# Patient Record
Sex: FEMALE | Race: WHITE | NOT HISPANIC OR LATINO | Employment: OTHER | URBAN - METROPOLITAN AREA
[De-identification: names, ages, dates, MRNs, and addresses within clinical notes are randomized per-mention and may not be internally consistent; named-entity substitution may affect disease eponyms.]

---

## 2017-03-01 ENCOUNTER — APPOINTMENT (OUTPATIENT)
Dept: CT IMAGING | Facility: CLINIC | Age: 77
End: 2017-03-01
Attending: EMERGENCY MEDICINE
Payer: MEDICARE

## 2017-03-01 ENCOUNTER — HOSPITAL ENCOUNTER (EMERGENCY)
Facility: CLINIC | Age: 77
Discharge: HOME | End: 2017-03-01
Attending: EMERGENCY MEDICINE | Admitting: EMERGENCY MEDICINE
Payer: MEDICARE

## 2017-03-01 VITALS
SYSTOLIC BLOOD PRESSURE: 126 MMHG | HEART RATE: 82 BPM | BODY MASS INDEX: 26.58 KG/M2 | RESPIRATION RATE: 16 BRPM | TEMPERATURE: 97.6 F | DIASTOLIC BLOOD PRESSURE: 81 MMHG | OXYGEN SATURATION: 97 % | HEIGHT: 63 IN | WEIGHT: 150 LBS

## 2017-03-01 DIAGNOSIS — R10.84 ABDOMINAL PAIN, GENERALIZED: ICD-10-CM

## 2017-03-01 LAB
ALBUMIN SERPL-MCNC: 3.5 G/DL (ref 3.4–5)
ALBUMIN UR-MCNC: NEGATIVE MG/DL
ALP SERPL-CCNC: 86 U/L (ref 40–150)
ALT SERPL W P-5'-P-CCNC: 26 U/L (ref 0–50)
ANION GAP SERPL CALCULATED.3IONS-SCNC: 8 MMOL/L (ref 3–14)
APPEARANCE UR: CLEAR
AST SERPL W P-5'-P-CCNC: 31 U/L (ref 0–45)
BASOPHILS # BLD AUTO: 0.1 10E9/L (ref 0–0.2)
BASOPHILS NFR BLD AUTO: 1.7 %
BILIRUB SERPL-MCNC: 0.8 MG/DL (ref 0.2–1.3)
BILIRUB UR QL STRIP: NEGATIVE
BUN SERPL-MCNC: 12 MG/DL (ref 7–30)
CALCIUM SERPL-MCNC: 8.8 MG/DL (ref 8.5–10.1)
CHLORIDE SERPL-SCNC: 101 MMOL/L (ref 94–109)
CO2 SERPL-SCNC: 28 MMOL/L (ref 20–32)
COLOR UR AUTO: YELLOW
CREAT SERPL-MCNC: 0.53 MG/DL (ref 0.52–1.04)
DIFFERENTIAL METHOD BLD: ABNORMAL
EOSINOPHIL # BLD AUTO: 0.2 10E9/L (ref 0–0.7)
EOSINOPHIL NFR BLD AUTO: 4 %
ERYTHROCYTE [DISTWIDTH] IN BLOOD BY AUTOMATED COUNT: 12.6 % (ref 10–15)
GFR SERPL CREATININE-BSD FRML MDRD: NORMAL ML/MIN/1.7M2
GLUCOSE SERPL-MCNC: 96 MG/DL (ref 70–99)
GLUCOSE UR STRIP-MCNC: NEGATIVE MG/DL
HCT VFR BLD AUTO: 42.6 % (ref 35–47)
HGB BLD-MCNC: 14.8 G/DL (ref 11.7–15.7)
HGB UR QL STRIP: NEGATIVE
IMM GRANULOCYTES # BLD: 0 10E9/L (ref 0–0.4)
IMM GRANULOCYTES NFR BLD: 0.2 %
KETONES UR STRIP-MCNC: NEGATIVE MG/DL
LACTATE BLD-SCNC: 1 MMOL/L (ref 0.7–2.1)
LEUKOCYTE ESTERASE UR QL STRIP: NEGATIVE
LIPASE SERPL-CCNC: 150 U/L (ref 73–393)
LYMPHOCYTES # BLD AUTO: 1.8 10E9/L (ref 0.8–5.3)
LYMPHOCYTES NFR BLD AUTO: 45.6 %
MCH RBC QN AUTO: 30.1 PG (ref 26.5–33)
MCHC RBC AUTO-ENTMCNC: 34.7 G/DL (ref 31.5–36.5)
MCV RBC AUTO: 87 FL (ref 78–100)
MONOCYTES # BLD AUTO: 0.4 10E9/L (ref 0–1.3)
MONOCYTES NFR BLD AUTO: 10 %
NEUTROPHILS # BLD AUTO: 1.5 10E9/L (ref 1.6–8.3)
NEUTROPHILS NFR BLD AUTO: 38.5 %
NITRATE UR QL: NEGATIVE
NRBC # BLD AUTO: 0 10*3/UL
NRBC BLD AUTO-RTO: 0 /100
PH UR STRIP: 7 PH (ref 5–7)
PLATELET # BLD AUTO: 246 10E9/L (ref 150–450)
POTASSIUM SERPL-SCNC: 3.5 MMOL/L (ref 3.4–5.3)
PROT SERPL-MCNC: 7.3 G/DL (ref 6.8–8.8)
RBC # BLD AUTO: 4.92 10E12/L (ref 3.8–5.2)
RBC #/AREA URNS AUTO: <1 /HPF (ref 0–2)
SODIUM SERPL-SCNC: 137 MMOL/L (ref 133–144)
SP GR UR STRIP: 1.01 (ref 1–1.03)
URN SPEC COLLECT METH UR: NORMAL
UROBILINOGEN UR STRIP-MCNC: NORMAL MG/DL (ref 0–2)
WBC # BLD AUTO: 4 10E9/L (ref 4–11)
WBC #/AREA URNS AUTO: <1 /HPF (ref 0–2)

## 2017-03-01 PROCEDURE — 81001 URINALYSIS AUTO W/SCOPE: CPT | Performed by: EMERGENCY MEDICINE

## 2017-03-01 PROCEDURE — 96375 TX/PRO/DX INJ NEW DRUG ADDON: CPT

## 2017-03-01 PROCEDURE — 83605 ASSAY OF LACTIC ACID: CPT | Performed by: EMERGENCY MEDICINE

## 2017-03-01 PROCEDURE — 96361 HYDRATE IV INFUSION ADD-ON: CPT

## 2017-03-01 PROCEDURE — 25500064 ZZH RX 255 OP 636: Performed by: EMERGENCY MEDICINE

## 2017-03-01 PROCEDURE — 96374 THER/PROPH/DIAG INJ IV PUSH: CPT

## 2017-03-01 PROCEDURE — 99285 EMERGENCY DEPT VISIT HI MDM: CPT | Mod: 25

## 2017-03-01 PROCEDURE — 25000128 H RX IP 250 OP 636: Performed by: EMERGENCY MEDICINE

## 2017-03-01 PROCEDURE — 85025 COMPLETE CBC W/AUTO DIFF WBC: CPT | Performed by: EMERGENCY MEDICINE

## 2017-03-01 PROCEDURE — 80053 COMPREHEN METABOLIC PANEL: CPT | Performed by: EMERGENCY MEDICINE

## 2017-03-01 PROCEDURE — 83690 ASSAY OF LIPASE: CPT | Performed by: EMERGENCY MEDICINE

## 2017-03-01 PROCEDURE — 74177 CT ABD & PELVIS W/CONTRAST: CPT

## 2017-03-01 RX ORDER — ONDANSETRON 4 MG/1
4 TABLET, ORALLY DISINTEGRATING ORAL EVERY 8 HOURS PRN
Qty: 10 TABLET | Refills: 0 | Status: SHIPPED | OUTPATIENT
Start: 2017-03-01 | End: 2017-03-04

## 2017-03-01 RX ORDER — MORPHINE SULFATE 4 MG/ML
4 INJECTION, SOLUTION INTRAMUSCULAR; INTRAVENOUS
Status: DISCONTINUED | OUTPATIENT
Start: 2017-03-01 | End: 2017-03-01 | Stop reason: HOSPADM

## 2017-03-01 RX ORDER — ATENOLOL 50 MG/1
50 TABLET ORAL DAILY
COMMUNITY
End: 2017-03-03 | Stop reason: ALTCHOICE

## 2017-03-01 RX ORDER — CALCIPOTRIENE 50 UG/G
CREAM TOPICAL 2 TIMES DAILY
COMMUNITY
End: 2017-06-02

## 2017-03-01 RX ORDER — OXYCODONE HYDROCHLORIDE 5 MG/1
5 TABLET ORAL EVERY 6 HOURS PRN
Qty: 20 TABLET | Refills: 0 | Status: SHIPPED | OUTPATIENT
Start: 2017-03-01 | End: 2018-05-25

## 2017-03-01 RX ORDER — SODIUM CHLORIDE 9 MG/ML
1000 INJECTION, SOLUTION INTRAVENOUS CONTINUOUS
Status: DISCONTINUED | OUTPATIENT
Start: 2017-03-01 | End: 2017-03-01 | Stop reason: HOSPADM

## 2017-03-01 RX ORDER — LIDOCAINE 40 MG/G
CREAM TOPICAL
Status: DISCONTINUED | OUTPATIENT
Start: 2017-03-01 | End: 2017-03-01 | Stop reason: HOSPADM

## 2017-03-01 RX ORDER — IOPAMIDOL 755 MG/ML
500 INJECTION, SOLUTION INTRAVASCULAR ONCE
Status: COMPLETED | OUTPATIENT
Start: 2017-03-01 | End: 2017-03-01

## 2017-03-01 RX ORDER — ONDANSETRON 2 MG/ML
4 INJECTION INTRAMUSCULAR; INTRAVENOUS EVERY 30 MIN PRN
Status: DISCONTINUED | OUTPATIENT
Start: 2017-03-01 | End: 2017-03-01 | Stop reason: HOSPADM

## 2017-03-01 RX ADMIN — SODIUM CHLORIDE 1000 ML: 9 INJECTION, SOLUTION INTRAVENOUS at 06:38

## 2017-03-01 RX ADMIN — MORPHINE SULFATE 4 MG: 4 INJECTION, SOLUTION INTRAMUSCULAR; INTRAVENOUS at 06:41

## 2017-03-01 RX ADMIN — ONDANSETRON 4 MG: 2 INJECTION INTRAMUSCULAR; INTRAVENOUS at 06:39

## 2017-03-01 RX ADMIN — SODIUM CHLORIDE 51 ML: 9 INJECTION, SOLUTION INTRAVENOUS at 07:37

## 2017-03-01 RX ADMIN — MORPHINE SULFATE 4 MG: 4 INJECTION, SOLUTION INTRAMUSCULAR; INTRAVENOUS at 07:08

## 2017-03-01 RX ADMIN — IOPAMIDOL 63 ML: 755 INJECTION, SOLUTION INTRAVENOUS at 07:37

## 2017-03-01 ASSESSMENT — ENCOUNTER SYMPTOMS
ABDOMINAL PAIN: 1
FEVER: 0
DYSURIA: 0
HEMATURIA: 0
VOMITING: 0
DIFFICULTY URINATING: 0

## 2017-03-01 NOTE — ED AVS SNAPSHOT
[facility]    [address]    [facility] [address]    Phone:  [phone]    Fax:  [phone]                                       Malika Velasco   MRN: [id number]    Department:  [facility]   Date of Visit:  3/1/2017           After Visit Summary Signature Page     I have received my discharge instructions, and my questions have been answered. I have discussed any challenges I see with this plan with the nurse or doctor.    ..........................................................................................................................................  Patient/Patient Representative Signature      ..........................................................................................................................................  Patient Representative Print Name and Relationship to Patient    ..................................................               ................................................  Date                                            Time    ..........................................................................................................................................  Reviewed by Signature/Title    ...................................................              ..............................................  Date                                                            Time

## 2017-03-01 NOTE — DISCHARGE INSTRUCTIONS
Abdominal Pain  Abdominal pain is pain in the stomach or intestinal area. Everyone has this pain from time to time. In many cases it goes away on its own. But abdominal pain can sometimes be due to a serious problem, such as appendicitis. So it s important to know when to seek help.  Causes of abdominal pain  There are many possible causes of abdominal pain. Common causes in adults include:    Constipation, diarrhea, or gas    GERD (gastroesophageal reflux disease) movement of stomach acid into the esophagus, also known as acid reflux or heartburn    Peptic ulcer (a sore in the lining of the stomach or small intestine)    Inflammation of the gallbladder, liver, or pancreas    Gallstones or kidney stones    Appendicitis     Obstruction of the intestines     Hernia (bulging of an internal organ through a muscle or other tissue)    Urinary tract infections    In women, menstrual cramps, fibroids, or endometriosis of the uterus    Inflammation or infection of the intestines  Diagnosing the cause of abdominal pain  Your health care provider will examine you to help find the cause of your pain. If needed, tests will be ordered. Because abdominal pain has so many possible causes, it can be hard to discover the reason for the pain. Giving details about your pain can help. Be ready to tell your health care provider where and when you feel the pain and what makes it better or worse. Also mention whether you have other symptoms such as fever, tiredness, nausea, vomiting, or changes in bathroom habits.  Treating abdominal pain  Certain causes of pain, such as appendicitis or a bowel obstruction, need emergency treatment. Other problems can be treated with rest, fluids, or medications. Your health care provider can give you specific instructions for treatment or self-care based on the cause of your pain.  If you have vomiting or diarrhea, sip water or other clear fluids. When you are ready to eat solid foods again, start with  small amounts of easy-to-digest, low-fat foods, such as applesauce, toast, or crackers.   When to call the doctor  Call 911 or go to the hospital right away if you:    Can t pass stool and are vomiting    Are vomiting blood or have black, tarry diarrhea    Also have chest, neck, or shoulder pain    Feel like you are about to pass out    Have pain in your shoulder blades with nausea    Have sudden, excruciating abdominal pain    Have new, severe pain unlike any you have felt before    Have a belly that is rigid, hard, and tender to touch  Call your doctor if you have:    Pain for more than 5 days    Bloating for more than 2 days    Diarrhea for more than 5 days    Fever of 101 F (38.3 C) or higher    Pain that continues to worsen    Unexplained weight loss    Continued lack of appetite    Blood in the stool  How to prevent abdominal pain  Here are some tips to help prevent abdominal pain:    Eat smaller amounts of food at one time.    Avoid greasy, fried, or other high-fat foods.    Avoid foods that give you gas.    Exercise regularly.    Drink plenty of fluids.  To help prevent symptoms of gastroesophageal reflux disease (GERD):    Quit smoking.    Reduce alcohol and certain foods that increase stomach acid.     Lose excess weight.    Finish eating at least 2 hours before you go to bed or lie down.    Elevate the head of your bed.    0636-6120 [employer]. [address] [place] [address], [place] [id number]. All rights reserved. This information is not intended as a substitute for professional medical care. Always follow your healthcare professional's instructions.        Clear Liquid Diet    Clear liquids are any liquid that you can see through as well as those that are very easy to digest. This is used while the body is recovering from irritation or infection of the stomach or intestinal tract. It may also be used before special procedures or surgery. This diet is to be used no more than 3 days. You may  include the following items.  Adults and children over 2 years old  Adults should drink a total of 2 to 3 quarts of liquid per day. It may be easier to drink small frequent servings rather than a few large ones. Liquids can include:    Fruit juices. Strained orange juice or lemonade (no pulp), apple, grape and cranberry juice, clear fruit drinks    Beverages. Sport drinks, sodas, mineral water (plain or flavored), tea, black coffee, liquid gelatin (add twice the recommended amount of water)    Soups. Clear broth, consommé, bouillon    Desserts. Plain gelatin, popsicles, fruit juice bars  Children under 2 years old  Oral rehydration fluids such are available at drug stores and most grocery stores without a prescription.    4123-5402 [employer]. [address] [place] [address]. All rights reserved. This information is not intended as a substitute for professional medical care. Always follow your healthcare professional's instructions.    Opioid Medication Information    You have been given a prescription for an opioid (narcotic) pain medicine and/or have received a pain medicine while here in the Emergency Department. These medicines can make you drowsy or impaired. You must not drive, operate dangerous equipment, or engage in any other dangerous activities while taking these medications. If you drive while taking these medications, you could be arrested for DUI, or driving under the influence. Do not drink any alcohol while you are taking these medications.   Opioid pain medications can cause addiction. If you have a history of chemical dependency of any type, you are at a higher risk of becoming addicted to pain medications.  Only take these prescribed medications to treat your pain when all other options have been tried. Take it for as short a time and as few doses as possible. Store your pain pills in a secure place, as they are frequently stolen and provide a dangerous opportunity for  children or visitors in your house to start abusing these powerful medications. We will not replace any lost or stolen medicine.  As soon as your pain is better, you should flush all your remaining medication.   Many prescription pain medications contain Tylenol  (acetaminophen), including Vicodin , Tylenol #3 , Norco , Lortab , and Percocet .  You should not take any extra pills of Tylenol  if you are using these prescription medications or you can get very sick.  Do not ever take more than 4000 mg of acetaminophen in any 24 hour period.  All opioids tend to cause constipation. Drink plenty of water and eat foods that have a lot of fiber, such as fruits, vegetables, prune juice, apple juice and high fiber cereal.  Take a laxative if you don t move your bowels at least every other day. Miralax , Milk of Magnesia, Colace , or Senna  can be used to keep you regular.

## 2017-03-01 NOTE — ED AVS SNAPSHOT
[facility] Emergency Department    [address] [place] [address]    Phone:  [phone]    Fax:  [phone]                                       Malika Velasco   MRN: [id number]    Department:  [facility] Emergency Department   Date of Visit:  3/1/2017           Patient Information     Date Of Birth          1940        Your diagnoses for this visit were:     Abdominal pain, generalized        You were seen by Ralph Stanton MD.      Follow-up Information     Follow up with Kita Jacques MD. Go in 2 days.    Specialty:  Family Practice    Contact information:    [place]  [address]     [place] [address]  [phone]          Discharge Instructions         Abdominal Pain  Abdominal pain is pain in the stomach or intestinal area. Everyone has this pain from time to time. In many cases it goes away on its own. But abdominal pain can sometimes be due to a serious problem, such as appendicitis. So it s important to know when to seek help.  Causes of abdominal pain  There are many possible causes of abdominal pain. Common causes in adults include:    Constipation, diarrhea, or gas    GERD (gastroesophageal reflux disease) movement of stomach acid into the esophagus, also known as acid reflux or heartburn    Peptic ulcer (a sore in the lining of the stomach or small intestine)    Inflammation of the gallbladder, liver, or pancreas    Gallstones or kidney stones    Appendicitis     Obstruction of the intestines     Hernia (bulging of an internal organ through a muscle or other tissue)    Urinary tract infections    In women, menstrual cramps, fibroids, or endometriosis of the uterus    Inflammation or infection of the intestines  Diagnosing the cause of abdominal pain  Your health care provider will examine you to help find the cause of your pain. If needed, tests will be ordered. Because abdominal pain has so many possible causes, it can be  hard to discover the reason for the pain. Giving details about your pain can help. Be ready to tell your health care provider where and when you feel the pain and what makes it better or worse. Also mention whether you have other symptoms such as fever, tiredness, nausea, vomiting, or changes in bathroom habits.  Treating abdominal pain  Certain causes of pain, such as appendicitis or a bowel obstruction, need emergency treatment. Other problems can be treated with rest, fluids, or medications. Your health care provider can give you specific instructions for treatment or self-care based on the cause of your pain.  If you have vomiting or diarrhea, sip water or other clear fluids. When you are ready to eat solid foods again, start with small amounts of easy-to-digest, low-fat foods, such as applesauce, toast, or crackers.   When to call the doctor  Call 911 or go to the hospital right away if you:    Can t pass stool and are vomiting    Are vomiting blood or have black, tarry diarrhea    Also have chest, neck, or shoulder pain    Feel like you are about to pass out    Have pain in your shoulder blades with nausea    Have sudden, excruciating abdominal pain    Have new, severe pain unlike any you have felt before    Have a belly that is rigid, hard, and tender to touch  Call your doctor if you have:    Pain for more than 5 days    Bloating for more than 2 days    Diarrhea for more than 5 days    Fever of 101 F (38.3 C) or higher    Pain that continues to worsen    Unexplained weight loss    Continued lack of appetite    Blood in the stool  How to prevent abdominal pain  Here are some tips to help prevent abdominal pain:    Eat smaller amounts of food at one time.    Avoid greasy, fried, or other high-fat foods.    Avoid foods that give you gas.    Exercise regularly.    Drink plenty of fluids.  To help prevent symptoms of gastroesophageal reflux disease (GERD):    Quit smoking.    Reduce alcohol and certain foods that  increase stomach acid.     Lose excess weight.    Finish eating at least 2 hours before you go to bed or lie down.    Elevate the head of your bed.    7671-9695 The "[employer]. [address] [place] [address]. All rights reserved. This information is not intended as a substitute for professional medical care. Always follow your healthcare professional's instructions.        Clear Liquid Diet    Clear liquids are any liquid that you can see through as well as those that are very easy to digest. This is used while the body is recovering from irritation or infection of the stomach or intestinal tract. It may also be used before special procedures or surgery. This diet is to be used no more than 3 days. You may include the following items.  Adults and children over 2 years old  Adults should drink a total of 2 to 3 quarts of liquid per day. It may be easier to drink small frequent servings rather than a few large ones. Liquids can include:    Fruit juices. Strained orange juice or lemonade (no pulp), apple, grape and cranberry juice, clear fruit drinks    Beverages. Sport drinks, sodas, mineral water (plain or flavored), tea, black coffee, liquid gelatin (add twice the recommended amount of water)    Soups. Clear broth, consommé, bouillon    Desserts. Plain gelatin, popsicles, fruit juice bars  Children under 2 years old  Oral rehydration fluids such are available at drug stores and most grocery stores without a prescription.    0116-7853 The "[employer]. [address] [place] [address]. All rights reserved. This information is not intended as a substitute for professional medical care. Always follow your healthcare professional's instructions.    Opioid Medication Information    You have been given a prescription for an opioid (narcotic) pain medicine and/or have received a pain medicine while here in the Emergency Department. These medicines can make you drowsy or impaired. You  must not drive, operate dangerous equipment, or engage in any other dangerous activities while taking these medications. If you drive while taking these medications, you could be arrested for DUI, or driving under the influence. Do not drink any alcohol while you are taking these medications.   Opioid pain medications can cause addiction. If you have a history of chemical dependency of any type, you are at a higher risk of becoming addicted to pain medications.  Only take these prescribed medications to treat your pain when all other options have been tried. Take it for as short a time and as few doses as possible. Store your pain pills in a secure place, as they are frequently stolen and provide a dangerous opportunity for children or visitors in your house to start abusing these powerful medications. We will not replace any lost or stolen medicine.  As soon as your pain is better, you should flush all your remaining medication.   Many prescription pain medications contain Tylenol  (acetaminophen), including Vicodin , Tylenol #3 , Norco , Lortab , and Percocet .  You should not take any extra pills of Tylenol  if you are using these prescription medications or you can get very sick.  Do not ever take more than 4000 mg of acetaminophen in any 24 hour period.  All opioids tend to cause constipation. Drink plenty of water and eat foods that have a lot of fiber, such as fruits, vegetables, prune juice, apple juice and high fiber cereal.  Take a laxative if you don t move your bowels at least every other day. Miralax , Milk of Magnesia, Colace , or Senna  can be used to keep you regular.              24 Hour Appointment Hotline       To make an appointment at any [facility], call [phone] ([phone]). If you don't have a family doctor or clinic, we will help you find one. [facility] are conveniently located to serve the needs of you and your family.             Review of your medicines      START  taking        Dose / Directions Last dose taken    ondansetron 4 MG ODT tab   Commonly known as:  ZOFRAN ODT   Dose:  4 mg   Quantity:  10 tablet        Take 1 tablet (4 mg) by mouth every 8 hours as needed for nausea   Refills:  0        oxyCODONE 5 MG IR tablet   Commonly known as:  ROXICODONE   Dose:  5 mg   Quantity:  20 tablet        Take 1 tablet (5 mg) by mouth every 6 hours as needed for pain   Refills:  0          Our records show that you are taking the medicines listed below. If these are incorrect, please call your family doctor or clinic.        Dose / Directions Last dose taken    atenolol 50 MG tablet   Commonly known as:  TENORMIN   Dose:  50 mg        Take 50 mg by mouth daily   Refills:  0        calcipotriene 0.005 % cream   Commonly known as:  DOVONOX        Apply topically 2 times daily   Refills:  0        MELATONIN PO   Dose:  5 mg        Take 5 mg by mouth At Bedtime   Refills:  0        SIMVASTATIN PO   Dose:  5 mg        Take 5 mg by mouth   Refills:  0                Prescriptions were sent or printed at these locations (2 Prescriptions)                   Other Prescriptions                Printed at Department/Unit printer (2 of 2)         ondansetron (ZOFRAN ODT) 4 MG ODT tab               oxyCODONE (ROXICODONE) 5 MG IR tablet                Procedures and tests performed during your visit     CBC with platelets differential    CT Abdomen Pelvis w Contrast    Comprehensive metabolic panel    Lactic acid whole blood    Lipase    Peripheral IV: Standard    Pulse oximetry nursing    UA with Microscopic      Orders Needing Specimen Collection     None      Pending Results     No orders found from 2/27/2017 to 3/2/2017.            Pending Culture Results     No orders found from 2/27/2017 to 3/2/2017.             Test Results from your hospital stay     3/1/2017  6:55 AM - Interface, Soluble Systems Results      Component Results     Component Value Ref Range & Units Status    WBC 4.0 4.0 - 11.0  10e9/L Final    RBC Count 4.92 3.8 - 5.2 10e12/L Final    Hemoglobin 14.8 11.7 - 15.7 g/dL Final    Hematocrit 42.6 35.0 - 47.0 % Final    MCV 87 78 - 100 fl Final    MCH 30.1 26.5 - 33.0 pg Final    MCHC 34.7 31.5 - 36.5 g/dL Final    RDW 12.6 10.0 - 15.0 % Final    Platelet Count 246 150 - 450 10e9/L Final    Diff Method Automated Method  Final    % Neutrophils 38.5 % Final    % Lymphocytes 45.6 % Final    % Monocytes 10.0 % Final    % Eosinophils 4.0 % Final    % Basophils 1.7 % Final    % Immature Granulocytes 0.2 % Final    Nucleated RBCs 0 0 /100 Final    Absolute Neutrophil 1.5 (L) 1.6 - 8.3 10e9/L Final    Absolute Lymphocytes 1.8 0.8 - 5.3 10e9/L Final    Absolute Monocytes 0.4 0.0 - 1.3 10e9/L Final    Absolute Eosinophils 0.2 0.0 - 0.7 10e9/L Final    Absolute Basophils 0.1 0.0 - 0.2 10e9/L Final    Abs Immature Granulocytes 0.0 0 - 0.4 10e9/L Final    Absolute Nucleated RBC 0.0  Final         3/1/2017  7:13 AM - Interface, Flexilab Results      Component Results     Component Value Ref Range & Units Status    Sodium 137 133 - 144 mmol/L Final    Potassium 3.5 3.4 - 5.3 mmol/L Final    Specimen slightly hemolyzed, potassium may be falsely elevated    Chloride 101 94 - 109 mmol/L Final    Carbon Dioxide 28 20 - 32 mmol/L Final    Anion Gap 8 3 - 14 mmol/L Final    Glucose 96 70 - 99 mg/dL Final    Urea Nitrogen 12 7 - 30 mg/dL Final    Creatinine 0.53 0.52 - 1.04 mg/dL Final    GFR Estimate >90  Non  GFR Calc   >60 mL/min/1.7m2 Final    GFR Estimate If Black >90   GFR Calc   >60 mL/min/1.7m2 Final    Calcium 8.8 8.5 - 10.1 mg/dL Final    Bilirubin Total 0.8 0.2 - 1.3 mg/dL Final    Albumin 3.5 3.4 - 5.0 g/dL Final    Protein Total 7.3 6.8 - 8.8 g/dL Final    Alkaline Phosphatase 86 40 - 150 U/L Final    ALT 26 0 - 50 U/L Final    AST 31 0 - 45 U/L Final    Specimen slightly hemolyzed         3/1/2017  7:13 AM - Interface, Flexilab Results      Component Results     Component  Value Ref Range & Units Status    Lipase 150 73 - 393 U/L Final         3/1/2017  6:59 AM - Interface, Flexilab Results      Component Results     Component Value Ref Range & Units Status    Lactic Acid 1.0 0.7 - 2.1 mmol/L Final         3/1/2017  9:03 AM - Interface, Flexilab Results      Component Results     Component Value Ref Range & Units Status    Color Urine Yellow  Final    Appearance Urine Clear  Final    Glucose Urine Negative NEG mg/dL Final    Bilirubin Urine Negative NEG Final    Ketones Urine Negative NEG mg/dL Final    Specific Gravity Urine 1.011 1.003 - 1.035 Final    Blood Urine Negative NEG Final    pH Urine 7.0 5.0 - 7.0 pH Final    Protein Albumin Urine Negative NEG mg/dL Final    Urobilinogen mg/dL Normal 0.0 - 2.0 mg/dL Final    Nitrite Urine Negative NEG Final    Leukocyte Esterase Urine Negative NEG Final    Source Midstream Urine  Final    WBC Urine <1 0 - 2 /HPF Final    RBC Urine <1 0 - 2 /HPF Final         3/1/2017  7:54 AM - Interface, Radiant Ib      Narrative     CT ABDOMEN AND PELVIS WITH CONTRAST March 1, 2017 7:44 AM    HISTORY: Mid abdominal pain.    COMPARISON: None.    TECHNIQUE: Routine transverse CT imaging of the abdomen and pelvis was  performed following the uneventful administration of 63mL Isovue-370  intravenous contrast. Radiation dose for this scan was reduced using  automated exposure control, adjustment of the mA and/or kV according  to patient size, or iterative reconstruction technique.    FINDINGS: There is mild atelectasis in both lung bases. The liver,  spleen, pancreas, gallbladder, adrenal glands, kidneys, and bladder  are normal. No enlarged lymph node or other abnormal mass is  demonstrated. No free fluid is seen. No free intraperitoneal gas is  identified. The gastrointestinal tract is unremarkable. The appendix  is not identified. There is no additional evidence of appendicitis. No  vascular abnormality is seen. There degenerative changes in the  spine.  The osseous structures are otherwise unremarkable. No abdominal or  pelvic wall pathology is demonstrated.         Impression     IMPRESSION: Unremarkable CT of the abdomen and pelvis.     BRITNEY GUTIÉRREZ MD                Clinical Quality Measure: Blood Pressure Screening     Your blood pressure was checked while you were in the emergency department today. The last reading we obtained was  BP: 123/68 . Please read the guidelines below about what these numbers mean and what you should do about them.  If your systolic blood pressure (the top number) is less than 120 and your diastolic blood pressure (the bottom number) is less than 80, then your blood pressure is normal. There is nothing more that you need to do about it.  If your systolic blood pressure (the top number) is 120-139 or your diastolic blood pressure (the bottom number) is 80-89, your blood pressure may be higher than it should be. You should have your blood pressure rechecked within a year by a primary care provider.  If your systolic blood pressure (the top number) is 140 or greater or your diastolic blood pressure (the bottom number) is 90 or greater, you may have high blood pressure. High blood pressure is treatable, but if left untreated over time it can put you at risk for heart attack, stroke, or kidney failure. You should have your blood pressure rechecked by a primary care provider within the next 4 weeks.  If your provider in the emergency department today gave you specific instructions to follow-up with your doctor or provider even sooner than that, you should follow that instruction and not wait for up to 4 weeks for your follow-up visit.        Thank you for choosing [facility]       Thank you for choosing [facility] for your care. Our goal is always to provide you with excellent care. Hearing back from our patients is one way we can continue to improve our services. Please take a few minutes to complete the written survey that you  "may receive in the mail after you visit with us. Thank you!        [employer] Information     Gr[employer] lets you send messages to your doctor, view your test results, renew your prescriptions, schedule appointments and more. To sign up, go to [email or web address] . Click on \"Log in\" on the left side of the screen, which will take you to the Welcome page. Then click on \"Sign up Now\" on the right side of the page.     You will be asked to enter the access code listed below, as well as some personal information. Please follow the directions to create your username and password.     Your access code is: [email or web address]  Expires: 2017  9:05 AM     Your access code will  in 90 days. If you need help or a new code, please call your [facility] clinic or [phone].        Care EveryWhere ID     This is your Care EveryWhere ID. This could be used by other organizations to access your [facility] medical records  [id number]        After Visit Summary       This is your record. Keep this with you and show to your community pharmacist(s) and doctor(s) at your next visit.                  "

## 2017-03-03 ENCOUNTER — OFFICE VISIT (OUTPATIENT)
Dept: INTERNAL MEDICINE | Facility: CLINIC | Age: 77
End: 2017-03-03
Payer: COMMERCIAL

## 2017-03-03 DIAGNOSIS — Z85.828 HISTORY OF SKIN CANCER: ICD-10-CM

## 2017-03-03 DIAGNOSIS — Z95.0 PACEMAKER: ICD-10-CM

## 2017-03-03 DIAGNOSIS — R10.13 EPIGASTRIC PAIN: Primary | ICD-10-CM

## 2017-03-03 DIAGNOSIS — M19.91 PRIMARY OSTEOARTHRITIS, UNSPECIFIED SITE: ICD-10-CM

## 2017-03-03 DIAGNOSIS — E78.5 HYPERLIPIDEMIA LDL GOAL <130: ICD-10-CM

## 2017-03-03 DIAGNOSIS — I10 BENIGN ESSENTIAL HYPERTENSION: ICD-10-CM

## 2017-03-03 DIAGNOSIS — M54.50 LOW BACK PAIN WITHOUT SCIATICA, UNSPECIFIED BACK PAIN LATERALITY, UNSPECIFIED CHRONICITY: ICD-10-CM

## 2017-03-03 PROCEDURE — 99203 OFFICE O/P NEW LOW 30 MIN: CPT | Performed by: INTERNAL MEDICINE

## 2017-03-03 RX ORDER — FLUTICASONE PROPIONATE 50 MCG
1 SPRAY, SUSPENSION (ML) NASAL DAILY
COMMUNITY
End: 2020-02-04

## 2017-03-03 RX ORDER — CHLORAL HYDRATE 500 MG
2 CAPSULE ORAL DAILY
COMMUNITY

## 2017-03-03 RX ORDER — MULTIPLE VITAMINS W/ MINERALS TAB 9MG-400MCG
1 TAB ORAL DAILY
COMMUNITY

## 2017-03-03 RX ORDER — ATENOLOL 50 MG/1
50 TABLET ORAL DAILY
COMMUNITY
End: 2018-01-25

## 2017-03-03 RX ORDER — ATENOLOL AND CHLORTHALIDONE TABLET 100; 25 MG/1; MG/1
1 TABLET ORAL DAILY
COMMUNITY
End: 2017-12-13

## 2017-03-03 NOTE — MR AVS SNAPSHOT
After Visit Summary   3/3/2017    Malika Velasco    MRN: [id number]           Patient Information     Date Of Birth          1940        Visit Information        Provider Department      3/3/2017 1:00 PM Karen Navarro MD [facility]        Today's Diagnoses     Epigastric pain    -  1    Pacemaker        History of skin cancer           Follow-ups after your visit        Additional Services     CARDIOLOGY EVAL ADULT REFERRAL       Your provider has referred you to:  [facility]: [facility] [phone]   [email or web address]    Please be aware that coverage of these services is subject to the terms and limitations of your health insurance plan.  Call member services at your health plan with any benefit or coverage questions.      Type of Referral:  New Cardiology Consult, follow up pacemaker    Timeframe requested:  Within 1 month      Please bring the following to your appointment:  >>   Any x-rays, CTs or MRIs which have been performed.  Contact the facility where they were done to arrange for  prior to your scheduled appointment.    >>   List of current medications  >>   This referral request   >>   Any documents/labs given to you for this referral            DERMATOLOGY REFERRAL       Your provider has referred you to: [facility] [phone]  [email or web address]   [facility] [phone]   Please be aware that coverage of these services is subject to the terms and limitations of your health insurance plan.  Call member services at your health plan with any benefit or coverage questions.      Please bring the following with you to your appointment:    (1) Any X-Rays, CTs or MRIs which have been performed.  Contact the facility where they were done to arrange for  prior to your scheduled appointment.   "  (2) List of current medications  (3) This referral request   (4) Any documents/labs given to you for this referral                  Future tests that were ordered for you today     Open Future Orders        Priority Expected Expires Ordered    NM HepatOBiliary Scan w CCK Routine  3/3/2018 3/3/2017            Who to contact     If you have questions or need follow up information about today's clinic visit or your schedule please contact [facility] directly at [phone].  Normal or non-critical lab and imaging results will be communicated to you by Cleversafehart, letter or phone within 4 business days after the clinic has received the results. If you do not hear from us within 7 days, please contact the clinic through GlySenst or phone. If you have a critical or abnormal lab result, we will notify you by phone as soon as possible.  Submit refill requests through Oink or call your pharmacy and they will forward the refill request to us. Please allow 3 business days for your refill to be completed.          Additional Information About Your Visit        Oink Information     Oink lets you send messages to your doctor, view your test results, renew your prescriptions, schedule appointments and more. To sign up, go to [email or web address] . Click on \"Log in\" on the left side of the screen, which will take you to the Welcome page. Then click on \"Sign up Now\" on the right side of the page.     You will be asked to enter the access code listed below, as well as some personal information. Please follow the directions to create your username and password.     Your access code is: [email or web address]  Expires: 2017  9:05 AM     Your access code will  in 90 days. If you need help or a new code, please call your [facility] or [phone].        Care EveryWhere ID     This is your Care EveryWhere ID. This could be used by other organizations to access your [facility] medical " "records  [id number]        Your Vitals Were     Pulse Temperature Height Pulse Oximetry BMI (Body Mass Index)       87 97.7  F (36.5  C) (Oral) 5' 2.6\" (1.59 m) 98% 27.61 kg/m2        Blood Pressure from Last 3 Encounters:   03/03/17 130/90   03/01/17 126/81    Weight from Last 3 Encounters:   03/03/17 153 lb 14.4 oz (69.8 kg)   03/01/17 150 lb (68 kg)              We Performed the Following     CARDIOLOGY EVAL ADULT REFERRAL     DERMATOLOGY REFERRAL          Today's Medication Changes          These changes are accurate as of: 3/3/17  1:59 PM.  If you have any questions, ask your nurse or doctor.               These medicines have changed or have updated prescriptions.        Dose/Directions    atenolol 50 MG tablet   Commonly known as:  TENORMIN   This may have changed:  Another medication with the same name was removed. Continue taking this medication, and follow the directions you see here.   Changed by:  Karen Navarro MD        Dose:  50 mg   Take 50 mg by mouth daily   Refills:  0                Primary Care Provider Office Phone # Fax #    Karen Navarro [phone]       [facility] [address]  [facility] [address]        Thank you!     Thank you for choosing [facility]  for your care. Our goal is always to provide you with excellent care. Hearing back from our patients is one way we can continue to improve our services. Please take a few minutes to complete the written survey that you may receive in the mail after your visit with us. Thank you!             Your Updated Medication List - Protect others around you: Learn how to safely use, store and throw away your medicines at www.disposemymeds.org.          This list is accurate as of: 3/3/17  1:59 PM.  Always use your most recent med list.                   Brand Name Dispense Instructions for use    aspirin-acetaminophen-caffeine 250-250-65 MG per tablet    EXCEDRIN MIGRAINE     Take 1 tablet by " mouth every 6 hours as needed for headaches       atenolol 50 MG tablet    TENORMIN     Take 50 mg by mouth daily       atenolol-chlorthalidone 100-25 MG per tablet    TENORETIC     Take 1 tablet by mouth daily       calcipotriene 0.005 % cream    DOVONOX     Apply topically 2 times daily       CALCIUM 600/VITAMIN D3 600-800 MG-UNIT Tabs   Generic drug:  Calcium Carb-Cholecalciferol          FEXOFENADINE HCL PO      Take 180 mg by mouth daily       fish oil-omega-3 fatty acids 1000 MG capsule      Take 2 g by mouth daily       fluticasone 50 MCG/ACT spray    FLONASE     Spray 1 spray into both nostrils daily       MELATONIN PO      Take 5 mg by mouth At Bedtime       Multi-vitamin Tabs tablet      Take 1 tablet by mouth daily       ondansetron 4 MG ODT tab    ZOFRAN ODT    10 tablet    Take 1 tablet (4 mg) by mouth every 8 hours as needed for nausea       oxyCODONE 5 MG IR tablet    ROXICODONE    20 tablet    Take 1 tablet (5 mg) by mouth every 6 hours as needed for pain       SIMVASTATIN PO      Take 5 mg by mouth

## 2017-03-03 NOTE — NURSING NOTE
"Chief Complaint   Patient presents with     Hospital F/U     Establish Care       Initial /90  Pulse 87  Temp 97.7  F (36.5  C) (Oral)  Ht 5' 2.6\" (1.59 m)  Wt 153 lb 14.4 oz (69.8 kg)  SpO2 98%  BMI 27.61 kg/m2 Estimated body mass index is 27.61 kg/(m^2) as calculated from the following:    Height as of this encounter: 5' 2.6\" (1.59 m).    Weight as of this encounter: 153 lb 14.4 oz (69.8 kg).  Medication Reconciliation: complete    "

## 2017-03-03 NOTE — PROGRESS NOTES
SUBJECTIVE:                                                    Malika Velasco is a 76 year old female who presents to clinic today for the following health issues:    She is a new patient to this clinic.     ED/UC Followup:    Facility:  [facility]  Date of visit: 03/01/17  Reason for visit: Abdominal pain  Current Status: she still has occasional pain right upper abdomen. It is a few minutes duration, often after dinner. No associated symptoms. It was much worse when she went to ED. No associated nausea.        Problems:   1. HTN; controlled  2. Hyperlipidemia: controlled  3. Pacemaker; needs cardiologist  4. Previous skin cancer: needs dermatologist.   5. Chronic back pain, and OA, sees [facility] spine and [facility]      Past Medical History   Diagnosis Date     Allergic rhinitis      Anxiety      Aortic valve sclerosis      Back pain      GERD (gastroesophageal reflux disease)      Heart block AV complete (H) 2008     pacemaker     Hyperlipidemia      Hypertension      Major depression      Osteoarthritis      Squamous cell carcinoma, face       Current Outpatient Prescriptions   Medication Sig Dispense Refill     simvastatin (ZOCOR) 5 MG tablet Take 1 tablet (5 mg) by mouth every evening 90 tablet 1     atenolol-chlorthalidone (TENORETIC) 100-25 MG per tablet Take 1 tablet by mouth daily       atenolol (TENORMIN) 50 MG tablet Take 50 mg by mouth daily       fluticasone (FLONASE) 50 MCG/ACT spray Spray 1 spray into both nostrils daily       aspirin-acetaminophen-caffeine (EXCEDRIN MIGRAINE) 250-250-65 MG per tablet Take 1 tablet by mouth every 6 hours as needed for headaches       FEXOFENADINE HCL PO Take 180 mg by mouth daily       multivitamin, therapeutic with minerals (MULTI-VITAMIN) TABS tablet Take 1 tablet by mouth daily       Calcium Carb-Cholecalciferol (CALCIUM 600/VITAMIN D3) 600-800 MG-UNIT TABS        fish oil-omega-3 fatty acids 1000 MG capsule Take 2 g by mouth daily       calcipotriene (DOVONOX) 0.005  "% cream Apply topically 2 times daily       MELATONIN PO Take 5 mg by mouth At Bedtime       oxyCODONE (ROXICODONE) 5 MG IR tablet Take 1 tablet (5 mg) by mouth every 6 hours as needed for pain 20 tablet 0     [DISCONTINUED] SIMVASTATIN PO Take 5 mg by mouth        Family History   Problem Relation Age of Onset     HEART DISEASE Mother      Dementia Mother      HEART DISEASE Father       Past Surgical History   Procedure Laterality Date     Implant pacemaker  1997     Tonsillectomy       Tubal ligation       Arthroscopy knee Left      As total knee arthroplasty Left 05/2015        Social History   Substance Use Topics     Smoking status: Former Smoker     Smokeless tobacco: Not on file     Alcohol use Yes      Comment: rarely             ROS:  Lungs: clear  CV: normal S1, S2 without murmur, S3 or S4.   Abdomen: Bowel sounds normal, soft, nontender. No hepatosplenomegaly. No masses.   No edema  Pulses full, no carotid bruits      OBJECTIVE:                                                    /89  Pulse 87  Temp 97.7  F (36.5  C) (Oral)  Ht 5' 2.6\" (1.59 m)  Wt 153 lb 14.4 oz (69.8 kg)  SpO2 98%  BMI 27.61 kg/m2  Body mass index is 27.61 kg/(m^2).      Lungs: clear  CV: normal S1, S2 without murmur, S3 or S4.   Abdomen: Bowel sounds normal, soft, nontender. No hepatosplenomegaly. No masses.   No edema  Pulses full, no carotid bruits       ASSESSMENT/PLAN:                                                            1. Epigastric pain  May be muscle strain but suggest HIDA since some relation to meals  - NM HepatOBiliary Scan w [facility]; Future    2. Benign essential hypertension  controlled    3. Pacemaker  Refer   - CARDIOLOGY EVAL ADULT REFERRAL    4. History of skin cancer  refer  - DERMATOLOGY REFERRAL    5. Hyperlipidemia LDL goal <130  Stable, labs were last done 12/16, do in Dec  - simvastatin (ZOCOR) 5 MG tablet; Take 1 tablet (5 mg) by mouth every evening  Dispense: 90 tablet; Refill: 1    6. Primary " osteoarthritis, unspecified site  Stable, follow up ortho    7. Low back pain without sciatica, unspecified back pain laterality, unspecified chronicity  Stable, follow up spine.         Karen Navarro MD  [facility]        Please abstract the following data from this visit with this patient into the appropriate field in Epic:    Colonoscopy done on this date: 12/3/12  (approximately), by this group: [facility], results were normal.   Mammogram done on this date: 12/12/16 (approximately), by this group: [facility], results were normal.

## 2017-03-08 ENCOUNTER — HOSPITAL ENCOUNTER (OUTPATIENT)
Dept: NUCLEAR MEDICINE | Facility: CLINIC | Age: 77
Setting detail: NUCLEAR MEDICINE
Discharge: HOME | End: 2017-03-08
Attending: INTERNAL MEDICINE | Admitting: INTERNAL MEDICINE
Payer: MEDICARE

## 2017-03-08 DIAGNOSIS — R10.13 EPIGASTRIC PAIN: ICD-10-CM

## 2017-03-08 PROCEDURE — 78227 HEPATOBIL SYST IMAGE W/DRUG: CPT

## 2017-03-08 PROCEDURE — 34300033 ZZH RX 343: Performed by: INTERNAL MEDICINE

## 2017-03-08 PROCEDURE — A9537 TC99M MEBROFENIN: HCPCS | Performed by: INTERNAL MEDICINE

## 2017-03-08 RX ORDER — KIT FOR THE PREPARATION OF TECHNETIUM TC 99M MEBROFENIN 45 MG/10ML
6 INJECTION, POWDER, LYOPHILIZED, FOR SOLUTION INTRAVENOUS ONCE
Status: COMPLETED | OUTPATIENT
Start: 2017-03-08 | End: 2017-03-08

## 2017-03-08 RX ADMIN — MEBROFENIN 6 MCI.: 45 INJECTION, POWDER, LYOPHILIZED, FOR SOLUTION INTRAVENOUS at 13:42

## 2017-03-09 VITALS
HEART RATE: 87 BPM | DIASTOLIC BLOOD PRESSURE: 89 MMHG | WEIGHT: 153.9 LBS | BODY MASS INDEX: 27.27 KG/M2 | OXYGEN SATURATION: 98 % | SYSTOLIC BLOOD PRESSURE: 130 MMHG | HEIGHT: 63 IN | TEMPERATURE: 97.7 F

## 2017-03-09 PROBLEM — E78.5 HYPERLIPIDEMIA LDL GOAL <130: Status: ACTIVE | Noted: 2017-03-09

## 2017-03-09 PROBLEM — M85.80 OSTEOPENIA: Status: ACTIVE | Noted: 2017-03-09

## 2017-03-09 RX ORDER — SIMVASTATIN 5 MG
5 TABLET ORAL EVERY EVENING
Qty: 90 TABLET | Refills: 1
Start: 2017-03-09 | End: 2017-03-27

## 2017-03-27 ENCOUNTER — OFFICE VISIT (OUTPATIENT)
Dept: CARDIOLOGY | Facility: CLINIC | Age: 77
End: 2017-03-27
Attending: INTERNAL MEDICINE
Payer: COMMERCIAL

## 2017-03-27 ENCOUNTER — ALLIED HEALTH/NURSE VISIT (OUTPATIENT)
Dept: CARDIOLOGY | Facility: CLINIC | Age: 77
End: 2017-03-27
Payer: COMMERCIAL

## 2017-03-27 VITALS
SYSTOLIC BLOOD PRESSURE: 124 MMHG | HEIGHT: 62 IN | BODY MASS INDEX: 28.71 KG/M2 | HEART RATE: 70 BPM | DIASTOLIC BLOOD PRESSURE: 78 MMHG | WEIGHT: 156 LBS

## 2017-03-27 DIAGNOSIS — I44.2 COMPLETE HEART BLOCK (H): ICD-10-CM

## 2017-03-27 DIAGNOSIS — I10 ESSENTIAL HYPERTENSION: Primary | ICD-10-CM

## 2017-03-27 DIAGNOSIS — Z95.0 CARDIAC PACEMAKER IN SITU: Primary | ICD-10-CM

## 2017-03-27 DIAGNOSIS — I25.10 ATHEROSCLEROSIS OF NATIVE CORONARY ARTERY OF NATIVE HEART WITHOUT ANGINA PECTORIS: ICD-10-CM

## 2017-03-27 PROCEDURE — 99204 OFFICE O/P NEW MOD 45 MIN: CPT | Mod: 25 | Performed by: INTERNAL MEDICINE

## 2017-03-27 PROCEDURE — 93280 PM DEVICE PROGR EVAL DUAL: CPT | Performed by: INTERNAL MEDICINE

## 2017-03-27 PROCEDURE — 93000 ELECTROCARDIOGRAM COMPLETE: CPT | Performed by: INTERNAL MEDICINE

## 2017-03-27 RX ORDER — ATORVASTATIN CALCIUM 10 MG/1
10 TABLET, FILM COATED ORAL DAILY
Qty: 30 TABLET | Refills: 11 | Status: SHIPPED | OUTPATIENT
Start: 2017-03-27 | End: 2018-03-18

## 2017-03-27 NOTE — MR AVS SNAPSHOT
After Visit Summary   3/27/2017    Malika Velasco    MRN: [id number]           Patient Information     Date Of Birth          1940        Visit Information        Provider Department      3/27/2017 2:00 PM DÍAZ DCR2 [facility]        Today's Diagnoses     Cardiac pacemaker in situ    -  1    Complete heart block (H)           Follow-ups after your visit        Your next 10 appointments already scheduled     May 30, 2017  7:45 AM CDT   LAB with RU LAB   [facility] ([facility])    [address]  [facility] [address] [phone]           Patient must bring picture ID.  Patient should be prepared to give a urine specimen  Please do not eat 10-12 hours before your appointment if you are coming in fasting for labs on lipids, cholesterol, or glucose (sugar).  Pregnant women should follow their Care Team instructions. Water with medications is okay. Do not drink coffee or other fluids.   If you have concerns about taking  your medications, please ask at office or if scheduling via [employer], send a message by clicking on Secure Messaging, Message Your Care Team.              Future tests that were ordered for you today     Open Future Orders        Priority Expected Expires Ordered    Lipid Profile Routine 5/30/2017 3/27/2018 3/27/2017    CARDIOLOGY EVAL ADULT REFERRAL Routine 3/27/2018 3/27/2018 3/27/2017            Who to contact     If you have questions or need follow up information about today's clinic visit or your schedule please contact [facility] directly at [phone].  Normal or non-critical lab and imaging results will be communicated to you by MyChart, letter or phone within 4 business days after the clinic has received the results. If you do not hear from us within 7 days, please contact the clinic through MyChart or phone. If you have  a critical or abnormal lab result, we will notify you by phone as soon as possible.  Submit refill requests through [employer] or call your pharmacy and they will forward the refill request to us. Please allow 3 business days for your refill to be completed.          Additional Information About Your Visit        [employer] Information     [employer] gives you secure access to your electronic health record. If you see a primary care provider, you can also send messages to your care team and make appointments. If you have questions, please call your primary care clinic.  If you do not have a primary care provider, please call [phone] and they will assist you.        Care EveryWhere ID     This is your Care EveryWhere ID. This could be used by other organizations to access your [place] medical records  [id number]         Blood Pressure from Last 3 Encounters:   03/27/17 124/78   03/03/17 130/89   03/01/17 126/81    Weight from Last 3 Encounters:   03/27/17 70.8 kg (156 lb)   03/03/17 69.8 kg (153 lb 14.4 oz)   03/01/17 68 kg (150 lb)              We Performed the Following     PM DEVICE PROGRAMMING EVAL, DUAL LEAD PACER (30582)          Today's Medication Changes          These changes are accurate as of: 3/27/17  2:25 PM.  If you have any questions, ask your nurse or doctor.               Start taking these medicines.        Dose/Directions    atorvastatin 10 MG tablet   Commonly known as:  LIPITOR   Used for:  Atherosclerosis of native coronary artery of native heart without angina pectoris   Started by:  Elicia Yung MD        Dose:  10 mg   Take 1 tablet (10 mg) by mouth daily   Quantity:  30 tablet   Refills:  11         Stop taking these medicines if you haven't already. Please contact your care team if you have questions.     simvastatin 5 MG tablet   Commonly known as:  ZOCOR   Stopped by:  Elicia Yung MD                Where to get your medicines      These medications were sent to  [facility] [place], [place] - [address]  [address] [facility] [address]     Phone:  [phone]     atorvastatin 10 MG tablet                Primary Care Provider Office Phone # Fax #    Karen Navarro [phone]       [facility] [address]  [facility] [address]        Thank you!     Thank you for choosing [facility]  for your care. Our goal is always to provide you with excellent care. Hearing back from our patients is one way we can continue to improve our services. Please take a few minutes to complete the written survey that you may receive in the mail after your visit with us. Thank you!             Your Updated Medication List - Protect others around you: Learn how to safely use, store and throw away your medicines at www.disposemymeds.org.          This list is accurate as of: 3/27/17  2:25 PM.  Always use your most recent med list.                   Brand Name Dispense Instructions for use    aspirin-acetaminophen-caffeine 250-250-65 MG per tablet    EXCEDRIN MIGRAINE     Take 1 tablet by mouth every 6 hours as needed for headaches       atenolol 50 MG tablet    TENORMIN     Take 50 mg by mouth daily       atenolol-chlorthalidone 100-25 MG per tablet    TENORETIC     Take 1 tablet by mouth daily       atorvastatin 10 MG tablet    LIPITOR    30 tablet    Take 1 tablet (10 mg) by mouth daily       calcipotriene 0.005 % cream    DOVONOX     Apply topically 2 times daily       CALCIUM 600/VITAMIN D3 600-800 MG-UNIT Tabs   Generic drug:  Calcium Carb-Cholecalciferol          FEXOFENADINE HCL PO      Take 180 mg by mouth daily       fish oil-omega-3 fatty acids 1000 MG capsule      Take 2 g by mouth daily       fluticasone 50 MCG/ACT spray    FLONASE     Spray 1 spray into both nostrils daily       MELATONIN PO      Take 5 mg by mouth At Bedtime       Multi-vitamin Tabs tablet      Take 1  tablet by mouth daily       oxyCODONE 5 MG IR tablet    ROXICODONE    20 tablet    Take 1 tablet (5 mg) by mouth every 6 hours as needed for pain

## 2017-03-27 NOTE — LETTER
3/27/2017    Karen Navarro MD  [facility]  [address] [place] [address]    RE: Malika J Rod       Dear Colleague,    I had the pleasure of seeing Malika Velasco in the [facility].    This is a new patient visit to establish care.      Ms. Velasco is a pleasant, 76-year-old lady with a prior history of conduction disease, having developed high-grade AV block with syncope in 1997 and subsequently undergoing permanent pacemaker implantation at [facility].  She had a generator replacement in 2007 and then again in 03/2015 had an interrogation with the device being found as having an elective replacement indicator.  She had been complaining of dyspnea on exertion over the several months previously as well as lightheadedness.  She has comorbidities of hypertension and elevated fasting blood sugar and had an echocardiogram showing only aortic sclerosis with preserved LV systolic function in 2015.  She underwent nuclear stress testing at the time to exclude ischemia with a benign study.  She ultimately had her generator replaced and has been doing well since with resolution of the dyspnea on exertion.      She has no cardiorespiratory complaints of chest pain, shortness of breath, lightheadedness, presyncope, syncope, PND, orthopnea, palpitations or pedal edema.      Ms. Velasco has no family history of premature coronary disease.  She is hypertensive as mentioned, but not clearly known to be diabetic.  She is described to be dyslipidemic and is on a very low dose of statin, 5 mg of simvastatin a day.  She is a former smoker.  She underwent CT coronary angiography in 2009 due to a mild defect on stress testing and was found to have minimal soft plaque at the mid LAD.  Review of a CT of the abdomen and pelvis with contrast from March of this year describes a mild focal area of atherosclerosis in the aorta.     Outpatient Encounter  Prescriptions as of 3/27/2017   Medication Sig Dispense Refill     atorvastatin (LIPITOR) 10 MG tablet Take 1 tablet (10 mg) by mouth daily 30 tablet 11     atenolol-chlorthalidone (TENORETIC) 100-25 MG per tablet Take 1 tablet by mouth daily       atenolol (TENORMIN) 50 MG tablet Take 50 mg by mouth daily       fluticasone (FLONASE) 50 MCG/ACT spray Spray 1 spray into both nostrils daily       aspirin-acetaminophen-caffeine (EXCEDRIN MIGRAINE) 250-250-65 MG per tablet Take 1 tablet by mouth every 6 hours as needed for headaches       FEXOFENADINE HCL PO Take 180 mg by mouth daily       multivitamin, therapeutic with minerals (MULTI-VITAMIN) TABS tablet Take 1 tablet by mouth daily       Calcium Carb-Cholecalciferol (CALCIUM 600/VITAMIN D3) 600-800 MG-UNIT TABS        fish oil-omega-3 fatty acids 1000 MG capsule Take 2 g by mouth daily       calcipotriene (DOVONOX) 0.005 % cream Apply topically 2 times daily Reported on 4/7/2017       MELATONIN PO Take 5 mg by mouth At Bedtime       oxyCODONE (ROXICODONE) 5 MG IR tablet Take 1 tablet (5 mg) by mouth every 6 hours as needed for pain (Patient not taking: Reported on 4/7/2017) 20 tablet 0     [DISCONTINUED] simvastatin (ZOCOR) 5 MG tablet Take 1 tablet (5 mg) by mouth every evening 90 tablet 1     No facility-administered encounter medications on file as of 3/27/2017.       ASSESSMENT AND PLAN:  A pleasant, 76-year-old lady with mild atherosclerosis as outlined above and a tobacco abuse history.  She is reportedly dyslipidemic, though our last lipid results are of an LDL of 67, HDL of 61 and triglycerides of 103 in December of last year.  She is on a very low-dose statin.  She has had normal liver function tests.      She has no cardiorespiratory complaints at this time and is due for her pacemaker interrogation later today.      I would recommend trying to intensify her statin regimen, and to this end, we will increase to Lipitor 10 mg daily.  We will follow up lipids  in 9 weeks' time.        Otherwise, I will see her back in a year or sooner as needed and will return her to the care of Karen Navarro in the interim.  We have discussed the risk of muscle inflammation and myositis with statins as well as symptoms for which to be aware, and she has verbalized understanding.      Total time is 45 minutes, 40 in coordination of care and counseling.     Again, thank you for allowing me to participate in the care of your patient.      Sincerely,    Elicia Yung MD

## 2017-03-27 NOTE — PROGRESS NOTES
HPI and Plan:   See dictation    Orders Placed This Encounter   Procedures     Lipid Profile     CARDIOLOGY EVAL ADULT REFERRAL     EKG 12-lead complete w/read - Clinics (performed today)       Orders Placed This Encounter   Medications     atorvastatin (LIPITOR) 10 MG tablet     Sig: Take 1 tablet (10 mg) by mouth daily     Dispense:  30 tablet     Refill:  11     May refill with 90 tablets at a time       Medications Discontinued During This Encounter   Medication Reason     simvastatin (ZOCOR) 5 MG tablet          Encounter Diagnoses   Name Primary?     Essential hypertension Yes     Atherosclerosis of native coronary artery of native heart without angina pectoris        CURRENT MEDICATIONS:  Current Outpatient Prescriptions   Medication Sig Dispense Refill     atorvastatin (LIPITOR) 10 MG tablet Take 1 tablet (10 mg) by mouth daily 30 tablet 11     atenolol-chlorthalidone (TENORETIC) 100-25 MG per tablet Take 1 tablet by mouth daily       atenolol (TENORMIN) 50 MG tablet Take 50 mg by mouth daily       fluticasone (FLONASE) 50 MCG/ACT spray Spray 1 spray into both nostrils daily       aspirin-acetaminophen-caffeine (EXCEDRIN MIGRAINE) 250-250-65 MG per tablet Take 1 tablet by mouth every 6 hours as needed for headaches       FEXOFENADINE HCL PO Take 180 mg by mouth daily       multivitamin, therapeutic with minerals (MULTI-VITAMIN) TABS tablet Take 1 tablet by mouth daily       Calcium Carb-Cholecalciferol (CALCIUM 600/VITAMIN D3) 600-800 MG-UNIT TABS        fish oil-omega-3 fatty acids 1000 MG capsule Take 2 g by mouth daily       calcipotriene (DOVONOX) 0.005 % cream Apply topically 2 times daily       MELATONIN PO Take 5 mg by mouth At Bedtime       oxyCODONE (ROXICODONE) 5 MG IR tablet Take 1 tablet (5 mg) by mouth every 6 hours as needed for pain 20 tablet 0       ALLERGIES     Allergies   Allergen Reactions     Zantac [Ranitidine]      Headache       PAST MEDICAL HISTORY:  Past Medical History:   Diagnosis  "Date     Allergic rhinitis      Anxiety      Aortic valve sclerosis      Back pain      GERD (gastroesophageal reflux disease)      Heart block AV complete (H) 2008    pacemaker     Hyperlipidemia      Hypertension      Major depression      Osteoarthritis      Squamous cell carcinoma, face        PAST SURGICAL HISTORY:  Past Surgical History:   Procedure Laterality Date     ARTHROSCOPY KNEE Left      AS TOTAL KNEE ARTHROPLASTY Left 05/2015     IMPLANT PACEMAKER  1997     TONSILLECTOMY       TUBAL LIGATION         FAMILY HISTORY:  Family History   Problem Relation Age of Onset     HEART DISEASE Mother      Dementia Mother      HEART DISEASE Father        SOCIAL HISTORY:  Social History     Social History     Marital status:      Spouse name: N/A     Number of children: N/A     Years of education: N/A     Social History Main Topics     Smoking status: Former Smoker     Smokeless tobacco: None     Alcohol use Yes      Comment: rarely     Drug use: None     Sexual activity: No     Other Topics Concern     None     Social History Narrative       Review of Systems:  Skin:  Negative       Eyes:  Positive for glasses    ENT:  Negative      Respiratory:  Negative       Cardiovascular:    Positive for;lightheadedness positional   Gastroenterology: Negative      Genitourinary:  Negative      Musculoskeletal:  Negative      Neurologic:  Negative      Psychiatric:  Negative      Heme/Lymph/Imm:  Negative      Endocrine:  Negative        Physical Exam:  Vitals: /78  Pulse 70  Ht 1.575 m (5' 2\")  Wt 70.8 kg (156 lb)  BMI 28.53 kg/m2    Constitutional:  cooperative, alert and oriented, well developed, well nourished, in no acute distress        Skin:  warm and dry to the touch, no apparent skin lesions or masses noted        Head:  normocephalic, no masses or lesions        Eyes:  pupils equal and round, conjunctivae and lids unremarkable, sclera white, no xanthalasma, EOMS intact, no nystagmus        ENT:  no " pallor or cyanosis, dentition good        Neck:  carotid pulses are full and equal bilaterally;JVP normal;no carotid bruit        Chest:  normal breath sounds, clear to auscultation, normal A-P diameter, normal symmetry, normal respiratory excursion, no use of accessory muscles          Cardiac: regular rhythm, normal S1/S2, no S3 or S4, apical impulse not displaced, no murmurs, gallops or rubs                  Abdomen:  abdomen soft;BS normoactive;non-tender        Vascular: pulses full and equal                                        Extremities and Back:  no edema;no deformities, clubbing, cyanosis, erythema observed              Neurological:  affect appropriate, oriented to time, person and place;no gross motor deficits              CC  Karen Navarro MD  [facility]  [address]  [place] [address]

## 2017-03-27 NOTE — PROGRESS NOTES
This is a new patient visit to establish care.      HISTORY OF PRESENT ILLNESS:  Ms. Velasco is a pleasant, 76-year-old lady with a prior history of conduction disease, having developed high-grade AV block with syncope in 1997 and subsequently undergoing permanent pacemaker implantation at [facility].  She had a generator replacement in 2007 and then again in 03/2015 had an interrogation with the device being found as having an elective replacement indicator.  She had been complaining of dyspnea on exertion over the several months previously as well as lightheadedness.  She has comorbidities of hypertension and elevated fasting blood sugar and had an echocardiogram showing only aortic sclerosis with preserved LV systolic function in 2015.  She underwent nuclear stress testing at the time to exclude ischemia with a benign study.  She ultimately had her generator replaced and has been doing well since with resolution of the dyspnea on exertion.      She has no cardiorespiratory complaints of chest pain, shortness of breath, lightheadedness, presyncope, syncope, PND, orthopnea, palpitations or pedal edema.      Ms. Velasco has no family history of premature coronary disease.  She is hypertensive as mentioned, but not clearly known to be diabetic.  She is described to be dyslipidemic and is on a very low dose of statin, 5 mg of simvastatin a day.  She is a former smoker.  She underwent CT coronary angiography in 2009 due to a mild defect on stress testing and was found to have minimal soft plaque at the mid LAD.  Review of a CT of the abdomen and pelvis with contrast from March of this year describes a mild focal area of atherosclerosis in the aorta.      ASSESSMENT AND PLAN:  A pleasant, 76-year-old lady with mild atherosclerosis as outlined above and a tobacco abuse history.  She is reportedly dyslipidemic, though our last lipid results are of an LDL of 67, HDL of 61 and triglycerides of 103 in December  of last year.  She is on a very low-dose statin.  She has had normal liver function tests.      She has no cardiorespiratory complaints at this time and is due for her pacemaker interrogation later today.      I would recommend trying to intensify her statin regimen, and to this end, we will increase to Lipitor 10 mg daily.  We will follow up lipids in 9 weeks' time.        Otherwise, I will see her back in a year or sooner as needed and will return her to the care of Karen Navarro in the interim.  We have discussed the risk of muscle inflammation and myositis with statins as well as symptoms for which to be aware, and she has verbalized understanding.      Total time is 45 minutes, 40 in coordination of care and counseling.      cc:   Karen Navarro MD   [facility] [address] [place] [address]         RAMONITA KAPLAN MD             D: 2017 16:40   T: 2017 18:08   MT: lizette      Name:     DOC SOSA   MRN:      [id number]        Account:      [id number]   :      1940           Service Date: 2017      Document: [id number]

## 2017-03-27 NOTE — MR AVS SNAPSHOT
After Visit Summary   3/27/2017    Malika Velasco    MRN: [id number]           Patient Information     Date Of Birth          1940        Visit Information        Provider Department      3/27/2017 12:45 PM Elicia Yung MD [facility]        Today's Diagnoses     Essential hypertension    -  1    Atherosclerosis of native coronary artery of native heart without angina pectoris           Follow-ups after your visit        Additional Services     CARDIOLOGY EVAL ADULT REFERRAL                 Your next 10 appointments already scheduled     May 30, 2017  7:45 AM CDT   LAB with RU LAB   [facility] ([facility])    [address]  [facility] [id number] [phone]           Patient must bring picture ID.  Patient should be prepared to give a urine specimen  Please do not eat 10-12 hours before your appointment if you are coming in fasting for labs on lipids, cholesterol, or glucose (sugar).  Pregnant women should follow their Care Team instructions. Water with medications is okay. Do not drink coffee or other fluids.   If you have concerns about taking  your medications, please ask at office or if scheduling via [employer], send a message by clicking on Secure Messaging, Message Your Care Team.              Future tests that were ordered for you today     Open Future Orders        Priority Expected Expires Ordered    Lipid Profile Routine 5/30/2017 3/27/2018 3/27/2017    CARDIOLOGY EVAL ADULT REFERRAL Routine 3/27/2018 3/27/2018 3/27/2017            Who to contact     If you have questions or need follow up information about today's clinic visit or your schedule please contact [facility] directly at [phone].  Normal or non-critical lab and imaging results will be communicated to you by MyChart, letter or phone within 4 business days  "after the clinic has received the results. If you do not hear from us within 7 days, please contact the clinic through [employer] or phone. If you have a critical or abnormal lab result, we will notify you by phone as soon as possible.  Submit refill requests through [employer] or call your pharmacy and they will forward the refill request to us. Please allow 3 business days for your refill to be completed.          Additional Information About Your Visit        [employer] Information     [employer] gives you secure access to your electronic health record. If you see a primary care provider, you can also send messages to your care team and make appointments. If you have questions, please call your primary care clinic.  If you do not have a primary care provider, please call [phone] and they will assist you.        Care EveryWhere ID     This is your Care EveryWhere ID. This could be used by other organizations to access your [place] medical records  [id number]        Your Vitals Were     Pulse Height BMI (Body Mass Index)             70 1.575 m (5' 2\") 28.53 kg/m2          Blood Pressure from Last 3 Encounters:   03/27/17 124/78   03/03/17 130/89   03/01/17 126/81    Weight from Last 3 Encounters:   03/27/17 70.8 kg (156 lb)   03/03/17 69.8 kg (153 lb 14.4 oz)   03/01/17 68 kg (150 lb)              We Performed the Following     EKG 12-lead complete w/read - Clinics (performed today)          Today's Medication Changes          These changes are accurate as of: 3/27/17  4:40 PM.  If you have any questions, ask your nurse or doctor.               Start taking these medicines.        Dose/Directions    atorvastatin 10 MG tablet   Commonly known as:  LIPITOR   Used for:  Atherosclerosis of native coronary artery of native heart without angina pectoris   Started by:  Elicia Yung MD        Dose:  10 mg   Take 1 tablet (10 mg) by mouth daily   Quantity:  30 tablet   Refills:  11         Stop taking these " medicines if you haven't already. Please contact your care team if you have questions.     simvastatin 5 MG tablet   Commonly known as:  ZOCOR   Stopped by:  Elicia Yung MD                Where to get your medicines      These medications were sent to [facility] [place], [place] - [address]  [address] [facility] [address]     Phone:  [phone]     atorvastatin 10 MG tablet                Primary Care Provider Office Phone # Fax #    Karen Navarro [phone]       [facility] [address]  [facility] [address]        Thank you!     Thank you for choosing [facility] [place]  for your care. Our goal is always to provide you with excellent care. Hearing back from our patients is one way we can continue to improve our services. Please take a few minutes to complete the written survey that you may receive in the mail after your visit with us. Thank you!             Your Updated Medication List - Protect others around you: Learn how to safely use, store and throw away your medicines at www.disposemymeds.org.          This list is accurate as of: 3/27/17  4:40 PM.  Always use your most recent med list.                   Brand Name Dispense Instructions for use    aspirin-acetaminophen-caffeine 250-250-65 MG per tablet    EXCEDRIN MIGRAINE     Take 1 tablet by mouth every 6 hours as needed for headaches       atenolol 50 MG tablet    TENORMIN     Take 50 mg by mouth daily       atenolol-chlorthalidone 100-25 MG per tablet    TENORETIC     Take 1 tablet by mouth daily       atorvastatin 10 MG tablet    LIPITOR    30 tablet    Take 1 tablet (10 mg) by mouth daily       calcipotriene 0.005 % cream    DOVONOX     Apply topically 2 times daily       CALCIUM 600/VITAMIN D3 600-800 MG-UNIT Tabs   Generic drug:  Calcium Carb-Cholecalciferol          FEXOFENADINE HCL PO      Take 180 mg by mouth daily        fish oil-omega-3 fatty acids 1000 MG capsule      Take 2 g by mouth daily       fluticasone 50 MCG/ACT spray    FLONASE     Spray 1 spray into both nostrils daily       MELATONIN PO      Take 5 mg by mouth At Bedtime       Multi-vitamin Tabs tablet      Take 1 tablet by mouth daily       oxyCODONE 5 MG IR tablet    ROXICODONE    20 tablet    Take 1 tablet (5 mg) by mouth every 6 hours as needed for pain

## 2017-03-27 NOTE — PROGRESS NOTES
Medtronic Adapta (D) Pacemaker Device Check/ Establishing care  AP: 95 % : 100 %  Mode: DDDR        Underlying Rhythm: SB (45 bpm)/ CHB. No junctional at 30 bpm  Heart Rate: Histogram is adequate/ Pt is active with no complaints  Sensing: P waves WNL    Pacing Threshold: WNL   Impedance: WNL  Battery Status: 9-12 years estimated longevity  Atrial Arrhythmia: NONE  Ventricular Arrhythmia: NONE  Setting Change: NONE    Care Plan: Remote in 3 months.     .

## 2017-04-07 ENCOUNTER — OFFICE VISIT (OUTPATIENT)
Dept: INTERNAL MEDICINE | Facility: CLINIC | Age: 77
End: 2017-04-07
Payer: COMMERCIAL

## 2017-04-07 VITALS
WEIGHT: 157.5 LBS | SYSTOLIC BLOOD PRESSURE: 110 MMHG | HEIGHT: 62 IN | HEART RATE: 81 BPM | DIASTOLIC BLOOD PRESSURE: 82 MMHG | TEMPERATURE: 98.2 F | BODY MASS INDEX: 28.98 KG/M2 | OXYGEN SATURATION: 98 %

## 2017-04-07 DIAGNOSIS — Z78.0 ASYMPTOMATIC MENOPAUSAL STATE: ICD-10-CM

## 2017-04-07 DIAGNOSIS — I10 BENIGN ESSENTIAL HYPERTENSION: Primary | ICD-10-CM

## 2017-04-07 DIAGNOSIS — M85.80 OSTEOPENIA: ICD-10-CM

## 2017-04-07 PROCEDURE — 99213 OFFICE O/P EST LOW 20 MIN: CPT | Performed by: INTERNAL MEDICINE

## 2017-04-07 NOTE — MR AVS SNAPSHOT
After Visit Summary   4/7/2017    Malika Velasco    MRN: [id number]           Patient Information     Date Of Birth          1940        Visit Information        Provider Department      4/7/2017 1:00 PM Karen Navarro MD [facility]        Today's Diagnoses     Benign essential hypertension    -  1    Osteopenia        Asymptomatic menopausal state            Follow-ups after your visit        Your next 10 appointments already scheduled     Apr 26, 2017 10:00 AM CDT   DX HIP/PELVIS/SPINE with RIDX1   [facility] ([facility])    [address]  [address]  [facility] [address]              Please do not take any of the following 48 hours prior to your exam: vitamins, calcium tablets, antacids.            May 30, 2017  7:45 AM CDT   LAB with RU LAB   [facility] [place] ([facility])    [address]  [facility] [address] [phone]           Patient must bring picture ID.  Patient should be prepared to give a urine specimen  Please do not eat 10-12 hours before your appointment if you are coming in fasting for labs on lipids, cholesterol, or glucose (sugar).  Pregnant women should follow their Care Team instructions. Water with medications is okay. Do not drink coffee or other fluids.   If you have concerns about taking  your medications, please ask at office or if scheduling via [employer], send a message by clicking on Secure Messaging, Message Your Care Team.              Who to contact     If you have questions or need follow up information about today's clinic visit or your schedule please contact [facility] directly at [phone].  Normal or non-critical lab and imaging results will be communicated to you by MyChart, letter or phone within 4 business days after the clinic has received the results. If you do not hear from us within 7 days, please  "contact the clinic through [employer] or phone. If you have a critical or abnormal lab result, we will notify you by phone as soon as possible.  Submit refill requests through [employer] or call your pharmacy and they will forward the refill request to us. Please allow 3 business days for your refill to be completed.          Additional Information About Your Visit        [employer] Information     [employer] gives you secure access to your electronic health record. If you see a primary care provider, you can also send messages to your care team and make appointments. If you have questions, please call your primary care clinic.  If you do not have a primary care provider, please call [phone] and they will assist you.        Care EveryWhere ID     This is your Care EveryWhere ID. This could be used by other organizations to access your [place] medical records  [id number]        Your Vitals Were     Pulse Temperature Height Pulse Oximetry BMI (Body Mass Index)       81 98.2  F (36.8  C) (Oral) 5' 2\" (1.575 m) 98% 28.81 kg/m2        Blood Pressure from Last 3 Encounters:   04/07/17 110/82   03/27/17 124/78   03/03/17 130/89    Weight from Last 3 Encounters:   04/07/17 157 lb 8 oz (71.4 kg)   03/27/17 156 lb (70.8 kg)   03/03/17 153 lb 14.4 oz (69.8 kg)               Primary Care Provider Office Phone # Fax #    Karen Navarro [phone]       [facility] [address]  [facility] [address]        Thank you!     Thank you for choosing [facility]  for your care. Our goal is always to provide you with excellent care. Hearing back from our patients is one way we can continue to improve our services. Please take a few minutes to complete the written survey that you may receive in the mail after your visit with us. Thank you!             Your Updated Medication List - Protect others around you: Learn how to safely use, store and throw away your medicines at " www.disposemymeds.org.          This list is accurate as of: 4/7/17 11:59 PM.  Always use your most recent med list.                   Brand Name Dispense Instructions for use    aspirin-acetaminophen-caffeine 250-250-65 MG per tablet    EXCEDRIN MIGRAINE     Take 1 tablet by mouth every 6 hours as needed for headaches       atenolol 50 MG tablet    TENORMIN     Take 50 mg by mouth daily       atenolol-chlorthalidone 100-25 MG per tablet    TENORETIC     Take 1 tablet by mouth daily       atorvastatin 10 MG tablet    LIPITOR    30 tablet    Take 1 tablet (10 mg) by mouth daily       calcipotriene 0.005 % cream    DOVONOX     Apply topically 2 times daily Reported on 4/7/2017       CALCIUM 600/VITAMIN D3 600-800 MG-UNIT Tabs   Generic drug:  Calcium Carb-Cholecalciferol          FEXOFENADINE HCL PO      Take 180 mg by mouth daily       fish oil-omega-3 fatty acids 1000 MG capsule      Take 2 g by mouth daily       fluticasone 50 MCG/ACT spray    FLONASE     Spray 1 spray into both nostrils daily       MELATONIN PO      Take 5 mg by mouth At Bedtime       Multi-vitamin Tabs tablet      Take 1 tablet by mouth daily       oxyCODONE 5 MG IR tablet    ROXICODONE    20 tablet    Take 1 tablet (5 mg) by mouth every 6 hours as needed for pain

## 2017-04-07 NOTE — PROGRESS NOTES
SUBJECTIVE:                                                    Malika Velasco is a 76 year old female who presents to clinic today for the following health issues:    1. She noticed previous tests were still listed as due on health maintenance. Apparently they were not abstracted last visit.   2. The abdominal pain she had last visit has resolved, she had a little for a day or two, had some come back a week later intermittently but very mild, then resolved completely.   3. HTN; stable    Patient Active Problem List   Diagnosis     GERD (gastroesophageal reflux disease)     Anxiety     Pacemaker     Hyperlipidemia LDL goal <130     Benign essential hypertension     Osteopenia     Osteoarthritis     Squamous cell carcinoma, face     Back pain     Hypertension     Hyperlipidemia     Heart block AV complete (H)     Aortic valve sclerosis     TRAVIS (obstructive sleep apnea)     Current Outpatient Prescriptions   Medication Sig Dispense Refill     atorvastatin (LIPITOR) 10 MG tablet Take 1 tablet (10 mg) by mouth daily 30 tablet 11     atenolol-chlorthalidone (TENORETIC) 100-25 MG per tablet Take 1 tablet by mouth daily       atenolol (TENORMIN) 50 MG tablet Take 50 mg by mouth daily       fluticasone (FLONASE) 50 MCG/ACT spray Spray 1 spray into both nostrils daily       aspirin-acetaminophen-caffeine (EXCEDRIN MIGRAINE) 250-250-65 MG per tablet Take 1 tablet by mouth every 6 hours as needed for headaches       FEXOFENADINE HCL PO Take 180 mg by mouth daily       multivitamin, therapeutic with minerals (MULTI-VITAMIN) TABS tablet Take 1 tablet by mouth daily       Calcium Carb-Cholecalciferol (CALCIUM 600/VITAMIN D3) 600-800 MG-UNIT TABS        fish oil-omega-3 fatty acids 1000 MG capsule Take 2 g by mouth daily       MELATONIN PO Take 5 mg by mouth At Bedtime       calcipotriene (DOVONOX) 0.005 % cream Apply topically 2 times daily Reported on 4/7/2017       oxyCODONE (ROXICODONE) 5 MG IR tablet Take 1 tablet (5 mg)  "by mouth every 6 hours as needed for pain (Patient not taking: Reported on 4/7/2017) 20 tablet 0      Reviewed and updated as needed this visit by clinical staff  Tobacco  Allergies  Meds  Med Hx  Surg Hx  Fam Hx  Soc Hx      Reviewed and updated as needed this visit by Provider         ROS:  negative    OBJECTIVE:                                                    /82 (BP Location: Right arm, Patient Position: Chair, Cuff Size: Adult Regular)  Pulse 81  Temp 98.2  F (36.8  C) (Oral)  Ht 5' 2\" (1.575 m)  Wt 157 lb 8 oz (71.4 kg)  SpO2 98%  BMI 28.81 kg/m2  Body mass index is 28.81 kg/(m^2).    Not examined.        ASSESSMENT/PLAN:                                                            1. Benign essential hypertension  Controlled     2. Osteopenia  due  - DX Hip/Pelvis/Spine; Future    3. Asymptomatic menopausal state   - DX Hip/Pelvis/Spine; Future    Abdominal pain resolved, likely was muscular    Karen Navarro MD  [facility]           Please abstract the following data from this visit with this patient into the appropriate field in Epic:     Colonoscopy done on this date: 12/3/12  (approximately), by this group: [facility], results were normal.   Mammogram done on this date: 12/12/16 (approximately), by this group: [facility], results were normal.           "

## 2017-04-07 NOTE — NURSING NOTE
"Chief Complaint   Patient presents with     Patient/info Update     pt has brought in records for colonoscopy, Mammo        Initial /82 (BP Location: Right arm, Patient Position: Chair, Cuff Size: Adult Regular)  Pulse 81  Temp 98.2  F (36.8  C) (Oral)  Ht 5' 2\" (1.575 m)  Wt 157 lb 8 oz (71.4 kg)  SpO2 98%  BMI 28.81 kg/m2 Estimated body mass index is 28.81 kg/(m^2) as calculated from the following:    Height as of this encounter: 5' 2\" (1.575 m).    Weight as of this encounter: 157 lb 8 oz (71.4 kg).  Medication Reconciliation: complete    "

## 2017-04-08 ASSESSMENT — PATIENT HEALTH QUESTIONNAIRE - PHQ9: SUM OF ALL RESPONSES TO PHQ QUESTIONS 1-9: 8

## 2017-05-15 ENCOUNTER — RADIANT APPOINTMENT (OUTPATIENT)
Dept: BONE DENSITY | Facility: CLINIC | Age: 77
End: 2017-05-15
Attending: INTERNAL MEDICINE
Payer: COMMERCIAL

## 2017-05-15 DIAGNOSIS — Z78.0 ASYMPTOMATIC MENOPAUSAL STATE: ICD-10-CM

## 2017-05-15 DIAGNOSIS — M85.80 OSTEOPENIA: ICD-10-CM

## 2017-05-15 PROCEDURE — 77085 DXA BONE DENSITY AXL VRT FX: CPT | Performed by: INTERNAL MEDICINE

## 2017-05-30 DIAGNOSIS — I25.10 ATHEROSCLEROSIS OF NATIVE CORONARY ARTERY OF NATIVE HEART WITHOUT ANGINA PECTORIS: ICD-10-CM

## 2017-05-30 DIAGNOSIS — I10 ESSENTIAL HYPERTENSION: ICD-10-CM

## 2017-05-30 LAB
CHOLEST SERPL-MCNC: 136 MG/DL
HDLC SERPL-MCNC: 53 MG/DL
LDLC SERPL CALC-MCNC: 50 MG/DL
NONHDLC SERPL-MCNC: 83 MG/DL
TRIGL SERPL-MCNC: 165 MG/DL

## 2017-05-30 PROCEDURE — 80061 LIPID PANEL: CPT | Performed by: INTERNAL MEDICINE

## 2017-05-30 PROCEDURE — 36415 COLL VENOUS BLD VENIPUNCTURE: CPT | Performed by: INTERNAL MEDICINE

## 2017-06-02 ENCOUNTER — OFFICE VISIT (OUTPATIENT)
Dept: INTERNAL MEDICINE | Facility: CLINIC | Age: 77
End: 2017-06-02
Payer: COMMERCIAL

## 2017-06-02 VITALS
HEIGHT: 62 IN | SYSTOLIC BLOOD PRESSURE: 138 MMHG | OXYGEN SATURATION: 97 % | BODY MASS INDEX: 29.43 KG/M2 | TEMPERATURE: 97.9 F | DIASTOLIC BLOOD PRESSURE: 92 MMHG | RESPIRATION RATE: 16 BRPM | HEART RATE: 90 BPM | WEIGHT: 159.9 LBS

## 2017-06-02 DIAGNOSIS — M85.80 OSTEOPENIA: Primary | ICD-10-CM

## 2017-06-02 DIAGNOSIS — Z23 NEED FOR TDAP VACCINATION: ICD-10-CM

## 2017-06-02 PROCEDURE — 99214 OFFICE O/P EST MOD 30 MIN: CPT | Mod: 25 | Performed by: INTERNAL MEDICINE

## 2017-06-02 PROCEDURE — 90471 IMMUNIZATION ADMIN: CPT | Performed by: INTERNAL MEDICINE

## 2017-06-02 PROCEDURE — 90715 TDAP VACCINE 7 YRS/> IM: CPT | Performed by: INTERNAL MEDICINE

## 2017-06-02 RX ORDER — ALENDRONATE SODIUM 70 MG/1
70 TABLET ORAL
Qty: 4 TABLET | Refills: 11 | Status: SHIPPED | OUTPATIENT
Start: 2017-06-02 | End: 2018-05-10

## 2017-06-02 NOTE — NURSING NOTE
"Chief Complaint   Patient presents with     Results     DEXA results       Initial BP (!) 138/92  Pulse 90  Temp 97.9  F (36.6  C) (Oral)  Resp 16  Ht 5' 2\" (1.575 m)  Wt 159 lb 14.4 oz (72.5 kg)  SpO2 97%  BMI 29.25 kg/m2 Estimated body mass index is 29.25 kg/(m^2) as calculated from the following:    Height as of this encounter: 5' 2\" (1.575 m).    Weight as of this encounter: 159 lb 14.4 oz (72.5 kg).  Medication Reconciliation: complete      Vivi Bateman CMA      "

## 2017-06-02 NOTE — PROGRESS NOTES
SUBJECTIVE:                                                    Malika Velasco is a 76 year old female who presents to clinic today for the following health issues:      Subjective:  Malika Velasco is a 76 year old female who presents for follow up of abnormal DEXA. She is postmenopausal.   Other risks :Mother with hip fracture x2.    Patient Active Problem List   Diagnosis     GERD (gastroesophageal reflux disease)     Anxiety     Pacemaker     Hyperlipidemia LDL goal <130     Benign essential hypertension     Osteopenia     Osteoarthritis     Squamous cell carcinoma, face     Back pain     Hypertension     Hyperlipidemia     Heart block AV complete (H)     Aortic valve sclerosis     TRAVIS (obstructive sleep apnea)      Current Outpatient Prescriptions   Medication Sig Dispense Refill     alendronate (FOSAMAX) 70 MG tablet Take 1 tablet (70 mg) by mouth every 7 days Take 60 minutes before am meal with 8 oz. water. Remain upright for 30 minutes. 4 tablet 11     atorvastatin (LIPITOR) 10 MG tablet Take 1 tablet (10 mg) by mouth daily 30 tablet 11     atenolol-chlorthalidone (TENORETIC) 100-25 MG per tablet Take 1 tablet by mouth daily       atenolol (TENORMIN) 50 MG tablet Take 50 mg by mouth daily       fluticasone (FLONASE) 50 MCG/ACT spray Spray 1 spray into both nostrils daily       aspirin-acetaminophen-caffeine (EXCEDRIN MIGRAINE) 250-250-65 MG per tablet Take 1 tablet by mouth every 6 hours as needed for headaches       FEXOFENADINE HCL PO Take 180 mg by mouth daily       multivitamin, therapeutic with minerals (MULTI-VITAMIN) TABS tablet Take 1 tablet by mouth daily       Calcium Carb-Cholecalciferol (CALCIUM 600/VITAMIN D3) 600-800 MG-UNIT TABS        fish oil-omega-3 fatty acids 1000 MG capsule Take 2 g by mouth daily       MELATONIN PO Take 5 mg by mouth At Bedtime       oxyCODONE (ROXICODONE) 5 MG IR tablet Take 1 tablet (5 mg) by mouth every 6 hours as needed for pain 20 tablet 0        Other  "concerns: none    Objective:  Patient alert in NAD  BP (!) 138/92  Pulse 90  Temp 97.9  F (36.6  C) (Oral)  Resp 16  Ht 5' 2\" (1.575 m)  Wt 159 lb 14.4 oz (72.5 kg)  SpO2 97%  BMI 29.25 kg/m2      Not examined.    T-score -2.1  Total fracture risk: 35%  Hip fracture risk: 19%    Assessment/Plan:  1. Osteopenia: discussed results of DEXA, etiology and definitions of osteopenia and osteoporosis, risks, treatments. Recommend Fosamax. Advised of potential side effects of medications and recommend she contact me for significant reactions.       Karen Navarro MD  [facility]       25 minutes spent with the patient, >50% of time spent counseling about fractures, treatments, side effects of meds.   "

## 2017-06-02 NOTE — MR AVS SNAPSHOT
"              After Visit Summary   6/2/2017    Malika Velasco    MRN: [id number]           Patient Information     Date Of Birth          1940        Visit Information        Provider Department      6/2/2017 1:00 PM Karen Navarro MD [facility]        Today's Diagnoses     Osteopenia    -  1    Need for Tdap vaccination           Follow-ups after your visit        Who to contact     If you have questions or need follow up information about today's clinic visit or your schedule please contact [facility] directly at [phone].  Normal or non-critical lab and imaging results will be communicated to you by NearWoohart, letter or phone within 4 business days after the clinic has received the results. If you do not hear from us within 7 days, please contact the clinic through Climeworkst or phone. If you have a critical or abnormal lab result, we will notify you by phone as soon as possible.  Submit refill requests through SuperDerivatives or call your pharmacy and they will forward the refill request to us. Please allow 3 business days for your refill to be completed.          Additional Information About Your Visit        MyChart Information     SuperDerivatives gives you secure access to your electronic health record. If you see a primary care provider, you can also send messages to your care team and make appointments. If you have questions, please call your primary care clinic.  If you do not have a primary care provider, please call [phone] and they will assist you.        Care EveryWhere ID     This is your Care EveryWhere ID. This could be used by other organizations to access your [facility] medical records  [id number]        Your Vitals Were     Pulse Temperature Respirations Height Pulse Oximetry BMI (Body Mass Index)    90 97.9  F (36.6  C) (Oral) 16 5' 2\" (1.575 m) 97% 29.25 kg/m2       Blood Pressure from Last 3 Encounters:   06/02/17 (!) 138/92   04/07/17 110/82   03/27/17 124/78 "    Weight from Last 3 Encounters:   06/02/17 159 lb 14.4 oz (72.5 kg)   04/07/17 157 lb 8 oz (71.4 kg)   03/27/17 156 lb (70.8 kg)              We Performed the Following     TDAP VACCINE (ADACEL)          Today's Medication Changes          These changes are accurate as of: 6/2/17  3:56 PM.  If you have any questions, ask your nurse or doctor.               Start taking these medicines.        Dose/Directions    alendronate 70 MG tablet   Commonly known as:  FOSAMAX   Used for:  Osteopenia   Started by:  Karen Navarro MD        Dose:  70 mg   Take 1 tablet (70 mg) by mouth every 7 days Take 60 minutes before am meal with 8 oz. water. Remain upright for 30 minutes.   Quantity:  4 tablet   Refills:  11            Where to get your medicines      These medications were sent to [facility] [place], [place] - [address]  [address] [facility] [address]     Phone:  [phone]     alendronate 70 MG tablet                Primary Care Provider Office Phone # Fax #    Karen Navarro [phone]       [facility]  [address]  [facility] [address]        Thank you!     Thank you for choosing [facility]  for your care. Our goal is always to provide you with excellent care. Hearing back from our patients is one way we can continue to improve our services. Please take a few minutes to complete the written survey that you may receive in the mail after your visit with us. Thank you!             Your Updated Medication List - Protect others around you: Learn how to safely use, store and throw away your medicines at www.disposemymeds.org.          This list is accurate as of: 6/2/17  3:56 PM.  Always use your most recent med list.                   Brand Name Dispense Instructions for use    alendronate 70 MG tablet    FOSAMAX    4 tablet    Take 1 tablet (70 mg) by mouth every 7 days Take 60 minutes before am meal with 8 oz. water. Remain  upright for 30 minutes.       aspirin-acetaminophen-caffeine 250-250-65 MG per tablet    EXCEDRIN MIGRAINE     Take 1 tablet by mouth every 6 hours as needed for headaches       atenolol 50 MG tablet    TENORMIN     Take 50 mg by mouth daily       atenolol-chlorthalidone 100-25 MG per tablet    TENORETIC     Take 1 tablet by mouth daily       atorvastatin 10 MG tablet    LIPITOR    30 tablet    Take 1 tablet (10 mg) by mouth daily       CALCIUM 600/VITAMIN D3 600-800 MG-UNIT Tabs   Generic drug:  Calcium Carb-Cholecalciferol          FEXOFENADINE HCL PO      Take 180 mg by mouth daily       fish oil-omega-3 fatty acids 1000 MG capsule      Take 2 g by mouth daily       fluticasone 50 MCG/ACT spray    FLONASE     Spray 1 spray into both nostrils daily       MELATONIN PO      Take 5 mg by mouth At Bedtime       Multi-vitamin Tabs tablet      Take 1 tablet by mouth daily       oxyCODONE 5 MG IR tablet    ROXICODONE    20 tablet    Take 1 tablet (5 mg) by mouth every 6 hours as needed for pain

## 2017-06-02 NOTE — NURSING NOTE
Prior to injection verified patient identity using patient's name and date of birth.  Screening Questionnaire for Adult Immunization    Are you sick today?   No   Do you have allergies to medications, food, a vaccine component or latex?   No   Have you ever had a serious reaction after receiving a vaccination?   No   Do you have a long-term health problem with heart disease, lung disease, asthma, kidney disease, metabolic disease (e.g. diabetes), anemia, or other blood disorder?   No   Do you have cancer, leukemia, HIV/AIDS, or any other immune system problem?   No   In the past 3 months, have you taken medications that affect  your immune system, such as prednisone, other steroids, or anticancer drugs; drugs for the treatment of rheumatoid arthritis, Crohn s disease, or psoriasis; or have you had radiation treatments?   No   Have you had a seizure, or a brain or other nervous system problem?   No   During the past year, have you received a transfusion of blood or blood     products, or been given immune (gamma) globulin or antiviral drug?   No   For women: Are you pregnant or is there a chance you could become        pregnant during the next month?   No   Have you received any vaccinations in the past 4 weeks?   No     Immunization questionnaire answers were all negative.   MNVFC doesn't apply on this patient  Per orders of Dr. Navarro, injection of Tdap given by Tammi Bateman. Patient instructed to remain in clinic for 20 minutes afterwards, and to report any adverse reaction to me immediately.     Screening performed by Tammi Bateman on 5/24/2017 at 11:08 AM.    Patient instructed to remain in clinic for 20 minutes afterwards, and to report any adverse reaction to me immediately.

## 2017-06-21 ENCOUNTER — TRANSFERRED RECORDS (OUTPATIENT)
Dept: HEALTH INFORMATION MANAGEMENT | Facility: CLINIC | Age: 77
End: 2017-06-21

## 2017-06-29 ENCOUNTER — TRANSFERRED RECORDS (OUTPATIENT)
Dept: HEALTH INFORMATION MANAGEMENT | Facility: CLINIC | Age: 77
End: 2017-06-29

## 2017-07-26 ENCOUNTER — DOCUMENTATION ONLY (OUTPATIENT)
Dept: OTHER | Facility: CLINIC | Age: 77
End: 2017-07-26

## 2017-07-26 DIAGNOSIS — Z71.89 ADVANCED DIRECTIVES, COUNSELING/DISCUSSION: Chronic | ICD-10-CM

## 2017-08-21 ENCOUNTER — OFFICE VISIT (OUTPATIENT)
Dept: INTERNAL MEDICINE | Facility: CLINIC | Age: 77
End: 2017-08-21
Payer: COMMERCIAL

## 2017-08-21 VITALS
OXYGEN SATURATION: 96 % | HEART RATE: 81 BPM | WEIGHT: 168 LBS | BODY MASS INDEX: 30.73 KG/M2 | RESPIRATION RATE: 16 BRPM | SYSTOLIC BLOOD PRESSURE: 102 MMHG | TEMPERATURE: 98.3 F | DIASTOLIC BLOOD PRESSURE: 70 MMHG

## 2017-08-21 DIAGNOSIS — M79.672 PAIN OF LEFT HEEL: Primary | ICD-10-CM

## 2017-08-21 PROCEDURE — 99213 OFFICE O/P EST LOW 20 MIN: CPT | Performed by: INTERNAL MEDICINE

## 2017-08-21 NOTE — NURSING NOTE
"Chief Complaint   Patient presents with     Musculoskeletal Problem     Sx for 3 weeks- left foot. Heel pain only when apply pressure.        Initial /70  Pulse 81  Temp 98.3  F (36.8  C) (Oral)  Resp 16  Wt 168 lb (76.2 kg)  SpO2 96%  BMI 30.73 kg/m2 Estimated body mass index is 30.73 kg/(m^2) as calculated from the following:    Height as of 6/2/17: 5' 2\" (1.575 m).    Weight as of this encounter: 168 lb (76.2 kg).  Medication Reconciliation: complete      Vivi Bateman CMA      "

## 2017-08-21 NOTE — MR AVS SNAPSHOT
After Visit Summary   8/21/2017    Malika Velasco    MRN: [id number]           Patient Information     Date Of Birth          1940        Visit Information        Provider Department      8/21/2017 2:20 PM Karen Navarro MD [facility]        Care Instructions    Increase the ibuprofen to 3 three times a day. Stop if any stomach upset, call for any black stools.                          Plantar Fasciitis Rehabilitation Exercises   You may begin exercising the muscles of your foot right away by gently stretching them as follows:     Prone hip extension: Lie on your stomach with your legs straight out behind you. Tighten the buttocks and thigh muscles of your injured leg and lift it off the floor about 8 inches. Keep your knee straight. Hold for 5 seconds. Then lower your leg and relax. Do 3 sets of 10.     Towel stretch: Sit on a hard surface with one leg stretched out in front of you. Loop a towel around your toes and the ball of your foot and pull the towel toward your body keeping your knee straight. Hold this position for 15 to 30 seconds then relax. Repeat 3 times.   When the towel stretch becomes too easy, you may begin doing the standing calf stretch.     Standing calf stretch: Facing a wall, put your hands against the wall at about eye level. Keep one leg back with the heel on the floor, and the other leg forward. Turn your back foot slightly inward (as if you were pigeon-toed) as you slowly lean into the wall until you feel a stretch in the back of your calf. Hold for 15 to 30 seconds. Repeat 3 times and then switch the position of your legs and repeat the exercise 3 times. Do this exercise several times each day.     Sitting plantar fascia stretch: Sit in a chair and cross one foot over your other knee. Grab the base of your toes and pull them back toward your leg until you feel a comfortable stretch. Hold 15 seconds and repeat 3 times.   When you can stand comfortably  on your injured foot, you can begin standing to stretch the bottom of your foot using the plantar fascia stretch.     Achilles stretch: Stand with the ball of one foot on a stair. Reach for the bottom step with your heel until you feel a stretch in the arch of your foot. Hold this position for 15 to 30 seconds and then relax. Repeat 3 times.   After you have stretched the bottom muscles of your foot, you can begin strengthening the top muscles of your foot.     Frozen can roll: Roll your bare injured foot back and forth from your heel to your mid-arch over a frozen juice can. Repeat for 3 to 5 minutes. This exercise is particularly helpful if done first thing in the morning.     Towel pickup: With your heel on the ground,  a towel with your toes. Release. Repeat 10 to 20 times. When this gets easy, add more resistance by placing a book or small weight on the towel.     Balance and reach exercises   Stand upright next to a chair with your injured leg farthest from the chair. This will provide you with support if you need it. Stand just on the foot of your injured leg. Try to raise the arch of this foot while keeping your toes on the floor.   A. Keep your foot in this position and reach forward in front of you with the hand farthest away from the chair, allowing your knee to bend. Repeat this 10 times while maintaining the arch height. This exercise can be made more difficult by reaching farther in front of you. Do 2 sets.   B.  the same position as above. While maintaining your arch height, reach the hand farthest away from the chair across your body toward the chair. The farther you reach, the more challenging the exercise. Do 2 sets of 10.     Heel raise: Balance yourself while standing behind a chair or counter. Using the chair to help you, raise your body up onto your toes and hold for 5 seconds. Then slowly lower yourself down without holding onto the chair. Hold onto the chair or counter if you  need to. When this exercise becomes less painful, try lowering on one leg only. Repeat 10 times. Do 3 sets of 10.     Side-lying leg lift: Lying on your uninjured side, tighten the front thigh muscles on your top leg and lift that leg 8 to 10 inches away from the other leg. Keep the leg straight and lower slowly. Do 3 sets of 10.   Written by Kimberly Ritchie, MS, PT, and Paulina Garduno PT, [facility], OCS, for [employer].   Published by [employer].   Last modified: 2009-02-09   Last reviewed: 2008-07-07   This content is reviewed periodically and is subject to change as new health information becomes available. The information is intended to inform and educate and is not a replacement for medical evaluation, advice, diagnosis or treatment by a healthcare professional.   Sports Medicine Advisor 2009.1 Index                        Follow-ups after your visit        Who to contact     If you have questions or need follow up information about today's clinic visit or your schedule please contact [facility] directly at [phone].  Normal or non-critical lab and imaging results will be communicated to you by [email or web address], letter or phone within 4 business days after the clinic has received the results. If you do not hear from us within 7 days, please contact the clinic through [email or web address] or phone. If you have a critical or abnormal lab result, we will notify you by phone as soon as possible.  Submit refill requests through Acteavo or call your pharmacy and they will forward the refill request to us. Please allow 3 business days for your refill to be completed.          Additional Information About Your Visit        Acteavo Information     Acteavo gives you secure access to your electronic health record. If you see a primary care provider, you can also send messages to your care team and make appointments. If you have questions, please call your primary care clinic.  If you do not have a primary care provider, please  call [phone] and they will assist you.        Care EveryWhere ID     This is your Care EveryWhere ID. This could be used by other organizations to access your [facility] medical records  [id number]        Your Vitals Were     Pulse Temperature Respirations Pulse Oximetry BMI (Body Mass Index)       81 98.3  F (36.8  C) (Oral) 16 96% 30.73 kg/m2        Blood Pressure from Last 3 Encounters:   08/21/17 102/70   06/02/17 (!) 138/92   04/07/17 110/82    Weight from Last 3 Encounters:   08/21/17 168 lb (76.2 kg)   06/02/17 159 lb 14.4 oz (72.5 kg)   04/07/17 157 lb 8 oz (71.4 kg)              Today, you had the following     No orders found for display       Primary Care Provider Office Phone # Fax #    Karen Navarro [phone]       [address] [facility] [address]  [facility] [address]        Equal Access to Services     MORA [facility] CenterELIZABETH : Hadii aad ku hadasho Soomaali, waaxda luqadaha, qaybta kaalmada adeegyada, waxay idiin haynorrisn akilah singer . So [facility] [phone].    ATENCIÓN: Si habla español, tiene a sotelo disposición servicios gratuitos de asistencia lingüística. Llame al [phone].    We comply with applicable federal civil rights laws and [place] laws. We do not discriminate on the basis of race, color, national origin, age, disability sex, sexual orientation or gender identity.            Thank you!     Thank you for choosing [facility]  for your care. Our goal is always to provide you with excellent care. Hearing back from our patients is one way we can continue to improve our services. Please take a few minutes to complete the written survey that you may receive in the mail after your visit with us. Thank you!             Your Updated Medication List - Protect others around you: Learn how to safely use, store and throw away your medicines at www.disposemymeds.org.          This list is accurate as of: 8/21/17  2:49 PM.  Always use your most recent med list.                    Brand Name Dispense Instructions for use Diagnosis    alendronate 70 MG tablet    FOSAMAX    4 tablet    Take 1 tablet (70 mg) by mouth every 7 days Take 60 minutes before am meal with 8 oz. water. Remain upright for 30 minutes.    Osteopenia       aspirin-acetaminophen-caffeine 250-250-65 MG per tablet    EXCEDRIN MIGRAINE     Take 1 tablet by mouth every 6 hours as needed for headaches        atenolol 50 MG tablet    TENORMIN     Take 50 mg by mouth daily        atenolol-chlorthalidone 100-25 MG per tablet    TENORETIC     Take 1 tablet by mouth daily        atorvastatin 10 MG tablet    LIPITOR    30 tablet    Take 1 tablet (10 mg) by mouth daily    Atherosclerosis of native coronary artery of native heart without angina pectoris       CALCIUM 600/VITAMIN D3 600-800 MG-UNIT Tabs   Generic drug:  Calcium Carb-Cholecalciferol           FEXOFENADINE HCL PO      Take 180 mg by mouth daily        fish oil-omega-3 fatty acids 1000 MG capsule      Take 2 g by mouth daily        fluticasone 50 MCG/ACT spray    FLONASE     Spray 1 spray into both nostrils daily        MELATONIN PO      Take 5 mg by mouth At Bedtime        Multi-vitamin Tabs tablet      Take 1 tablet by mouth daily        oxyCODONE 5 MG IR tablet    ROXICODONE    20 tablet    Take 1 tablet (5 mg) by mouth every 6 hours as needed for pain

## 2017-08-21 NOTE — PATIENT INSTRUCTIONS
Increase the ibuprofen to 3 three times a day. Stop if any stomach upset, call for any black stools.                          Plantar Fasciitis Rehabilitation Exercises   You may begin exercising the muscles of your foot right away by gently stretching them as follows:     Prone hip extension: Lie on your stomach with your legs straight out behind you. Tighten the buttocks and thigh muscles of your injured leg and lift it off the floor about 8 inches. Keep your knee straight. Hold for 5 seconds. Then lower your leg and relax. Do 3 sets of 10.     Towel stretch: Sit on a hard surface with one leg stretched out in front of you. Loop a towel around your toes and the ball of your foot and pull the towel toward your body keeping your knee straight. Hold this position for 15 to 30 seconds then relax. Repeat 3 times.   When the towel stretch becomes too easy, you may begin doing the standing calf stretch.     Standing calf stretch: Facing a wall, put your hands against the wall at about eye level. Keep one leg back with the heel on the floor, and the other leg forward. Turn your back foot slightly inward (as if you were pigeon-toed) as you slowly lean into the wall until you feel a stretch in the back of your calf. Hold for 15 to 30 seconds. Repeat 3 times and then switch the position of your legs and repeat the exercise 3 times. Do this exercise several times each day.     Sitting plantar fascia stretch: Sit in a chair and cross one foot over your other knee. Grab the base of your toes and pull them back toward your leg until you feel a comfortable stretch. Hold 15 seconds and repeat 3 times.   When you can stand comfortably on your injured foot, you can begin standing to stretch the bottom of your foot using the plantar fascia stretch.     Achilles stretch: Stand with the ball of one foot on a stair. Reach for the bottom step with your heel until you feel a stretch in the arch of your foot. Hold this position for 15 to  30 seconds and then relax. Repeat 3 times.   After you have stretched the bottom muscles of your foot, you can begin strengthening the top muscles of your foot.     Frozen can roll: Roll your bare injured foot back and forth from your heel to your mid-arch over a frozen juice can. Repeat for 3 to 5 minutes. This exercise is particularly helpful if done first thing in the morning.     Towel pickup: With your heel on the ground,  a towel with your toes. Release. Repeat 10 to 20 times. When this gets easy, add more resistance by placing a book or small weight on the towel.     Balance and reach exercises   Stand upright next to a chair with your injured leg farthest from the chair. This will provide you with support if you need it. Stand just on the foot of your injured leg. Try to raise the arch of this foot while keeping your toes on the floor.   A. Keep your foot in this position and reach forward in front of you with the hand farthest away from the chair, allowing your knee to bend. Repeat this 10 times while maintaining the arch height. This exercise can be made more difficult by reaching farther in front of you. Do 2 sets.   B.  the same position as above. While maintaining your arch height, reach the hand farthest away from the chair across your body toward the chair. The farther you reach, the more challenging the exercise. Do 2 sets of 10.     Heel raise: Balance yourself while standing behind a chair or counter. Using the chair to help you, raise your body up onto your toes and hold for 5 seconds. Then slowly lower yourself down without holding onto the chair. Hold onto the chair or counter if you need to. When this exercise becomes less painful, try lowering on one leg only. Repeat 10 times. Do 3 sets of 10.     Side-lying leg lift: Lying on your uninjured side, tighten the front thigh muscles on your top leg and lift that leg 8 to 10 inches away from the other leg. Keep the leg straight and  lower slowly. Do 3 sets of 10.   Written by Kimberly Ritchie, MS, PT, and Paulina Garduno PT, [facility], OCS, for "[employer].   Published by "[employer].   Last modified: 2009-02-09   Last reviewed: 2008-07-07   This content is reviewed periodically and is subject to change as new health information becomes available. The information is intended to inform and educate and is not a replacement for medical evaluation, advice, diagnosis or treatment by a healthcare professional.   Sports Medicine Advisor 2009.1 Index

## 2017-08-21 NOTE — PROGRESS NOTES
SUBJECTIVE:   Malika Velasco is a 76 year old female who presents to clinic today for the following health issues:      Left heel pain: plantar heel, worse in the morning, mostly with walking on it but can be sore at rest. It improves after starting to walk but then worsens if walks further. Bothering about 3 weeks, very sore. She is using a good insole in the shoe  She is taking ibuprofen 2 tabs three times a day.       Patient Active Problem List   Diagnosis     GERD (gastroesophageal reflux disease)     Anxiety     Pacemaker     Hyperlipidemia LDL goal <130     Benign essential hypertension     Osteopenia     Osteoarthritis     Squamous cell carcinoma, face     Back pain     Hypertension     Hyperlipidemia     Heart block AV complete (H)     Aortic valve sclerosis     TRAVIS (obstructive sleep apnea)     Advance Care Planning     Current Outpatient Prescriptions   Medication Sig Dispense Refill     alendronate (FOSAMAX) 70 MG tablet Take 1 tablet (70 mg) by mouth every 7 days Take 60 minutes before am meal with 8 oz. water. Remain upright for 30 minutes. 4 tablet 11     atorvastatin (LIPITOR) 10 MG tablet Take 1 tablet (10 mg) by mouth daily 30 tablet 11     atenolol-chlorthalidone (TENORETIC) 100-25 MG per tablet Take 1 tablet by mouth daily       atenolol (TENORMIN) 50 MG tablet Take 50 mg by mouth daily       fluticasone (FLONASE) 50 MCG/ACT spray Spray 1 spray into both nostrils daily       FEXOFENADINE HCL PO Take 180 mg by mouth daily       multivitamin, therapeutic with minerals (MULTI-VITAMIN) TABS tablet Take 1 tablet by mouth daily       Calcium Carb-Cholecalciferol (CALCIUM 600/VITAMIN D3) 600-800 MG-UNIT TABS        fish oil-omega-3 fatty acids 1000 MG capsule Take 2 g by mouth daily       MELATONIN PO Take 5 mg by mouth At Bedtime       aspirin-acetaminophen-caffeine (EXCEDRIN MIGRAINE) 250-250-65 MG per tablet Take 1 tablet by mouth every 6 hours as needed for headaches       oxyCODONE  (ROXICODONE) 5 MG IR tablet Take 1 tablet (5 mg) by mouth every 6 hours as needed for pain (Patient not taking: Reported on 8/21/2017) 20 tablet 0      Social History   Substance Use Topics     Smoking status: Former Smoker     Smokeless tobacco: Never Used     Alcohol use Yes      Comment: rarely        Reviewed and updated as needed this visit by clinical staff  Tobacco  Allergies  Meds  Med Hx  Surg Hx  Fam Hx  Soc Hx      Reviewed and updated as needed this visit by Provider         ROS:  negative    OBJECTIVE:     /70  Pulse 81  Temp 98.3  F (36.8  C) (Oral)  Resp 16  Wt 168 lb (76.2 kg)  SpO2 96%  BMI 30.73 kg/m2  Body mass index is 30.73 kg/(m^2).    Tender plantar heel left foot      ASSESSMENT/PLAN:             1. Pain of left heel  May be combination spur and plantar faciitis, very painful so refer podiatry. Increase ibuprofen for the short term and given a few stretches.   - ORTHO  REFERRAL        Karen Navarro MD  [facility]

## 2017-08-23 ENCOUNTER — OFFICE VISIT (OUTPATIENT)
Dept: PODIATRY | Facility: CLINIC | Age: 77
End: 2017-08-23
Payer: COMMERCIAL

## 2017-08-23 VITALS
WEIGHT: 168 LBS | DIASTOLIC BLOOD PRESSURE: 80 MMHG | BODY MASS INDEX: 30.91 KG/M2 | HEIGHT: 62 IN | SYSTOLIC BLOOD PRESSURE: 128 MMHG

## 2017-08-23 DIAGNOSIS — M72.2 PLANTAR FASCIITIS OF LEFT FOOT: ICD-10-CM

## 2017-08-23 DIAGNOSIS — G89.29 CHRONIC LOW BACK PAIN, UNSPECIFIED BACK PAIN LATERALITY, WITH SCIATICA PRESENCE UNSPECIFIED: ICD-10-CM

## 2017-08-23 DIAGNOSIS — M79.672 LEFT FOOT PAIN: Primary | ICD-10-CM

## 2017-08-23 DIAGNOSIS — I87.2 VENOUS INSUFFICIENCY OF BOTH LOWER EXTREMITIES: ICD-10-CM

## 2017-08-23 DIAGNOSIS — M76.72 PERONEAL TENDINITIS OF LEFT LOWER EXTREMITY: ICD-10-CM

## 2017-08-23 DIAGNOSIS — M54.5 CHRONIC LOW BACK PAIN, UNSPECIFIED BACK PAIN LATERALITY, WITH SCIATICA PRESENCE UNSPECIFIED: ICD-10-CM

## 2017-08-23 DIAGNOSIS — M25.572 ACUTE LEFT ANKLE PAIN: ICD-10-CM

## 2017-08-23 PROCEDURE — 99203 OFFICE O/P NEW LOW 30 MIN: CPT | Performed by: PODIATRIST

## 2017-08-23 RX ORDER — DEXAMETHASONE SODIUM PHOSPHATE 4 MG/ML
4 INJECTION, SOLUTION INTRA-ARTICULAR; INTRALESIONAL; INTRAMUSCULAR; INTRAVENOUS; SOFT TISSUE ONCE
Qty: 30 ML | Refills: 0 | Status: SHIPPED | OUTPATIENT
Start: 2017-08-23 | End: 2018-01-31

## 2017-08-23 NOTE — MR AVS SNAPSHOT
After Visit Summary   8/23/2017    Malika Velasco    MRN: [id number]           Patient Information     Date Of Birth          1940        Visit Information        Provider Department      8/23/2017 9:45 AM Krystin Carbajal DPM, Podiatry/Foot and Ankle Surgery [facility]        Today's Diagnoses     Left foot pain    -  1    Acute left ankle pain        Plantar fasciitis of left foot        Peroneal tendinitis of left lower extremity        Venous insufficiency of both lower extremities        Chronic low back pain, unspecified back pain laterality, with sciatica presence unspecified          Care Instructions      DR. CARBAJAL'S CLINIC LOCATIONS:   MONDAY AM - [place] TUESDAY - [place]   [address] [place] [address] [place] [address]   [phone] / [phone] / [phone]       WEDNESDAY - [place] FRIDAY AM - W[facility] CENTER   [address]   [place] [address] [place] [address]   [phone] / [phone]       FRIDAY PM - [place] SCHEDULE SURGERY: [phone]   [address] BILLING QUESTIONS: [phone]   [place] [address] AFTER HOURS: [phone]   [phone] / [phone] APPOINTMENTS: [phone]     Follow up in 1 month     Body Mass Index (BMI)  Many things can cause foot and ankle problems. Foot structure, activity level, foot mechanics and injuries are common causes of pain.  One very important issue that often goes unmentioned, is body weight. Extra weight can cause increased stress on muscles, ligaments, bones and tendons.  Sometimes just a few extra pounds is all it takes to put one over her/his threshold. Without reducing that stress, it can be difficult to alleviate pain. Some people are uncomfortable addressing this issue, but we feel it is important for you to think about it. As Foot &  Ankle specialists, our job is addressing the  lower extremity problem and possible causes. Regarding extra body weight, we encourage patients to discuss diet and weight management plans with their primary care doctors. It is this team approach that gives you the best opportunity for pain relief and getting you back on your feet.        PLANTAR FASCIITIS  Plantar fasciitis is often referred to as heel spurs or heel pain. Plantar fasciitis is a very common problem that affects people of all foot shapes, age, weight and activity level. Pain may be in the arch or on the weight-bearing surface of the heel. The pain may come on without injury or identifiable cause. Pain is generally present when first getting out of bed in the morning or up from a seated break.     CAUSES  The plantar fascia is a dense fibrous band of tissue that stretches across the bottom surface of the foot. The fascia helps support the foot muscles and arch. Plantar fasciitis is thought to be caused by mechanical strain or overload. Frequent walking without shoes or wearing unsupportive shoes is thought to cause structural overload and ultimately inflammation of the plantar fascia. Some people have heel spurs that can be seen on x-ray. The heel spur is actually a minor component of plantar fascitis and is largely ignored.       SELF TREATMENT   The easiest solution is to stop walking around your home without shoes. Plantar fasciitis is largely a shoe problem. Shoes are either not being worn often enough or your current shoes are inadequate for your weight, foot structure or activity level. The majority of shoes on the market today are not sufficient to resist development of plantar fasciitis or to promote healing. Assume that your current shoes are inadequate and will need to be replaced. Even high quality shoes wear out with 6 months to one year of frequent use. Weight loss is another option. Losing ten pounds in the next two months may be enough to resolve the problem. Ice applied to the area  of pain two to three times per day for ten minutes each session can be very helpful. Warm foot soaks in epsom salts can also relieve pain. This should continue until the problem resolves. Achilles tendon stretching is essential. Stretch multiple times daily to promote healing and to prevent recurrence in the future. Over all stretching of the body is helpful as well such as the calves, thighs and lower back. Normally when one area of the body is tight, other areas are too. Gentle Yoga can be good for this.     Over the counter topical anti inflammatories can be helpful such as biofreeze, bengay, salon pas, ect...  Oral ibuprofen or aleve is recommended as well to try to calm down inflammation.     Night splints can be helpful to gradually stretch the foot at night as a lot of pain is when you get up in the morning. Taking a towel or thera band and stretching the foot back multiple times before you get ou of bed can be beneficial as well.     MEDICAL TREATMENT  Medical treatments often include custom arch supports, cortisone injections, physical therapy, splints to be worn in bed, prescription medications and surgery. The home treatments listed above will be necessary regardless of these advanced medical treatments. Surgery is rarely needed but is very helpful in selected cases.     PROGNOSIS  Plantar fasciitis can last from one day to a lifetime. Some people get intermittent fascitis that is very short-lived. Others suffer daily for years. Excessive body weight, frequent bare foot walking, long hours on the feet, inadequate shoes, predisposing foot structures and excessive activity such as running are all potential issues that lead to chronic and/or recurring plantar fascitis. Having plantar fasciitis means that you are forever prone to this problem and will require modification of some of the above factors. Most people seek treatment within one to four months. Healing usually requires a similar one to four month  time frame. Healing time is relative to the amount of effort spent treating the problem.   Plantar fasciitis is highly recurrent. Risk factors often continue, including return to bare foot walking, inadequate shoes, excessive body weight, excessive activities, etc. Your life style and foot structure may predispose you to recurrent plantar fasciitis. A daily prevention regimen can be very helpful. Ongoing use of shoe inserts, careful attention to appropriate shoes, daily Achilles stretching, etc. may prevent recurrence. Prompt attention at the earliest warning signs of heel pain can resolve the problem in as short as a few days.     EXERCISES  Stair Exercise: Step on the stairs with the ball of your foot and hold your position for at least 15 seconds, then slowly step down with the heels of your foot. You can do this daily and as often as you want.   Picking the Towel: Sit comfortably and then pick the towel up with your toes. You can use any object other than a towel as long as the material can be soft and you can pick it up with your toes.  Rolling the Bottle: Use a small ball or frozen water bottle and then roll it around with your foot.   Flex the Toes: Sit comfortably and then flex your toes by pointing it towards the floor or towards your body. This will relax and flex your foot and exercise your plantar fascia, the calf, and the Achilles tendon. The inability of the foot to stretch often causes the bunching up of the plantar fascia area leading to the pain.  Calf/Achilles Stretching: Lay on you back and raise one foot, then point your toes towards the floor. See photo below:               Hold each stretch for 10 seconds. Stretch 10 times per set, three sets per day. Morning, afternoon and evening. If your heel pain is very severe in the morning, consider doing the first set of stretches before you get out of bed.      OVER THE COUNTER INSERT RECOMMENDATIONS  SuperFeet   Sofsole Fit Spenco   Power Step    Walk-Fit Arch Cradles     Most of these can be found at your local [employer], sporting goods stores, or online.  **A good high quality over the counter insert should cost around $40-$50      [employer] LOCATIONS  [place]  [address] [facility] [address]  [phone]   [address]  [phone] [place]  [address]  [phone]   [place]  [address]  [phone]   [place]  [address]  [phone] [place]  [address] [place] [address] [place]  [phone]   [place]  [address]  [phone]   [place]  [address]  [phone] [place]  [address]  [phone]       TENDONITIS   Tendons are the strong fibrous portions ofmuscles that attach to bones and allow the muscle to move a joint when it contracts. Tendons are very strong because they have a lot of force exerted on them. Sometimes tendons can become painful because they have suffered an acute injury, in which too much force was exerted at one time, or an overuse injury, in which a normal force was exerted too frequently or over a prolonged period of time. As a result, there is damage to the tendon and its surrounding soft tissue structures and they become inflammed. Because tendons do not have a great blood supply, they do not heal rapidly and the inflammation can become chronic.   Conservative treatment for tendinitis involves rest and anti-inflammatory measures. Ice is applied 15 minutes 2-3 times daily. Anti-inflammatory medications called NSAIDs (ibuprofen, example) can be taken provided they are used with caution, as they can lead to internal bleeding and increase the risk ofstroke and heart attack. Sometimes topical nitroglycerin is prescribed to help with pain. Often your doctor will use a special shoe or removable walking cast to immobilize the tendon, allowing it to heal without further damage from use. These devices are very useful in helping tendons heal, but they may  slow you down or make you feel like your hip, knee, or back are out ofalignment. This is temporary and should go away once you are out ofthe immobilization. You should not use a walking cast when showering or driving. Another option is Platelet Rich Plasma injections. (Normally done with a Sports and Orthorapedic doctor.   If conservative measures fail, your physician may need to surgically repair the tendon by removing any chronic inflammatory tissue and sewing it back together. Sometimes it is sewn to an adjacent tendon with similar function for support and sometimes it is lengthened. . Sometimes the bones around the tendon need to be realigned or reshaped to better support the tendon or prevent further damage. Your foot and ankle surgeon will discuss the specifics of your surgery with you, should you need it.    Towel stretch: Sit on a hard surface with your injured leg stretched out in front of you. Loop a towel around your toes and the ball of your foot and pull the towel toward your body keeping your leg straight. Hold this position for 15 to 30 seconds and then relax. Repeat 3 times. Then push the towel away with the ball of your foot. Repeat 3 times.  When you don't feel much of a stretch using the towel, you can start the standing calf stretch and the following exercises.  Standing calf stretch: Stand facing a wall with your hands on the wall at about eye level. Keep your injured leg back with your heel on the floor. Keep the other leg forward with the knee bent. Turn your back foot slightly inward (as if you were pigeon-toed). Slowly lean into the wall until you feel a stretch in the back of your calf. Hold the stretch for 15 to 30 seconds. Return to the starting position. Repeat 3 times. Do this exercise several times each day.   Standing soleus stretch: Stand facing a wall with your hands on the wall at about chest height. Keep your injured leg back with your heel on the floor. Keep the other leg forward  with the knee bent. Turn your back foot slightly inward (as if you were pigeon-toed). Bend your back knee slightly and gently lean into the wall until you feel a stretch in the lower calf of your injured leg. Hold the stretch for 15 to 30 seconds. Return to the starting position. Repeat 3 times.   Achilles stretch: Stand with the ball of one foot on a stair. Reach for the step below with your heel until you feel a stretch in the arch of your foot. Hold this position for 15 to 30 seconds and then relax. Repeat 3 times.   Heel raise: Balance yourself while standing behind a chair or counter. Using the chair or counter as a support to help you, raise your body up onto your toes and hold for 5 seconds. Then slowly lower yourself down without holding onto the support. (It's OK to keep holding onto the support if you need to.) When this exercise becomes less painful, try lowering yourself down on the injured leg only. Repeat 15 times. Do 2 sets of 15. Rest 30 seconds between sets.   Step-up: Stand with the foot of your injured leg on a support 3 to 5 inches high (like a small step or block of wood). Keep your other foot flat on the floor. Shift your weight onto the injured leg on the support. Straighten your injured leg as the other leg comes off the floor. Return to the starting position by bending your injured leg and slowly lowering your uninjured leg back to the floor. Do 2 sets of 15.   Resisted ankle eversion: Sit with both legs stretched out in front of you, with your feet about a shoulder's width apart. Tie a loop in one end of elastic tubing. Put the foot of your injured leg through the loop so that the tubing goes around the arch of that foot and wraps around the outside of the other foot. Hold onto the other end of the tubing with your hand to provide tension. Turn the foot of your injured leg up and out. Make sure you keep your other foot still so that it will allow the tubing to stretch as you move the foot  of your injured leg. Return to the starting position. Do 2 sets of 15.   Balance and reach exercises: Stand next to a chair with your injured leg farther from the chair. The chair will provide support if you need it. Stand on the foot of your injured leg and bend your knee slightly. Try to raise the arch of this foot while keeping your big toe on the floor. Keep your foot in this position. With the hand that is farther away from the chair, reach forward in front of you by bending at the waist. Avoid bending your knee any more as you do this. Repeat this 10 times. To make the exercise more challenging, reach farther in front of you. Do 2 sets of 10.  the same position as above. While keeping your arch height, reach the hand that is farther away from the chair across your body toward the chair. The farther you reach, the more challenging the exercise. Do 2 sets of 10.     Resisted ankle eversion: Sit with both legs stretched out in front of you, with your feet about a shoulder's width apart. Tie a loop in one end of elastic tubing. Put the foot of your injured leg through the loop so that the tubing goes around the arch of that foot and wraps around the outside of the other foot. Hold onto the other end of the tubing with your hand to provide tension. Turn the foot of your injured leg up and out. Make sure you keep your other foot still so that it will allow the tubing to stretch as you move the foot of your injured leg. Return to the starting position. Do 2 sets of 15.   If you have access to a wobble board, do the following exercises:  Wobble board exercises:   Stand on a wobble board with your feet shoulder width apart. Rock the board forwards and backwards 30 times, then side to side 30 times. Hold on to a chair if you need support.   Rotate the wobble board around so that the edge of the board is in contact with the floor at all times. Do this 30 times in a clockwise and then a counterclockwise direction.    Balance on the wobble board for as long as you can without letting the edges touch the floor. Try to do this for 2 minutes without touching the floor.   Rotate the wobble board in clockwise and counterclockwise circles, but do not let the edge of the board touch the floor.   When you have mastered exercises A through D, try repeating them while standing on just your injured leg.   After you are able to do these exercises on one leg, try to do them with your eyes closed. Make sure you have something nearby to support you in case you lose your balance.            Follow-ups after your visit        Additional Services     [facility] PT, HAND, AND CHIROPRACTIC REFERRAL       **This order will print in the [facility] Scheduling Office**    Physical Therapy, Hand Therapy and Chiropractic Care are available through:    *[facility]  *[facility]  *[facility]    Call one number to schedule at any of the above locations: [phone].    Your provider has referred you to: Physical Therapy at [facility] or [facility]    Indication/Reason for Referral: left plantar fasciitis and peroneal tendonitis  Onset of Illness: 3 weeks  Therapy Orders: Evaluate and Treat  Special Programs: None  Special Request: Exercise: Home Exercise Program, Posture/Body Mechanics and Stretching/Flexibility  Modalities: As Indicated: , Iontophoresis (Please Order: Dexamethasone Sodium Phosphate - 4mg/ml injectable, 30 cc total volume) and Ultrasound    Steven Harris      Additional Comments for the Therapist or Chiropractor:     Please be aware that coverage of these services is subject to the terms and limitations of your health insurance plan.  Call member services at your health plan with any benefit or coverage questions.      Please bring the following to your appointment:    *Your personal calendar for scheduling future appointments  *Comfortable clothing            ORTHO  REFERRAL       [facility]  Services is referring you to the Orthopedic  Services at [facility].       The  Representative will assist you in the coordination of your Orthopedic and Musculoskeletal Care as prescribed by your physician.    The  Representative will call you within 1 business day to help schedule your appointment, or you may contact the  Representative at:    All areas ~ [phone]     Type of Referral : Spine: Lumbar  **Choose Medical Spine Specialist (unless patient was seen by a Medical Spine Specialist within the past 6 months).**  Surgical Evaluation is advised if the patient presents with one or more of the following red flags: Evidence of Spinal Tumor, Infection or Fracture, Cauda Equina Syndrome, Sudden or Progressive Weakness, Loss of Bowel or Bladder Control, or any other documented emergent neurological condition resulting from a Lumbar Spinal Condition. Medical Spine Specialist        Timeframe requested: Routine    Coverage of these services is subject to the terms and limitations of your health insurance plan.  Please call member services at your health plan with any benefit or coverage questions.      If X-rays, CT or MRI's have been performed, please contact the facility where they were done to arrange for , prior to your scheduled appointment.  Please bring this referral request to your appointment and present it to your specialist.                  Who to contact     If you have questions or need follow up information about today's clinic visit or your schedule please contact [facility] directly at [phone].  Normal or non-critical lab and imaging results will be communicated to you by MyChart, letter or phone within 4 business days after the clinic has received the results. If you do not hear from us within 7 days, please contact the clinic through MyChart or phone. If you have a critical or abnormal lab result, we will  "notify you by phone as soon as possible.  Submit refill requests through [employer] or call your pharmacy and they will forward the refill request to us. Please allow 3 business days for your refill to be completed.          Additional Information About Your Visit        [employer] Information     [employer] gives you secure access to your electronic health record. If you see a primary care provider, you can also send messages to your care team and make appointments. If you have questions, please call your primary care clinic.  If you do not have a primary care provider, please call [phone] and they will assist you.        Care EveryWhere ID     This is your Care EveryWhere ID. This could be used by other organizations to access your [facility] medical records  [id number]        Your Vitals Were     Height BMI (Body Mass Index)                5' 2\" (1.575 m) 30.73 kg/m2           Blood Pressure from Last 3 Encounters:   08/23/17 128/80   08/21/17 102/70   06/02/17 (!) 138/92    Weight from Last 3 Encounters:   08/23/17 168 lb (76.2 kg)   08/21/17 168 lb (76.2 kg)   06/02/17 159 lb 14.4 oz (72.5 kg)              We Performed the Following     [facility] PT, HAND, AND CHIROPRACTIC REFERRAL     ORTHO  REFERRAL          Today's Medication Changes          These changes are accurate as of: 8/23/17 10:12 AM.  If you have any questions, ask your nurse or doctor.               Start taking these medicines.        Dose/Directions    dexamethasone 4 MG/ML injection   Commonly known as:  DECADRON   Used for:  Left foot pain, Acute left ankle pain, Plantar fasciitis of left foot, Peroneal tendinitis of left lower extremity, Venous insufficiency of both lower extremities, Chronic low back pain, unspecified back pain laterality, with sciatica presence unspecified   Started by:  Krystin Carbajal, HOLLIE, Podiatry/Foot and Ankle Surgery        Dose:  4 mg   Apply 1 mL (4 mg) topically once for 1 dose For physical therapy, iontophoresis "   Quantity:  30 mL   Refills:  0       order for DME   Used for:  Left foot pain, Acute left ankle pain, Plantar fasciitis of left foot, Peroneal tendinitis of left lower extremity, Venous insufficiency of both lower extremities, Chronic low back pain, unspecified back pain laterality, with sciatica presence unspecified   Started by:  Krystin Carbajal DPM, Podiatry/Foot and Ankle Surgery        Short cast boot   Quantity:  1 Device   Refills:  0            Where to get your medicines      These medications were sent to [facility] [place], [place] - [address]  [address] [facility] [address]     Phone:  [phone]     dexamethasone 4 MG/ML injection         Some of these will need a paper prescription and others can be bought over the counter.  Ask your nurse if you have questions.     Bring a paper prescription for each of these medications     order for DME                Primary Care Provider Office Phone # Fax #    Karen Navarro [phone]       [address]  [facility] [address]        Equal Access to Services     [facility]: Hadii osvaldo ku hadasho Soomaali, waaxda luqadaha, qaybta kaalmada adeegyada, waxay abraham haykelli singer . So [facility] [phone].    ATENCIÓN: Si habla español, tiene a sotelo disposición servicios gratuitos de asistencia lingüística. Llame al [phone].    We comply with applicable federal civil rights laws and [place] laws. We do not discriminate on the basis of race, color, national origin, age, disability sex, sexual orientation or gender identity.            Thank you!     Thank you for choosing [facility]  for your care. Our goal is always to provide you with excellent care. Hearing back from our patients is one way we can continue to improve our services. Please take a few minutes to complete the written survey that you may receive in the mail after your visit with us. Thank you!              Your Updated Medication List - Protect others around you: Learn how to safely use, store and throw away your medicines at www.disposemymeds.org.          This list is accurate as of: 8/23/17 10:12 AM.  Always use your most recent med list.                   Brand Name Dispense Instructions for use Diagnosis    alendronate 70 MG tablet    FOSAMAX    4 tablet    Take 1 tablet (70 mg) by mouth every 7 days Take 60 minutes before am meal with 8 oz. water. Remain upright for 30 minutes.    Osteopenia       aspirin-acetaminophen-caffeine 250-250-65 MG per tablet    EXCEDRIN MIGRAINE     Take 1 tablet by mouth every 6 hours as needed for headaches        atenolol 50 MG tablet    TENORMIN     Take 50 mg by mouth daily        atenolol-chlorthalidone 100-25 MG per tablet    TENORETIC     Take 1 tablet by mouth daily        atorvastatin 10 MG tablet    LIPITOR    30 tablet    Take 1 tablet (10 mg) by mouth daily    Atherosclerosis of native coronary artery of native heart without angina pectoris       CALCIUM 600/VITAMIN D3 600-800 MG-UNIT Tabs   Generic drug:  Calcium Carb-Cholecalciferol           dexamethasone 4 MG/ML injection    DECADRON    30 mL    Apply 1 mL (4 mg) topically once for 1 dose For physical therapy, iontophoresis    Left foot pain, Acute left ankle pain, Plantar fasciitis of left foot, Peroneal tendinitis of left lower extremity, Venous insufficiency of both lower extremities, Chronic low back pain, unspecified back pain laterality, with sciatica presence unspecified       FEXOFENADINE HCL PO      Take 180 mg by mouth daily        fish oil-omega-3 fatty acids 1000 MG capsule      Take 2 g by mouth daily        fluticasone 50 MCG/ACT spray    FLONASE     Spray 1 spray into both nostrils daily        MELATONIN PO      Take 5 mg by mouth At Bedtime        Multi-vitamin Tabs tablet      Take 1 tablet by mouth daily        order for DME     1 Device    Short cast boot    Left foot pain, Acute left ankle  pain, Plantar fasciitis of left foot, Peroneal tendinitis of left lower extremity, Venous insufficiency of both lower extremities, Chronic low back pain, unspecified back pain laterality, with sciatica presence unspecified       oxyCODONE 5 MG IR tablet    ROXICODONE    20 tablet    Take 1 tablet (5 mg) by mouth every 6 hours as needed for pain

## 2017-08-23 NOTE — NURSING NOTE
"Chief Complaint   Patient presents with     Foot Problems     left heel pain also radiates to around the ankle the great toe will aso swell up a little right foot bothers her occasionally        Initial /80  Ht 5' 2\" (1.575 m)  Wt 168 lb (76.2 kg)  BMI 30.73 kg/m2 Estimated body mass index is 30.73 kg/(m^2) as calculated from the following:    Height as of this encounter: 5' 2\" (1.575 m).    Weight as of this encounter: 168 lb (76.2 kg).  Medication Reconciliation: complete   Huber Oquendo MA      "

## 2017-08-23 NOTE — PATIENT INSTRUCTIONS
[facility] LOCATIONS:   MONDAY AM - [place] TUESDAY - [place]   [address] [address] [place] [address] [place] [address]   [phone] / [phone] / [phone]       WEDNESDAY - [place] FRIDAY AM - [facility]   [address]   [place] [address] [place] [address]   [phone] / [phone]       FRIDAY PM - [place] SCHEDULE SURGERY: [phone]   [address] [address] BILLING QUESTIONS: [phone]   [place] [address] AFTER HOURS: [phone]   [phone] / [phone] APPOINTMENTS: [phone]     Follow up in 1 month     Body Mass Index (BMI)  Many things can cause foot and ankle problems. Foot structure, activity level, foot mechanics and injuries are common causes of pain.  One very important issue that often goes unmentioned, is body weight. Extra weight can cause increased stress on muscles, ligaments, bones and tendons.  Sometimes just a few extra pounds is all it takes to put one over her/his threshold. Without reducing that stress, it can be difficult to alleviate pain. Some people are uncomfortable addressing this issue, but we feel it is important for you to think about it. As [facility]  Ankle specialists, our job is addressing the lower extremity problem and possible causes. Regarding extra body weight, we encourage patients to discuss diet and weight management plans with their primary care doctors. It is this team approach that gives you the best opportunity for pain relief and getting you back on your feet.        PLANTAR FASCIITIS  Plantar fasciitis is often referred to as heel spurs or heel pain. Plantar fasciitis is a very common problem that affects people of all foot shapes, age, weight and activity level. Pain may be in the arch or on the weight-bearing surface of the heel. The pain may come on without injury or identifiable cause. Pain is generally present when first  getting out of bed in the morning or up from a seated break.     CAUSES  The plantar fascia is a dense fibrous band of tissue that stretches across the bottom surface of the foot. The fascia helps support the foot muscles and arch. Plantar fasciitis is thought to be caused by mechanical strain or overload. Frequent walking without shoes or wearing unsupportive shoes is thought to cause structural overload and ultimately inflammation of the plantar fascia. Some people have heel spurs that can be seen on x-ray. The heel spur is actually a minor component of plantar fascitis and is largely ignored.       SELF TREATMENT   The easiest solution is to stop walking around your home without shoes. Plantar fasciitis is largely a shoe problem. Shoes are either not being worn often enough or your current shoes are inadequate for your weight, foot structure or activity level. The majority of shoes on the market today are not sufficient to resist development of plantar fasciitis or to promote healing. Assume that your current shoes are inadequate and will need to be replaced. Even high quality shoes wear out with 6 months to one year of frequent use. Weight loss is another option. Losing ten pounds in the next two months may be enough to resolve the problem. Ice applied to the area of pain two to three times per day for ten minutes each session can be very helpful. Warm foot soaks in epsom salts can also relieve pain. This should continue until the problem resolves. Achilles tendon stretching is essential. Stretch multiple times daily to promote healing and to prevent recurrence in the future. Over all stretching of the body is helpful as well such as the calves, thighs and lower back. Normally when one area of the body is tight, other areas are too. Gentle Yoga can be good for this.     Over the counter topical anti inflammatories can be helpful such as biofreeze, bengay, salon pas, ect...  Oral ibuprofen or aleve is recommended  as well to try to calm down inflammation.     Night splints can be helpful to gradually stretch the foot at night as a lot of pain is when you get up in the morning. Taking a towel or thera band and stretching the foot back multiple times before you get ou of bed can be beneficial as well.     MEDICAL TREATMENT  Medical treatments often include custom arch supports, cortisone injections, physical therapy, splints to be worn in bed, prescription medications and surgery. The home treatments listed above will be necessary regardless of these advanced medical treatments. Surgery is rarely needed but is very helpful in selected cases.     PROGNOSIS  Plantar fasciitis can last from one day to a lifetime. Some people get intermittent fascitis that is very short-lived. Others suffer daily for years. Excessive body weight, frequent bare foot walking, long hours on the feet, inadequate shoes, predisposing foot structures and excessive activity such as running are all potential issues that lead to chronic and/or recurring plantar fascitis. Having plantar fasciitis means that you are forever prone to this problem and will require modification of some of the above factors. Most people seek treatment within one to four months. Healing usually requires a similar one to four month time frame. Healing time is relative to the amount of effort spent treating the problem.   Plantar fasciitis is highly recurrent. Risk factors often continue, including return to bare foot walking, inadequate shoes, excessive body weight, excessive activities, etc. Your life style and foot structure may predispose you to recurrent plantar fasciitis. A daily prevention regimen can be very helpful. Ongoing use of shoe inserts, careful attention to appropriate shoes, daily Achilles stretching, etc. may prevent recurrence. Prompt attention at the earliest warning signs of heel pain can resolve the problem in as short as a few days.     EXERCISES  Stair  Exercise: Step on the stairs with the ball of your foot and hold your position for at least 15 seconds, then slowly step down with the heels of your foot. You can do this daily and as often as you want.   Picking the Towel: Sit comfortably and then pick the towel up with your toes. You can use any object other than a towel as long as the material can be soft and you can pick it up with your toes.  Rolling the Bottle: Use a small ball or frozen water bottle and then roll it around with your foot.   Flex the Toes: Sit comfortably and then flex your toes by pointing it towards the floor or towards your body. This will relax and flex your foot and exercise your plantar fascia, the calf, and the Achilles tendon. The inability of the foot to stretch often causes the bunching up of the plantar fascia area leading to the pain.  Calf/Achilles Stretching: Lay on you back and raise one foot, then point your toes towards the floor. See photo below:               Hold each stretch for 10 seconds. Stretch 10 times per set, three sets per day. Morning, afternoon and evening. If your heel pain is very severe in the morning, consider doing the first set of stretches before you get out of bed.      OVER THE COUNTER INSERT RECOMMENDATIONS  SuperFeet   Sofsole Fit Spenco   Power Step   Walk-Fit Arch Cradles     Most of these can be found at your local [employer], sporting [employer], or online.  **A good high quality over the counter insert should cost around $40-$50      [facility] LOCATIONS  [address]  [phone]   [address]  [phone] [place]  [address] [facility]  [phone]   [place]  [address]  [phone]   [place]  [address] [facility]  [phone] [place] [address]  [phone]   [place]  [address]  [phone]   [place]  [address]  [phone] [place]  [address]  [phone]        TENDONITIS   Tendons are the strong fibrous portions ofmuscles that attach to bones and allow the muscle to move a joint when it contracts. Tendons are very strong because they have a lot of force exerted on them. Sometimes tendons can become painful because they have suffered an acute injury, in which too much force was exerted at one time, or an overuse injury, in which a normal force was exerted too frequently or over a prolonged period of time. As a result, there is damage to the tendon and its surrounding soft tissue structures and they become inflammed. Because tendons do not have a great blood supply, they do not heal rapidly and the inflammation can become chronic.   Conservative treatment for tendinitis involves rest and anti-inflammatory measures. Ice is applied 15 minutes 2-3 times daily. Anti-inflammatory medications called NSAIDs (ibuprofen, example) can be taken provided they are used with caution, as they can lead to internal bleeding and increase the risk ofstroke and heart attack. Sometimes topical nitroglycerin is prescribed to help with pain. Often your doctor will use a special shoe or removable walking cast to immobilize the tendon, allowing it to heal without further damage from use. These devices are very useful in helping tendons heal, but they may slow you down or make you feel like your hip, knee, or back are out ofalignment. This is temporary and should go away once you are out ofthe immobilization. You should not use a walking cast when showering or driving. Another option is Platelet Rich Plasma injections. (Normally done with a Sports and Orthorapedic doctor.   If conservative measures fail, your physician may need to surgically repair the tendon by removing any chronic inflammatory tissue and sewing it back together. Sometimes it is sewn to an adjacent tendon with similar function for support and sometimes it is lengthened. . Sometimes the bones around the tendon need to be realigned  or reshaped to better support the tendon or prevent further damage. Your foot and ankle surgeon will discuss the specifics of your surgery with you, should you need it.    Towel stretch: Sit on a hard surface with your injured leg stretched out in front of you. Loop a towel around your toes and the ball of your foot and pull the towel toward your body keeping your leg straight. Hold this position for 15 to 30 seconds and then relax. Repeat 3 times. Then push the towel away with the ball of your foot. Repeat 3 times.  When you don't feel much of a stretch using the towel, you can start the standing calf stretch and the following exercises.  Standing calf stretch: Stand facing a wall with your hands on the wall at about eye level. Keep your injured leg back with your heel on the floor. Keep the other leg forward with the knee bent. Turn your back foot slightly inward (as if you were pigeon-toed). Slowly lean into the wall until you feel a stretch in the back of your calf. Hold the stretch for 15 to 30 seconds. Return to the starting position. Repeat 3 times. Do this exercise several times each day.   Standing soleus stretch: Stand facing a wall with your hands on the wall at about chest height. Keep your injured leg back with your heel on the floor. Keep the other leg forward with the knee bent. Turn your back foot slightly inward (as if you were pigeon-toed). Bend your back knee slightly and gently lean into the wall until you feel a stretch in the lower calf of your injured leg. Hold the stretch for 15 to 30 seconds. Return to the starting position. Repeat 3 times.   Achilles stretch: Stand with the ball of one foot on a stair. Reach for the step below with your heel until you feel a stretch in the arch of your foot. Hold this position for 15 to 30 seconds and then relax. Repeat 3 times.   Heel raise: Balance yourself while standing behind a chair or counter. Using the chair or counter as a support to help you,  raise your body up onto your toes and hold for 5 seconds. Then slowly lower yourself down without holding onto the support. (It's OK to keep holding onto the support if you need to.) When this exercise becomes less painful, try lowering yourself down on the injured leg only. Repeat 15 times. Do 2 sets of 15. Rest 30 seconds between sets.   Step-up: Stand with the foot of your injured leg on a support 3 to 5 inches high (like a small step or block of wood). Keep your other foot flat on the floor. Shift your weight onto the injured leg on the support. Straighten your injured leg as the other leg comes off the floor. Return to the starting position by bending your injured leg and slowly lowering your uninjured leg back to the floor. Do 2 sets of 15.   Resisted ankle eversion: Sit with both legs stretched out in front of you, with your feet about a shoulder's width apart. Tie a loop in one end of elastic tubing. Put the foot of your injured leg through the loop so that the tubing goes around the arch of that foot and wraps around the outside of the other foot. Hold onto the other end of the tubing with your hand to provide tension. Turn the foot of your injured leg up and out. Make sure you keep your other foot still so that it will allow the tubing to stretch as you move the foot of your injured leg. Return to the starting position. Do 2 sets of 15.   Balance and reach exercises: Stand next to a chair with your injured leg farther from the chair. The chair will provide support if you need it. Stand on the foot of your injured leg and bend your knee slightly. Try to raise the arch of this foot while keeping your big toe on the floor. Keep your foot in this position. With the hand that is farther away from the chair, reach forward in front of you by bending at the waist. Avoid bending your knee any more as you do this. Repeat this 10 times. To make the exercise more challenging, reach farther in front of you. Do 2 sets  of 10.  the same position as above. While keeping your arch height, reach the hand that is farther away from the chair across your body toward the chair. The farther you reach, the more challenging the exercise. Do 2 sets of 10.     Resisted ankle eversion: Sit with both legs stretched out in front of you, with your feet about a shoulder's width apart. Tie a loop in one end of elastic tubing. Put the foot of your injured leg through the loop so that the tubing goes around the arch of that foot and wraps around the outside of the other foot. Hold onto the other end of the tubing with your hand to provide tension. Turn the foot of your injured leg up and out. Make sure you keep your other foot still so that it will allow the tubing to stretch as you move the foot of your injured leg. Return to the starting position. Do 2 sets of 15.   If you have access to a wobble board, do the following exercises:  Wobble board exercises:   Stand on a wobble board with your feet shoulder width apart. Rock the board forwards and backwards 30 times, then side to side 30 times. Hold on to a chair if you need support.   Rotate the wobble board around so that the edge of the board is in contact with the floor at all times. Do this 30 times in a clockwise and then a counterclockwise direction.   Balance on the wobble board for as long as you can without letting the edges touch the floor. Try to do this for 2 minutes without touching the floor.   Rotate the wobble board in clockwise and counterclockwise circles, but do not let the edge of the board touch the floor.   When you have mastered exercises A through D, try repeating them while standing on just your injured leg.   After you are able to do these exercises on one leg, try to do them with your eyes closed. Make sure you have something nearby to support you in case you lose your balance.

## 2017-08-23 NOTE — PROGRESS NOTES
PATIENT HISTORY:  Malika Velasco is a 76 year old female who presents to clinic for pain to the left foot and ankle. Notes it started about 3 weeks ago. Denies specific injury. Pain is 7/10 but can be 10/10 at its worst. Has tried icing, ibuprofen but minimal relief.  Would like to know what is causing pain and what can be done for it.     Review of Systems:  Patient denies fever, chills, rash, wound, stiffness, numbness, weakness, heart burn, blood in stool, chest pain with activity, calf pain when walking, shortness of breath with activity, chronic cough, easy bleeding/bruising, excessive thirst, fatigue, depression, anxiety.  Patient admits to limping, swelling.     PAST MEDICAL HISTORY:   Past Medical History:   Diagnosis Date     Allergic rhinitis      Anxiety      Aortic valve sclerosis      Back pain      GERD (gastroesophageal reflux disease)      Heart block AV complete (H) 2008    pacemaker     Hyperlipidemia      Hypertension      Major depression      TRAVIS (obstructive sleep apnea)     moderate     Osteoarthritis      Squamous cell carcinoma, face         PAST SURGICAL HISTORY:   Past Surgical History:   Procedure Laterality Date     ARTHROSCOPY KNEE Left      AS TOTAL KNEE ARTHROPLASTY Left 05/2015     IMPLANT PACEMAKER  1997     TONSILLECTOMY       TUBAL LIGATION          MEDICATIONS:   Current Outpatient Prescriptions:      dexamethasone (DECADRON) 4 MG/ML injection, Apply 1 mL (4 mg) topically once for 1 dose For physical therapy, iontophoresis, Disp: 30 mL, Rfl: 0     alendronate (FOSAMAX) 70 MG tablet, Take 1 tablet (70 mg) by mouth every 7 days Take 60 minutes before am meal with 8 oz. water. Remain upright for 30 minutes., Disp: 4 tablet, Rfl: 11     atorvastatin (LIPITOR) 10 MG tablet, Take 1 tablet (10 mg) by mouth daily, Disp: 30 tablet, Rfl: 11     atenolol-chlorthalidone (TENORETIC) 100-25 MG per tablet, Take 1 tablet by mouth daily, Disp: , Rfl:      atenolol (TENORMIN) 50 MG tablet,  "Take 50 mg by mouth daily, Disp: , Rfl:      fluticasone (FLONASE) 50 MCG/ACT spray, Spray 1 spray into both nostrils daily, Disp: , Rfl:      aspirin-acetaminophen-caffeine (EXCEDRIN MIGRAINE) 250-250-65 MG per tablet, Take 1 tablet by mouth every 6 hours as needed for headaches, Disp: , Rfl:      FEXOFENADINE HCL PO, Take 180 mg by mouth daily, Disp: , Rfl:      multivitamin, therapeutic with minerals (MULTI-VITAMIN) TABS tablet, Take 1 tablet by mouth daily, Disp: , Rfl:      Calcium Carb-Cholecalciferol (CALCIUM 600/VITAMIN D3) 600-800 MG-UNIT TABS, , Disp: , Rfl:      fish oil-omega-3 fatty acids 1000 MG capsule, Take 2 g by mouth daily, Disp: , Rfl:      MELATONIN PO, Take 5 mg by mouth At Bedtime, Disp: , Rfl:      oxyCODONE (ROXICODONE) 5 MG IR tablet, Take 1 tablet (5 mg) by mouth every 6 hours as needed for pain (Patient not taking: Reported on 8/21/2017), Disp: 20 tablet, Rfl: 0     ALLERGIES:    Allergies   Allergen Reactions     Zantac [Ranitidine]      Headache        SOCIAL HISTORY:   Social History     Social History     Marital status:      Spouse name: N/A     Number of children: N/A     Years of education: N/A     Occupational History     Not on file.     Social History Main Topics     Smoking status: Former Smoker     Smokeless tobacco: Never Used     Alcohol use Yes      Comment: rarely     Drug use: Not on file     Sexual activity: No     Other Topics Concern     Not on file     Social History Narrative        FAMILY HISTORY:   Family History   Problem Relation Age of Onset     HEART DISEASE Mother      Dementia Mother      HEART DISEASE Father         EXAM:Vitals: /80  Ht 1.575 m (5' 2\")  Wt 76.2 kg (168 lb)  BMI 30.73 kg/m2  BMI= Body mass index is 30.73 kg/(m^2).    General appearance: Patient is alert and fully cooperative with history & exam.  No sign of distress is noted during the visit.     Psychiatric: Affect is pleasant & appropriate.  Patient appears motivated to " improve health.     Respiratory: Breathing is regular & unlabored while sitting.     HEENT: Hearing is intact to spoken word.  Speech is clear.  No gross evidence of visual impairment that would impact ambulation.     Dermatologic: Skin is intact to both lower extremities without significant lesions, rash or abrasion.  No paronychia or evidence of soft tissue infection is noted.     Vascular: DP & PT pulses are intact & regular bilaterally.   edema and varicosities noted.  CFT and skin temperature is normal to both lower extremities.     Neurologic: Lower extremity sensation is intact to light touch.  No evidence of weakness or contracture in the lower extremities.  No evidence of neuropathy.     Musculoskeletal: Patient is ambulatory without assistive device or brace.  Pain on palpation of the plantar left heel and along the peroneal tendon. Pain with eversion of the left ankle.      ASSESSMENT:    Left foot pain  Acute left ankle pain  Plantar fasciitis of left foot  Peroneal tendinitis of left lower extremity  Venous insufficiency of both lower extremities  Chronic low back pain, unspecified back pain laterality, with sciatica presence unspecified     PLAN:  Reviewed patient's chart in [facility]. The potential causes and nature of plantar fasciitis were discussed with the patient.  We reviewed the natural history/prognosis of the condition and risks if left untreated.  These include chronic pain, other sites of pain due to gait changes, and potential plantar fascial rupture.      We discussed possible causes of the condition as it relates to the patients specific situation.      Conservative treatment options were reviewed:  appropriate shoes, avoidance of barefoot walking, inserts/orthoses, stretching, ice, massage, immobilization and NSAIDs.     We also reviewed the options of injection therapy and surgery.  However, it was made clear that surgery is only considered when conservative therapy fails.  The risks and  benefits of injection therapy, and surgery were discussed.     Reviewed and discussed causes of tendonitis.  We discussed treatments such as immobiliation, icing, stretching, heel lifts, orthotics, physical therapy, MRI.      At this time, recommend cast boot. Would also recommend MRI to assess for tendon tear given the acute point tenderness to tendon. Will call her with the results.  Recommend icing, decreased activity and was given an order for physical therapy with iontophoresis. Also recommend orthotics.  She requested referral for a lumbar back doctor as she has been seeing [facility] and would like a 2nd opinion. This was placed.     Krystin Carbajal DPM, Podiatry/Foot and Ankle Surgery    Weight management plan: Patient was referred to their PCP to discuss a diet and exercise plan.

## 2017-08-25 ENCOUNTER — THERAPY VISIT (OUTPATIENT)
Dept: PHYSICAL THERAPY | Facility: CLINIC | Age: 77
End: 2017-08-25
Payer: MEDICARE

## 2017-08-25 DIAGNOSIS — M72.2 PLANTAR FASCIITIS, LEFT: Primary | ICD-10-CM

## 2017-08-25 DIAGNOSIS — M76.72 PERONEAL TENDINITIS, LEFT LEG: ICD-10-CM

## 2017-08-25 PROCEDURE — 97110 THERAPEUTIC EXERCISES: CPT | Mod: GP | Performed by: PHYSICAL THERAPIST

## 2017-08-25 PROCEDURE — 97161 PT EVAL LOW COMPLEX 20 MIN: CPT | Mod: GP | Performed by: PHYSICAL THERAPIST

## 2017-08-25 PROCEDURE — G8978 MOBILITY CURRENT STATUS: HCPCS | Mod: GP | Performed by: PHYSICAL THERAPIST

## 2017-08-25 PROCEDURE — G8979 MOBILITY GOAL STATUS: HCPCS | Mod: GP | Performed by: PHYSICAL THERAPIST

## 2017-08-25 NOTE — PROGRESS NOTES
Subjective:    HPI                    Objective:    System    Physical Exam    General     ROS     Physical Therapy Initial Evaluation:   Aug 25, 2017  Precautions/Restrictions/MD instructions:   Per Patient: Dexamethasone was not discussed it the patient by her doctor. She has not ordered it at this time.   Per Orders: Special Request: Exercise: Home Exercise Program, Posture/Body Mechanics and Stretching/Flexibility.  Modalities: As Indicated: , Iontophoresis (Please Order: Dexamethasone Sodium Phosphate - 4mg/ml injectable, 30 cc total volume) and Ultrasound     Subjective:   Chief Complaint:    Pain: left foot on the inferolateral aspects of the calcaneus   Numbness/Tingling: None   Weakness: None   Stiffness: None  New/Recurrent/Chronic: New for this side  DOI/onset: 3 weeks   Referral Date: 8/23/2017  Mechanism of onset: unsure, concrete floors may have contributed.   PMH/surgical history/trauma:    - osteoarthritis   - osetopenia   - Hx of fractures in the hand   - HTN   - pacemaker   -L Knee Repleacment  General health as reported by patient: Good    Medications: Cholesterol, Anti-inflammatory, HTN  Previous Treatment (Effect): heat/ice (ice hurt more, heat felt better), insoles (somewhat helpful),   Imaging: None  AM/PM: worse in the morning  Quality of Pain: sharp  Pain: 2/10 at present, 1/10 at best, 10/10 at worst  Better: short to moderate walking distances (not more than 10 minutes)  Worse: sitting for long periods,   Progression of Symptoms since onset: same   Hx of Falls: No alarming pattern.   Occupation: Retired Job duties: prolonged sitting,   Sleeping: Minimal disturbance   Other current functional challenges: walking, standing after sitting,   Current Functional Status: walking - no more than 10 minutes, sharp pain when getting up in the night to use the bathroom ; standing after sitting - sitting more than 30 minutes causes pain up to 5/10   Previous Functional Status: walking - 20+ minutes, no  pain with getting up in the night ; standing after sitting - no pain  Current HEP/exercise regimen: water aerobics ([employer] 3x per week),   Transportation: able to drive  Live with Others: Lives alone  Red Flags:   - Patient denies the following: Night Pain ; Fever ; Weakness ; Numbness/Tingling ; Rashes or Lesions of the Skin ; Non-Healing Wounds ;   - Patient reports the following: Edema of the Feet (both sides of the left foot)    Patient's Goal(s): Get out of the pain      Objective:    Standing Posture: increased arch height on the left than the right    Gait: decreased pronation on the left than the right.     AROM (PROM): (* indicates patient's pain)   ROM R ROM L   Plantarflexion 70 58   DF, knee straight 10 (20) 5 (10*)   DF, knee bent 24 24   G Toe Ext WNL Mod-alexander dec       Strength: (* indicates patient's pain)   MMT R MMT L   DF/Inv 5 5*   DF/Ev 5 5   PF/Ev 5 5   PF/Inv  4 5   Note: pain with DF/Inv test position on the left, not with actually resistance    Ankle/Foot Mobilizations (hyper vs hypo): Joint mobility normal unless otherwise noted.   - Talocrual:  - Subtalar: Hypo left into inversion  - Talonavicular:  - Calcaneocuboid:  - Cubonavicular-cuniform:    Palpation: lateral inferior calcaneus, insertion of both peroneals. Increased tone in the lateral left calf.    Special Tests:   R L   Windlass Test (-) (-) but lacks great toe extension           Assessment/Plan:      Patient is a 76 year old female with left side ankle complaints.    Patient has the following significant findings with corresponding treatment plan.                Diagnosis 1:  Left Plantar Fasciitis, Left Peroneal Tendinitis  Pain -  hot/cold therapy, manual therapy, splint/taping/bracing/orthotics, self management, education and home program  Decreased ROM/flexibility - manual therapy, therapeutic exercise, therapeutic activity and home program  Decreased joint mobility - manual therapy, therapeutic exercise, therapeutic activity  and home program  Decreased strength - therapeutic exercise, therapeutic activities and home program  Impaired gait - gait training and home program  Decreased function - therapeutic activities and home program  Impaired posture - neuro re-education, therapeutic activities and home program    Therapy Evaluation Codes:   1) History comprised of:   Personal factors that impact the plan of care:      Age and Living environment.    Comorbidity factors that impact the plan of care are:      Osteoarthritis.     Medications impacting care: Anti-inflammatory and High blood pressure.  2) Examination of Body Systems comprised of:   Body structures and functions that impact the plan of care:      Ankle and Foot.   Activity limitations that impact the plan of care are:      Standing and Walking.  3) Clinical presentation characteristics are:   Stable/Uncomplicated.  4) Decision-Making    Low complexity using standardized patient assessment instrument and/or measureable assessment of functional outcome.  Cumulative Therapy Evaluation is: Low complexity.    Previous and current functional limitations:  (See Goal Flow Sheet for this information)    Short term and Long term goals: (See Goal Flow Sheet for this information)     Communication ability:  Patient appears to be able to clearly communicate and understand verbal and written communication and follow directions correctly.  Treatment Explanation - The following has been discussed with the patient:   RX ordered/plan of care  Anticipated outcomes  Possible risks and side effects  This patient would benefit from PT intervention to resume normal activities.   Rehab potential is good.    Frequency:  1 X week, once daily  Duration:  for 6 weeks  Discharge Plan:  Achieve all LTG.  Independent in home treatment program.  Reach maximal therapeutic benefit.    Please refer to the daily flowsheet for treatment today, total treatment time and time spent performing 1:1 timed codes.

## 2017-08-25 NOTE — LETTER
DEPARTMENT OF HEALTH AND HUMAN SERVICES  CENTERS FOR MEDICARE & MEDICAID SERVICES    PLAN/UPDATED PLAN OF PROGRESS FOR OUTPATIENT REHABILITATION    PATIENTS NAME:  Malika Velasco   : 1940  PROVIDER NUMBER:    [id number]  [id number]  PROVIDER NAME: [facility]  MEDICAL RECORD NUMBER: [id number]   START OF CARE DATE:  SOC Date: 17   TYPE:  PT    PRIMARY/TREATMENT DIAGNOSIS: (Pertinent Medical Diagnosis)     Plantar fasciitis, left  Peroneal tendinitis, left leg    VISITS FROM START OF CARE:  Rxs Used: 1   Physical Therapy Initial Evaluation:   Aug 25, 2017  Precautions/Restrictions/MD instructions:   Per Patient: Dexamethasone was not discussed it the patient by her doctor. She has not ordered it at this time.   Per Orders: Special Request: Exercise: Home Exercise Program, Posture/Body Mechanics and Stretching/Flexibility.  Modalities: As Indicated: , Iontophoresis (Please Order: Dexamethasone Sodium Phosphate - 4mg/ml injectable, 30 cc total volume) and Ultrasound   Subjective:   Chief Complaint:    Pain: left foot on the inferolateral aspects of the calcaneus   Numbness/Tingling: None   Weakness: None   Stiffness: None  New/Recurrent/Chronic: New for this side  DOI/onset: 3 weeks   Referral Date: 2017  Mechanism of onset: unsure, concrete floors may have contributed.       PATIENTS NAME:  Malika Velasco   : 1940  PMH/surgical history/trauma:    - osteoarthritis   - osetopenia   - Hx of fractures in the hand   - HTN   - pacemaker   -L Knee Repleacment  General health as reported by patient: Good    Medications: Cholesterol, Anti-inflammatory, HTN  Previous Treatment (Effect): heat/ice (ice hurt more, heat felt better), insoles (somewhat helpful),   Imaging: None  AM/PM: worse in the morning  Quality of Pain: sharp  Pain: 2/10 at present, 1/10 at best, 10/10 at worst  Better: short to moderate walking distances (not more than 10 minutes)  Worse: sitting for long  periods,   Progression of Symptoms since onset: same   Hx of Falls: No alarming pattern.   Occupation: Retired Job duties: prolonged sitting,   Sleeping: Minimal disturbance   Other current functional challenges: walking, standing after sitting,   Current Functional Status: walking - no more than 10 minutes, sharp pain when getting up in the night to use the bathroom ; standing after sitting - sitting more than 30 minutes causes pain up to 5/10   Previous Functional Status: walking - 20+ minutes, no pain with getting up in the night ; standing after sitting - no pain  Current HEP/exercise regimen: water aerobics ([employer] 3x per week),   Transportation: able to drive  Live with Others: Lives alone  PATIENTS NAME:  Malika Velasco   : 1940  Red Flags:   - Patient denies the following: Night Pain ; Fever ; Weakness ; Numbness/Tingling ; Rashes or Lesions of the Skin ; Non-Healing Wounds ;   - Patient reports the following: Edema of the Feet (both sides of the left foot)  Patient's Goal(s): Get out of the pain    Objective:  Standing Posture: increased arch height on the left than the right  Gait: decreased pronation on the left than the right.   AROM (PROM): (* indicates patient's pain)   ROM R ROM L   Plantarflexion 70 58   DF, knee straight 10 (20) 5 (10*)   DF, knee bent 24 24   G Toe Ext WNL Mod-alexander dec       Strength: (* indicates patient's pain)   MMT R MMT L   DF/Inv 5 5*   DF/Ev 5 5   PF/Ev 5 5   PF/Inv  4 5   Note: pain with DF/Inv test position on the left, not with actually resistance    Ankle/Foot Mobilizations (hyper vs hypo): Joint mobility normal unless otherwise noted.   - Talocrual:  - Subtalar: Hypo left into inversion  - Talonavicular:  - Calcaneocuboid:  - Cubonavicular-cuniform:    Palpation: lateral inferior calcaneus, insertion of both peroneals. Increased tone in the lateral left calf.    Special Tests:   R L   Windlass Test (-) (-) but lacks great toe extension                 PATIENTS  NAME:  Malika Velasco   : 1940    Assessment/Plan:      Patient is a 76 year old female with left side ankle complaints.    Patient has the following significant findings with corresponding treatment plan.                Diagnosis 1:  Left Plantar Fasciitis, Left Peroneal Tendinitis  Pain -  hot/cold therapy, manual therapy, splint/taping/bracing/orthotics, self management, education and home program  Decreased ROM/flexibility - manual therapy, therapeutic exercise, therapeutic activity and home program  Decreased joint mobility - manual therapy, therapeutic exercise, therapeutic activity and home program  Decreased strength - therapeutic exercise, therapeutic activities and home program  Impaired gait - gait training and home program  Decreased function - therapeutic activities and home program  Impaired posture - neuro re-education, therapeutic activities and home program    Therapy Evaluation Codes:   1) History comprised of:   Personal factors that impact the plan of care:      Age and Living environment.    Comorbidity factors that impact the plan of care are:      Osteoarthritis.     Medications impacting care: Anti-inflammatory and High blood pressure.  2) Examination of Body Systems comprised of:   Body structures and functions that impact the plan of care:      Ankle and Foot.   Activity limitations that impact the plan of care are:      Standing and Walking.  3) Clinical presentation characteristics are:   Stable/Uncomplicated.  4) Decision-Making    Low complexity using standardized patient assessment instrument and/or measureable assessment of functional outcome.  Cumulative Therapy Evaluation is: Low complexity.    Previous and current functional limitations:  (See Goal Flow Sheet for this information)    Short term and Long term goals: (See Goal Flow Sheet for this information)     Communication ability:  Patient appears to be able to clearly communicate and understand verbal and written  "communication and follow directions correctly.  Treatment Explanation - The following has been discussed with the patient:   RX ordered/plan of care  Anticipated outcomes  Possible risks and side effects              PATIENTS NAME:  Malika Velasco   : 1940    This patient would benefit from PT intervention to resume normal activities.   Rehab potential is good.    Frequency:  1 X week, once daily  Duration:  for 6 weeks  Discharge Plan:  Achieve all LTG.  Independent in home treatment program.  Reach maximal therapeutic benefit.    Caregiver Signature/Credentials _____________________________ Date ________       Treating Provider: Lavelle March DPT   I have reviewed and certified the need for these services and plan of treatment while under my care.        PHYSICIAN'S SIGNATURE:   ______________________________________Date___________     Krystin Carbajal    Certification period:  Beginning of Cert date period: 17 to  End of Cert period date: 17     Functional Level Progress Report: Please see attached \"Goal Flow sheet for Functional level.\"    ____X____ Continue Services or       ________ DC Services                Service dates: From  SOC Date: 17 date to present                         "

## 2017-09-01 ENCOUNTER — THERAPY VISIT (OUTPATIENT)
Dept: PHYSICAL THERAPY | Facility: CLINIC | Age: 77
End: 2017-09-01
Payer: MEDICARE

## 2017-09-01 DIAGNOSIS — M72.2 PLANTAR FASCIITIS, LEFT: ICD-10-CM

## 2017-09-01 DIAGNOSIS — M76.72 PERONEAL TENDINITIS, LEFT LEG: ICD-10-CM

## 2017-09-01 PROCEDURE — 97110 THERAPEUTIC EXERCISES: CPT | Mod: GP | Performed by: PHYSICAL THERAPIST

## 2017-09-01 PROCEDURE — 97140 MANUAL THERAPY 1/> REGIONS: CPT | Mod: GP | Performed by: PHYSICAL THERAPIST

## 2017-09-07 ENCOUNTER — THERAPY VISIT (OUTPATIENT)
Dept: PHYSICAL THERAPY | Facility: CLINIC | Age: 77
End: 2017-09-07
Payer: MEDICARE

## 2017-09-07 DIAGNOSIS — M72.2 PLANTAR FASCIITIS, LEFT: ICD-10-CM

## 2017-09-07 DIAGNOSIS — M76.72 PERONEAL TENDINITIS, LEFT LEG: ICD-10-CM

## 2017-09-07 PROCEDURE — 97140 MANUAL THERAPY 1/> REGIONS: CPT | Mod: GP | Performed by: PHYSICAL THERAPY ASSISTANT

## 2017-09-07 PROCEDURE — 97110 THERAPEUTIC EXERCISES: CPT | Mod: GP | Performed by: PHYSICAL THERAPY ASSISTANT

## 2017-09-13 ENCOUNTER — OFFICE VISIT (OUTPATIENT)
Dept: NEUROSURGERY | Facility: CLINIC | Age: 77
End: 2017-09-13
Attending: NURSE PRACTITIONER
Payer: COMMERCIAL

## 2017-09-13 ENCOUNTER — TELEPHONE (OUTPATIENT)
Dept: PALLIATIVE MEDICINE | Facility: CLINIC | Age: 77
End: 2017-09-13

## 2017-09-13 VITALS
WEIGHT: 171.4 LBS | RESPIRATION RATE: 16 BRPM | HEART RATE: 77 BPM | BODY MASS INDEX: 31.35 KG/M2 | SYSTOLIC BLOOD PRESSURE: 135 MMHG | TEMPERATURE: 98 F | DIASTOLIC BLOOD PRESSURE: 84 MMHG

## 2017-09-13 DIAGNOSIS — M54.16 LUMBAR RADICULAR PAIN: Primary | ICD-10-CM

## 2017-09-13 PROCEDURE — 99203 OFFICE O/P NEW LOW 30 MIN: CPT | Performed by: NURSE PRACTITIONER

## 2017-09-13 PROCEDURE — 99211 OFF/OP EST MAY X REQ PHY/QHP: CPT | Performed by: NURSE PRACTITIONER

## 2017-09-13 ASSESSMENT — PAIN SCALES - GENERAL: PAINLEVEL: NO PAIN (0)

## 2017-09-13 NOTE — NURSING NOTE
"Malika Velasco is a 76 year old female who presents for:  Chief Complaint   Patient presents with     Neurologic Problem     Chronic low back pain, unspecified back pain laterality/Krystin Carbajal DPM/         Initial Vitals:  /84 (BP Location: Right arm, Patient Position: Chair, Cuff Size: Adult Large)  Pulse 77  Temp 98  F (36.7  C) (Oral)  Resp 16  Wt 171 lb 6.4 oz (77.7 kg)  BMI 31.35 kg/m2 Estimated body mass index is 31.35 kg/(m^2) as calculated from the following:    Height as of 8/23/17: 5' 2\" (1.575 m).    Weight as of this encounter: 171 lb 6.4 oz (77.7 kg).. Body surface area is 1.84 meters squared. BP completed using cuff size: large  No Pain (0)    Do you feel safe in your environment? Yes  Do you need any refills today? No    Nursing Comments: Chronic low back pain, unspecified back pain laterality/Krystin Carbajal DPM/ .  Patient rates 0 pain today as 9/13/2017      5 min. nursing intake time  Flor Morales CMA      Discharge plan: See NP dictation  2 min. nursing discharge time  Flor Morales CMA         "

## 2017-09-13 NOTE — TELEPHONE ENCOUNTER
[facility] Referral      1. Confirmed address with patient? Yes  2. Confirmed phone number with patient? Yes  3. Confirmed referring provider? Yes  4. Is the PCP the same as the referring provider? No  5. Has the patient been to any previous pain clinics? No  (If yes, send FLO with welcome letter)  6. Which insurance are we to bill for this appointment?  Medicare and BCBS    7. Informed pt of cancellation (48 hour) policy? Yes    REGARDING OPIOID MEDICATIONS: We will always address appropriateness of opioid pain medications, but we generally will not automatically take on a prescribing role. When we do take on prescribing of opioids for chronic pain, it is in collaboration with the referring physician for an intermediate period of time (months), with an expectation that the primary physician or provider will assume the prescribing role if medications are effective at stable doses with demonstrated compliance. Therefore, please do not assume that your prescribing responsibilities end on the day of pain clinic consultation.  7. Informed pt of prescribing policy? Yes      8. Referring Provider: Kim Roque APRN CNP    9. Criteria for Triage Eval:   -Missed/Failed 1st DUAL appointment? N/A   -Medication Focused? N/A   -Mental Health Concerns? (e.g. Recent psych hospitalization/snap shot)? N/A   -Active substance abuse? N/A   -Patient behaviors (e.g. Offensive language/raised voice)? N/A    Nakia SINGH    [facility] [place]

## 2017-09-13 NOTE — PROGRESS NOTES
Dr. Ho Ron  [facility]  Neurosurgery Clinic Visit        CC: low back and right leg pain    Primary care Provider: Karen Navarro      Reason For Visit:   I was asked by Dr. Navarro to consult on the patient for lumbar radicular pain.      HPI: Malika Velasco is a 76 year old female with lumbar radicular pain.  She reports that she is here to transfer care to [place] from [facility].  She has had long standing low back pain with right lateral thigh pain.  She states that the leg pain is the worse.  She feels that if she walks slowly it is worse.  She has been told that she needs surgery but would like to avoid it.  She states that walking really long distances make it hurt.  She has not had PT but recently had a right L4-5 TLESI at [facility] in [place] in July of 2017.  She denies any falls or foot drop.  The pt has a pacemaker as well therefore a MRI is contraindicated.      Pain at its worst 10  Pain right now:  0    Past Medical History:   Diagnosis Date     Allergic rhinitis      Anxiety      Aortic valve sclerosis      Back pain      GERD (gastroesophageal reflux disease)      Heart block AV complete (H) 2008    pacemaker     Hyperlipidemia      Hypertension      Major depression      TRAVIS (obstructive sleep apnea)     moderate     Osteoarthritis      Squamous cell carcinoma, face        Past Medical History reviewed with patient during visit.    Past Surgical History:   Procedure Laterality Date     ARTHROSCOPY KNEE Left      AS TOTAL KNEE ARTHROPLASTY Left 05/2015     IMPLANT PACEMAKER  1997     TONSILLECTOMY       TUBAL LIGATION       Past Surgical History reviewed with patient during visit.    Current Outpatient Prescriptions   Medication     order for DME     alendronate (FOSAMAX) 70 MG tablet     atorvastatin (LIPITOR) 10 MG tablet     atenolol-chlorthalidone (TENORETIC) 100-25 MG per tablet     atenolol (TENORMIN) 50 MG tablet     fluticasone (FLONASE) 50 MCG/ACT spray      aspirin-acetaminophen-caffeine (EXCEDRIN MIGRAINE) 250-250-65 MG per tablet     FEXOFENADINE HCL PO     multivitamin, therapeutic with minerals (MULTI-VITAMIN) TABS tablet     Calcium Carb-Cholecalciferol (CALCIUM 600/VITAMIN D3) 600-800 MG-UNIT TABS     fish oil-omega-3 fatty acids 1000 MG capsule     MELATONIN PO     oxyCODONE (ROXICODONE) 5 MG IR tablet     dexamethasone (DECADRON) 4 MG/ML injection     No current facility-administered medications for this visit.        Allergies   Allergen Reactions     Zantac [Ranitidine]      Headache       Social History     Social History     Marital status:      Spouse name: N/A     Number of children: N/A     Years of education: N/A     Social History Main Topics     Smoking status: Former Smoker     Smokeless tobacco: Never Used     Alcohol use Yes      Comment: rarely     Drug use: None     Sexual activity: No     Other Topics Concern     None     Social History Narrative       Family History   Problem Relation Age of Onset     HEART DISEASE Mother      Dementia Mother      HEART DISEASE Father          Review Of Systems  Skin: negative  Eyes: negative  Ears/Nose/Throat: negative  Respiratory: No shortness of breath, dyspnea on exertion, cough, or hemoptysis  Cardiovascular: pacemaker for Afib, HTN, HLD  Gastrointestinal: negative  Genitourinary: negative  Musculoskeletal: low back pain  Neurologic: right leg pain   Psychiatric: negative  Hematologic/Lymphatic/Immunologic: negative  Endocrine: negative     ROS: 10 point ROS neg other than the symptoms noted above in the HPI.      Vital Signs: /84 (BP Location: Right arm, Patient Position: Chair, Cuff Size: Adult Large)  Pulse 77  Temp 98  F (36.7  C) (Oral)  Resp 16  Wt 171 lb 6.4 oz (77.7 kg)  BMI 31.35 kg/m2    Examination:  Constitutional:  Alert, well nourished, NAD.  HEENT: Normocephalic, atraumatic.   Pulm:  Without shortness of breath   CV:  No pitting edema of BLE.     Neurological:  Awake  Alert  Oriented x 3  Speech clear  Cranial nerves II - XII intact  PERRL  EOMI  Face symmetric  Tongue midline  Motor exam   Shoulder Abduction:  Right:  5/5   Left:  5/5  Biceps:                      Right:  5/5   Left:  5/5  Triceps:                     Right:  5/5   Left:  5/5  Wrist Extensors:       Right:  5/5   Left:  5/5  Wrist Flexors:           Right:  5/5   Left:  5/5  Intrinsics:                   Right:  5/5   Left:  5/5   Hip Flexor:                Right: 5/5  Left:  5/5  Hip Adductor:             Right:  5/5  Left:  5/5  Hip Abductor:             Right:  5/5  Left:  5/5  Gastroc Soleus:        Right:  5/5  Left:  5/5  Tib/Ant:                      Right:  5/5  Left:  5/5  EHL:                          Right:  5/5  Left:  5/5   Sensation normal to bilateral upper and lower extremities    Gait: Able to stand from a seated position. Normal non-antalgic, non-myelopathic gait.  Lumbar examination reveals no tenderness of the spine or paraspinous muscles.  Hip height is symmetrical. Negative SI joint, sciatic notch or greater trochanteric tenderness to palpation bilaterally.  Straight leg raise is negative bilaterally.      Imaging:     Multiple levels of severe degeneration with protrusions.     Assessment/Plan:    Malika Velasco is a 76 year old female with lumbar radicular pain.  She reports that she is here to transfer care to [place] from [facility].  She has had long standing low back pain with right lateral thigh pain.  She states that the leg pain is the worse.  She feels that if she walks slowly it is worse.  She has been told that she needs surgery but would like to avoid it.  She states that walking really long distances make it hurt.  She has not had PT but recently had a right L4-5 TLESI at [facility] in [place] in July of 2017.  She denies any falls or foot drop.  The pt has a pacemaker as well therefore a MRI is contraindicated.  The pts CT myelogram was reviewed.  She does have severe degeneration at multiple levels with disc protrusions. She is not interested in any surgery at this time. She is still able to function if she gets injections every 3-4 months.  The pt denies any pain is neurologically intact. She does have a hard boot on the left foot for plantar fascitis.  It was explained that she should establish care at the pain clinic.  She was unclear that we were neurosurgery. We will refer her over there and have her return if her pain worsens and she would like to discuss if she is a surgical candidate. She agreed with the POC.     Patient Instructions   1.  Comprehensive pain care at Pain Clinic. Someone will contact you to schedule.    2. Please contact the clinic if pain persists at [phone].      Kim Roque [facility]  [facility]ine [facility]  [address] [address]    Tel [phone]  Pager [phone]

## 2017-09-13 NOTE — MR AVS SNAPSHOT
After Visit Summary   9/13/2017    Malika Velasco    MRN: [id number]           Patient Information     Date Of Birth          1940        Visit Information        Provider Department      9/13/2017 11:20 AM Kim Roque APRN CNP [facility]        Today's Diagnoses     Lumbar radicular pain    -  1      Care Instructions    1.  Comprehensive pain care at Pain Clinic. Someone will contact you to schedule.    2. Please contact the clinic if pain persists at [phone].           Follow-ups after your visit        Additional Services     PAIN MANAGEMENT CENTER ([place]) REFERRAL       Your provider has referred you to the [facility].    Reason for Referral: Comprehensive Evaluation and Management    Please complete the following questions:    What is your diagnosis for the patient's pain? Lumbar radicular pain     Do you have any specific questions for the pain specialist? No    Are there any red flags that may impact the assessment or management of the patient? None    **ANY DIAGNOSTIC TESTS THAT ARE NOT IN EPIC SHOULD BE SENT TO THE PAIN CENTER**    Please note the Pre-Op Pain Consult must be scheduled 2-3 weeks prior to the patient's surgery.  Patient's trying to schedule within 2 weeks of surgery may not be accommodated.     Pre-Op Pain Consults are only good for 30 days.    REGARDING OPIOID MEDICATIONS:  We will always address appropriateness of opioid pain medications, but we generally will not automatically take on a prescribing role. When we do take on prescribing of opioids for chronic pain, it is in collaboration with the referring physician for an intermediate period of time (months), with an expectation that the primary physician or provider will assume the prescribing role if medications are effective at stable doses with demonstrated compliance.  Therefore, please do not assume that your prescribing responsibilities end on the day  of pain clinic consultation.  Is this agreeable to you? YES    For any questions, contact the [facility] at [phone].    Please be aware that coverage of these services is subject to the terms and limitations of your health insurance plan.  Call member services at your health plan with any benefit or coverage questions.      Please bring the following with you to your appointment:    (1) Any X-Rays, CTs or MRIs which have been performed.  Contact the facility where they were done to arrange for  prior to your scheduled appointment.    (2) List of current medications   (3) This referral request   (4) Any documents/labs given to you for this referral                  Your next 10 appointments already scheduled     Sep 14, 2017 10:30 AM CDT   [place] Extremity with Lavelle CHU  [facility] ([facility]  )    [address]  [address]  [place] [place] [address]   [phone]            Sep 20, 2017 10:30 AM CDT   Return Visit with Krystin Carbajal DPM, Podiatry/Foot and Ankle Surgery   [facility]    [address] [address] [phone]              Who to contact     If you have questions or need follow up information about today's clinic visit or your schedule please contact [facility] directly at [phone].  Normal or non-critical lab and imaging results will be communicated to you by MyChart, letter or phone within 4 business days after the clinic has received the results. If you do not hear from us within 7 days, please contact the clinic through MyChart or phone. If you have a critical or abnormal lab result, we will notify you by phone as soon as possible.  Submit refill requests through [employer] or call your pharmacy and they will forward the refill request to us. Please allow 3 business days for your refill to be completed.          Additional Information About Your Visit        [employer]  Information     [employer] gives you secure access to your electronic health record. If you see a primary care provider, you can also send messages to your care team and make appointments. If you have questions, please call your primary care clinic.  If you do not have a primary care provider, please call [phone] and they will assist you.        Care EveryWhere ID     This is your Care EveryWhere ID. This could be used by other organizations to access your [facility] medical records  [id number]        Your Vitals Were     Pulse Temperature Respirations BMI (Body Mass Index)          77 98  F (36.7  C) (Oral) 16 31.35 kg/m2         Blood Pressure from Last 3 Encounters:   09/13/17 135/84   08/23/17 128/80   08/21/17 102/70    Weight from Last 3 Encounters:   09/13/17 171 lb 6.4 oz (77.7 kg)   08/23/17 168 lb (76.2 kg)   08/21/17 168 lb (76.2 kg)              We Performed the Following     PAIN MANAGEMENT CENTER ([place]) REFERRAL        Primary Care Provider Office Phone # Fax #    Karen Navarro [phone]       [address]  [facility] [address]        Equal Access to Services     MORA VARGHESE : Hadii aad ku hadasho Solyudmilaali, waaxda luqadaha, qaybta kaalmada adeegyada, ro sierra. So [facility] [phone].    ATENCIÓN: Si habla español, tiene a sotelo disposición servicios gratuitos de asistencia lingüística. Lali al [phone].    We comply with applicable federal civil rights laws and [place] laws. We do not discriminate on the basis of race, color, national origin, age, disability sex, sexual orientation or gender identity.            Thank you!     Thank you for choosing [facility]  for your care. Our goal is always to provide you with excellent care. Hearing back from our patients is one way we can continue to improve our services. Please take a few minutes to complete the written survey that you may receive in the mail after your  visit with us. Thank you!             Your Updated Medication List - Protect others around you: Learn how to safely use, store and throw away your medicines at www.disposemymeds.org.          This list is accurate as of: 9/13/17 11:25 AM.  Always use your most recent med list.                   Brand Name Dispense Instructions for use Diagnosis    alendronate 70 MG tablet    FOSAMAX    4 tablet    Take 1 tablet (70 mg) by mouth every 7 days Take 60 minutes before am meal with 8 oz. water. Remain upright for 30 minutes.    Osteopenia       aspirin-acetaminophen-caffeine 250-250-65 MG per tablet    EXCEDRIN MIGRAINE     Take 1 tablet by mouth every 6 hours as needed for headaches        atenolol 50 MG tablet    TENORMIN     Take 50 mg by mouth daily        atenolol-chlorthalidone 100-25 MG per tablet    TENORETIC     Take 1 tablet by mouth daily        atorvastatin 10 MG tablet    LIPITOR    30 tablet    Take 1 tablet (10 mg) by mouth daily    Atherosclerosis of native coronary artery of native heart without angina pectoris       CALCIUM 600/VITAMIN D3 600-800 MG-UNIT Tabs   Generic drug:  Calcium Carb-Cholecalciferol           dexamethasone 4 MG/ML injection    DECADRON    30 mL    Apply 1 mL (4 mg) topically once for 1 dose For physical therapy, iontophoresis    Left foot pain, Acute left ankle pain, Plantar fasciitis of left foot, Peroneal tendinitis of left lower extremity, Venous insufficiency of both lower extremities, Chronic low back pain, unspecified back pain laterality, with sciatica presence unspecified       FEXOFENADINE HCL PO      Take 180 mg by mouth daily        fish oil-omega-3 fatty acids 1000 MG capsule      Take 2 g by mouth daily        fluticasone 50 MCG/ACT spray    FLONASE     Spray 1 spray into both nostrils daily        MELATONIN PO      Take 5 mg by mouth At Bedtime        Multi-vitamin Tabs tablet      Take 1 tablet by mouth daily        order for DME     1 Device    Short cast boot     Left foot pain, Acute left ankle pain, Plantar fasciitis of left foot, Peroneal tendinitis of left lower extremity, Venous insufficiency of both lower extremities, Chronic low back pain, unspecified back pain laterality, with sciatica presence unspecified       oxyCODONE 5 MG IR tablet    ROXICODONE    20 tablet    Take 1 tablet (5 mg) by mouth every 6 hours as needed for pain

## 2017-09-13 NOTE — PATIENT INSTRUCTIONS
1.  Comprehensive pain care at Pain Clinic. Someone will contact you to schedule.    2. Please contact the clinic if pain persists at [phone].

## 2017-09-13 NOTE — LETTER
September 13, 2017    Malika Velasco  [address]  [place] [address]    Dear Malika,                                                                     Welcome to the [facility] at the [facility] [place]. We are located on the 6th floor, [address], of the [facility] located at [address] [place] [address]. For general parking, the [address] is the closest to our building.     Your appointment at the [facility] has been scheduled on Friday, October 27th at 10:00 am with Geni Packer NP.    At your first visit, you will meet your team of caregivers who will help you to develop pain management strategies that will last a lifetime. You will meet with our support staff to validate parking at a reduced rate, review your insurance information, and collect your co-payment if required by your insurance company. You will also meet with a medical pain specialist and care coordinator who will assess your pain and develop a plan of care for your successful pain rehabilitation. You should expect to spend 1-2 hours at your first visit with us. Usually, patients work with us for a period of 6-12 months, and eventually return to their primary doctor once their pain management has stabilized.      To help us make your visit go as smoothly as possible, please bring the following items with you on your visit:   Completed Pain Questionnaire enclosed in this packet.  If you do not bring the completed questionnaire, we may have to reschedule your appointment.  List of any medicines that you are currently taking or have been prescribed  Important NON-[place] medical information such as medical records or tests results (X-rays, or laboratory tests)  Your health insurance card  Financial resources to cover your co-payment or balance due at the time of service (cash, personal check, Visa, and MasterCard  are acceptable methods of payment)     Due to the demand for new patient evaluations, you must notify the scheduling department 48 hours in advance if you are not able to keep this appointment.  Failure to do so could affect your ability to reschedule with our clinic. Please do not assume that you will  receive any prescription medications at your first visit.    Please call [phone] with any questions regarding your appointment.  We look forward to meeting you and working to address your health care needs.       Sincerely,      [facility] [place]

## 2017-09-14 ENCOUNTER — THERAPY VISIT (OUTPATIENT)
Dept: PHYSICAL THERAPY | Facility: CLINIC | Age: 77
End: 2017-09-14
Payer: MEDICARE

## 2017-09-14 DIAGNOSIS — M76.72 PERONEAL TENDINITIS, LEFT LEG: ICD-10-CM

## 2017-09-14 DIAGNOSIS — M72.2 PLANTAR FASCIITIS, LEFT: ICD-10-CM

## 2017-09-14 PROCEDURE — 97110 THERAPEUTIC EXERCISES: CPT | Mod: GP | Performed by: PHYSICAL THERAPIST

## 2017-09-14 NOTE — PROGRESS NOTES
Subjective:    HPI                    Objective:    System    Physical Exam    General     ROS    Assessment/Plan:      PROGRESS  REPORT    Progress reporting period is from 8/25/2017 to 9/14/2017.       SUBJECTIVE  Subjective changes noted by patient: Patient reports that she is doing well. She only has pain with the first few steps in the morning, then it is fine. She has not tried walking very long distances, and has been in the boot when out of the house. However, she has been walking around in the house without the boot an not had any issues.     Current Pain level: 0/10.     Initial Pain level: 10/10.   Changes in function:  Yes (See Goal flowsheet attached for changes in current functional level)  Adverse reaction to treatment or activity: None    OBJECTIVE  Changes noted in objective findings:  Yes, Patient demonstrates improved ROM without pain and no pain with muscle testing.       Gait: Non-antalgic with boot. Steps carefully/slowly, but without symptoms outside of the boot.     AROM (PROM): (* indicates patient's pain)   ROM R ROM L   Plantarflexion     DF, knee straight  10 (12)   DF, knee bent         Strength: (* indicates patient's pain)   MMT R MMT L   DF/Inv  5 without pain   DF/Ev     PF/Ev     PF/Inv          Ankle/Foot Mobilizations (hyper vs hypo): Joint mobility normal unless otherwise noted.   - Talocrual:  - Subtalar:  - Talonavicular:  - Calcaneocuboid:  - Cubonavicular-cuniform:    Palpation: No tenderness to palpatio          ASSESSMENT/PLAN  Updated problem list and treatment plan: Diagnosis 1:  Left Plantar Fasciitis, Left Peroneal Tendinitis  Decreased function - therapeutic activities and home program  STG/LTGs have been met or progress has been made towards goals:  Yes (See Goal flow sheet completed today.)  Assessment of Progress: The patient's condition is improving.  Self Management Plans:  Patient is independent in a home treatment program.  Patient is independent in self  management of symptoms.  I have re-evaluated this patient and find that the nature, scope, duration and intensity of the therapy is appropriate for the medical condition of the patient.  Malika continues to require the following intervention to meet STG and LTG's:  PT    Recommendations:  Malika has been instructed to attempt a longer walk with the boot and shorter walk without the boot and report the outcome. If she is not limited with walking, she will be discharged from therapy as her previously identified deficits are resolving well.     Please refer to the daily flowsheet for treatment today, total treatment time and time spent performing 1:1 timed codes.

## 2017-09-20 ENCOUNTER — OFFICE VISIT (OUTPATIENT)
Dept: PODIATRY | Facility: CLINIC | Age: 77
End: 2017-09-20
Payer: COMMERCIAL

## 2017-09-20 VITALS
SYSTOLIC BLOOD PRESSURE: 128 MMHG | BODY MASS INDEX: 31.47 KG/M2 | DIASTOLIC BLOOD PRESSURE: 84 MMHG | WEIGHT: 171 LBS | HEIGHT: 62 IN

## 2017-09-20 DIAGNOSIS — M76.72 PERONEAL TENDINITIS OF LEFT LOWER LEG: ICD-10-CM

## 2017-09-20 DIAGNOSIS — M25.572 CHRONIC PAIN OF LEFT ANKLE: Primary | ICD-10-CM

## 2017-09-20 DIAGNOSIS — G89.29 CHRONIC PAIN OF LEFT ANKLE: Primary | ICD-10-CM

## 2017-09-20 PROCEDURE — 99212 OFFICE O/P EST SF 10 MIN: CPT | Performed by: PODIATRIST

## 2017-09-20 NOTE — MR AVS SNAPSHOT
After Visit Summary   9/20/2017    Malika Velasco    MRN: [id number]           Patient Information     Date Of Birth          1940        Visit Information        Provider Department      9/20/2017 10:30 AM Krystin Carbajal DPM, Podiatry/Foot and Ankle Surgery [facility]        Today's Diagnoses     Chronic pain of left ankle    -  1    Peroneal tendinitis of left lower leg          Care Instructions      DR. CARBAJAL'S CLINIC LOCATIONS:   MONDAY AM - [place] TUESDAY - [place]   [address] [facility] [address] [place] [address] [place] [address]   [phone] / [phone] / [phone]       WEDNESDAY - [place] FRIDAY AM - [facility]   [address]   [place] [address] [address] [address] [address] [phone] / [phone]       FRIDAY PM - [place] SCHEDULE SURGERY: [phone]   [address] APPOINTMENTS: [phone]   [place] [address] BILLING QUESTIONS: [phone] / [phone]      Home Medical Equipment:  1. [facility]    [facility] -[address]   [place] [phone]     2. [facility] - [address], [place], [phone]        Body Mass Index (BMI)  Many things can cause foot and ankle problems. Foot structure, activity level, foot mechanics and injuries are common causes of pain.  One very important issue that often goes unmentioned, is body weight. Extra weight can cause increased stress on muscles, ligaments, bones and tendons.  Sometimes just a few extra pounds is all it takes to put one over her/his threshold. Without reducing that stress, it can be difficult to alleviate pain. Some people are uncomfortable addressing this issue, but we feel it is important for you to think about it. As Foot &  Ankle specialists,  our job is addressing the lower extremity problem and possible causes. Regarding extra body weight, we encourage patients to discuss diet and weight management plans with their primary care doctors. It is this team approach that gives you the best opportunity for pain relief and getting you back on your feet.                  Follow-ups after your visit        Your next 10 appointments already scheduled     Oct 02, 2017 10:40 AM CDT   [facility] Extremity with Lavelle CHU RS BURNSVARGAS PT ([place]  )    [address]  [address]  [facility] [address]   [phone]            Oct 27, 2017 10:00 AM CDT   New Visit with LUISA Reinoso CNP   [facility]    [address]  [address]  [facility] [address] [phone]              Who to contact     If you have questions or need follow up information about today's clinic visit or your schedule please contact [facility] directly at [phone].  Normal or non-critical lab and imaging results will be communicated to you by Enclarityhart, letter or phone within 4 business days after the clinic has received the results. If you do not hear from us within 7 days, please contact the clinic through seedtagt or phone. If you have a critical or abnormal lab result, we will notify you by phone as soon as possible.  Submit refill requests through Envie de Fraises or call your pharmacy and they will forward the refill request to us. Please allow 3 business days for your refill to be completed.          Additional Information About Your Visit        En[employer] Fraises Information     [employer] gives you secure access to your electronic health record. If you see a primary care provider, you can also send messages to your care team and make appointments. If you have questions, please call your primary care clinic.  If you do not have a primary care provider, please call [phone] and they will assist you.        Care  "EveryWhere ID     This is your Care EveryWhere ID. This could be used by other organizations to access your [place] medical records  [id number]        Your Vitals Were     Height BMI (Body Mass Index)                5' 2\" (1.575 m) 31.28 kg/m2           Blood Pressure from Last 3 Encounters:   09/20/17 128/84   09/13/17 135/84   08/23/17 128/80    Weight from Last 3 Encounters:   09/20/17 171 lb (77.6 kg)   09/13/17 171 lb 6.4 oz (77.7 kg)   08/23/17 168 lb (76.2 kg)              Today, you had the following     No orders found for display         Today's Medication Changes          These changes are accurate as of: 9/20/17 10:41 AM.  If you have any questions, ask your nurse or doctor.               These medicines have changed or have updated prescriptions.        Dose/Directions    * order for DME   This may have changed:  Another medication with the same name was added. Make sure you understand how and when to take each.   Used for:  Left foot pain, Acute left ankle pain, Plantar fasciitis of left foot, Peroneal tendinitis of left lower extremity, Venous insufficiency of both lower extremities, Chronic low back pain, unspecified back pain laterality, with sciatica presence unspecified   Changed by:  Krystin Carbajal DPM, Podiatry/Foot and Ankle Surgery        Short cast boot   Quantity:  1 Device   Refills:  0       * order for DME   This may have changed:  You were already taking a medication with the same name, and this prescription was added. Make sure you understand how and when to take each.   Used for:  Chronic pain of left ankle, Peroneal tendinitis of left lower leg   Changed by:  Krystin Carbajal DPM, Podiatry/Foot and Ankle Surgery        bioskin ankle brace   Quantity:  1 Device   Refills:  0       * Notice:  This list has 2 medication(s) that are the same as other medications prescribed for you. Read the directions carefully, and ask your doctor or other care provider to review them with you.       "   Where to get your medicines      Some of these will need a paper prescription and others can be bought over the counter.  Ask your nurse if you have questions.     Bring a paper prescription for each of these medications     order for DME                Primary Care Provider Office Phone # Fax #    Karen Navarro [phone]       [address]  [facility] [address]        Equal Access to Services     [facility]: Hadii aad ku hadasho Soomaali, waaxda luqadaha, qaybta kaalmada adeegyada, waxay idiin hayaan adeeg kharash la'aan ah. So [facility] [phone].    ATENCIÓN: Si habla español, tiene a sotelo disposición servicios gratuitos de asistencia lingüística. Lali al [phone].    We comply with applicable federal civil rights laws and [place] laws. We do not discriminate on the basis of race, color, national origin, age, disability sex, sexual orientation or gender identity.            Thank you!     Thank you for choosing [facility] [place]  for your care. Our goal is always to provide you with excellent care. Hearing back from our patients is one way we can continue to improve our services. Please take a few minutes to complete the written survey that you may receive in the mail after your visit with us. Thank you!             Your Updated Medication List - Protect others around you: Learn how to safely use, store and throw away your medicines at www.disposemymeds.org.          This list is accurate as of: 9/20/17 10:41 AM.  Always use your most recent med list.                   Brand Name Dispense Instructions for use Diagnosis    alendronate 70 MG tablet    FOSAMAX    4 tablet    Take 1 tablet (70 mg) by mouth every 7 days Take 60 minutes before am meal with 8 oz. water. Remain upright for 30 minutes.    Osteopenia       aspirin-acetaminophen-caffeine 250-250-65 MG per tablet    EXCEDRIN MIGRAINE     Take 1 tablet by mouth every 6 hours as needed for headaches        atenolol  50 MG tablet    TENORMIN     Take 50 mg by mouth daily        atenolol-chlorthalidone 100-25 MG per tablet    TENORETIC     Take 1 tablet by mouth daily        atorvastatin 10 MG tablet    LIPITOR    30 tablet    Take 1 tablet (10 mg) by mouth daily    Atherosclerosis of native coronary artery of native heart without angina pectoris       CALCIUM 600/VITAMIN D3 600-800 MG-UNIT Tabs   Generic drug:  Calcium Carb-Cholecalciferol           dexamethasone 4 MG/ML injection    DECADRON    30 mL    Apply 1 mL (4 mg) topically once for 1 dose For physical therapy, iontophoresis    Left foot pain, Acute left ankle pain, Plantar fasciitis of left foot, Peroneal tendinitis of left lower extremity, Venous insufficiency of both lower extremities, Chronic low back pain, unspecified back pain laterality, with sciatica presence unspecified       FEXOFENADINE HCL PO      Take 180 mg by mouth daily        fish oil-omega-3 fatty acids 1000 MG capsule      Take 2 g by mouth daily        fluticasone 50 MCG/ACT spray    FLONASE     Spray 1 spray into both nostrils daily        MELATONIN PO      Take 5 mg by mouth At Bedtime        Multi-vitamin Tabs tablet      Take 1 tablet by mouth daily        * order for DME     1 Device    Short cast boot    Left foot pain, Acute left ankle pain, Plantar fasciitis of left foot, Peroneal tendinitis of left lower extremity, Venous insufficiency of both lower extremities, Chronic low back pain, unspecified back pain laterality, with sciatica presence unspecified       * order for DME     1 Device    bioskin ankle brace    Chronic pain of left ankle, Peroneal tendinitis of left lower leg       oxyCODONE 5 MG IR tablet    ROXICODONE    20 tablet    Take 1 tablet (5 mg) by mouth every 6 hours as needed for pain        * Notice:  This list has 2 medication(s) that are the same as other medications prescribed for you. Read the directions carefully, and ask your doctor or other care provider to review them  with you.

## 2017-09-20 NOTE — LETTER
"    9/20/2017         RE: Malika Velasco  [address]  [place] [address]        Dear Colleague,    Thank you for referring your patient, Malika Velasco, to the [facility]. Please see a copy of my visit note below.    Podiatry / Foot and Ankle Surgery Progress Note    September 20, 2017    Subject: Patient was seen for follow up on left foot and ankle pain. Notes PT has been going well. Minimal pain to foot after walking in shoes.     Objective:  Vitals: /84  Ht 1.575 m (5' 2\")  Wt 77.6 kg (171 lb)  BMI 31.28 kg/m2  BMI= Body mass index is 31.28 kg/(m^2).    General:  Patient is alert and orientated.  NAD  Dermatologic: Skin is intact to both lower extremities without significant lesions, rash or abrasion.  No paronychia or evidence of soft tissue infection is noted.      Vascular: DP & PT pulses are intact & regular bilaterally.   edema and varicosities noted.  CFT and skin temperature is normal to both lower extremities.      Neurologic: Lower extremity sensation is intact to light touch.  No evidence of weakness or contracture in the lower extremities.  No evidence of neuropathy.      Musculoskeletal: Patient is ambulatory without assistive device or brace. No Pain on palpation of the plantar left heel and along the peroneal tendon.  No further pain with eversion of the left ankle.       ASSESSMENT:     Chronic pain of left ankle  Peroneal tendinitis of left lower leg      PLAN:  Review[facility] in [facility]. At this time, recommend transitioning out of the boot. Recommend ankle brace. Continue in inserts. She will follow up as needed. If pain recurs, may recommend MRI.         Krystin Carbajal DPM, Podiatry/Foot and Ankle Surgery    Weight management plan: Patient was referred to their PCP to discuss a diet and exercise plan.        Again, thank you for allowing me to participate in the care of your patient.        Sincerely,        Krystin Carbajal DPM, " Podiatry/Foot and Ankle Surgery

## 2017-09-20 NOTE — PATIENT INSTRUCTIONS
DR. RENE'S CLINIC LOCATIONS:   MONDAY AM - [place] TUESDAY - [place]   [address] [address] [place] [address]   [phone] / [phone]       WEDNESDAY - [place] FRIDAY AM - WOUND CENTER   [address]   [place] [address] [place] [address]   [phone] / [phone]       FRIDAY PM - [place] SCHEDULE SURGERY: [phone]   [address] APPOINTMENTS: [phone]   [place] [address] BILLING QUESTIONS: [phone] / [phone]      Home Medical Equipment:  1. [facility]    [facility] -[address]   [place] [phone]     2. [facility] - [address], [place], [phone]        Body Mass Index (BMI)  Many things can cause foot and ankle problems. Foot structure, activity level, foot mechanics and injuries are common causes of pain.  One very important issue that often goes unmentioned, is body weight. Extra weight can cause increased stress on muscles, ligaments, bones and tendons.  Sometimes just a few extra pounds is all it takes to put one over her/his threshold. Without reducing that stress, it can be difficult to alleviate pain. Some people are uncomfortable addressing this issue, but we feel it is important for you to think about it. As Foot &  Ankle specialists, our job is addressing the lower extremity problem and possible causes. Regarding extra body weight, we encourage patients to discuss diet and weight management plans with their primary care doctors. It is this team approach that gives you the best opportunity for pain relief and getting you back on your feet.

## 2017-09-20 NOTE — PROGRESS NOTES
"Podiatry / Foot and Ankle Surgery Progress Note    September 20, 2017    Subject: Patient was seen for follow up on left foot and ankle pain. Notes PT has been going well. Minimal pain to foot after walking in shoes.     Objective:  Vitals: /84  Ht 1.575 m (5' 2\")  Wt 77.6 kg (171 lb)  BMI 31.28 kg/m2  BMI= Body mass index is 31.28 kg/(m^2).    General:  Patient is alert and orientated.  NAD  Dermatologic: Skin is intact to both lower extremities without significant lesions, rash or abrasion.  No paronychia or evidence of soft tissue infection is noted.      Vascular: DP & PT pulses are intact & regular bilaterally.   edema and varicosities noted.  CFT and skin temperature is normal to both lower extremities.      Neurologic: Lower extremity sensation is intact to light touch.  No evidence of weakness or contracture in the lower extremities.  No evidence of neuropathy.      Musculoskeletal: Patient is ambulatory without assistive device or brace. No Pain on palpation of the plantar left heel and along the peroneal tendon.  No further pain with eversion of the left ankle.       ASSESSMENT:     Chronic pain of left ankle  Peroneal tendinitis of left lower leg      PLAN:  Reviewed patient's chart in [facility]. At this time, recommend transitioning out of the boot. Recommend ankle brace. Continue in inserts. She will follow up as needed. If pain recurs, may recommend MRI.         Krystin Carbajal DPM, Podiatry/Foot and Ankle Surgery    Weight management plan: Patient was referred to their PCP to discuss a diet and exercise plan.      "

## 2017-09-20 NOTE — NURSING NOTE
"Chief Complaint   Patient presents with     RECHECK     pt is here on a 1 month f/u, still becomes painfull later in the day, states heating pad helps alot        Initial /84  Ht 5' 2\" (1.575 m)  Wt 171 lb (77.6 kg)  BMI 31.28 kg/m2 Estimated body mass index is 31.28 kg/(m^2) as calculated from the following:    Height as of this encounter: 5' 2\" (1.575 m).    Weight as of this encounter: 171 lb (77.6 kg).  Medication Reconciliation: complete   Huber Oquendo MA      "

## 2017-10-18 PROBLEM — M72.2 PLANTAR FASCIITIS, LEFT: Status: RESOLVED | Noted: 2017-08-25 | Resolved: 2017-10-18

## 2017-10-18 PROBLEM — M76.72 PERONEAL TENDINITIS, LEFT LEG: Status: RESOLVED | Noted: 2017-08-25 | Resolved: 2017-10-18

## 2017-10-18 NOTE — PROGRESS NOTES
Update as of October 18, 2017: Patient has reported that she is doing well and the pain is resolving. This progress note shall serve as a discharge note.

## 2017-12-13 DIAGNOSIS — I10 BENIGN ESSENTIAL HYPERTENSION: Primary | ICD-10-CM

## 2017-12-15 ENCOUNTER — TRANSFERRED RECORDS (OUTPATIENT)
Dept: HEALTH INFORMATION MANAGEMENT | Facility: CLINIC | Age: 77
End: 2017-12-15

## 2017-12-15 RX ORDER — ATENOLOL AND CHLORTHALIDONE TABLET 100; 25 MG/1; MG/1
1 TABLET ORAL DAILY
Qty: 90 TABLET | Refills: 2 | Status: SHIPPED | OUTPATIENT
Start: 2017-12-15 | End: 2018-01-09

## 2017-12-15 NOTE — TELEPHONE ENCOUNTER
Requested Prescriptions   Pending Prescriptions Disp Refills     atenolol-chlorthalidone (TENORETIC) 100-25 MG per tablet 30 tablet      Sig: Take 1 tablet by mouth daily    Beta-Blockers Protocol Passed    12/13/2017  3:18 PM       Passed - Blood pressure under 140/90    BP Readings from Last 3 Encounters:   09/20/17 128/84   09/13/17 135/84   08/23/17 128/80                Passed - Patient is age 6 or older       Passed - Recent or future visit with authorizing provider's specialty    Patient had office visit in the last year or has a visit in the next 30 days with authorizing provider.  See chart review.               Routing refill request to provider for review/approval because:  Medication is reported/historical

## 2017-12-28 ENCOUNTER — THERAPY VISIT (OUTPATIENT)
Dept: PHYSICAL THERAPY | Facility: CLINIC | Age: 77
End: 2017-12-28
Payer: MEDICARE

## 2017-12-28 DIAGNOSIS — M54.41 MIDLINE LOW BACK PAIN WITH RIGHT-SIDED SCIATICA: Primary | ICD-10-CM

## 2017-12-28 PROCEDURE — 97110 THERAPEUTIC EXERCISES: CPT | Mod: GP | Performed by: PHYSICAL THERAPIST

## 2017-12-28 PROCEDURE — 97161 PT EVAL LOW COMPLEX 20 MIN: CPT | Mod: GP | Performed by: PHYSICAL THERAPIST

## 2017-12-28 PROCEDURE — G8979 MOBILITY GOAL STATUS: HCPCS | Mod: GP | Performed by: PHYSICAL THERAPIST

## 2017-12-28 PROCEDURE — G8978 MOBILITY CURRENT STATUS: HCPCS | Mod: GP | Performed by: PHYSICAL THERAPIST

## 2017-12-28 NOTE — LETTER
"DEPARTMENT OF HEALTH AND HUMAN SERVICES  CENTERS FOR MEDICARE & MEDICAID SERVICES    PLAN/UPDATED PLAN OF PROGRESS FOR OUTPATIENT REHABILITATION    PATIENTS NAME:  Malika Velasco   : 1940  PROVIDER NUMBER:    [id number]  [id number]  PROVIDER NAME: [facility]  MEDICAL RECORD NUMBER: [id number]   START OF CARE DATE:  SOC Date: 17   TYPE:  PT  PRIMARY/TREATMENT DIAGNOSIS: (Pertinent Medical Diagnosis)  Midline low back pain with right-sided sciatica  VISITS FROM START OF CARE:  Rxs Used: 1   Physical Therapy Initial Evaluation:   Dec 28, 2017  Precautions/Restrictions/MD instructions:   Per Orders: 1-2 times per week for 4-6 weeks. Exercise: abdominal/gluteal strenghtening, aerobic conditioning, home program-develop, isometrics, lifting technique/posture, patient education, spine stabilization, strengthening - lower extremity, trunk strengthening  Subjective:   Chief Complaint:    Pain: Lateral right thigh. Sometimes in the low back.    Numbness/Tingling: In the lateral thigh   Weakness: Some in the right lower extremity   Stiffness: None  New/Recurrent/Chronic: Chronic  DOI/onset: 10 years ago   Referral Date: 12/15/2017 - Dr. Adam Carr  Mechanism of onset: Unknown  PMH/surgical history/trauma:    - Osteoarthritis   - Osteopenia   - History of fractures (wrist)   - overweight   - HTN   - heart problems (pace maker)   - Depression  PATIENTS NAME:  Malika Velasco   : 1940    General health as reported by patient: Good    Medications: HTN, Anti-inflammatory  Occupation: Retired Job duties: prolonged sitting,  Previous Treatment (Effect): Epidural shots (helpful),   Imaging: MRI - Low back in : deterioration of the spine disc. Decreased disc height of the \"lower two discs\"  AM/PM: Random  Quality of Pain: very sharp, warm sensation  Pain: 0/10 at present, 0/10 at best, 10/10 at worst  Better: mild relief with walking  Worse: standing?, not found any significant " triggers  Progression of Symptoms since onset: getting worse   Sleeping: wakes her up at times, but has not done this recently   Other current functional challenges: walking, standing  Current Functional Status: walking - can't go more than half a mile ; standing - can stand no more than 15 minutes, applies to shopping as well   Previous Functional Status: walking - over 1 mile ; standing - 30 minutes or more ;   Red Flags:   - Patient denies the following: Pain with Cough / Sneeze / Laughing ; Fever ; Saddle Anesthesia ; Change in Bowel or Bladder ; Unexplained Weight Loss ;   - Patient reports the following: Weakness ; Numbness/Tingling ;   Patient's Goal(s): Relieve the pain    Objective:    Posture: Increased lumbar lordosis; mild curve to the left in the front plan in the lower thoracic spine    Gait: mild hip drop, bilaterally    AROM: (Major, Moderate, Minimal or Nil loss)  Movement Loss Irvin Mod Min Nil Pain   Flexion    x    Extension  x x     Side Gliding L    x    Side Gliding R  x x  CC into leg             PATIENTS NAME:  Malika Velasco   : 1940    Neurological:    Motor Deficit:  Myotomes R L   L1-2 (hip flexion) 4+ 5   L3 (knee extension) 5 5   L4 (ankle DF) 5 5   L5 (g. toe ext) 5 5   S1 (ankle PF or knee flex) 5 5     Reflexes:    R L   Patellar 1+ 1+   Achilles 0+ 0+     Sensation/Proprioception: Altered sensation in the lateral right thigh    Dural Signs:   R L   Slump (-) (-)   SLR (-) (-)     Prone instability test: Positive    Lumbar Mobility/Spring Testing: Pain throughout the lumbar spine    Palpation: Tenderness to palpation in the lateral right thigh    Assessment/Plan:    Patient is a 77 year old female with lumbar and right lower extremity complaints.    Patient has the following significant findings with corresponding treatment plan.                Diagnosis 1:  Low back and leg pain  Pain -  hot/cold therapy, manual therapy, splint/taping/bracing/orthotics, self management,  education, directional preference exercise and home program  Decreased ROM/flexibility - manual therapy, therapeutic exercise, therapeutic activity and home program  Decreased strength - therapeutic exercise, therapeutic activities and home program  Impaired gait - gait training and home program  Decreased function - therapeutic activities and home program  Impaired posture - neuro re-education, therapeutic activities and home program    Therapy Evaluation Codes:   1) History comprised of:   Personal factors that impact the plan of care:      Time since onset of symptoms.    Comorbidity factors that impact the plan of care are:      Depression, Heart problems, Osteoarthritis, Overweight and History of Ankle Problems.     Medications impacting care: Anti-inflammatory and High blood pressure.  2) Examination of Body Systems comprised of:   Body structures and functions that impact the plan of care:      Hip and Lumbar spine.   Activity limitations that impact the plan of care are:      Standing and Walking.    PATIENTS NAME:  Malika Velasco   : 1940      3) Clinical presentation characteristics are:   Stable/Uncomplicated.  4) Decision-Making    Moderate complexity using standardized patient assessment instrument and/or measureable assessment of functional outcome.  Cumulative Therapy Evaluation is: Low complexity.    Previous and current functional limitations:  (See Goal Flow Sheet for this information)    Short term and Long term goals: (See Goal Flow Sheet for this information)     Communication ability:  Patient appears to be able to clearly communicate and understand verbal and written communication and follow directions correctly.  Treatment Explanation - The following has been discussed with the patient:   RX ordered/plan of care  Anticipated outcomes  Possible risks and side effects  This patient would benefit from PT intervention to resume normal activities.   Rehab potential is  "fair.    Frequency:  2 X week, once daily  Duration:  for 6 weeks per MD orders  Discharge Plan:  Achieve all LTG.  Independent in home treatment program.  Reach maximal therapeutic benefit.           Caregiver Signature/Credentials _____________________________ Date ________      Treating Provider: Lavelle March DPT   I have reviewed and certified the need for these services and plan of treatment while under my care.        PHYSICIAN'S SIGNATURE:   ____________________________________ Date___________     Adam Carr PA-C    Certification period:  Beginning of Cert date period: 12/28/17 to  End of Cert period date: 03/27/18     Functional Level Progress Report: Please see attached \"Goal Flow sheet for Functional level.\"    ____X____ Continue Services or       ________ DC Services                Service dates: From  SOC Date: 12/28/17 date to present                         "

## 2017-12-28 NOTE — PROGRESS NOTES
"[facility] Initial Evaluation  Subjective:  HPI                    Objective:  System    Physical Exam    General     ROS     Physical Therapy Initial Evaluation:   Dec 28, 2017  Precautions/Restrictions/MD instructions:   Per Orders: 1-2 times per week for 4-6 weeks. Exercise: abdominal/gluteal strenghtening, aerobic conditioning, home program-develop, isometrics, lifting technique/posture, patient education, spine stabilization, strengthening - lower extremity, trunk strengthening    Subjective:   Chief Complaint:    Pain: Lateral right thigh. Sometimes in the low back.    Numbness/Tingling: In the lateral thigh   Weakness: Some in the right lower extremity   Stiffness: None  New/Recurrent/Chronic: Chronic  DOI/onset: 10 years ago   Referral Date: 12/15/2017 - Dr. Adam Carr  Mechanism of onset: Unknown  PMH/surgical history/trauma:    - Osteoarthritis   - Osteopenia   - History of fractures (wrist)   - overweight   - HTN   - heart problems (pace maker)   - Depression  General health as reported by patient: Good    Medications: HTN, Anti-inflammatory  Occupation: Retired Job duties: prolonged sitting,  Previous Treatment (Effect): Epidural shots (helpful),   Imaging: MRI - Low back in June: deterioration of the spine disc. Decreased disc height of the \"lower two discs\"  AM/PM: Random  Quality of Pain: very sharp, warm sensation  Pain: 0/10 at present, 0/10 at best, 10/10 at worst  Better: mild relief with walking  Worse: standing?, not found any significant triggers  Progression of Symptoms since onset: getting worse   Sleeping: wakes her up at times, but has not done this recently   Other current functional challenges: walking, standing  Current Functional Status: walking - can't go more than half a mile ; standing - can stand no more than 15 minutes, applies to shopping as well   Previous Functional Status: walking - over 1 mile ; standing - 30 minutes or more ;   Red Flags:   - Patient " denies the following: Pain with Cough / Sneeze / Laughing ; Fever ; Saddle Anesthesia ; Change in Bowel or Bladder ; Unexplained Weight Loss ;   - Patient reports the following: Weakness ; Numbness/Tingling ;     Patient's Goal(s): Relieve the pain        Objective:    Posture: Increased lumbar lordosis; mild curve to the left in the front plan in the lower thoracic spine    Gait: mild hip drop, bilaterally    AROM: (Major, Moderate, Minimal or Nil loss)  Movement Loss Irvin Mod Min Nil Pain   Flexion    x    Extension  x x     Side Gliding L    x    Side Gliding R  x x  CC into leg       Neurological:    Motor Deficit:  Myotomes R L   L1-2 (hip flexion) 4+ 5   L3 (knee extension) 5 5   L4 (ankle DF) 5 5   L5 (g. toe ext) 5 5   S1 (ankle PF or knee flex) 5 5     Reflexes:    R L   Patellar 1+ 1+   Achilles 0+ 0+     Sensation/Proprioception: Altered sensation in the lateral right thigh    Dural Signs:   R L   Slump (-) (-)   SLR (-) (-)     Prone instability test: Positive    Lumbar Mobility/Spring Testing: Pain throughout the lumbar spine    Palpation: Tenderness to palpation in the lateral right thigh      Assessment/Plan:    Patient is a 77 year old female with lumbar and right lower extremity complaints.    Patient has the following significant findings with corresponding treatment plan.                Diagnosis 1:  Low back and leg pain  Pain -  hot/cold therapy, manual therapy, splint/taping/bracing/orthotics, self management, education, directional preference exercise and home program  Decreased ROM/flexibility - manual therapy, therapeutic exercise, therapeutic activity and home program  Decreased strength - therapeutic exercise, therapeutic activities and home program  Impaired gait - gait training and home program  Decreased function - therapeutic activities and home program  Impaired posture - neuro re-education, therapeutic activities and home program    Therapy Evaluation Codes:   1) History comprised  of:   Personal factors that impact the plan of care:      Time since onset of symptoms.    Comorbidity factors that impact the plan of care are:      Depression, Heart problems, Osteoarthritis, Overweight and History of Ankle Problems.     Medications impacting care: Anti-inflammatory and High blood pressure.  2) Examination of Body Systems comprised of:   Body structures and functions that impact the plan of care:      Hip and Lumbar spine.   Activity limitations that impact the plan of care are:      Standing and Walking.  3) Clinical presentation characteristics are:   Stable/Uncomplicated.  4) Decision-Making    Moderate complexity using standardized patient assessment instrument and/or measureable assessment of functional outcome.  Cumulative Therapy Evaluation is: Low complexity.    Previous and current functional limitations:  (See Goal Flow Sheet for this information)    Short term and Long term goals: (See Goal Flow Sheet for this information)     Communication ability:  Patient appears to be able to clearly communicate and understand verbal and written communication and follow directions correctly.  Treatment Explanation - The following has been discussed with the patient:   RX ordered/plan of care  Anticipated outcomes  Possible risks and side effects  This patient would benefit from PT intervention to resume normal activities.   Rehab potential is fair.    Frequency:  2 X week, once daily  Duration:  for 6 weeks per MD orders  Discharge Plan:  Achieve all LTG.  Independent in home treatment program.  Reach maximal therapeutic benefit.    Please refer to the daily flowsheet for treatment today, total treatment time and time spent performing 1:1 timed codes.

## 2018-01-02 ENCOUNTER — THERAPY VISIT (OUTPATIENT)
Dept: PHYSICAL THERAPY | Facility: CLINIC | Age: 78
End: 2018-01-02
Payer: MEDICARE

## 2018-01-02 DIAGNOSIS — M54.41 MIDLINE LOW BACK PAIN WITH RIGHT-SIDED SCIATICA: ICD-10-CM

## 2018-01-02 PROCEDURE — 97110 THERAPEUTIC EXERCISES: CPT | Mod: GP | Performed by: PHYSICAL THERAPIST

## 2018-01-02 PROCEDURE — 97112 NEUROMUSCULAR REEDUCATION: CPT | Mod: GP | Performed by: PHYSICAL THERAPIST

## 2018-01-04 ENCOUNTER — THERAPY VISIT (OUTPATIENT)
Dept: PHYSICAL THERAPY | Facility: CLINIC | Age: 78
End: 2018-01-04
Payer: MEDICARE

## 2018-01-04 DIAGNOSIS — M54.41 MIDLINE LOW BACK PAIN WITH RIGHT-SIDED SCIATICA: ICD-10-CM

## 2018-01-04 PROCEDURE — 97110 THERAPEUTIC EXERCISES: CPT | Mod: GP | Performed by: PHYSICAL THERAPIST

## 2018-01-04 PROCEDURE — 97112 NEUROMUSCULAR REEDUCATION: CPT | Mod: GP | Performed by: PHYSICAL THERAPIST

## 2018-01-09 ENCOUNTER — THERAPY VISIT (OUTPATIENT)
Dept: PHYSICAL THERAPY | Facility: CLINIC | Age: 78
End: 2018-01-09
Payer: MEDICARE

## 2018-01-09 ENCOUNTER — MYC REFILL (OUTPATIENT)
Dept: INTERNAL MEDICINE | Facility: CLINIC | Age: 78
End: 2018-01-09

## 2018-01-09 DIAGNOSIS — M54.41 MIDLINE LOW BACK PAIN WITH RIGHT-SIDED SCIATICA: ICD-10-CM

## 2018-01-09 DIAGNOSIS — I10 BENIGN ESSENTIAL HYPERTENSION: ICD-10-CM

## 2018-01-09 PROCEDURE — 97112 NEUROMUSCULAR REEDUCATION: CPT | Mod: GP | Performed by: PHYSICAL THERAPIST

## 2018-01-09 PROCEDURE — 97110 THERAPEUTIC EXERCISES: CPT | Mod: GP | Performed by: PHYSICAL THERAPIST

## 2018-01-16 ENCOUNTER — THERAPY VISIT (OUTPATIENT)
Dept: PHYSICAL THERAPY | Facility: CLINIC | Age: 78
End: 2018-01-16
Payer: MEDICARE

## 2018-01-16 DIAGNOSIS — M54.41 MIDLINE LOW BACK PAIN WITH RIGHT-SIDED SCIATICA: ICD-10-CM

## 2018-01-16 PROCEDURE — 97110 THERAPEUTIC EXERCISES: CPT | Mod: GP | Performed by: PHYSICAL THERAPIST

## 2018-01-16 PROCEDURE — 97112 NEUROMUSCULAR REEDUCATION: CPT | Mod: GP | Performed by: PHYSICAL THERAPIST

## 2018-01-16 RX ORDER — ATENOLOL AND CHLORTHALIDONE TABLET 100; 25 MG/1; MG/1
1 TABLET ORAL DAILY
Qty: 90 TABLET | Refills: 2 | Status: SHIPPED | OUTPATIENT
Start: 2018-01-16 | End: 2018-02-15

## 2018-01-16 NOTE — TELEPHONE ENCOUNTER
Message from MyChart:  Original authorizing provider: MD Malika Montenegro would like a refill of the following medications:  atenolol-chlorthalidone (TENORETIC) 100-25 MG per tablet [Eleuterio Harden MD]    Preferred pharmacy: [place], [place] - [facility] [address] [facility].    Comment:

## 2018-01-16 NOTE — TELEPHONE ENCOUNTER
"Requested Prescriptions   Pending Prescriptions Disp Refills     atenolol-chlorthalidone (TENORETIC) 100-25 MG per tablet 90 tablet 2     Sig: Take 1 tablet by mouth daily    Beta-Blockers Protocol Passed    1/16/2018  9:17 AM       Passed - Blood pressure under 140/90    BP Readings from Last 3 Encounters:   09/20/17 128/84   09/13/17 135/84   08/23/17 128/80                Passed - Patient is age 6 or older       Passed - Recent or future visit with authorizing provider's specialty    Patient had office visit in the last year or has a visit in the next 30 days with authorizing provider.  See \"Patient Info\" tab in inbasket, or \"Choose Columns\" in Meds & Orders section of the refill encounter.               Prescription approved per [facility] Refill Protocol.      "

## 2018-01-16 NOTE — LETTER
[facility]  [address]  [facility] [address]  [phone]    2018    Re: Malika OTTO Rod   :   1940  MRN:  [id number]   REFERRING PHYSICIAN:   Adam BARKLEY PT    Date of Initial Evaluation: 2018    Visits:  Rxs Used: 5  Reason for Referral:  Midline low back pain with right-sided sciatica    DISCHARGE REPORT  Progress reporting period is from 2017 to 2018.       SUBJECTIVE  Subjective changes noted by patient: Patient reports that she is doing well and     Current Pain level: 0/10.     Initial Pain level: 10/10.   Changes in function:  Yes (See Goal flowsheet attached for changes in current functional level)  Adverse reaction to treatment or activity: None    OBJECTIVE  Changes noted in objective findings:  Yes, Patient demonstrates improved ROM and improving strength through exercise performance    AROM: (Major, Moderate, Minimal or Nil loss)  Movement Loss Irvin Mod Min Nil Pain   Flexion    x    Extension    x    Side Gliding L    x    Side Gliding R    x      HIP:   MMT R MMT L   Abd 4- 4-     Other Tests:   - Mild pain with (+) prone instability      ASSESSMENT/PLAN  Updated problem list and treatment plan: Diagnosis 1:  Low Back and Leg Pain  STG/LTGs have been met or progress has been made towards goals:  Yes (See Goal flow sheet completed today.)  Assessment of Progress: The patient's condition is improving.  The patient has met all of their long term goals.  Self Management Plans:  Patient is independent in a home treatment program.  Patient is independent in self management of symptoms.  Malika continues to require the following intervention to meet STG and LTG's:  PT intervention is no longer required to meet STG/LTG.  Malika FAM Rod   :   1940    Recommendations:  This patient is ready to be discharged from therapy and continue their home treatment program.          Thank you for your  referral.    INQUIRIES  Therapist: XAVIER Reynolds [facility]  [address] [address] [address]  Phone: [phone]  Fax: [phone]

## 2018-01-19 ENCOUNTER — TRANSFERRED RECORDS (OUTPATIENT)
Dept: HEALTH INFORMATION MANAGEMENT | Facility: CLINIC | Age: 78
End: 2018-01-19

## 2018-01-25 DIAGNOSIS — I10 ESSENTIAL HYPERTENSION: Primary | ICD-10-CM

## 2018-01-31 RX ORDER — ATENOLOL 50 MG/1
50 TABLET ORAL DAILY
Qty: 90 TABLET | Refills: 0 | Status: SHIPPED | OUTPATIENT
Start: 2018-01-31 | End: 2018-02-15

## 2018-01-31 NOTE — TELEPHONE ENCOUNTER
"Routing refill request to provider for review/approval because:  Medication is reported/historical    Requested Prescriptions   Pending Prescriptions Disp Refills     atenolol (TENORMIN) 50 MG tablet 30 tablet      Sig: Take 1 tablet (50 mg) by mouth daily    Beta-Blockers Protocol Passed    1/25/2018  1:13 PM       Passed - Blood pressure under 140/90    BP Readings from Last 3 Encounters:   09/20/17 128/84   09/13/17 135/84   08/23/17 128/80                Passed - Patient is age 6 or older       Passed - Recent or future visit with authorizing provider's specialty    Patient had office visit in the last year or has a visit in the next 30 days with authorizing provider.  See \"Patient Info\" tab in inbasket, or \"Choose Columns\" in Meds & Orders section of the refill encounter.                     "

## 2018-01-31 NOTE — TELEPHONE ENCOUNTER
Pt had atenolol/chlrothalidone 100/25 mg  refilled on 01/16/18 for 90 pills.  I am not sure why she also on atenolol 50 mg, , pl check with pt and hold for PCP.

## 2018-02-01 PROBLEM — M54.41 MIDLINE LOW BACK PAIN WITH RIGHT-SIDED SCIATICA: Status: RESOLVED | Noted: 2017-12-28 | Resolved: 2018-02-01

## 2018-02-14 ENCOUNTER — TELEPHONE (OUTPATIENT)
Dept: INTERNAL MEDICINE | Facility: CLINIC | Age: 78
End: 2018-02-14

## 2018-02-14 DIAGNOSIS — I10 BENIGN ESSENTIAL HYPERTENSION: ICD-10-CM

## 2018-02-14 DIAGNOSIS — I10 ESSENTIAL HYPERTENSION: ICD-10-CM

## 2018-02-14 NOTE — TELEPHONE ENCOUNTER
[facility] calls, asking if pt is intentionally on atenolol 50mg and atenolol 100mg-chlorthalidone 25mg.    Per 01/25 refill encounter Dr. Huffman was wondering this as well. Requested call be made to pt to clarify and have PCP address.     Called pt, states she's been on both medication for about 2 years. Pt running out of atenolol on Friday.    Dr. Navarro - Please advise if approval to fill both rxs can be given to pharm, thanks.

## 2018-02-15 RX ORDER — ATENOLOL 50 MG/1
50 TABLET ORAL DAILY
Qty: 90 TABLET | Refills: 3 | Status: SHIPPED | OUTPATIENT
Start: 2018-02-15 | End: 2019-04-06

## 2018-02-15 RX ORDER — ATENOLOL AND CHLORTHALIDONE TABLET 100; 25 MG/1; MG/1
1 TABLET ORAL DAILY
Qty: 90 TABLET | Refills: 3 | Status: SHIPPED | OUTPATIENT
Start: 2018-02-15 | End: 2019-04-05

## 2018-03-15 ENCOUNTER — DOCUMENTATION ONLY (OUTPATIENT)
Dept: OTHER | Facility: CLINIC | Age: 78
End: 2018-03-15

## 2018-03-15 DIAGNOSIS — Z71.89 ACP (ADVANCE CARE PLANNING): Chronic | ICD-10-CM

## 2018-03-18 DIAGNOSIS — I25.10 ATHEROSCLEROSIS OF NATIVE CORONARY ARTERY OF NATIVE HEART WITHOUT ANGINA PECTORIS: ICD-10-CM

## 2018-03-18 RX ORDER — ATORVASTATIN CALCIUM 10 MG/1
10 TABLET, FILM COATED ORAL DAILY
Qty: 90 TABLET | Refills: 0 | Status: SHIPPED | OUTPATIENT
Start: 2018-03-18 | End: 2018-05-25

## 2018-04-02 DIAGNOSIS — E78.2 MIXED HYPERLIPIDEMIA: Primary | ICD-10-CM

## 2018-04-10 ENCOUNTER — OFFICE VISIT (OUTPATIENT)
Dept: CARDIOLOGY | Facility: CLINIC | Age: 78
End: 2018-04-10
Payer: COMMERCIAL

## 2018-04-10 VITALS
DIASTOLIC BLOOD PRESSURE: 75 MMHG | WEIGHT: 177 LBS | BODY MASS INDEX: 32.57 KG/M2 | HEIGHT: 62 IN | SYSTOLIC BLOOD PRESSURE: 121 MMHG

## 2018-04-10 DIAGNOSIS — Z95.0 CARDIAC PACEMAKER IN SITU: Primary | ICD-10-CM

## 2018-04-10 DIAGNOSIS — R06.09 DYSPNEA ON EXERTION: ICD-10-CM

## 2018-04-10 DIAGNOSIS — E78.2 MIXED HYPERLIPIDEMIA: ICD-10-CM

## 2018-04-10 LAB
ALT SERPL W P-5'-P-CCNC: 5 U/L (ref 5–30)
CHOLEST SERPL-MCNC: 135 MG/DL
HDLC SERPL-MCNC: 48 MG/DL
LDLC SERPL CALC-MCNC: 49 MG/DL
NONHDLC SERPL-MCNC: 87 MG/DL
TRIGL SERPL-MCNC: 190 MG/DL

## 2018-04-10 PROCEDURE — 99214 OFFICE O/P EST MOD 30 MIN: CPT | Performed by: INTERNAL MEDICINE

## 2018-04-10 PROCEDURE — 84460 ALANINE AMINO (ALT) (SGPT): CPT | Performed by: INTERNAL MEDICINE

## 2018-04-10 PROCEDURE — 80061 LIPID PANEL: CPT | Performed by: INTERNAL MEDICINE

## 2018-04-10 PROCEDURE — 36415 COLL VENOUS BLD VENIPUNCTURE: CPT | Performed by: INTERNAL MEDICINE

## 2018-04-10 NOTE — PROGRESS NOTES
DIAGNOSES:      Encounter Diagnoses   Name Primary?     Cardiac pacemaker in situ Yes     Dyspnea on exertion          HPI:  See [id number]        MEDICATIONS:    Current Outpatient Prescriptions   Medication Sig Dispense Refill     atorvastatin (LIPITOR) 10 MG tablet Take 1 tablet (10 mg) by mouth daily 90 tablet 0     atenolol (TENORMIN) 50 MG tablet Take 1 tablet (50 mg) by mouth daily along with Tenoretic 90 tablet 3     atenolol-chlorthalidone (TENORETIC) 100-25 MG per tablet Take 1 tablet by mouth daily 90 tablet 3     alendronate (FOSAMAX) 70 MG tablet Take 1 tablet (70 mg) by mouth every 7 days Take 60 minutes before am meal with 8 oz. water. Remain upright for 30 minutes. 4 tablet 11     fluticasone (FLONASE) 50 MCG/ACT spray Spray 1 spray into both nostrils daily       aspirin-acetaminophen-caffeine (EXCEDRIN MIGRAINE) 250-250-65 MG per tablet Take 1 tablet by mouth every 6 hours as needed for headaches       FEXOFENADINE HCL PO Take 180 mg by mouth daily       multivitamin, therapeutic with minerals (MULTI-VITAMIN) TABS tablet Take 1 tablet by mouth daily       Calcium Carb-Cholecalciferol (CALCIUM 600/VITAMIN D3) 600-800 MG-UNIT TABS        fish oil-omega-3 fatty acids 1000 MG capsule Take 2 g by mouth daily       MELATONIN PO Take 5 mg by mouth At Bedtime       oxyCODONE (ROXICODONE) 5 MG IR tablet Take 1 tablet (5 mg) by mouth every 6 hours as needed for pain 20 tablet 0         ALLERGIES     Allergies   Allergen Reactions     Zantac [Ranitidine]      Headache       PAST MEDICAL HISTORY:  Past Medical History:   Diagnosis Date     Allergic rhinitis      Anxiety      Aortic valve sclerosis      Back pain      GERD (gastroesophageal reflux disease)      Heart block AV complete (H) 2008    pacemaker     Hyperlipidemia      Hypertension      Major depression      TRAVIS (obstructive sleep apnea)     moderate     Osteoarthritis      Squamous cell carcinoma, face        PAST SURGICAL HISTORY:  Past  "Surgical History:   Procedure Laterality Date     ARTHROSCOPY KNEE Left      AS TOTAL KNEE ARTHROPLASTY Left 05/2015     IMPLANT PACEMAKER  1997     TONSILLECTOMY       TUBAL LIGATION         FAMILY HISTORY:  Family History   Problem Relation Age of Onset     HEART DISEASE Mother      Dementia Mother      HEART DISEASE Father        SOCIAL HISTORY:  Social History     Social History     Marital status:      Spouse name: N/A     Number of children: N/A     Years of education: N/A     Social History Main Topics     Smoking status: Former Smoker     Smokeless tobacco: Never Used     Alcohol use Yes      Comment: rarely     Drug use: None     Sexual activity: No     Other Topics Concern     None     Social History Narrative       Review of Systems:  Skin:  Negative     Eyes:  Positive for glasses  ENT:  Negative    Respiratory:  Positive for dyspnea on exertion  Cardiovascular:  Negative    Gastroenterology: Negative    Genitourinary:  Negative    Musculoskeletal:  Negative    Neurologic:  Negative    Psychiatric:  Negative    Heme/Lymph/Imm:  Negative    Endocrine:  Negative      Physical Exam:  Vitals: /75  Ht 1.575 m (5' 2\")  Wt 80.3 kg (177 lb)  BMI 32.37 kg/m2    Constitutional:  cooperative, alert and oriented, well developed, well nourished, in no acute distress        Skin:  warm and dry to the touch, no apparent skin lesions or masses noted        Head:  normocephalic, no masses or lesions        Eyes:  pupils equal and round, conjunctivae and lids unremarkable, sclera white, no xanthalasma, EOMS intact, no nystagmus        ENT:  no pallor or cyanosis, dentition good        Neck:  carotid pulses are full and equal bilaterally;JVP normal;no carotid bruit        Chest:  normal breath sounds, clear to auscultation, normal A-P diameter, normal symmetry, normal respiratory excursion, no use of accessory muscles        Cardiac: regular rhythm;normal S1 and S2;no S3 or S4       early systolic " murmur;systolic ejection murmur;grade 2;grade 1;RUSB          Abdomen:  abdomen soft;BS normoactive;non-tender        Vascular: pulses full and equal                                        Extremities and Back:  no edema;no deformities, clubbing, cyanosis, erythema observed              Neurological:  no gross motor deficits          ASSESSMENT AND PLAN:    See dictation    ORDERS AT TODAY'S VISIT:    Orders Placed This Encounter   Procedures     Follow-Up with Cardiac Advanced Practice Provider     Pacemaker/ICD check     Echocardiogram           CC  Karen Navarro MD  [address] [facility] [address]  [place] [address]

## 2018-04-10 NOTE — MR AVS SNAPSHOT
After Visit Summary   4/10/2018    Malika Velasco    MRN: [id number]           Patient Information     Date Of Birth          1940        Visit Information        Provider Department      4/10/2018 10:45 AM Elicia Yung MD [facility]        Today's Diagnoses     Cardiac pacemaker in situ    -  1    Dyspnea on exertion           Follow-ups after your visit        Additional Services     Follow-Up with Cardiac Advanced Practice Provider                 Your next 10 appointments already scheduled     Apr 18, 2018  8:45 AM CDT   Ech Complete with [id number]   [facility] CV Echocardiography (Cardiovascular Imaging at [facility])    [address]  [address]  [facility] [id number] [phone]           1. Please bring or wear a comfortable two-piece outfit. 2. You may eat, drink and take your normal medicines. 3. For any questions that cannot be answered, please contact the ordering physician            Apr 18, 2018 10:00 AM CDT   Pacemaker Check with DÍAZ DCR2   [facility] ([facility])    [address]  [facility] [address] [phone] OPT 2              Future tests that were ordered for you today     Open Future Orders        Priority Expected Expires Ordered    Echocardiogram Routine 4/10/2018 4/10/2019 4/10/2018    Follow-Up with Cardiac Advanced Practice Provider Routine 4/10/2019 4/9/2020 4/10/2018            Who to contact     If you have questions or need follow up information about today's clinic visit or your schedule please contact [facility] directly at [phone].  Normal or non-critical lab and imaging results will be communicated to you by MyChart, letter or phone within 4 business days after the clinic has received the results. If you do not hear from us within 7 days, please contact the clinic  "through Comic Wondert or phone. If you have a critical or abnormal lab result, we will notify you by phone as soon as possible.  Submit refill requests through Maimai or call your pharmacy and they will forward the refill request to us. Please allow 3 business days for your refill to be completed.          Additional Information About Your Visit        3LMhart Information     [employer]ai gives you secure access to your electronic health record. If you see a primary care provider, you can also send messages to your care team and make appointments. If you have questions, please call your primary care clinic.  If you do not have a primary care provider, please call [phone] and they will assist you.        Care EveryWhere ID     This is your Care EveryWhere ID. This could be used by other organizations to access your [place] medical records  [id number]        Your Vitals Were     Height BMI (Body Mass Index)                1.575 m (5' 2\") 32.37 kg/m2           Blood Pressure from Last 3 Encounters:   04/10/18 121/75   09/20/17 128/84   09/13/17 135/84    Weight from Last 3 Encounters:   04/10/18 80.3 kg (177 lb)   09/20/17 77.6 kg (171 lb)   09/13/17 77.7 kg (171 lb 6.4 oz)              We Performed the Following     Pacemaker/ICD check        Primary Care Provider Office Phone # Fax #    Karen Navarro [phone]       [address] [facility] [address]  [facility] [address]        Equal Access to Services     [facility] AH: Hadii aad ku hadasho Solyudmilaali, waaxda luqadaha, qaybta kaalmada adeegyada, ro singer . So [facility] [phone].    ATENCIÓN: Si habla español, tiene a sotelo disposición servicios gratuitos de asistencia lingüística. Lali al [phone].    We comply with applicable federal civil rights laws and [place] laws. We do not discriminate on the basis of race, color, national origin, age, disability, sex, sexual orientation, or gender identity.            Thank you!     " Thank you for choosing [facility]  for your care. Our goal is always to provide you with excellent care. Hearing back from our patients is one way we can continue to improve our services. Please take a few minutes to complete the written survey that you may receive in the mail after your visit with us. Thank you!             Your Updated Medication List - Protect others around you: Learn how to safely use, store and throw away your medicines at www.disposemymeds.org.          This list is accurate as of 4/10/18 11:59 PM.  Always use your most recent med list.                   Brand Name Dispense Instructions for use Diagnosis    alendronate 70 MG tablet    FOSAMAX    4 tablet    Take 1 tablet (70 mg) by mouth every 7 days Take 60 minutes before am meal with 8 oz. water. Remain upright for 30 minutes.    Osteopenia       aspirin-acetaminophen-caffeine 250-250-65 MG per tablet    EXCEDRIN MIGRAINE     Take 1 tablet by mouth every 6 hours as needed for headaches        atenolol 50 MG tablet    TENORMIN    90 tablet    Take 1 tablet (50 mg) by mouth daily along with Tenoretic    Essential hypertension       atenolol-chlorthalidone 100-25 MG per tablet    TENORETIC    90 tablet    Take 1 tablet by mouth daily    Benign essential hypertension       atorvastatin 10 MG tablet    LIPITOR    90 tablet    Take 1 tablet (10 mg) by mouth daily    Atherosclerosis of native coronary artery of native heart without angina pectoris       CALCIUM 600/VITAMIN D3 600-800 MG-UNIT Tabs   Generic drug:  Calcium Carb-Cholecalciferol           FEXOFENADINE HCL PO      Take 180 mg by mouth daily        fish oil-omega-3 fatty acids 1000 MG capsule      Take 2 g by mouth daily        fluticasone 50 MCG/ACT spray    FLONASE     Spray 1 spray into both nostrils daily        MELATONIN PO      Take 5 mg by mouth At Bedtime        Multi-vitamin Tabs tablet      Take 1 tablet by mouth daily        oxyCODONE IR  5 MG tablet    ROXICODONE    20 tablet    Take 1 tablet (5 mg) by mouth every 6 hours as needed for pain

## 2018-04-10 NOTE — LETTER
4/10/2018      Karen Navarro MD  [address]  [facility] [address]      RE: Malika Mckeonronit       Dear Colleague,    I had the pleasure of seeing Malika Velasco in the [facility].    Service Date: 04/10/2018      REASON FOR VISIT:  Followup visit.      HISTORY OF PRESENT ILLNESS:  Ms. Velasco is a pleasant 77-year-old lady who comes in routine followup.  She has a history of high-grade AV block with syncope in 1997 and a permanent pacemaker implanted at [facility] with subsequent generator replacement in 2007 as well as 2015, here.  She has hypertension and elevated fasting blood sugar and had an echocardiogram showing only aortic sclerosis with preserved LV systolic function in 2015.  She had a negative nuclear stress test at that time as well in the setting of dyspnea on exertion which has improved after generator replacement, ultimately.      Ms. Velasco is described to be dyslipidemic on a low dose of a statin.  CT coronary angiography due to a mild defect on stress testing in 2009 demonstrated minimal soft plaque at the mid-LAD.  Mild focal atherosclerosis in the abdominal aorta was noted on CT of the abdomen and pelvis from March of last year.      At this time, Ms. Velasco has no cardiorespiratory complaints of chest pain, shortness of breath, lightheadedness, presyncope, syncope, PND, orthopnea or pedal edema with the exception of perhaps noticing going group home up 3 flights of stairs she will get short of breath.  She does not get chest pain as mentioned and goes up and down the stairs twice a day.  She will be winded at the top.  This is chronic and unchanged.      Blood pressure is 124/75 today.  I have also reviewed her lipids with an LDL of 49, HDL of 48 and triglycerides mildly elevated at 190.      We have discussed the recommendation for aerobic activity and cutting out simple carbs.  She also needs to follow up with her primary  physician, which she does agree to do, particularly in the setting of the elevated fasting blood sugars last year and persistently elevated triglycerides.  The labs we have available from 2017 demonstrate normal blood glucose, reassuringly.  Of note as well, she had no pacemaker interrogation at all since March of last year and no surveillance.      ASSESSMENT AND PLAN:  A pleasant 77-year-old lady with mild soft plaque in the middle of the LAD, pacemaker in place for AV node dysfunction, dyslipidemia and chronic unchanged dyspnea on exertion, though realistically with good exercise capacity (after 3 flights of stairs).      Ms. Velasco is doing quite well, but with the dyspnea on exertion, history of obstructive sleep apnea and aortic sclerosis on an echo  years ago, I would recommend repeating it.      We also will arrange for her to have her pacemaker checked and hopefully will have her establish to continue following in our pacer clinic.      I have recommended decreasing simple carbs and increasing her aerobic activity as tolerated.  I have also recommended strongly that she follow up with her primary physician, and she has verbalized understanding and agrees.  Otherwise, we will communicate the results of her tests to her and see her back in followup with the nurse practitioner at that time.  Otherwise, we can see her yearly unless there are some abnormal findings that are unanticipated on the studies.      Total time is 30 minutes, 25 in coordination of care and counseling.      Ramonita Yung MD      cc:   Karen Navarro MD    [facility]    [address]    [place]  [address]         RAMONITA YUNG MD             D: 2018   T: 2018   MT: ZOLTAN      Name:     DOC VELASCO   MRN:      [id number]        Account:      [id number]   :      1940           Service Date: 04/10/2018      Document: [id number]         Outpatient Encounter  Prescriptions as of 4/10/2018   Medication Sig Dispense Refill     atorvastatin (LIPITOR) 10 MG tablet Take 1 tablet (10 mg) by mouth daily 90 tablet 0     atenolol (TENORMIN) 50 MG tablet Take 1 tablet (50 mg) by mouth daily along with Tenoretic 90 tablet 3     atenolol-chlorthalidone (TENORETIC) 100-25 MG per tablet Take 1 tablet by mouth daily 90 tablet 3     alendronate (FOSAMAX) 70 MG tablet Take 1 tablet (70 mg) by mouth every 7 days Take 60 minutes before am meal with 8 oz. water. Remain upright for 30 minutes. 4 tablet 11     fluticasone (FLONASE) 50 MCG/ACT spray Spray 1 spray into both nostrils daily       aspirin-acetaminophen-caffeine (EXCEDRIN MIGRAINE) 250-250-65 MG per tablet Take 1 tablet by mouth every 6 hours as needed for headaches       FEXOFENADINE HCL PO Take 180 mg by mouth daily       multivitamin, therapeutic with minerals (MULTI-VITAMIN) TABS tablet Take 1 tablet by mouth daily       Calcium Carb-Cholecalciferol (CALCIUM 600/VITAMIN D3) 600-800 MG-UNIT TABS        fish oil-omega-3 fatty acids 1000 MG capsule Take 2 g by mouth daily       MELATONIN PO Take 5 mg by mouth At Bedtime       oxyCODONE (ROXICODONE) 5 MG IR tablet Take 1 tablet (5 mg) by mouth every 6 hours as needed for pain 20 tablet 0     No facility-administered encounter medications on file as of 4/10/2018.        Again, thank you for allowing me to participate in the care of your patient.      Sincerely,    Elicia Yung MD     [facility]

## 2018-04-10 NOTE — LETTER
4/10/2018    Karen Navarro MD  [address]  [facility] [address]    RE: Malika Velasco       Dear Colleague,    I had the pleasure of seeing Malika Velsaco in the [facility].    DIAGNOSES:      Encounter Diagnoses   Name Primary?     Cardiac pacemaker in situ Yes     Dyspnea on exertion          HPI:  See [id number]        MEDICATIONS:    Current Outpatient Prescriptions   Medication Sig Dispense Refill     atorvastatin (LIPITOR) 10 MG tablet Take 1 tablet (10 mg) by mouth daily 90 tablet 0     atenolol (TENORMIN) 50 MG tablet Take 1 tablet (50 mg) by mouth daily along with Tenoretic 90 tablet 3     atenolol-chlorthalidone (TENORETIC) 100-25 MG per tablet Take 1 tablet by mouth daily 90 tablet 3     alendronate (FOSAMAX) 70 MG tablet Take 1 tablet (70 mg) by mouth every 7 days Take 60 minutes before am meal with 8 oz. water. Remain upright for 30 minutes. 4 tablet 11     fluticasone (FLONASE) 50 MCG/ACT spray Spray 1 spray into both nostrils daily       aspirin-acetaminophen-caffeine (EXCEDRIN MIGRAINE) 250-250-65 MG per tablet Take 1 tablet by mouth every 6 hours as needed for headaches       FEXOFENADINE HCL PO Take 180 mg by mouth daily       multivitamin, therapeutic with minerals (MULTI-VITAMIN) TABS tablet Take 1 tablet by mouth daily       Calcium Carb-Cholecalciferol (CALCIUM 600/VITAMIN D3) 600-800 MG-UNIT TABS        fish oil-omega-3 fatty acids 1000 MG capsule Take 2 g by mouth daily       MELATONIN PO Take 5 mg by mouth At Bedtime       oxyCODONE (ROXICODONE) 5 MG IR tablet Take 1 tablet (5 mg) by mouth every 6 hours as needed for pain 20 tablet 0         ALLERGIES     Allergies   Allergen Reactions     Zantac [Ranitidine]      Headache       PAST MEDICAL HISTORY:  Past Medical History:   Diagnosis Date     Allergic rhinitis      Anxiety      Aortic valve sclerosis      Back pain      GERD (gastroesophageal reflux disease)      Heart block AV  "complete (H) 2008    pacemaker     Hyperlipidemia      Hypertension      Major depression      TRAVIS (obstructive sleep apnea)     moderate     Osteoarthritis      Squamous cell carcinoma, face        PAST SURGICAL HISTORY:  Past Surgical History:   Procedure Laterality Date     ARTHROSCOPY KNEE Left      AS TOTAL KNEE ARTHROPLASTY Left 05/2015     IMPLANT PACEMAKER  1997     TONSILLECTOMY       TUBAL LIGATION         FAMILY HISTORY:  Family History   Problem Relation Age of Onset     HEART DISEASE Mother      Dementia Mother      HEART DISEASE Father        SOCIAL HISTORY:  Social History     Social History     Marital status:      Spouse name: N/A     Number of children: N/A     Years of education: N/A     Social History Main Topics     Smoking status: Former Smoker     Smokeless tobacco: Never Used     Alcohol use Yes      Comment: rarely     Drug use: None     Sexual activity: No     Other Topics Concern     None     Social History Narrative       Review of Systems:  Skin:  Negative     Eyes:  Positive for glasses  ENT:  Negative    Respiratory:  Positive for dyspnea on exertion  Cardiovascular:  Negative    Gastroenterology: Negative    Genitourinary:  Negative    Musculoskeletal:  Negative    Neurologic:  Negative    Psychiatric:  Negative    Heme/Lymph/Imm:  Negative    Endocrine:  Negative      Physical Exam:  Vitals: /75  Ht 1.575 m (5' 2\")  Wt 80.3 kg (177 lb)  BMI 32.37 kg/m2    Constitutional:  cooperative, alert and oriented, well developed, well nourished, in no acute distress        Skin:  warm and dry to the touch, no apparent skin lesions or masses noted        Head:  normocephalic, no masses or lesions        Eyes:  pupils equal and round, conjunctivae and lids unremarkable, sclera white, no xanthalasma, EOMS intact, no nystagmus        ENT:  no pallor or cyanosis, dentition good        Neck:  carotid pulses are full and equal bilaterally;JVP normal;no carotid bruit        Chest:  " normal breath sounds, clear to auscultation, normal A-P diameter, normal symmetry, normal respiratory excursion, no use of accessory muscles        Cardiac: regular rhythm;normal S1 and S2;no S3 or S4       early systolic murmur;systolic ejection murmur;grade 2;grade 1;RUSB          Abdomen:  abdomen soft;BS normoactive;non-tender        Vascular: pulses full and equal                                        Extremities and Back:  no edema;no deformities, clubbing, cyanosis, erythema observed              Neurological:  no gross motor deficits          ASSESSMENT AND PLAN:    See dictation    ORDERS AT TODAY'S VISIT:    Orders Placed This Encounter   Procedures     Follow-Up with Cardiac Advanced Practice Provider     Pacemaker/ICD check     Echocardiogram           CC  Karen Navarro MD  [address] [place] [address]              Thank you for allowing me to participate in the care of your patient.      Sincerely,     Elicia Yung MD     [facility] [place] [facility]    cc:   Karen Navarro MD  [address] [place], [place] [address] [phone]

## 2018-04-12 NOTE — PROGRESS NOTES
Service Date: 04/10/2018      REASON FOR VISIT:  Followup visit.      HISTORY OF PRESENT ILLNESS:  Ms. Velasco is a pleasant 77-year-old lady who comes in routine followup.  She has a history of high-grade AV block with syncope in 1997 and a permanent pacemaker implanted at [facility] with subsequent generator replacement in 2007 as well as 2015, here.  She has hypertension and elevated fasting blood sugar and had an echocardiogram showing only aortic sclerosis with preserved LV systolic function in 2015.  She had a negative nuclear stress test at that time as well in the setting of dyspnea on exertion which has improved after generator replacement, ultimately.      Ms. Velasco is described to be dyslipidemic on a low dose of a statin.  CT coronary angiography due to a mild defect on stress testing in 2009 demonstrated minimal soft plaque at the mid-LAD.  Mild focal atherosclerosis in the abdominal aorta was noted on CT of the abdomen and pelvis from March of last year.      At this time, Ms. Velasco has no cardiorespiratory complaints of chest pain, shortness of breath, lightheadedness, presyncope, syncope, PND, orthopnea or pedal edema with the exception of perhaps noticing going retirement up 3 flights of stairs she will get short of breath.  She does not get chest pain as mentioned and goes up and down the stairs twice a day.  She will be winded at the top.  This is chronic and unchanged.      Blood pressure is 124/75 today.  I have also reviewed her lipids with an LDL of 49, HDL of 48 and triglycerides mildly elevated at 190.      We have discussed the recommendation for aerobic activity and cutting out simple carbs.  She also needs to follow up with her primary physician, which she does agree to do, particularly in the setting of the elevated fasting blood sugars last year and persistently elevated triglycerides.  The labs we have available from 05/2017 demonstrate normal blood glucose,  reassuringly.  Of note as well, she had no pacemaker interrogation at all since March of last year and no surveillance.      ASSESSMENT AND PLAN:  A pleasant 77-year-old lady with mild soft plaque in the middle of the LAD, pacemaker in place for AV node dysfunction, dyslipidemia and chronic unchanged dyspnea on exertion, though realistically with good exercise capacity (after 3 flights of stairs).      Ms. Velasco is doing quite well, but with the dyspnea on exertion, history of obstructive sleep apnea and aortic sclerosis on an echo  years ago, I would recommend repeating it.      We also will arrange for her to have her pacemaker checked and hopefully will have her establish to continue following in our pacer clinic.      I have recommended decreasing simple carbs and increasing her aerobic activity as tolerated.  I have also recommended strongly that she follow up with her primary physician, and she has verbalized understanding and agrees.  Otherwise, we will communicate the results of her tests to her and see her back in followup with the nurse practitioner at that time.  Otherwise, we can see her yearly unless there are some abnormal findings that are unanticipated on the studies.      Total time is 30 minutes, 25 in coordination of care and counseling.      Ramonita Yung MD      cc:   Karen Navarro MD    [facility]    [address]    [place]  [address]         RAMONITA YUNG MD             D: 2018   T: 2018   MT: ZOLTAN      Name:     DOC VELASCO   MRN:      [id number]        Account:      [id number]   :      1940           Service Date: 04/10/2018      Document: [id number]

## 2018-04-18 ENCOUNTER — ALLIED HEALTH/NURSE VISIT (OUTPATIENT)
Dept: CARDIOLOGY | Facility: CLINIC | Age: 78
End: 2018-04-18
Payer: COMMERCIAL

## 2018-04-18 ENCOUNTER — HOSPITAL ENCOUNTER (OUTPATIENT)
Dept: CARDIOLOGY | Facility: CLINIC | Age: 78
Discharge: HOME | End: 2018-04-18
Attending: INTERNAL MEDICINE | Admitting: INTERNAL MEDICINE
Payer: MEDICARE

## 2018-04-18 DIAGNOSIS — Z95.0 CARDIAC PACEMAKER IN SITU: ICD-10-CM

## 2018-04-18 DIAGNOSIS — R06.09 DYSPNEA ON EXERTION: ICD-10-CM

## 2018-04-18 DIAGNOSIS — Z95.0 CARDIAC PACEMAKER IN SITU: Primary | ICD-10-CM

## 2018-04-18 PROCEDURE — 93280 PM DEVICE PROGR EVAL DUAL: CPT | Performed by: INTERNAL MEDICINE

## 2018-04-18 PROCEDURE — 93306 TTE W/DOPPLER COMPLETE: CPT

## 2018-04-18 PROCEDURE — 93306 TTE W/DOPPLER COMPLETE: CPT | Mod: 26 | Performed by: INTERNAL MEDICINE

## 2018-04-18 NOTE — MR AVS SNAPSHOT
After Visit Summary   4/18/2018    Malika Velasco    MRN: [id number]           Patient Information     Date Of Birth          1940        Visit Information        Provider Department      4/18/2018 10:00 AM DÍAZ DCR2 [facility]        Today's Diagnoses     Cardiac pacemaker in situ    -  1       Follow-ups after your visit        Your next 10 appointments already scheduled     Jul 25, 2018  2:45 PM CDT   Remote PPM Check with DÍAZ TECH1   [facility] ([facility])    [address] [place], [place] [id number]  [facility] [address] [phone] OPT 2           This appointment is for a remote check of your pacemaker.  This is not an appointment at the office.              Who to contact     If you have questions or need follow up information about today's clinic visit or your schedule please contact [facility] directly at [phone].  Normal or non-critical lab and imaging results will be communicated to you by [facility] in Hand[employer], letter or phone within 4 business days after the clinic has received the results. If you do not hear from us within 7 days, please contact the clinic through Brain in Handhart or phone. If you have a critical or abnormal lab result, we will notify you by phone as soon as possible.  Submit refill requests through [facility] or call your pharmacy and they will forward the refill request to us. Please allow 3 business days for your refill to be completed.          Additional Information About Your Visit        MyChart Information     [employer] gives you secure access to your electronic health record. If you see a primary care provider, you can also send messages to your care team and make appointments. If you have questions, please call your primary care clinic.  If you do not have a primary care provider, please call [phone] and they will assist you.        Care EveryWhere ID      This is your Care EveryWhere ID. This could be used by other organizations to access your [facility] medical records  [id number]         Blood Pressure from Last 3 Encounters:   04/10/18 121/75   09/20/17 128/84   09/13/17 135/84    Weight from Last 3 Encounters:   04/10/18 80.3 kg (177 lb)   09/20/17 77.6 kg (171 lb)   09/13/17 77.7 kg (171 lb 6.4 oz)              We Performed the Following     PM DEVICE PROGRAMMING EVAL, DUAL LEAD PACER ([id number])        Primary Care Provider Office Phone # Fax #    Karen Navarro [phone]       [address] [place] [address]        Equal Access to Services     JD VARGHESE : Hadii aad ku hadasho Soomaali, waaxda luqadaha, qaybta kaalmada adeegyada, waxay idiin haynorrisn akilah singer . So [facility] [phone].    ATENCIÓN: Si habla español, tiene a sotelo disposición servicios gratuitos de asistencia lingüística. [facility] [phone].    We comply with applicable federal civil rights laws and [place] laws. We do not discriminate on the basis of race, color, national origin, age, disability, sex, sexual orientation, or gender identity.            Thank you!     Thank you for choosing [facility]  for your care. Our goal is always to provide you with excellent care. Hearing back from our patients is one way we can continue to improve our services. Please take a few minutes to complete the written survey that you may receive in the mail after your visit with us. Thank you!             Your Updated Medication List - Protect others around you: Learn how to safely use, store and throw away your medicines at www.disposemymeds.org.          This list is accurate as of 4/18/18 10:42 AM.  Always use your most recent med list.                   Brand Name Dispense Instructions for use Diagnosis    alendronate 70 MG tablet    FOSAMAX    4 tablet    Take 1 tablet (70 mg) by mouth every 7 days Take 60 minutes before am meal  with 8 oz. water. Remain upright for 30 minutes.    Osteopenia       aspirin-acetaminophen-caffeine 250-250-65 MG per tablet    EXCEDRIN MIGRAINE     Take 1 tablet by mouth every 6 hours as needed for headaches        atenolol 50 MG tablet    TENORMIN    90 tablet    Take 1 tablet (50 mg) by mouth daily along with Tenoretic    Essential hypertension       atenolol-chlorthalidone 100-25 MG per tablet    TENORETIC    90 tablet    Take 1 tablet by mouth daily    Benign essential hypertension       atorvastatin 10 MG tablet    LIPITOR    90 tablet    Take 1 tablet (10 mg) by mouth daily    Atherosclerosis of native coronary artery of native heart without angina pectoris       CALCIUM 600/VITAMIN D3 600-800 MG-UNIT Tabs   Generic drug:  Calcium Carb-Cholecalciferol           FEXOFENADINE HCL PO      Take 180 mg by mouth daily        fish oil-omega-3 fatty acids 1000 MG capsule      Take 2 g by mouth daily        fluticasone 50 MCG/ACT spray    FLONASE     Spray 1 spray into both nostrils daily        MELATONIN PO      Take 5 mg by mouth At Bedtime        Multi-vitamin Tabs tablet      Take 1 tablet by mouth daily        oxyCODONE IR 5 MG tablet    ROXICODONE    20 tablet    Take 1 tablet (5 mg) by mouth every 6 hours as needed for pain

## 2018-05-04 DIAGNOSIS — M85.80 OSTEOPENIA: ICD-10-CM

## 2018-05-08 RX ORDER — ALENDRONATE SODIUM 70 MG/1
70 TABLET ORAL
Qty: 4 TABLET | Refills: 11 | OUTPATIENT
Start: 2018-05-08

## 2018-05-10 DIAGNOSIS — M85.80 OSTEOPENIA: ICD-10-CM

## 2018-05-10 NOTE — TELEPHONE ENCOUNTER
Please send new RX to [facility]. Pt is out of this medication and there is no more refills left on RX. RX was writing 06/02/2017 with 11 refills. And pt already use up refills.                       Thank You,  Sergo Bateman, [facility]  On behalf of [facility]

## 2018-05-11 NOTE — TELEPHONE ENCOUNTER
"  What are dates of refills? Pt should not need refill yet, should have enough til June      Requested Prescriptions   Pending Prescriptions Disp Refills     alendronate (FOSAMAX) 70 MG tablet 4 tablet 11     Sig: Take 1 tablet (70 mg) by mouth every 7 days Take 60 minutes before am meal with 8 oz. water. Remain upright for 30 minutes.    Bisphosphonates Failed    5/10/2018  4:15 PM       Failed - Normal serum creatinine on file within past 12 months    Recent Labs   Lab Test  03/01/17   0630   CR  0.53            Passed - Recent (12 mo) or future (30 days) visit within the authorizing provider's specialty    Patient had office visit in the last 12 months or has a visit in the next 30 days with authorizing provider or within the authorizing provider's specialty.  See \"Patient Info\" tab in inbasket, or \"Choose Columns\" in Meds & Orders section of the refill encounter.           Passed - Dexa on file within past 2 years    Please review last Dexa result.          Passed - Patient is age 18 or older          "

## 2018-05-21 RX ORDER — ALENDRONATE SODIUM 70 MG/1
70 TABLET ORAL
Qty: 4 TABLET | Refills: 0 | Status: SHIPPED | OUTPATIENT
Start: 2018-05-21 | End: 2018-05-25

## 2018-05-21 NOTE — TELEPHONE ENCOUNTER
06/02/17,06/28/17,07/25/17,08/25/17,09/21/17,10/20/17,11/13/17,12/13/17,01/11/18,02/07/18,03/06/18 and 04/05/18    Rosalee Phillips T  [facility] Department  On behalf of [facility]

## 2018-05-25 ENCOUNTER — OFFICE VISIT (OUTPATIENT)
Dept: INTERNAL MEDICINE | Facility: CLINIC | Age: 78
End: 2018-05-25
Payer: COMMERCIAL

## 2018-05-25 VITALS
BODY MASS INDEX: 30.97 KG/M2 | SYSTOLIC BLOOD PRESSURE: 112 MMHG | DIASTOLIC BLOOD PRESSURE: 82 MMHG | TEMPERATURE: 99.1 F | RESPIRATION RATE: 16 BRPM | HEIGHT: 63 IN | WEIGHT: 174.8 LBS | HEART RATE: 77 BPM | OXYGEN SATURATION: 96 %

## 2018-05-25 DIAGNOSIS — Z12.31 ENCOUNTER FOR SCREENING MAMMOGRAM FOR BREAST CANCER: ICD-10-CM

## 2018-05-25 DIAGNOSIS — I10 BENIGN ESSENTIAL HYPERTENSION: Primary | ICD-10-CM

## 2018-05-25 DIAGNOSIS — E78.5 HYPERLIPIDEMIA LDL GOAL <130: ICD-10-CM

## 2018-05-25 DIAGNOSIS — M85.80 OSTEOPENIA, UNSPECIFIED LOCATION: ICD-10-CM

## 2018-05-25 DIAGNOSIS — G47.33 OSA (OBSTRUCTIVE SLEEP APNEA): ICD-10-CM

## 2018-05-25 DIAGNOSIS — I44.2 HEART BLOCK AV COMPLETE (H): ICD-10-CM

## 2018-05-25 DIAGNOSIS — I35.8 AORTIC VALVE SCLEROSIS: ICD-10-CM

## 2018-05-25 PROCEDURE — 99214 OFFICE O/P EST MOD 30 MIN: CPT | Performed by: INTERNAL MEDICINE

## 2018-05-25 PROCEDURE — 80048 BASIC METABOLIC PNL TOTAL CA: CPT | Performed by: INTERNAL MEDICINE

## 2018-05-25 PROCEDURE — 36415 COLL VENOUS BLD VENIPUNCTURE: CPT | Performed by: INTERNAL MEDICINE

## 2018-05-25 PROCEDURE — 82043 UR ALBUMIN QUANTITATIVE: CPT | Performed by: INTERNAL MEDICINE

## 2018-05-25 RX ORDER — ATORVASTATIN CALCIUM 10 MG/1
10 TABLET, FILM COATED ORAL DAILY
Qty: 90 TABLET | Refills: 3 | Status: SHIPPED | OUTPATIENT
Start: 2018-05-25 | End: 2019-04-06

## 2018-05-25 RX ORDER — ALENDRONATE SODIUM 70 MG/1
70 TABLET ORAL
Qty: 12 TABLET | Refills: 3 | Status: SHIPPED | OUTPATIENT
Start: 2018-05-25 | End: 2019-04-06

## 2018-05-25 NOTE — MR AVS SNAPSHOT
After Visit Summary   5/25/2018    Malika Velasco    MRN: [id number]           Patient Information     Date Of Birth          1940        Visit Information        Provider Department      5/25/2018 10:00 AM Karen Navarro MD [facility]        Today's Diagnoses     Benign essential hypertension    -  1    Encounter for screening mammogram for breast cancer          Care Instructions    Shingrix vaccine for shingles. It is two shots given between 1 and 5 months apart.               Follow-ups after your visit        Your next 10 appointments already scheduled     Jul 25, 2018  2:45 PM CDT   Remote PPM Check with DÍAZ TECH1   [facility] ([facility])    [address]  [facility] [address] [phone] OPT 2           This appointment is for a remote check of your pacemaker.  This is not an appointment at the office.              Future tests that were ordered for you today     Open Future Orders        Priority Expected Expires Ordered    *MA Screening Digital Bilateral Routine  5/25/2019 5/25/2018            Who to contact     If you have questions or need follow up information about today's clinic visit or your schedule please contact [facility] directly at [phone].  Normal or non-critical lab and imaging results will be communicated to you by MyChart, letter or phone within 4 business days after the clinic has received the results. If you do not hear from us within 7 days, please contact the clinic through MyChart or phone. If you have a critical or abnormal lab result, we will notify you by phone as soon as possible.  Submit refill requests through [employer] or call your pharmacy and they will forward the refill request to us. Please allow 3 business days for your refill to be completed.          Additional Information About Your Visit        MyChart Information     [employer] gives you secure  "access to your electronic health record. If you see a primary care provider, you can also send messages to your care team and make appointments. If you have questions, please call your primary care clinic.  If you do not have a primary care provider, please call [phone] and they will assist you.        Care EveryWhere ID     This is your Care EveryWhere ID. This could be used by other organizations to access your [place] medical records  [id number]        Your Vitals Were     Pulse Temperature Respirations Height Pulse Oximetry BMI (Body Mass Index)    77 99.1  F (37.3  C) (Oral) 16 5' 2.75\" (1.594 m) 96% 31.21 kg/m2       Blood Pressure from Last 3 Encounters:   05/25/18 112/82   04/10/18 121/75   09/20/17 128/84    Weight from Last 3 Encounters:   05/25/18 174 lb 12.8 oz (79.3 kg)   04/10/18 177 lb (80.3 kg)   09/20/17 171 lb (77.6 kg)              We Performed the Following     Albumin Random Urine Quantitative with Creat Ratio     Basic metabolic panel        Primary Care Provider Office Phone # Fax #    Karen Navarro [phone]       [address] [facility] [address]  [place] [id number]        Equal Access to Services     JD VARGHESE : Hadii aad ku hadasho Soomaali, waaxda luqadaha, qaybta kaalmada adeegyada, waxay idiin haynorrisn akilah soriano lakassidy . So [facility] [phone].    ATENCIÓN: Si habla español, tiene a sotelo disposición servicios gratuitos de asistencia lingüística. Llame al [phone].    We comply with applicable federal civil rights laws and [place] laws. We do not discriminate on the basis of race, color, national origin, age, disability, sex, sexual orientation, or gender identity.            Thank you!     Thank you for choosing [facility]  for your care. Our goal is always to provide you with excellent care. Hearing back from our patients is one way we can continue to improve our services. Please take a few minutes to complete the written survey that you may " receive in the mail after your visit with us. Thank you!             Your Updated Medication List - Protect others around you: Learn how to safely use, store and throw away your medicines at www.disposemymeds.org.          This list is accurate as of 5/25/18 10:46 AM.  Always use your most recent med list.                   Brand Name Dispense Instructions for use Diagnosis    alendronate 70 MG tablet    FOSAMAX    4 tablet    Take 1 tablet (70 mg) by mouth every 7 days Take 60 minutes before am meal with 8 oz. water. Remain upright for 30 minutes.    Osteopenia       aspirin-acetaminophen-caffeine 250-250-65 MG per tablet    EXCEDRIN MIGRAINE     Take 1 tablet by mouth every 6 hours as needed for headaches        atenolol 50 MG tablet    TENORMIN    90 tablet    Take 1 tablet (50 mg) by mouth daily along with Tenoretic    Essential hypertension       atenolol-chlorthalidone 100-25 MG per tablet    TENORETIC    90 tablet    Take 1 tablet by mouth daily    Benign essential hypertension       atorvastatin 10 MG tablet    LIPITOR    90 tablet    Take 1 tablet (10 mg) by mouth daily    Atherosclerosis of native coronary artery of native heart without angina pectoris       CALCIUM 600/VITAMIN D3 600-800 MG-UNIT Tabs   Generic drug:  Calcium Carb-Cholecalciferol           FEXOFENADINE HCL PO      Take 180 mg by mouth daily        fish oil-omega-3 fatty acids 1000 MG capsule      Take 2 g by mouth daily        fluticasone 50 MCG/ACT spray    FLONASE     Spray 1 spray into both nostrils daily        MELATONIN PO      Take 5 mg by mouth At Bedtime        Multi-vitamin Tabs tablet      Take 1 tablet by mouth daily

## 2018-05-25 NOTE — PROGRESS NOTES
SUBJECTIVE:   Malika Velasco is a 77 year old female who presents to clinic today for the following health issues:      Hyperlipidemia Follow-Up      Rate your low fat/cholesterol diet?: good    Taking statin?  Yes, no muscle aches from statin    Other lipid medications/supplements?:  Fish oil/Omega 3, dose  without side effects    Hypertension Follow-up      Outpatient blood pressures are being checked at home.  Results are 120/80.    Low Salt Diet: low salt    Other problems:   1. TRAVIS: She is stable on her CPAP.  When she last Saw cardiology there was some comments about some concerns about her sleep apnea and she is not sure what that meant.  When I reviewed the records it had more to do with her breathing combined with history of sleep apnea and aortic sclerosis that prompted her cardiologist to get another echocardiogram which showed no acute changes.  2.  Osteopenia: She is doing well on Fosamax, has been on it almost a year.  3.  Complete heart block: She is doing well since her pacemaker was placed, pacemaker checks have been good.  No symptoms.    Current Concerns: none    Patient Active Problem List   Diagnosis     GERD (gastroesophageal reflux disease)     Pacemaker     Hyperlipidemia LDL goal <130     Benign essential hypertension     Osteopenia     Osteoarthritis     Squamous cell carcinoma, face     Back pain     Heart block AV complete (H)     Aortic valve sclerosis     TRAVIS (obstructive sleep apnea)     ACP (advance care planning)       Current Outpatient Prescriptions   Medication Sig Dispense Refill     alendronate (FOSAMAX) 70 MG tablet Take 1 tablet (70 mg) by mouth every 7 days Take 60 minutes before am meal with 8 oz. water. Remain upright for 30 minutes. 4 tablet 0     aspirin-acetaminophen-caffeine (EXCEDRIN MIGRAINE) 250-250-65 MG per tablet Take 1 tablet by mouth every 6 hours as needed for headaches       atenolol (TENORMIN) 50 MG tablet Take 1 tablet (50 mg) by mouth daily along with  "Tenoretic 90 tablet 3     atenolol-chlorthalidone (TENORETIC) 100-25 MG per tablet Take 1 tablet by mouth daily 90 tablet 3     atorvastatin (LIPITOR) 10 MG tablet Take 1 tablet (10 mg) by mouth daily 90 tablet 0     Calcium Carb-Cholecalciferol (CALCIUM 600/VITAMIN D3) 600-800 MG-UNIT TABS        FEXOFENADINE HCL PO Take 180 mg by mouth daily       fish oil-omega-3 fatty acids 1000 MG capsule Take 2 g by mouth daily       fluticasone (FLONASE) 50 MCG/ACT spray Spray 1 spray into both nostrils daily       MELATONIN PO Take 5 mg by mouth At Bedtime       multivitamin, therapeutic with minerals (MULTI-VITAMIN) TABS tablet Take 1 tablet by mouth daily           Social History   Substance Use Topics     Smoking status: Former Smoker     Smokeless tobacco: Never Used     Alcohol use Yes      Comment: rarely          ROS:  No fever, chills, no dyspnea on exertion, no chest pain, palpitations, PND, orthopnea, edema, syncope, headache, abdominal pain     OBJECTIVE:     /82  Pulse 77  Temp 99.1  F (37.3  C) (Oral)  Resp 16  Ht 5' 2.75\" (1.594 m)  Wt 174 lb 12.8 oz (79.3 kg)  SpO2 96%  BMI 31.21 kg/m2  Body mass index is 31.21 kg/(m^2).    Lungs: Clear  CV: Regular S1, S2 with grade 1-2/6 systolic ejection murmur without radiation  Carotids full without bruits  Abnormal sugar: reviewed results, sugar metabolism, insulin, diagnosis of diabetes, diet, exercise.   Breast exam without masses, tenderness, no axillary masses    Lab Results   Component Value Date    HDL 48 04/10/2018    LDL 49 04/10/2018    CHOL 135 04/10/2018    TRIG 190 04/10/2018          ASSESSMENT/PLAN:             1. Benign essential hypertension  Well-controlled, continue medication  - Basic metabolic panel  - Albumin Random Urine Quantitative with Creat Ratio    2. Heart block AV complete (H)  Doing well with pacemaker, follow-up with cardiology as needed    3. Hyperlipidemia LDL goal <130  Well-controlled, continue medication    4. Osteopenia, " unspecified location  Stable, recheck DEXA next year  - alendronate (FOSAMAX) 70 MG tablet; Take 1 tablet (70 mg) by mouth every 7 days Take 30 min before eating  Dispense: 12 tablet; Refill: 3    5. TRAVIS (obstructive sleep apnea)  Doing well, reassured that the echo was not done because of concerns her sleep apnea was not well controlled    6. Aortic valve sclerosis  Stable, follow-up cardiology    7. Encounter for screening mammogram for breast cancer    - *MA Screening Digital Bilateral; Future      Karen Navarro MD  [facility]

## 2018-05-26 LAB
ANION GAP SERPL CALCULATED.3IONS-SCNC: 9 MMOL/L (ref 3–14)
BUN SERPL-MCNC: 15 MG/DL (ref 7–30)
CALCIUM SERPL-MCNC: 9.5 MG/DL (ref 8.5–10.1)
CHLORIDE SERPL-SCNC: 100 MMOL/L (ref 94–109)
CO2 SERPL-SCNC: 28 MMOL/L (ref 20–32)
CREAT SERPL-MCNC: 0.7 MG/DL (ref 0.52–1.04)
CREAT UR-MCNC: 39 MG/DL
GFR SERPL CREATININE-BSD FRML MDRD: 82 ML/MIN/1.7M2
GLUCOSE SERPL-MCNC: 100 MG/DL (ref 70–99)
MICROALBUMIN UR-MCNC: <5 MG/L
MICROALBUMIN/CREAT UR: NORMAL MG/G CR (ref 0–25)
POTASSIUM SERPL-SCNC: 4.5 MMOL/L (ref 3.4–5.3)
SODIUM SERPL-SCNC: 137 MMOL/L (ref 133–144)

## 2018-06-20 ENCOUNTER — HOSPITAL ENCOUNTER (OUTPATIENT)
Dept: MAMMOGRAPHY | Facility: CLINIC | Age: 78
Discharge: HOME | End: 2018-06-20
Attending: INTERNAL MEDICINE | Admitting: INTERNAL MEDICINE
Payer: MEDICARE

## 2018-06-20 DIAGNOSIS — Z12.31 ENCOUNTER FOR SCREENING MAMMOGRAM FOR BREAST CANCER: ICD-10-CM

## 2018-06-20 PROCEDURE — 77067 SCR MAMMO BI INCL CAD: CPT

## 2018-07-06 ENCOUNTER — TRANSFERRED RECORDS (OUTPATIENT)
Dept: HEALTH INFORMATION MANAGEMENT | Facility: CLINIC | Age: 78
End: 2018-07-06

## 2018-07-25 ENCOUNTER — ALLIED HEALTH/NURSE VISIT (OUTPATIENT)
Dept: CARDIOLOGY | Facility: CLINIC | Age: 78
End: 2018-07-25
Payer: COMMERCIAL

## 2018-07-25 DIAGNOSIS — Z95.0 CARDIAC PACEMAKER IN SITU: Primary | ICD-10-CM

## 2018-07-25 PROCEDURE — 93294 REM INTERROG EVL PM/LDLS PM: CPT | Performed by: INTERNAL MEDICINE

## 2018-07-25 PROCEDURE — 93296 REM INTERROG EVL PM/IDS: CPT | Performed by: INTERNAL MEDICINE

## 2018-07-25 NOTE — MR AVS SNAPSHOT
After Visit Summary   7/25/2018    Malika Velasco    MRN: [id number]           Patient Information     Date Of Birth          1940        Visit Information        Provider Department      7/25/2018 2:45 PM DÍAZ TECH1 [facility]        Today's Diagnoses     Cardiac pacemaker in situ    -  1       Follow-ups after your visit        Your next 10 appointments already scheduled     Jul 25, 2018  2:45 PM CDT   Remote PPM Check with DÍAZ TECH1   [facility]   [place] ([facility])    [address] [place], [place] [id number]  [facility] [address] [phone] OPT 2           This appointment is for a remote check of your pacemaker.  This is not an appointment at the office.              Who to contact     If you have questions or need follow up information about today's clinic visit or your schedule please contact [facility] directly at [phone].  Normal or non-critical lab and imaging results will be communicated to you by [employer], letter or phone within 4 business days after the clinic has received the results. If you do not hear from us within 7 days, please contact the clinic through [employer] or phone. If you have a critical or abnormal lab result, we will notify you by phone as soon as possible.  Submit refill requests through [employer] or call your pharmacy and they will forward the refill request to us. Please allow 3 business days for your refill to be completed.          Additional Information About Your Visit        MyChart Information     [employer] gives you secure access to your electronic health record. If you see a primary care provider, you can also send messages to your care team and make appointments. If you have questions, please call your primary care clinic.  If you do not have a primary care provider, please call [phone] and they will assist you.        Care EveryWhere ID      This is your Care EveryWhere ID. This could be used by other organizations to access your [place] medical records  [id number]         Blood Pressure from Last 3 Encounters:   05/25/18 112/82   04/10/18 121/75   09/20/17 128/84    Weight from Last 3 Encounters:   05/25/18 79.3 kg (174 lb 12.8 oz)   04/10/18 80.3 kg (177 lb)   09/20/17 77.6 kg (171 lb)              We Performed the Following     INTERROGATION DEVICE EVAL REMOTE, PACER/ICD (17910)     PM DEVICE INTERROGATE REMOTE (25850)        Primary Care Provider Office Phone # Fax #    Karen Navarro [phone]       [address]  [facility] [address]        Equal Access to Services     JD VARGHESE : Hadii aad ku hadasho Soomaali, waaxda luqadaha, qaybta kaalmada adeegyada, waxvaleria rosario haykelli singer . So [facility] [phone].    ATENCIÓN: Si habla español, tiene a sotelo disposición servicios gratuitos de asistencia lingüística. Llame al [phone].    We comply with applicable federal civil rights laws and [place] laws. We do not discriminate on the basis of race, color, national origin, age, disability, sex, sexual orientation, or gender identity.            Thank you!     Thank you for choosing [facility]  for your care. Our goal is always to provide you with excellent care. Hearing back from our patients is one way we can continue to improve our services. Please take a few minutes to complete the written survey that you may receive in the mail after your visit with us. Thank you!             Your Updated Medication List - Protect others around you: Learn how to safely use, store and throw away your medicines at www.disposemymeds.org.          This list is accurate as of 7/25/18 10:13 AM.  Always use your most recent med list.                   Brand Name Dispense Instructions for use Diagnosis    alendronate 70 MG tablet    FOSAMAX    12 tablet    Take 1 tablet (70 mg) by mouth  every 7 days Take 30 min before eating    Osteopenia, unspecified location       aspirin-acetaminophen-caffeine 250-250-65 MG per tablet    EXCEDRIN MIGRAINE     Take 1 tablet by mouth every 6 hours as needed for headaches        atenolol 50 MG tablet    TENORMIN    90 tablet    Take 1 tablet (50 mg) by mouth daily along with Tenoretic    Essential hypertension       atenolol-chlorthalidone 100-25 MG per tablet    TENORETIC    90 tablet    Take 1 tablet by mouth daily    Benign essential hypertension       atorvastatin 10 MG tablet    LIPITOR    90 tablet    Take 1 tablet (10 mg) by mouth daily    Hyperlipidemia LDL goal <130       CALCIUM 600/VITAMIN D3 600-800 MG-UNIT Tabs   Generic drug:  Calcium Carb-Cholecalciferol           FEXOFENADINE HCL PO      Take 180 mg by mouth daily        fish oil-omega-3 fatty acids 1000 MG capsule      Take 2 g by mouth daily        fluticasone 50 MCG/ACT spray    FLONASE     Spray 1 spray into both nostrils daily        MELATONIN PO      Take 5 mg by mouth At Bedtime        Multi-vitamin Tabs tablet      Take 1 tablet by mouth daily

## 2018-07-25 NOTE — PROGRESS NOTES
Medtronic Adapta (D) Remote PPM Device Check  AP: 96 % : 100 %  Mode: DDDR        Presenting Rhythm: AP/  Heart Rate: Adequate rates per histogram  Sensing: Stable    Pacing Threshold: Stable    Impedance: Stable  Battery Status: 7-10 years  Atrial Arrhythmia: None  Ventricular Arrhythmia: None     Care Plan: F/u PPM Carelink q 3 months. Gave patient results over the phone. Cathi,CVT

## 2018-10-31 ENCOUNTER — ALLIED HEALTH/NURSE VISIT (OUTPATIENT)
Dept: CARDIOLOGY | Facility: CLINIC | Age: 78
End: 2018-10-31
Payer: COMMERCIAL

## 2018-10-31 DIAGNOSIS — Z95.0 CARDIAC PACEMAKER IN SITU: Primary | ICD-10-CM

## 2018-10-31 PROCEDURE — 93294 REM INTERROG EVL PM/LDLS PM: CPT | Performed by: INTERNAL MEDICINE

## 2018-10-31 PROCEDURE — 93296 REM INTERROG EVL PM/IDS: CPT | Performed by: INTERNAL MEDICINE

## 2018-10-31 NOTE — MR AVS SNAPSHOT
After Visit Summary   10/31/2018    Malika Velasco    MRN: [id number]           Patient Information     Date Of Birth          1940        Visit Information        Provider Department      10/31/2018 2:00 PM DÍAZ TECH1 [facility]        Today's Diagnoses     Cardiac pacemaker in situ    -  1       Follow-ups after your visit        Your next 10 appointments already scheduled     Oct 31, 2018  2:00 PM CDT   Remote PPM Check with DÍAZ TECH1   [facility]   [place] ([facility])    [address] [place] [address] [address]  [facility] [address] [phone] OPT 2           This appointment is for a remote check of your pacemaker.  This is not an appointment at the office.              Who to contact     If you have questions or need follow up information about today's clinic visit or your schedule please contact [facility] directly at [phone].  Normal or non-critical lab and imaging results will be communicated to you by [employer], letter or phone within 4 business days after the clinic has received the results. If you do not hear from us within 7 days, please contact the clinic through [employer] or phone. If you have a critical or abnormal lab result, we will notify you by phone as soon as possible.  Submit refill requests through [employer] or call your pharmacy and they will forward the refill request to us. Please allow 3 business days for your refill to be completed.          Additional Information About Your Visit        MyChart Information     [employer] gives you secure access to your electronic health record. If you see a primary care provider, you can also send messages to your care team and make appointments. If you have questions, please call your primary care clinic.  If you do not have a primary care provider, please call [phone] and they will assist you.        Care EveryWhere ID      This is your Care EveryWhere ID. This could be used by other organizations to access your [place] medical records  [id number]         Blood Pressure from Last 3 Encounters:   05/25/18 112/82   04/10/18 121/75   09/20/17 128/84    Weight from Last 3 Encounters:   05/25/18 79.3 kg (174 lb 12.8 oz)   04/10/18 80.3 kg (177 lb)   09/20/17 77.6 kg (171 lb)              We Performed the Following     INTERROGATION DEVICE EVAL REMOTE, PACER/ICD (98982)     PM DEVICE INTERROGATE REMOTE (79042)        Primary Care Provider Office Phone # Fax #    Karen Navarro [phone]       [address]  [facility] [address]        Equal Access to Services     JD VARGHESE : Hadii aad ku hadasho Soomaali, waaxda luqadaha, qaybta kaalmada adeegyada, waxvaleria rosario haykelli singer . So [facility] [phone].    ATENCIÓN: Si habla español, tiene a sotelo disposición servicios gratuitos de asistencia lingüística. Llame al [phone].    We comply with applicable federal civil rights laws and [place] laws. We do not discriminate on the basis of race, color, national origin, age, disability, sex, sexual orientation, or gender identity.            Thank you!     Thank you for choosing [facility]  for your care. Our goal is always to provide you with excellent care. Hearing back from our patients is one way we can continue to improve our services. Please take a few minutes to complete the written survey that you may receive in the mail after your visit with us. Thank you!             Your Updated Medication List - Protect others around you: Learn how to safely use, store and throw away your medicines at www.disposemymeds.org.          This list is accurate as of 10/31/18 11:03 AM.  Always use your most recent med list.                   Brand Name Dispense Instructions for use Diagnosis    alendronate 70 MG tablet    FOSAMAX    12 tablet    Take 1 tablet (70 mg) by mouth  every 7 days Take 30 min before eating    Osteopenia, unspecified location       aspirin-acetaminophen-caffeine 250-250-65 MG per tablet    EXCEDRIN MIGRAINE     Take 1 tablet by mouth every 6 hours as needed for headaches        atenolol 50 MG tablet    TENORMIN    90 tablet    Take 1 tablet (50 mg) by mouth daily along with Tenoretic    Essential hypertension       atenolol-chlorthalidone 100-25 MG per tablet    TENORETIC    90 tablet    Take 1 tablet by mouth daily    Benign essential hypertension       atorvastatin 10 MG tablet    LIPITOR    90 tablet    Take 1 tablet (10 mg) by mouth daily    Hyperlipidemia LDL goal <130       CALCIUM 600/VITAMIN D3 600-800 MG-UNIT Tabs   Generic drug:  Calcium Carb-Cholecalciferol           FEXOFENADINE HCL PO      Take 180 mg by mouth daily        fish oil-omega-3 fatty acids 1000 MG capsule      Take 2 g by mouth daily        fluticasone 50 MCG/ACT spray    FLONASE     Spray 1 spray into both nostrils daily        MELATONIN PO      Take 5 mg by mouth At Bedtime        Multi-vitamin Tabs tablet      Take 1 tablet by mouth daily

## 2018-10-31 NOTE — PROGRESS NOTES
Medtronic Adapta (D) Remote PPM Device Check  AP: 96 % : 100 %  Mode: DDDR        Presenting Rhythm: AP/  Heart Rate: Adequate rates per histogram  Sensing: Stable    Pacing Threshold: Stable    Impedance: Stable  Battery Status: 5.5-8 years  Atrial Arrhythmia: 1 brief mode switch episode, no EGM  Ventricular Arrhythmia: None    Care Plan: F/u PPM Carelink q 3 months. Gave patient results over the phone. EStafford,CVT

## 2019-01-31 ENCOUNTER — OFFICE VISIT (OUTPATIENT)
Dept: INTERNAL MEDICINE | Facility: CLINIC | Age: 79
End: 2019-01-31
Payer: COMMERCIAL

## 2019-01-31 VITALS
BODY MASS INDEX: 32.48 KG/M2 | TEMPERATURE: 98.4 F | OXYGEN SATURATION: 98 % | WEIGHT: 183.3 LBS | HEART RATE: 90 BPM | HEIGHT: 63 IN | SYSTOLIC BLOOD PRESSURE: 124 MMHG | DIASTOLIC BLOOD PRESSURE: 88 MMHG

## 2019-01-31 DIAGNOSIS — L03.319 CELLULITIS AND ABSCESS OF TRUNK: Primary | ICD-10-CM

## 2019-01-31 DIAGNOSIS — L02.219 CELLULITIS AND ABSCESS OF TRUNK: Primary | ICD-10-CM

## 2019-01-31 PROCEDURE — 99213 OFFICE O/P EST LOW 20 MIN: CPT | Performed by: INTERNAL MEDICINE

## 2019-01-31 RX ORDER — CEFDINIR 300 MG/1
300 CAPSULE ORAL 2 TIMES DAILY
Qty: 14 CAPSULE | Refills: 0 | Status: SHIPPED | OUTPATIENT
Start: 2019-01-31 | End: 2019-04-05

## 2019-01-31 ASSESSMENT — MIFFLIN-ST. JEOR: SCORE: 1286.6

## 2019-01-31 NOTE — PROGRESS NOTES
SUBJECTIVE:   Malika Velasco is a 78 year old female who presents to clinic today for the following health issues:      Complaints redness and irritation at the left labia majora/perineal area.  She first noticed this about 3 days ago, felt firm like it could be a cyst.  It has been sore.  It seems to possibly gotten a little bit bigger.  Today there was a little blood when she wiped and a little spotting on her pad since then without other drainage.  It seems to felt more sore and irritated.    Patient Active Problem List   Diagnosis     Pacemaker     Hyperlipidemia LDL goal <130     Benign essential hypertension     Osteopenia     Osteoarthritis     Squamous cell carcinoma, face     Back pain     Heart block AV complete (H)     Aortic valve sclerosis     TRAVIS (obstructive sleep apnea)     ACP (advance care planning)     Current Outpatient Medications   Medication Sig Dispense Refill     alendronate (FOSAMAX) 70 MG tablet Take 1 tablet (70 mg) by mouth every 7 days Take 30 min before eating 12 tablet 3     aspirin-acetaminophen-caffeine (EXCEDRIN MIGRAINE) 250-250-65 MG per tablet Take 1 tablet by mouth every 6 hours as needed for headaches       atenolol (TENORMIN) 50 MG tablet Take 1 tablet (50 mg) by mouth daily along with Tenoretic 90 tablet 3     atenolol-chlorthalidone (TENORETIC) 100-25 MG per tablet Take 1 tablet by mouth daily 90 tablet 3     atorvastatin (LIPITOR) 10 MG tablet Take 1 tablet (10 mg) by mouth daily 90 tablet 3     Calcium Carb-Cholecalciferol (CALCIUM 600/VITAMIN D3) 600-800 MG-UNIT TABS        cefdinir (OMNICEF) 300 MG capsule Take 1 capsule (300 mg) by mouth 2 times daily for 7 days 14 capsule 0     FEXOFENADINE HCL PO Take 180 mg by mouth daily       fish oil-omega-3 fatty acids 1000 MG capsule Take 2 g by mouth daily       fluticasone (FLONASE) 50 MCG/ACT spray Spray 1 spray into both nostrils daily       MELATONIN PO Take 5 mg by mouth At Bedtime       multivitamin, therapeutic  "with minerals (MULTI-VITAMIN) TABS tablet Take 1 tablet by mouth daily        Social History     Tobacco Use     Smoking status: Former Smoker     Smokeless tobacco: Never Used   Substance Use Topics     Alcohol use: Yes     Comment: rarely     Drug use: No        Reviewed and updated as needed this visit by clinical staff       Reviewed and updated as needed this visit by Provider         ROS:  No fever or chills    OBJECTIVE:     /88 (BP Location: Left arm, Patient Position: Chair, Cuff Size: Adult Large)   Pulse 90   Temp 98.4  F (36.9  C) (Oral)   Ht 1.594 m (5' 2.75\")   Wt 83.1 kg (183 lb 4.8 oz)   SpO2 98%   Breastfeeding? No   BMI 32.73 kg/m    Body mass index is 32.73 kg/m .    There is a large, 4-5 cm patch of erythema with induration involving the left labia majora posteriorly  and perineal area.  This is not involving the anus.  There is a very superficial abrasion in the center area which is more firm but no fluctuance present.      ASSESSMENT/PLAN:             1. Cellulitis and abscess of trunk  Likely this is an early abscess but not anything that is drainable at this time.  Recommend sitz baths 3 times a day, start on antibiotic.  Advised if there is drainage, she can try to express material but should not try to open anything on her own.  Advised may need to be seen if significant worsening, development of fever or chills, lack of improvement in 1-2 days.  - cefdinir (OMNICEF) 300 MG capsule; Take 1 capsule (300 mg) by mouth 2 times daily for 7 days  Dispense: 14 capsule; Refill: 0        Karen Navarro MD  [facility]    "

## 2019-02-13 ENCOUNTER — ANCILLARY PROCEDURE (OUTPATIENT)
Dept: CARDIOLOGY | Facility: CLINIC | Age: 79
End: 2019-02-13
Attending: INTERNAL MEDICINE
Payer: COMMERCIAL

## 2019-02-13 DIAGNOSIS — Z95.0 PACEMAKER: ICD-10-CM

## 2019-02-13 PROCEDURE — 93294 REM INTERROG EVL PM/LDLS PM: CPT | Performed by: INTERNAL MEDICINE

## 2019-02-13 PROCEDURE — 93296 REM INTERROG EVL PM/IDS: CPT | Performed by: INTERNAL MEDICINE

## 2019-02-15 LAB
MDC_IDC_LEAD_IMPLANT_DT: NORMAL
MDC_IDC_LEAD_IMPLANT_DT: NORMAL
MDC_IDC_LEAD_LOCATION: NORMAL
MDC_IDC_LEAD_LOCATION: NORMAL
MDC_IDC_LEAD_LOCATION_DETAIL_1: NORMAL
MDC_IDC_LEAD_LOCATION_DETAIL_1: NORMAL
MDC_IDC_LEAD_MFG: NORMAL
MDC_IDC_LEAD_MFG: NORMAL
MDC_IDC_LEAD_MODEL: NORMAL
MDC_IDC_LEAD_MODEL: NORMAL
MDC_IDC_LEAD_POLARITY_TYPE: NORMAL
MDC_IDC_LEAD_POLARITY_TYPE: NORMAL
MDC_IDC_LEAD_SERIAL: NORMAL
MDC_IDC_LEAD_SERIAL: NORMAL
MDC_IDC_MSMT_BATTERY_DTM: NORMAL
MDC_IDC_MSMT_BATTERY_IMPEDANCE: 335 OHM
MDC_IDC_MSMT_BATTERY_REMAINING_LONGEVITY: 96 MO
MDC_IDC_MSMT_BATTERY_STATUS: NORMAL
MDC_IDC_MSMT_BATTERY_VOLTAGE: 2.78 V
MDC_IDC_MSMT_LEADCHNL_RA_IMPEDANCE_VALUE: 749 OHM
MDC_IDC_MSMT_LEADCHNL_RA_PACING_THRESHOLD_AMPLITUDE: 1.88 V
MDC_IDC_MSMT_LEADCHNL_RA_PACING_THRESHOLD_PULSEWIDTH: 0.4 MS
MDC_IDC_MSMT_LEADCHNL_RV_IMPEDANCE_VALUE: 578 OHM
MDC_IDC_MSMT_LEADCHNL_RV_PACING_THRESHOLD_AMPLITUDE: 1 V
MDC_IDC_MSMT_LEADCHNL_RV_PACING_THRESHOLD_PULSEWIDTH: 0.4 MS
MDC_IDC_PG_IMPLANT_DTM: NORMAL
MDC_IDC_PG_MFG: NORMAL
MDC_IDC_PG_MODEL: NORMAL
MDC_IDC_PG_SERIAL: NORMAL
MDC_IDC_PG_TYPE: NORMAL
MDC_IDC_SESS_CLINIC_NAME: NORMAL
MDC_IDC_SESS_DTM: NORMAL
MDC_IDC_SESS_TYPE: NORMAL
MDC_IDC_SET_BRADY_AT_MODE_SWITCH_MODE: NORMAL
MDC_IDC_SET_BRADY_AT_MODE_SWITCH_RATE: 175 {BEATS}/MIN
MDC_IDC_SET_BRADY_LOWRATE: 60 {BEATS}/MIN
MDC_IDC_SET_BRADY_MAX_SENSOR_RATE: 130 {BEATS}/MIN
MDC_IDC_SET_BRADY_MAX_TRACKING_RATE: 130 {BEATS}/MIN
MDC_IDC_SET_BRADY_MODE: NORMAL
MDC_IDC_SET_BRADY_PAV_DELAY_LOW: 200 MS
MDC_IDC_SET_BRADY_SAV_DELAY_LOW: 180 MS
MDC_IDC_SET_LEADCHNL_RA_PACING_AMPLITUDE: 2.75 V
MDC_IDC_SET_LEADCHNL_RA_PACING_ANODE_ELECTRODE_1: NORMAL
MDC_IDC_SET_LEADCHNL_RA_PACING_ANODE_LOCATION_1: NORMAL
MDC_IDC_SET_LEADCHNL_RA_PACING_CAPTURE_MODE: NORMAL
MDC_IDC_SET_LEADCHNL_RA_PACING_CATHODE_ELECTRODE_1: NORMAL
MDC_IDC_SET_LEADCHNL_RA_PACING_CATHODE_LOCATION_1: NORMAL
MDC_IDC_SET_LEADCHNL_RA_PACING_POLARITY: NORMAL
MDC_IDC_SET_LEADCHNL_RA_PACING_PULSEWIDTH: 0.4 MS
MDC_IDC_SET_LEADCHNL_RA_SENSING_ANODE_ELECTRODE_1: NORMAL
MDC_IDC_SET_LEADCHNL_RA_SENSING_ANODE_LOCATION_1: NORMAL
MDC_IDC_SET_LEADCHNL_RA_SENSING_CATHODE_ELECTRODE_1: NORMAL
MDC_IDC_SET_LEADCHNL_RA_SENSING_CATHODE_LOCATION_1: NORMAL
MDC_IDC_SET_LEADCHNL_RA_SENSING_POLARITY: NORMAL
MDC_IDC_SET_LEADCHNL_RA_SENSING_SENSITIVITY: 1 MV
MDC_IDC_SET_LEADCHNL_RV_PACING_AMPLITUDE: 2 V
MDC_IDC_SET_LEADCHNL_RV_PACING_ANODE_ELECTRODE_1: NORMAL
MDC_IDC_SET_LEADCHNL_RV_PACING_ANODE_LOCATION_1: NORMAL
MDC_IDC_SET_LEADCHNL_RV_PACING_CAPTURE_MODE: NORMAL
MDC_IDC_SET_LEADCHNL_RV_PACING_CATHODE_ELECTRODE_1: NORMAL
MDC_IDC_SET_LEADCHNL_RV_PACING_CATHODE_LOCATION_1: NORMAL
MDC_IDC_SET_LEADCHNL_RV_PACING_POLARITY: NORMAL
MDC_IDC_SET_LEADCHNL_RV_PACING_PULSEWIDTH: 0.4 MS
MDC_IDC_SET_LEADCHNL_RV_SENSING_ANODE_ELECTRODE_1: NORMAL
MDC_IDC_SET_LEADCHNL_RV_SENSING_ANODE_LOCATION_1: NORMAL
MDC_IDC_SET_LEADCHNL_RV_SENSING_CATHODE_ELECTRODE_1: NORMAL
MDC_IDC_SET_LEADCHNL_RV_SENSING_CATHODE_LOCATION_1: NORMAL
MDC_IDC_SET_LEADCHNL_RV_SENSING_POLARITY: NORMAL
MDC_IDC_SET_LEADCHNL_RV_SENSING_SENSITIVITY: 2.8 MV
MDC_IDC_SET_ZONE_DETECTION_INTERVAL: 333.33 MS
MDC_IDC_SET_ZONE_DETECTION_INTERVAL: 342.86 MS
MDC_IDC_SET_ZONE_TYPE: NORMAL
MDC_IDC_SET_ZONE_TYPE: NORMAL
MDC_IDC_STAT_AT_BURDEN_PERCENT: 0 %
MDC_IDC_STAT_AT_DTM_END: NORMAL
MDC_IDC_STAT_AT_DTM_START: NORMAL
MDC_IDC_STAT_AT_MODE_SW_COUNT: 5
MDC_IDC_STAT_BRADY_AP_VP_PERCENT: 95 %
MDC_IDC_STAT_BRADY_AP_VS_PERCENT: 0 %
MDC_IDC_STAT_BRADY_AS_VP_PERCENT: 5 %
MDC_IDC_STAT_BRADY_AS_VS_PERCENT: 0 %
MDC_IDC_STAT_BRADY_DTM_END: NORMAL
MDC_IDC_STAT_BRADY_DTM_START: NORMAL
MDC_IDC_STAT_EPISODE_RECENT_COUNT: 0
MDC_IDC_STAT_EPISODE_RECENT_COUNT: 0
MDC_IDC_STAT_EPISODE_RECENT_COUNT_DTM_END: NORMAL
MDC_IDC_STAT_EPISODE_RECENT_COUNT_DTM_END: NORMAL
MDC_IDC_STAT_EPISODE_RECENT_COUNT_DTM_START: NORMAL
MDC_IDC_STAT_EPISODE_RECENT_COUNT_DTM_START: NORMAL
MDC_IDC_STAT_EPISODE_TYPE: NORMAL
MDC_IDC_STAT_EPISODE_TYPE: NORMAL

## 2019-04-05 ENCOUNTER — OFFICE VISIT (OUTPATIENT)
Dept: INTERNAL MEDICINE | Facility: CLINIC | Age: 79
End: 2019-04-05
Payer: COMMERCIAL

## 2019-04-05 VITALS
DIASTOLIC BLOOD PRESSURE: 82 MMHG | OXYGEN SATURATION: 99 % | TEMPERATURE: 98 F | HEIGHT: 63 IN | HEART RATE: 84 BPM | BODY MASS INDEX: 30.92 KG/M2 | SYSTOLIC BLOOD PRESSURE: 120 MMHG | WEIGHT: 174.5 LBS

## 2019-04-05 DIAGNOSIS — G47.33 OSA (OBSTRUCTIVE SLEEP APNEA): ICD-10-CM

## 2019-04-05 DIAGNOSIS — E78.5 HYPERLIPIDEMIA LDL GOAL <130: ICD-10-CM

## 2019-04-05 DIAGNOSIS — R35.0 URINARY FREQUENCY: ICD-10-CM

## 2019-04-05 DIAGNOSIS — I10 BENIGN ESSENTIAL HYPERTENSION: Primary | ICD-10-CM

## 2019-04-05 DIAGNOSIS — M85.80 OSTEOPENIA, UNSPECIFIED LOCATION: ICD-10-CM

## 2019-04-05 DIAGNOSIS — Z78.0 ASYMPTOMATIC POSTMENOPAUSAL STATUS: ICD-10-CM

## 2019-04-05 DIAGNOSIS — R51.9 FACIAL PAIN: ICD-10-CM

## 2019-04-05 PROCEDURE — 99214 OFFICE O/P EST MOD 30 MIN: CPT | Performed by: INTERNAL MEDICINE

## 2019-04-05 RX ORDER — ATENOLOL AND CHLORTHALIDONE TABLET 100; 25 MG/1; MG/1
1 TABLET ORAL DAILY
Qty: 90 TABLET | Refills: 3 | Status: SHIPPED | OUTPATIENT
Start: 2019-04-05 | End: 2019-05-16 | Stop reason: ALTCHOICE

## 2019-04-05 RX ORDER — TOLTERODINE 4 MG/1
4 CAPSULE, EXTENDED RELEASE ORAL DAILY
Qty: 30 CAPSULE | Refills: 3 | Status: SHIPPED | OUTPATIENT
Start: 2019-04-05 | End: 2019-08-04

## 2019-04-05 ASSESSMENT — MIFFLIN-ST. JEOR: SCORE: 1246.69

## 2019-04-05 NOTE — PROGRESS NOTES
SUBJECTIVE:   Malika Velasco is a 78 year old female who presents to clinic today for the following health issues:      Hyperlipidemia Follow-Up      Rate your low fat/cholesterol diet?: not monitoring fat, cutting down on junk food    Taking statin?  Yes, no muscle aches from statin    Other lipid medications/supplements?:  Fish oil/Omega 3, dose 1000 without side effects    Hypertension Follow-up      Outpatient blood pressures not often    Low Salt Diet: monitors      Amount of exercise or physical activity: takes dog for a walk, 3-4 times a week    Problems taking medications regularly: No    Medication side effects: none    Diet: regular (no restrictions)      Other problems:   1.  Sleep apnea: Overall stable, does have some daytime fatigue but that seems to be related to getting up to go to the bathroom at night.      Current Concerns:   1.  She complains of pain at the temples.  This is been going on for a few years and is episodic.  It is on the right much more than the left side.  It will be a very sharp fairly intense pain that comes on abruptly but goes away within a minute or so.  It may happen again a few times in a day or recur daily for a few days but then it can go away for up to 6 months.  It seems like it is slowly become a little more frequent.  She has no associated visual disturbances, facial numbness or tingling, jaw joint pain, jaw claudication, headaches, facial numbness or tingling.  She is not aware of any sort of triggers.  2.  Urinary frequency: She has issues with daytime frequency but also at night, getting up 3-4 times every night.  It is not really polyuria.  She does not feel she is excessive with her fluid intake, minimal caffeine intake.      Patient Active Problem List   Diagnosis     Pacemaker     Hyperlipidemia LDL goal <130     Benign essential hypertension     Osteopenia     Osteoarthritis     Squamous cell carcinoma, face     Back pain     Aortic valve sclerosis     TRAVIS  "(obstructive sleep apnea)     ACP (advance care planning)       Current Outpatient Medications   Medication Sig Dispense Refill     alendronate (FOSAMAX) 70 MG tablet Take 1 tablet (70 mg) by mouth every 7 days Take 30 min before eating 12 tablet 3     atenolol (TENORMIN) 50 MG tablet Take 1 tablet (50 mg) by mouth daily along with Tenoretic 90 tablet 3     atenolol-chlorthalidone (TENORETIC) 100-25 MG per tablet Take 1 tablet by mouth daily 90 tablet 3     atorvastatin (LIPITOR) 10 MG tablet Take 1 tablet (10 mg) by mouth daily 90 tablet 3     Calcium Carb-Cholecalciferol (CALCIUM 600/VITAMIN D3) 600-800 MG-UNIT TABS        FEXOFENADINE HCL PO Take 180 mg by mouth daily       fish oil-omega-3 fatty acids 1000 MG capsule Take 2 g by mouth daily       fluticasone (FLONASE) 50 MCG/ACT spray Spray 1 spray into both nostrils daily       MELATONIN PO Take 5 mg by mouth At Bedtime       multivitamin, therapeutic with minerals (MULTI-VITAMIN) TABS tablet Take 1 tablet by mouth daily           Social History     Tobacco Use     Smoking status: Former Smoker     Smokeless tobacco: Never Used   Substance Use Topics     Alcohol use: Yes     Comment: rarely          ROS:  No fever, chills, no dyspnea on exertion, no chest pain, palpitations, PND, orthopnea, edema, syncope, headache, abdominal pain   No visual disturbance, fever, chills, night sweats, weight loss, diffuse muscle pain, jaw joint pain, jaw claudication, facial numbness or weakness    OBJECTIVE:     /82 (BP Location: Right arm, Patient Position: Chair, Cuff Size: Adult Regular)   Pulse 84   Temp 98  F (36.7  C) (Oral)   Ht 1.594 m (5' 2.75\")   Wt 79.2 kg (174 lb 8 oz)   SpO2 99%   Breastfeeding? No   BMI 31.16 kg/m    Body mass index is 31.16 kg/m .    There is no significant tenderness of the temporalis muscle, temporal artery, no nodularity of the artery.  There is no TMJ tenderness or jaw muscle tenderness.  Pupils are equal round reactive to light, " extraocular movements intact, fundi benign  Lungs clear  CV: normal S1, S2 without murmur, S3 or S4.   Pulses full without bruits of the carotids      ASSESSMENT/PLAN:         1. Benign essential hypertension  Well-controlled, she had some questions as the pharmacy seems to question the chlorthalidone.  Overall she has been tolerating this medication well for a long time so we agreed to continue current medication unless there is a change in formulary or availability of the medication.  Due for labs.  - atenolol (TENORMIN) 50 MG tablet; Take 1 tablet (50 mg) by mouth daily along with Tenoretic  Dispense: 90 tablet; Refill: 3  - atenolol-chlorthalidone (TENORETIC) 100-25 MG tablet; Take 1 tablet by mouth daily  Dispense: 90 tablet; Refill: 3  - Basic metabolic panel; Future  - Albumin Random Urine Quantitative with Creat Ratio; Future    2. Hyperlipidemia LDL goal <130  But this has been well controlled, schedule future lab, continue medication  - atorvastatin (LIPITOR) 10 MG tablet; Take 1 tablet (10 mg) by mouth daily  Dispense: 90 tablet; Refill: 3  - Lipid panel reflex to direct LDL Fasting; Future    3. Urinary frequency  She feels this is causing disruption and some fatigue during the day so we will go ahead and try medication, advised about side effects, expected benefits  - tolterodine ER (DETROL LA) 4 MG 24 hr capsule; Take 1 capsule (4 mg) by mouth daily  Dispense: 30 capsule; Refill: 3    4. Facial pain  Reassured that likely this is a benign pain since it is fairly brief, sporadic.  Advised it could be triggered by things like going to the dentist, chewing a lot of chewy foods, some other type of bleeding on her hand or positioning.  Advised she can try rubbing the muscle to relieve any pain.  Very unlikely to be temporal arteritis since it is been so prolonged without progression and very brief pain without any other associated symptoms.    5. Osteopenia, unspecified location  Stable, due for bone  density, continue medication  - alendronate (FOSAMAX) 70 MG tablet; Take 1 tablet (70 mg) by mouth every 7 days  Dispense: 12 tablet; Refill: 3  - DX Hip/Pelvis/Spine; Future    6. TRAVIS (obstructive sleep apnea)  Overall stable, if she has continued fatigue may need to be reevaluated by sleep clinic    7. Asymptomatic postmenopausal status  due  - DX Hip/Pelvis/Spine; Future        Karen Navarro MD  [facility]

## 2019-04-06 PROBLEM — Z71.89 ACP (ADVANCE CARE PLANNING): Status: ACTIVE | Noted: 2017-07-26

## 2019-04-06 RX ORDER — ALENDRONATE SODIUM 70 MG/1
70 TABLET ORAL
Qty: 12 TABLET | Refills: 3 | Status: SHIPPED | OUTPATIENT
Start: 2019-04-06 | End: 2020-02-04

## 2019-04-06 RX ORDER — ATENOLOL 50 MG/1
50 TABLET ORAL DAILY
Qty: 90 TABLET | Refills: 3 | Status: SHIPPED | OUTPATIENT
Start: 2019-04-06 | End: 2019-04-17

## 2019-04-06 RX ORDER — ATORVASTATIN CALCIUM 10 MG/1
10 TABLET, FILM COATED ORAL DAILY
Qty: 90 TABLET | Refills: 3 | Status: SHIPPED | OUTPATIENT
Start: 2019-04-06 | End: 2020-02-04

## 2019-04-16 DIAGNOSIS — I10 BENIGN ESSENTIAL HYPERTENSION: ICD-10-CM

## 2019-04-16 DIAGNOSIS — E78.5 HYPERLIPIDEMIA LDL GOAL <130: ICD-10-CM

## 2019-04-16 LAB
ANION GAP SERPL CALCULATED.3IONS-SCNC: 7 MMOL/L (ref 3–14)
BUN SERPL-MCNC: 15 MG/DL (ref 7–30)
CALCIUM SERPL-MCNC: 9.3 MG/DL (ref 8.5–10.1)
CHLORIDE SERPL-SCNC: 96 MMOL/L (ref 94–109)
CHOLEST SERPL-MCNC: 132 MG/DL
CO2 SERPL-SCNC: 29 MMOL/L (ref 20–32)
CREAT SERPL-MCNC: 0.63 MG/DL (ref 0.52–1.04)
CREAT UR-MCNC: 45 MG/DL
GFR SERPL CREATININE-BSD FRML MDRD: 87 ML/MIN/{1.73_M2}
GLUCOSE SERPL-MCNC: 101 MG/DL (ref 70–99)
HDLC SERPL-MCNC: 50 MG/DL
LDLC SERPL CALC-MCNC: 43 MG/DL
MICROALBUMIN UR-MCNC: <5 MG/L
MICROALBUMIN/CREAT UR: NORMAL MG/G CR (ref 0–25)
NONHDLC SERPL-MCNC: 82 MG/DL
POTASSIUM SERPL-SCNC: 3.8 MMOL/L (ref 3.4–5.3)
SODIUM SERPL-SCNC: 132 MMOL/L (ref 133–144)
TRIGL SERPL-MCNC: 197 MG/DL

## 2019-04-16 PROCEDURE — 80061 LIPID PANEL: CPT | Performed by: INTERNAL MEDICINE

## 2019-04-16 PROCEDURE — 82043 UR ALBUMIN QUANTITATIVE: CPT | Performed by: INTERNAL MEDICINE

## 2019-04-16 PROCEDURE — 80048 BASIC METABOLIC PNL TOTAL CA: CPT | Performed by: INTERNAL MEDICINE

## 2019-04-16 PROCEDURE — 36415 COLL VENOUS BLD VENIPUNCTURE: CPT | Performed by: INTERNAL MEDICINE

## 2019-04-17 ENCOUNTER — OFFICE VISIT (OUTPATIENT)
Dept: CARDIOLOGY | Facility: CLINIC | Age: 79
End: 2019-04-17
Attending: INTERNAL MEDICINE
Payer: COMMERCIAL

## 2019-04-17 VITALS
HEART RATE: 66 BPM | SYSTOLIC BLOOD PRESSURE: 129 MMHG | DIASTOLIC BLOOD PRESSURE: 79 MMHG | HEIGHT: 62 IN | OXYGEN SATURATION: 97 % | WEIGHT: 177 LBS | BODY MASS INDEX: 32.57 KG/M2

## 2019-04-17 DIAGNOSIS — Z95.0 CARDIAC PACEMAKER IN SITU: ICD-10-CM

## 2019-04-17 DIAGNOSIS — Z95.0 PACEMAKER: ICD-10-CM

## 2019-04-17 DIAGNOSIS — I10 ESSENTIAL HYPERTENSION: Primary | ICD-10-CM

## 2019-04-17 PROCEDURE — 99214 OFFICE O/P EST MOD 30 MIN: CPT | Mod: 25 | Performed by: PHYSICIAN ASSISTANT

## 2019-04-17 PROCEDURE — 93280 PM DEVICE PROGR EVAL DUAL: CPT | Performed by: INTERNAL MEDICINE

## 2019-04-17 RX ORDER — LOSARTAN POTASSIUM 25 MG/1
25 TABLET ORAL DAILY
Qty: 30 TABLET | Refills: 11 | Status: SHIPPED | OUTPATIENT
Start: 2019-04-17 | End: 2019-05-16 | Stop reason: ALTCHOICE

## 2019-04-17 ASSESSMENT — MIFFLIN-ST. JEOR: SCORE: 1246.12

## 2019-04-17 NOTE — LETTER
2019    Karen Navarro MD  [address]  [facility] [address]    RE: Malika Velasco       Dear Colleague,    I had the pleasure of seeing Malika Velasco in the [facility].    CARDIOLOGY CLINIC PROGRESS NOTE    DOS: 2019      Malika Velasco  : 1940, 78 year old  MRN: [id number]      History:   Ms. Velasco is a pleasant 78-year-old woman who I am meeting today for annual follow up.  She had been a patient of Dr. Yung.     Malika has a history of high-grade AV block with syncope in  and a permanent pacemaker implanted at [facility] with subsequent generator replacement in  as well as , here.  She has hypertension, elevated fasting blood sugar, dyslipidemia, known minimal soft plaque in the mid LAD seen on CT coronary angiography in , TRAVIS on CPAP.     She had an echocardiogram showing aortic sclerosis with preserved LV systolic function in .    She had a negative nuclear stress test at that time as well in the setting of dyspnea on exertion which has improved after generator replacement, ultimately.     She was last seen by Dr. Yung 4/10/18.  At that time, she ha some mild BEASLEY.  An echocardiogram was performed 18 that showed normal LV systolic function, grade 1 diastolic dysfunction, no RWMAs, mild AI.      Interval History:   She presents today for annual follow up.   At this time, Ms. Velasco has no cardiorespiratory complaints of chest pain, shortness of breath, lightheadedness, presyncope, syncope, PND, orthopnea or pedal edema.      Blood pressure is 129/79 today.    She is on atenolol 50 mg and atenolol 100 mg-chlorthalidone 25 mg.  The latter is more expensive for her.  She denies any prior issues with edema.  Her Na was 132 yesterday. She just filled a 3 month supply of the combo pill.     FLP 19: , HDL 50, LDL 43, .      She had a device interrogation today, but the  results are not yet available to me.         ROS:  Skin:  Negative     Eyes:  Positive for glasses  ENT:  Positive for tinnitus;hearing loss  Respiratory:  Positive for sleep apnea;CPAP  Cardiovascular:  Negative    Gastroenterology: Positive for reflux  Genitourinary:  Positive for urinary frequency  Musculoskeletal:  Positive for arthritis;joint pain  Neurologic:  Positive for headaches  Psychiatric:  Negative    Heme/Lymph/Imm:  Negative    Endocrine:  Negative      PAST MEDICAL HISTORY:  Past Medical History:   Diagnosis Date     Allergic rhinitis      Anxiety      Aortic valve sclerosis      Back pain      GERD (gastroesophageal reflux disease)      Heart block AV complete (H) 2008    pacemaker     Hyperlipidemia      Hypertension      Major depression      TRAVIS (obstructive sleep apnea)     moderate     Osteoarthritis      Squamous cell carcinoma, face        PAST SURGICAL HISTORY:  Past Surgical History:   Procedure Laterality Date     ARTHROSCOPY KNEE Left      AS TOTAL KNEE ARTHROPLASTY Left 05/2015     IMPLANT PACEMAKER  1997     TONSILLECTOMY       TUBAL LIGATION         SOCIAL HISTORY:  Social History     Socioeconomic History     Marital status:      Spouse name: Not on file     Number of children: Not on file     Years of education: Not on file     Highest education level: Not on file   Occupational History     Not on file   Social Needs     Financial resource strain: Not on file     Food insecurity:     Worry: Not on file     Inability: Not on file     Transportation needs:     Medical: Not on file     Non-medical: Not on file   Tobacco Use     Smoking status: Former Smoker     Smokeless tobacco: Never Used   Substance and Sexual Activity     Alcohol use: Yes     Comment: rarely     Drug use: No     Sexual activity: Never   Lifestyle     Physical activity:     Days per week: Not on file     Minutes per session: Not on file     Stress: Not on file   Relationships     Social connections:     Talks  "on phone: Not on file     Gets together: Not on file     Attends Mandaeism service: Not on file     Active member of club or organization: Not on file     Attends meetings of clubs or organizations: Not on file     Relationship status: Not on file     Intimate partner violence:     Fear of current or ex partner: Not on file     Emotionally abused: Not on file     Physically abused: Not on file     Forced sexual activity: Not on file   Other Topics Concern     Parent/sibling w/ CABG, MI or angioplasty before 65F 55M? Not Asked   Social History Narrative     Not on file       FAMILY HISTORY:  Family History   Problem Relation Age of Onset     Heart Disease Mother      Dementia Mother      Heart Disease Father        MEDS:   Current Outpatient Medications on File Prior to Visit:  alendronate (FOSAMAX) 70 MG tablet Take 1 tablet (70 mg) by mouth every 7 days   atenolol-chlorthalidone (TENORETIC) 100-25 MG tablet Take 1 tablet by mouth daily   atorvastatin (LIPITOR) 10 MG tablet Take 1 tablet (10 mg) by mouth daily   Calcium Carb-Cholecalciferol (CALCIUM 600/VITAMIN D3) 600-800 MG-UNIT TABS    FEXOFENADINE HCL PO Take 180 mg by mouth daily   fish oil-omega-3 fatty acids 1000 MG capsule Take 2 g by mouth daily   fluticasone (FLONASE) 50 MCG/ACT spray Spray 1 spray into both nostrils daily   MELATONIN PO Take 5 mg by mouth At Bedtime   multivitamin, therapeutic with minerals (MULTI-VITAMIN) TABS tablet Take 1 tablet by mouth daily   tolterodine ER (DETROL LA) 4 MG 24 hr capsule Take 1 capsule (4 mg) by mouth daily     No current facility-administered medications on file prior to visit.     ALLERGIES:   Allergies   Allergen Reactions     Zantac [Ranitidine]      Headache       PHYSICAL EXAM:  Vitals: /79 (BP Location: Right arm, Patient Position: Sitting, Cuff Size: Adult Regular)   Pulse 66   Ht 1.575 m (5' 2\")   Wt 80.3 kg (177 lb)   SpO2 97%   BMI 32.37 kg/m     Constitutional:  cooperative, alert and " oriented, well developed, well nourished, in no acute distress        Skin:  warm and dry to the touch, no apparent skin lesions or masses noted        Head:  normocephalic, no masses or lesions        Eyes:  pupils equal and round, conjunctivae and lids unremarkable, sclera white, no xanthalasma, EOMS intact, no nystagmus        ENT:  no pallor or cyanosis, dentition good        Neck:  JVP normal;no carotid bruit        Respiratory:  normal breath sounds, clear to auscultation, normal A-P diameter, normal symmetry, normal respiratory excursion, no use of accessory muscles        Cardiac: regular rhythm;normal S1 and S2;no S3 or S4       early systolic murmur;systolic ejection murmur;grade 1;RUSB          GI:  abdomen soft;BS normoactive;non-tender obese      Vascular: pulses full and equal                                      Extremities and Musculoskeletal:  no edema;no deformities, clubbing, cyanosis, erythema observed        Neurological:  no gross motor deficits;affect appropriate            LABS/DATA:  I reviewed the following:  Component      Latest Ref Rng & Units 4/16/2019   Sodium      133 - 144 mmol/L 132 (L)   Potassium      3.4 - 5.3 mmol/L 3.8   Chloride      94 - 109 mmol/L 96   Carbon Dioxide      20 - 32 mmol/L 29   Anion Gap      3 - 14 mmol/L 7   Glucose      70 - 99 mg/dL 101 (H)   Urea Nitrogen      7 - 30 mg/dL 15   Creatinine      0.52 - 1.04 mg/dL 0.63   GFR Estimate      >60 mL/min/1.73:m2 87   GFR Estimate If Black      >60 mL/min/1.73:m2 >90   Calcium      8.5 - 10.1 mg/dL 9.3   Cholesterol      <200 mg/dL 132   Triglycerides      <150 mg/dL 197 (H)   HDL Cholesterol      >49 mg/dL 50   LDL Cholesterol Calculated      <100 mg/dL 43   Non HDL Cholesterol      <130 mg/dL 82       Echo 4/18/18:  Interpretation Summary     Left ventricular systolic function is normal.  Grade I or early diastolic dysfunction.  No regional wall motion abnormalities noted.  There is mild (1+) aortic  regurgitation.  The study was technically difficult.      ASSESSMENT/PLAN:  A pleasant 78-year-old lady with mild soft plaque in the middle of the LAD, pacemaker in place for AV node dysfunction, HTN, dyslipidemia, TRAVIS on CPAP, elevated fasting blood sugar.    She is feeling well without cardiorespiratory complaints.     BP is controlled, but she is on 150 mg of atenolol, and she notes the combo pill is a bit expensive.  She did just fill a 3 month supply, though. Recent Na was 132 on chlorthalidone.  She has not had issues with edema.   - I thought about stopping everything and putting her on Coreg and hydrochlorothiazide (she says her pharmacist told her hydrochlorothiazide is cheaper than chlorthalidone).   But she does not want to waste the combo pill she just filled.   So for now, stop atenolol 50 mg.   Continue atenolol 100 mg-chlorthalidone 25 mg.   Start losartan 25 mg.   See RN in 1 month for BP check and BMP.   We may stop the combo pill pending BMP.  Then I would either titrate the ARB or change the BB to Coreg.       FLP is controlled, except TG which is 197.  We reviewed continued efforts at decreasing simple carbs and sweets.     Continued follow up with device clinic.       Follow up:  RN visit to check BP and BMP in 1 month.   1 year with new cardiologist and labs.         Thank you for allowing me to participate in the care of your patient.    Sincerely,     Divya Reeder PA-C     [facility]

## 2019-04-17 NOTE — PATIENT INSTRUCTIONS
Stop the atenolol 50 mg pill.   Start losartan 25 mg daily.   See my nurse in 1 month to check blood pressure and have a lab.   Pending these results, we may adjust the losartan or switch your combo pill to a different medication.     In 1 year we will have you see a new cardiologist.

## 2019-04-17 NOTE — LETTER
2019    Karen Navarro MD  [address]  [facility] [address]    RE: Malika Velasco       Dear Colleague,    I had the pleasure of seeing Malika Velasco in the [facility].    CARDIOLOGY CLINIC PROGRESS NOTE    DOS: 2019      Malika Velasco  : 1940, 78 year old  MRN: [id number]      History:   Ms. Velasco is a pleasant 78-year-old woman who I am meeting today for annual follow up.  She had been a patient of Dr. Yung.     Malika has a history of high-grade AV block with syncope in  and a permanent pacemaker implanted at [facility] with subsequent generator replacement in  as well as , here.  She has hypertension, elevated fasting blood sugar, dyslipidemia, known minimal soft plaque in the mid LAD seen on CT coronary angiography in , TRAVIS on CPAP.     She had an echocardiogram showing aortic sclerosis with preserved LV systolic function in .    She had a negative nuclear stress test at that time as well in the setting of dyspnea on exertion which has improved after generator replacement, ultimately.     She was last seen by Dr. Yung 4/10/18.  At that time, she ha some mild BEASLEY.  An echocardiogram was performed 18 that showed normal LV systolic function, grade 1 diastolic dysfunction, no RWMAs, mild AI.      Interval History:   She presents today for annual follow up.   At this time, Ms. Velasco has no cardiorespiratory complaints of chest pain, shortness of breath, lightheadedness, presyncope, syncope, PND, orthopnea or pedal edema.      Blood pressure is 129/79 today.    She is on atenolol 50 mg and atenolol 100 mg-chlorthalidone 25 mg.  The latter is more expensive for her.  She denies any prior issues with edema.  Her Na was 132 yesterday. She just filled a 3 month supply of the combo pill.     FLP 19: , HDL 50, LDL 43, .      She had a device interrogation today, but the  results are not yet available to me.         ROS:  Skin:  Negative     Eyes:  Positive for glasses  ENT:  Positive for tinnitus;hearing loss  Respiratory:  Positive for sleep apnea;CPAP  Cardiovascular:  Negative    Gastroenterology: Positive for reflux  Genitourinary:  Positive for urinary frequency  Musculoskeletal:  Positive for arthritis;joint pain  Neurologic:  Positive for headaches  Psychiatric:  Negative    Heme/Lymph/Imm:  Negative    Endocrine:  Negative      PAST MEDICAL HISTORY:  Past Medical History:   Diagnosis Date     Allergic rhinitis      Anxiety      Aortic valve sclerosis      Back pain      GERD (gastroesophageal reflux disease)      Heart block AV complete (H) 2008    pacemaker     Hyperlipidemia      Hypertension      Major depression      TRAVIS (obstructive sleep apnea)     moderate     Osteoarthritis      Squamous cell carcinoma, face        PAST SURGICAL HISTORY:  Past Surgical History:   Procedure Laterality Date     ARTHROSCOPY KNEE Left      AS TOTAL KNEE ARTHROPLASTY Left 05/2015     IMPLANT PACEMAKER  1997     TONSILLECTOMY       TUBAL LIGATION         SOCIAL HISTORY:  Social History     Socioeconomic History     Marital status:      Spouse name: Not on file     Number of children: Not on file     Years of education: Not on file     Highest education level: Not on file   Occupational History     Not on file   Social Needs     Financial resource strain: Not on file     Food insecurity:     Worry: Not on file     Inability: Not on file     Transportation needs:     Medical: Not on file     Non-medical: Not on file   Tobacco Use     Smoking status: Former Smoker     Smokeless tobacco: Never Used   Substance and Sexual Activity     Alcohol use: Yes     Comment: rarely     Drug use: No     Sexual activity: Never   Lifestyle     Physical activity:     Days per week: Not on file     Minutes per session: Not on file     Stress: Not on file   Relationships     Social connections:     Talks  "on phone: Not on file     Gets together: Not on file     Attends Mandaeism service: Not on file     Active member of club or organization: Not on file     Attends meetings of clubs or organizations: Not on file     Relationship status: Not on file     Intimate partner violence:     Fear of current or ex partner: Not on file     Emotionally abused: Not on file     Physically abused: Not on file     Forced sexual activity: Not on file   Other Topics Concern     Parent/sibling w/ CABG, MI or angioplasty before 65F 55M? Not Asked   Social History Narrative     Not on file       FAMILY HISTORY:  Family History   Problem Relation Age of Onset     Heart Disease Mother      Dementia Mother      Heart Disease Father        MEDS:   Current Outpatient Medications on File Prior to Visit:  alendronate (FOSAMAX) 70 MG tablet Take 1 tablet (70 mg) by mouth every 7 days   atenolol-chlorthalidone (TENORETIC) 100-25 MG tablet Take 1 tablet by mouth daily   atorvastatin (LIPITOR) 10 MG tablet Take 1 tablet (10 mg) by mouth daily   Calcium Carb-Cholecalciferol (CALCIUM 600/VITAMIN D3) 600-800 MG-UNIT TABS    FEXOFENADINE HCL PO Take 180 mg by mouth daily   fish oil-omega-3 fatty acids 1000 MG capsule Take 2 g by mouth daily   fluticasone (FLONASE) 50 MCG/ACT spray Spray 1 spray into both nostrils daily   MELATONIN PO Take 5 mg by mouth At Bedtime   multivitamin, therapeutic with minerals (MULTI-VITAMIN) TABS tablet Take 1 tablet by mouth daily   tolterodine ER (DETROL LA) 4 MG 24 hr capsule Take 1 capsule (4 mg) by mouth daily     No current facility-administered medications on file prior to visit.     ALLERGIES:   Allergies   Allergen Reactions     Zantac [Ranitidine]      Headache       PHYSICAL EXAM:  Vitals: /79 (BP Location: Right arm, Patient Position: Sitting, Cuff Size: Adult Regular)   Pulse 66   Ht 1.575 m (5' 2\")   Wt 80.3 kg (177 lb)   SpO2 97%   BMI 32.37 kg/m     Constitutional:  cooperative, alert and " oriented, well developed, well nourished, in no acute distress        Skin:  warm and dry to the touch, no apparent skin lesions or masses noted        Head:  normocephalic, no masses or lesions        Eyes:  pupils equal and round, conjunctivae and lids unremarkable, sclera white, no xanthalasma, EOMS intact, no nystagmus        ENT:  no pallor or cyanosis, dentition good        Neck:  JVP normal;no carotid bruit        Respiratory:  normal breath sounds, clear to auscultation, normal A-P diameter, normal symmetry, normal respiratory excursion, no use of accessory muscles        Cardiac: regular rhythm;normal S1 and S2;no S3 or S4       early systolic murmur;systolic ejection murmur;grade 1;RUSB          GI:  abdomen soft;BS normoactive;non-tender obese      Vascular: pulses full and equal                                      Extremities and Musculoskeletal:  no edema;no deformities, clubbing, cyanosis, erythema observed        Neurological:  no gross motor deficits;affect appropriate            LABS/DATA:  I reviewed the following:  Component      Latest Ref Rng & Units 4/16/2019   Sodium      133 - 144 mmol/L 132 (L)   Potassium      3.4 - 5.3 mmol/L 3.8   Chloride      94 - 109 mmol/L 96   Carbon Dioxide      20 - 32 mmol/L 29   Anion Gap      3 - 14 mmol/L 7   Glucose      70 - 99 mg/dL 101 (H)   Urea Nitrogen      7 - 30 mg/dL 15   Creatinine      0.52 - 1.04 mg/dL 0.63   GFR Estimate      >60 mL/min/1.73:m2 87   GFR Estimate If Black      >60 mL/min/1.73:m2 >90   Calcium      8.5 - 10.1 mg/dL 9.3   Cholesterol      <200 mg/dL 132   Triglycerides      <150 mg/dL 197 (H)   HDL Cholesterol      >49 mg/dL 50   LDL Cholesterol Calculated      <100 mg/dL 43   Non HDL Cholesterol      <130 mg/dL 82       Echo 4/18/18:  Interpretation Summary     Left ventricular systolic function is normal.  Grade I or early diastolic dysfunction.  No regional wall motion abnormalities noted.  There is mild (1+) aortic  regurgitation.  The study was technically difficult.      ASSESSMENT/PLAN:  A pleasant 78-year-old lady with mild soft plaque in the middle of the LAD, pacemaker in place for AV node dysfunction, HTN, dyslipidemia, TRAVIS on CPAP, elevated fasting blood sugar.    She is feeling well without cardiorespiratory complaints.     BP is controlled, but she is on 150 mg of atenolol, and she notes the combo pill is a bit expensive.  She did just fill a 3 month supply, though. Recent Na was 132 on chlorthalidone.  She has not had issues with edema.   - I thought about stopping everything and putting her on Coreg and hydrochlorothiazide (she says her pharmacist told her hydrochlorothiazide is cheaper than chlorthalidone).   But she does not want to waste the combo pill she just filled.   So for now, stop atenolol 50 mg.   Continue atenolol 100 mg-chlorthalidone 25 mg.   Start losartan 25 mg.   See RN in 1 month for BP check and BMP.   We may stop the combo pill pending BMP.  Then I would either titrate the ARB or change the BB to Coreg.       FLP is controlled, except TG which is 197.  We reviewed continued efforts at decreasing simple carbs and sweets.     Continued follow up with device clinic.       Follow up:  RN visit to check BP and BMP in 1 month.   1 year with new cardiologist and labs.       Divya Reeder PA-C    Thank you for allowing me to participate in the care of your patient.      Sincerely,     Divya Reeder PA-C     [facility]    cc:   Elicia Yung MD  [address] [facility] [address]  [place] [address]

## 2019-04-17 NOTE — PROGRESS NOTES
CARDIOLOGY CLINIC PROGRESS NOTE    DOS: 2019      Malika Velasco  : 1940, 78 year old  MRN: [id number]      History:   Ms. Velasco is a pleasant 78-year-old woman who I am meeting today for annual follow up.  She had been a patient of Dr. Yung.     Malika has a history of high-grade AV block with syncope in  and a permanent pacemaker implanted at [facility] with subsequent generator replacement in  as well as , here.  She has hypertension, elevated fasting blood sugar, dyslipidemia, known minimal soft plaque in the mid LAD seen on CT coronary angiography in , TRAVIS on CPAP.     She had an echocardiogram showing aortic sclerosis with preserved LV systolic function in .    She had a negative nuclear stress test at that time as well in the setting of dyspnea on exertion which has improved after generator replacement, ultimately.     She was last seen by Dr. Yung 4/10/18.  At that time, she ha some mild BEASLEY.  An echocardiogram was performed 18 that showed normal LV systolic function, grade 1 diastolic dysfunction, no RWMAs, mild AI.      Interval History:   She presents today for annual follow up.   At this time, Ms. Velasco has no cardiorespiratory complaints of chest pain, shortness of breath, lightheadedness, presyncope, syncope, PND, orthopnea or pedal edema.      Blood pressure is 129/79 today.    She is on atenolol 50 mg and atenolol 100 mg-chlorthalidone 25 mg.  The latter is more expensive for her.  She denies any prior issues with edema.  Her Na was 132 yesterday. She just filled a 3 month supply of the combo pill.     FLP 19: , HDL 50, LDL 43, .      She had a device interrogation today, but the results are not yet available to me.         ROS:  Skin:  Negative     Eyes:  Positive for glasses  ENT:  Positive for tinnitus;hearing loss  Respiratory:  Positive for sleep apnea;CPAP  Cardiovascular:  Negative     Gastroenterology: Positive for reflux  Genitourinary:  Positive for urinary frequency  Musculoskeletal:  Positive for arthritis;joint pain  Neurologic:  Positive for headaches  Psychiatric:  Negative    Heme/Lymph/Imm:  Negative    Endocrine:  Negative      PAST MEDICAL HISTORY:  Past Medical History:   Diagnosis Date     Allergic rhinitis      Anxiety      Aortic valve sclerosis      Back pain      GERD (gastroesophageal reflux disease)      Heart block AV complete (H) 2008    pacemaker     Hyperlipidemia      Hypertension      Major depression      TRAVIS (obstructive sleep apnea)     moderate     Osteoarthritis      Squamous cell carcinoma, face        PAST SURGICAL HISTORY:  Past Surgical History:   Procedure Laterality Date     ARTHROSCOPY KNEE Left      AS TOTAL KNEE ARTHROPLASTY Left 05/2015     IMPLANT PACEMAKER  1997     TONSILLECTOMY       TUBAL LIGATION         SOCIAL HISTORY:  Social History     Socioeconomic History     Marital status:      Spouse name: Not on file     Number of children: Not on file     Years of education: Not on file     Highest education level: Not on file   Occupational History     Not on file   Social Needs     Financial resource strain: Not on file     Food insecurity:     Worry: Not on file     Inability: Not on file     Transportation needs:     Medical: Not on file     Non-medical: Not on file   Tobacco Use     Smoking status: Former Smoker     Smokeless tobacco: Never Used   Substance and Sexual Activity     Alcohol use: Yes     Comment: rarely     Drug use: No     Sexual activity: Never   Lifestyle     Physical activity:     Days per week: Not on file     Minutes per session: Not on file     Stress: Not on file   Relationships     Social connections:     Talks on phone: Not on file     Gets together: Not on file     Attends Hinduism service: Not on file     Active member of club or organization: Not on file     Attends meetings of clubs or organizations: Not on file     " Relationship status: Not on file     Intimate partner violence:     Fear of current or ex partner: Not on file     Emotionally abused: Not on file     Physically abused: Not on file     Forced sexual activity: Not on file   Other Topics Concern     Parent/sibling w/ CABG, MI or angioplasty before 65F 55M? Not Asked   Social History Narrative     Not on file       FAMILY HISTORY:  Family History   Problem Relation Age of Onset     Heart Disease Mother      Dementia Mother      Heart Disease Father        MEDS:   Current Outpatient Medications on File Prior to Visit:  alendronate (FOSAMAX) 70 MG tablet Take 1 tablet (70 mg) by mouth every 7 days   atenolol-chlorthalidone (TENORETIC) 100-25 MG tablet Take 1 tablet by mouth daily   atorvastatin (LIPITOR) 10 MG tablet Take 1 tablet (10 mg) by mouth daily   Calcium Carb-Cholecalciferol (CALCIUM 600/VITAMIN D3) 600-800 MG-UNIT TABS    FEXOFENADINE HCL PO Take 180 mg by mouth daily   fish oil-omega-3 fatty acids 1000 MG capsule Take 2 g by mouth daily   fluticasone (FLONASE) 50 MCG/ACT spray Spray 1 spray into both nostrils daily   MELATONIN PO Take 5 mg by mouth At Bedtime   multivitamin, therapeutic with minerals (MULTI-VITAMIN) TABS tablet Take 1 tablet by mouth daily   tolterodine ER (DETROL LA) 4 MG 24 hr capsule Take 1 capsule (4 mg) by mouth daily     No current facility-administered medications on file prior to visit.     ALLERGIES:   Allergies   Allergen Reactions     Zantac [Ranitidine]      Headache       PHYSICAL EXAM:  Vitals: /79 (BP Location: Right arm, Patient Position: Sitting, Cuff Size: Adult Regular)   Pulse 66   Ht 1.575 m (5' 2\")   Wt 80.3 kg (177 lb)   SpO2 97%   BMI 32.37 kg/m    Constitutional:  cooperative, alert and oriented, well developed, well nourished, in no acute distress        Skin:  warm and dry to the touch, no apparent skin lesions or masses noted        Head:  normocephalic, no masses or lesions        Eyes:  pupils equal " and round, conjunctivae and lids unremarkable, sclera white, no xanthalasma, EOMS intact, no nystagmus        ENT:  no pallor or cyanosis, dentition good        Neck:  JVP normal;no carotid bruit        Respiratory:  normal breath sounds, clear to auscultation, normal A-P diameter, normal symmetry, normal respiratory excursion, no use of accessory muscles        Cardiac: regular rhythm;normal S1 and S2;no S3 or S4       early systolic murmur;systolic ejection murmur;grade 1;RUSB          GI:  abdomen soft;BS normoactive;non-tender obese      Vascular: pulses full and equal                                      Extremities and Musculoskeletal:  no edema;no deformities, clubbing, cyanosis, erythema observed        Neurological:  no gross motor deficits;affect appropriate            LABS/DATA:  I reviewed the following:  Component      Latest Ref Rng & Units 4/16/2019   Sodium      133 - 144 mmol/L 132 (L)   Potassium      3.4 - 5.3 mmol/L 3.8   Chloride      94 - 109 mmol/L 96   Carbon Dioxide      20 - 32 mmol/L 29   Anion Gap      3 - 14 mmol/L 7   Glucose      70 - 99 mg/dL 101 (H)   Urea Nitrogen      7 - 30 mg/dL 15   Creatinine      0.52 - 1.04 mg/dL 0.63   GFR Estimate      >60 mL/min/1.73:m2 87   GFR Estimate If Black      >60 mL/min/1.73:m2 >90   Calcium      8.5 - 10.1 mg/dL 9.3   Cholesterol      <200 mg/dL 132   Triglycerides      <150 mg/dL 197 (H)   HDL Cholesterol      >49 mg/dL 50   LDL Cholesterol Calculated      <100 mg/dL 43   Non HDL Cholesterol      <130 mg/dL 82       Echo 4/18/18:  Interpretation Summary     Left ventricular systolic function is normal.  Grade I or early diastolic dysfunction.  No regional wall motion abnormalities noted.  There is mild (1+) aortic regurgitation.  The study was technically difficult.      ASSESSMENT/PLAN:  A pleasant 78-year-old lady with mild soft plaque in the middle of the LAD, pacemaker in place for AV node dysfunction, HTN, dyslipidemia, TRAVIS on CPAP,  elevated fasting blood sugar.    She is feeling well without cardiorespiratory complaints.     BP is controlled, but she is on 150 mg of atenolol, and she notes the combo pill is a bit expensive.  She did just fill a 3 month supply, though. Recent Na was 132 on chlorthalidone.  She has not had issues with edema.   - I thought about stopping everything and putting her on Coreg and hydrochlorothiazide (she says her pharmacist told her hydrochlorothiazide is cheaper than chlorthalidone).   But she does not want to waste the combo pill she just filled.   So for now, stop atenolol 50 mg.   Continue atenolol 100 mg-chlorthalidone 25 mg.   Start losartan 25 mg.   See RN in 1 month for BP check and BMP.   We may stop the combo pill pending BMP.  Then I would either titrate the ARB or change the BB to Coreg.       FLP is controlled, except TG which is 197.  We reviewed continued efforts at decreasing simple carbs and sweets.     Continued follow up with device clinic.       Follow up:  RN visit to check BP and BMP in 1 month.   1 year with new cardiologist and labs.       Divya Reeder PA-C

## 2019-04-25 ENCOUNTER — ANCILLARY PROCEDURE (OUTPATIENT)
Dept: BONE DENSITY | Facility: CLINIC | Age: 79
End: 2019-04-25
Attending: INTERNAL MEDICINE
Payer: COMMERCIAL

## 2019-04-25 DIAGNOSIS — M85.80 OSTEOPENIA, UNSPECIFIED LOCATION: ICD-10-CM

## 2019-04-25 DIAGNOSIS — Z78.0 ASYMPTOMATIC POSTMENOPAUSAL STATUS: ICD-10-CM

## 2019-04-25 PROCEDURE — 77085 DXA BONE DENSITY AXL VRT FX: CPT | Performed by: INTERNAL MEDICINE

## 2019-04-26 LAB
MDC_IDC_LEAD_IMPLANT_DT: NORMAL
MDC_IDC_LEAD_IMPLANT_DT: NORMAL
MDC_IDC_LEAD_LOCATION: NORMAL
MDC_IDC_LEAD_LOCATION: NORMAL
MDC_IDC_LEAD_LOCATION_DETAIL_1: NORMAL
MDC_IDC_LEAD_LOCATION_DETAIL_1: NORMAL
MDC_IDC_LEAD_MFG: NORMAL
MDC_IDC_LEAD_MFG: NORMAL
MDC_IDC_LEAD_MODEL: NORMAL
MDC_IDC_LEAD_MODEL: NORMAL
MDC_IDC_LEAD_POLARITY_TYPE: NORMAL
MDC_IDC_LEAD_POLARITY_TYPE: NORMAL
MDC_IDC_LEAD_SERIAL: NORMAL
MDC_IDC_LEAD_SERIAL: NORMAL
MDC_IDC_MSMT_BATTERY_DTM: NORMAL
MDC_IDC_MSMT_BATTERY_IMPEDANCE: 360 OHM
MDC_IDC_MSMT_BATTERY_REMAINING_LONGEVITY: 84 MO
MDC_IDC_MSMT_BATTERY_STATUS: NORMAL
MDC_IDC_MSMT_BATTERY_VOLTAGE: 2.78 V
MDC_IDC_MSMT_LEADCHNL_RA_IMPEDANCE_VALUE: 671 OHM
MDC_IDC_MSMT_LEADCHNL_RA_PACING_THRESHOLD_AMPLITUDE: 1 V
MDC_IDC_MSMT_LEADCHNL_RA_PACING_THRESHOLD_AMPLITUDE: 1.75 V
MDC_IDC_MSMT_LEADCHNL_RA_PACING_THRESHOLD_PULSEWIDTH: 0.4 MS
MDC_IDC_MSMT_LEADCHNL_RA_PACING_THRESHOLD_PULSEWIDTH: 0.4 MS
MDC_IDC_MSMT_LEADCHNL_RA_SENSING_INTR_AMPL: 2.8 MV
MDC_IDC_MSMT_LEADCHNL_RV_IMPEDANCE_VALUE: 644 OHM
MDC_IDC_MSMT_LEADCHNL_RV_PACING_THRESHOLD_AMPLITUDE: 1.25 V
MDC_IDC_MSMT_LEADCHNL_RV_PACING_THRESHOLD_AMPLITUDE: 1.25 V
MDC_IDC_MSMT_LEADCHNL_RV_PACING_THRESHOLD_PULSEWIDTH: 0.4 MS
MDC_IDC_MSMT_LEADCHNL_RV_PACING_THRESHOLD_PULSEWIDTH: 0.4 MS
MDC_IDC_PG_IMPLANT_DTM: NORMAL
MDC_IDC_PG_MFG: NORMAL
MDC_IDC_PG_MODEL: NORMAL
MDC_IDC_PG_SERIAL: NORMAL
MDC_IDC_PG_TYPE: NORMAL
MDC_IDC_SESS_CLINIC_NAME: NORMAL
MDC_IDC_SESS_DTM: NORMAL
MDC_IDC_SESS_TYPE: NORMAL
MDC_IDC_SET_BRADY_AT_MODE_SWITCH_MODE: NORMAL
MDC_IDC_SET_BRADY_AT_MODE_SWITCH_RATE: 175 {BEATS}/MIN
MDC_IDC_SET_BRADY_LOWRATE: 60 {BEATS}/MIN
MDC_IDC_SET_BRADY_MAX_SENSOR_RATE: 130 {BEATS}/MIN
MDC_IDC_SET_BRADY_MAX_TRACKING_RATE: 130 {BEATS}/MIN
MDC_IDC_SET_BRADY_MODE: NORMAL
MDC_IDC_SET_BRADY_PAV_DELAY_LOW: 200 MS
MDC_IDC_SET_BRADY_SAV_DELAY_LOW: 180 MS
MDC_IDC_SET_LEADCHNL_RA_PACING_AMPLITUDE: 2.75 V
MDC_IDC_SET_LEADCHNL_RA_PACING_ANODE_ELECTRODE_1: NORMAL
MDC_IDC_SET_LEADCHNL_RA_PACING_ANODE_LOCATION_1: NORMAL
MDC_IDC_SET_LEADCHNL_RA_PACING_CAPTURE_MODE: NORMAL
MDC_IDC_SET_LEADCHNL_RA_PACING_CATHODE_ELECTRODE_1: NORMAL
MDC_IDC_SET_LEADCHNL_RA_PACING_CATHODE_LOCATION_1: NORMAL
MDC_IDC_SET_LEADCHNL_RA_PACING_POLARITY: NORMAL
MDC_IDC_SET_LEADCHNL_RA_PACING_PULSEWIDTH: 0.4 MS
MDC_IDC_SET_LEADCHNL_RA_SENSING_ANODE_ELECTRODE_1: NORMAL
MDC_IDC_SET_LEADCHNL_RA_SENSING_ANODE_LOCATION_1: NORMAL
MDC_IDC_SET_LEADCHNL_RA_SENSING_CATHODE_ELECTRODE_1: NORMAL
MDC_IDC_SET_LEADCHNL_RA_SENSING_CATHODE_LOCATION_1: NORMAL
MDC_IDC_SET_LEADCHNL_RA_SENSING_POLARITY: NORMAL
MDC_IDC_SET_LEADCHNL_RA_SENSING_SENSITIVITY: 1 MV
MDC_IDC_SET_LEADCHNL_RV_PACING_AMPLITUDE: 2.5 V
MDC_IDC_SET_LEADCHNL_RV_PACING_ANODE_ELECTRODE_1: NORMAL
MDC_IDC_SET_LEADCHNL_RV_PACING_ANODE_LOCATION_1: NORMAL
MDC_IDC_SET_LEADCHNL_RV_PACING_CAPTURE_MODE: NORMAL
MDC_IDC_SET_LEADCHNL_RV_PACING_CATHODE_ELECTRODE_1: NORMAL
MDC_IDC_SET_LEADCHNL_RV_PACING_CATHODE_LOCATION_1: NORMAL
MDC_IDC_SET_LEADCHNL_RV_PACING_POLARITY: NORMAL
MDC_IDC_SET_LEADCHNL_RV_PACING_PULSEWIDTH: 0.4 MS
MDC_IDC_SET_LEADCHNL_RV_SENSING_ANODE_ELECTRODE_1: NORMAL
MDC_IDC_SET_LEADCHNL_RV_SENSING_ANODE_LOCATION_1: NORMAL
MDC_IDC_SET_LEADCHNL_RV_SENSING_CATHODE_ELECTRODE_1: NORMAL
MDC_IDC_SET_LEADCHNL_RV_SENSING_CATHODE_LOCATION_1: NORMAL
MDC_IDC_SET_LEADCHNL_RV_SENSING_POLARITY: NORMAL
MDC_IDC_SET_LEADCHNL_RV_SENSING_SENSITIVITY: 2.8 MV
MDC_IDC_SET_ZONE_DETECTION_INTERVAL: 333.33 MS
MDC_IDC_SET_ZONE_DETECTION_INTERVAL: 342.86 MS
MDC_IDC_SET_ZONE_TYPE: NORMAL
MDC_IDC_SET_ZONE_TYPE: NORMAL
MDC_IDC_STAT_AT_BURDEN_PERCENT: 0 %
MDC_IDC_STAT_AT_DTM_END: NORMAL
MDC_IDC_STAT_AT_DTM_START: NORMAL
MDC_IDC_STAT_AT_MODE_SW_COUNT: 6
MDC_IDC_STAT_BRADY_AP_VP_PERCENT: 96 %
MDC_IDC_STAT_BRADY_AP_VS_PERCENT: 0 %
MDC_IDC_STAT_BRADY_AS_VP_PERCENT: 4 %
MDC_IDC_STAT_BRADY_AS_VS_PERCENT: 0 %
MDC_IDC_STAT_BRADY_DTM_END: NORMAL
MDC_IDC_STAT_BRADY_DTM_START: NORMAL
MDC_IDC_STAT_EPISODE_RECENT_COUNT: 0
MDC_IDC_STAT_EPISODE_RECENT_COUNT: 0
MDC_IDC_STAT_EPISODE_RECENT_COUNT_DTM_END: NORMAL
MDC_IDC_STAT_EPISODE_RECENT_COUNT_DTM_END: NORMAL
MDC_IDC_STAT_EPISODE_RECENT_COUNT_DTM_START: NORMAL
MDC_IDC_STAT_EPISODE_RECENT_COUNT_DTM_START: NORMAL
MDC_IDC_STAT_EPISODE_TYPE: NORMAL
MDC_IDC_STAT_EPISODE_TYPE: NORMAL

## 2019-05-15 ENCOUNTER — CARE COORDINATION (OUTPATIENT)
Dept: CARDIOLOGY | Facility: CLINIC | Age: 79
End: 2019-05-15

## 2019-05-15 ENCOUNTER — ALLIED HEALTH/NURSE VISIT (OUTPATIENT)
Dept: CARDIOLOGY | Facility: CLINIC | Age: 79
End: 2019-05-15
Attending: PHYSICIAN ASSISTANT
Payer: COMMERCIAL

## 2019-05-15 VITALS — DIASTOLIC BLOOD PRESSURE: 78 MMHG | HEART RATE: 66 BPM | SYSTOLIC BLOOD PRESSURE: 140 MMHG

## 2019-05-15 DIAGNOSIS — I10 BENIGN ESSENTIAL HYPERTENSION: Primary | ICD-10-CM

## 2019-05-15 DIAGNOSIS — I10 ESSENTIAL HYPERTENSION: ICD-10-CM

## 2019-05-15 DIAGNOSIS — R06.09 DYSPNEA ON EXERTION: ICD-10-CM

## 2019-05-15 LAB
ANION GAP SERPL CALCULATED.3IONS-SCNC: 7 MMOL/L (ref 3–14)
BUN SERPL-MCNC: 18 MG/DL (ref 7–30)
CALCIUM SERPL-MCNC: 9 MG/DL (ref 8.5–10.1)
CHLORIDE SERPL-SCNC: 98 MMOL/L (ref 94–109)
CO2 SERPL-SCNC: 26 MMOL/L (ref 20–32)
CREAT SERPL-MCNC: 0.54 MG/DL (ref 0.52–1.04)
GFR SERPL CREATININE-BSD FRML MDRD: >90 ML/MIN/{1.73_M2}
GLUCOSE SERPL-MCNC: 105 MG/DL (ref 70–99)
POTASSIUM SERPL-SCNC: 3.7 MMOL/L (ref 3.4–5.3)
SODIUM SERPL-SCNC: 130 MMOL/L (ref 133–144)

## 2019-05-15 PROCEDURE — 36415 COLL VENOUS BLD VENIPUNCTURE: CPT | Performed by: PHYSICIAN ASSISTANT

## 2019-05-15 PROCEDURE — 80048 BASIC METABOLIC PNL TOTAL CA: CPT | Performed by: PHYSICIAN ASSISTANT

## 2019-05-15 NOTE — PROGRESS NOTES
Pt in clinic for BP check and BMP per Divya Reeder PA-C.     Last OV 4/17/19 w/Divya Reeder PA-C, pt asymptomatic. BP was 129/79, Na 132. Pt was on atenolol 50 mg and atenolol 100-chlorthalidone 25 mg (combo pill). Noted combo pill expensive but just got a 90 day supply. Divya advised to stop atenolol 50 mg, continue combo pill, start losartan 25 mg daily. BP check and BMP w/ RN x1-2 wks.       Today pt in clinic, took her atenolol 100-chlorthalidone 25 mg tablet today at 7 AM, takes losartan 25 mg in evening around 5-6 PM. Pt has been having sinus/allergy symptoms but is also having headaches. Pt states headaches started about 1-2 weeks after starting losartan. Has some LH/dizziness when standing up that resolves shortly after - she did not have this prior to starting losartan. Pt takes BPs at home but does not keep a log. Today 1 hour after taking combo pill, her BP was around 135/80.   Manual BP at 1013 AM: 144/82  Manual BP on recheck at 1016 AM: 140/78  HR: 66  Currently asymptomatic.     BMP results below compared to prev:  Component      Latest Ref Rng & Units 4/16/2019 5/15/2019   Sodium      133 - 144 mmol/L 132 (L) 130 (L)   Potassium      3.4 - 5.3 mmol/L 3.8 3.7   Chloride      94 - 109 mmol/L 96 98   Carbon Dioxide      20 - 32 mmol/L 29 26   Anion Gap      3 - 14 mmol/L 7 7   Glucose      70 - 99 mg/dL 101 (H) 105 (H)   Urea Nitrogen      7 - 30 mg/dL 15 18   Creatinine      0.52 - 1.04 mg/dL 0.63 0.54   GFR Estimate      >60 mL/min/1.73:m2 87 >90   GFR Estimate If Black      >60 mL/min/1.73:m2 >90 >90   Calcium      8.5 - 10.1 mg/dL 9.3 9.0       Primary cardiologist - Dr. Yung. Orders in Epic for follow-up and labs in 4/2020 - will need to establish new cardiologist.   Routing to ordering provider: Divya Johnson RN

## 2019-05-15 NOTE — PROGRESS NOTES
Let's try to hold the meds she has (combo pill and losartan)  Start carvedilol 6.25 mg BID and chlorthalidone 12.5 mg (lower dose - she declined issues with edema, and as a single pill).     One month supply  See me in 3 weeks with BP check and BMP  Divya Reeder PA-C 5/15/2019 1:53 PM

## 2019-05-15 NOTE — NURSING NOTE
Pt in clinic for BP check and BMP per Divya Reeder PA-C.   See care coordination for details.   Alex MARTÍNEZ 140

## 2019-05-16 RX ORDER — CHLORTHALIDONE 25 MG/1
12.5 TABLET ORAL DAILY
Qty: 30 TABLET | Refills: 0 | Status: SHIPPED | OUTPATIENT
Start: 2019-05-16 | End: 2019-07-12

## 2019-05-16 RX ORDER — CARVEDILOL 6.25 MG/1
6.25 TABLET ORAL 2 TIMES DAILY WITH MEALS
Qty: 120 TABLET | Refills: 0 | Status: SHIPPED | OUTPATIENT
Start: 2019-05-16 | End: 2019-07-12

## 2019-05-16 NOTE — PROGRESS NOTES
Called pt, reviewed Divya Reeder PA-C recommendations. Pt understood. Sent Rx to  pharmacy.   Pt will callback to schedule BP check and BMP. Order entered.     UPDATE - pt called back, scheduled for BP check and BMP on 6/6/19. Pt will call back sooner if any question/concerns/new sxs.   Alex MARTÍNEZ

## 2019-05-24 ENCOUNTER — OFFICE VISIT (OUTPATIENT)
Dept: URGENT CARE | Facility: URGENT CARE | Age: 79
End: 2019-05-24
Payer: COMMERCIAL

## 2019-05-24 VITALS
SYSTOLIC BLOOD PRESSURE: 143 MMHG | BODY MASS INDEX: 32.45 KG/M2 | DIASTOLIC BLOOD PRESSURE: 80 MMHG | OXYGEN SATURATION: 98 % | TEMPERATURE: 98.3 F | WEIGHT: 177.4 LBS | HEART RATE: 75 BPM

## 2019-05-24 DIAGNOSIS — J30.2 SEASONAL ALLERGIC RHINITIS, UNSPECIFIED TRIGGER: Primary | ICD-10-CM

## 2019-05-24 PROCEDURE — 99213 OFFICE O/P EST LOW 20 MIN: CPT | Performed by: FAMILY MEDICINE

## 2019-05-24 RX ORDER — CETIRIZINE HYDROCHLORIDE 10 MG/1
10 TABLET ORAL DAILY
Qty: 30 TABLET | Refills: 3 | Status: SHIPPED | OUTPATIENT
Start: 2019-05-24

## 2019-05-24 NOTE — PROGRESS NOTES
SUBJECTIVE:   Malika Velasco is a 78 year old female with a history of allergic rhinitis.  She is presenting with a chief complaint of sneezing, watery eyes (but no itchy eyes).  , nasal congestion, headache (at the forehead, with a tightness/pressure), body aches, unsettled stomach sensation  Onset of symptoms was two weeks ago.  Course of illness is about the same. .    Current and Associated symptoms: as listed above.    Treatment measures tried include Fexofenadine and Flonase (started a few days ago).  . .  Predisposing factors include history of allergic rhinitis. .    Past Medical History:   Diagnosis Date     Allergic rhinitis      Anxiety      Aortic valve sclerosis      Back pain      GERD (gastroesophageal reflux disease)      Heart block AV complete (H) 2008    pacemaker     Hyperlipidemia      Hypertension      Major depression      TRAVIS (obstructive sleep apnea)     moderate     Osteoarthritis      Squamous cell carcinoma, face      Current Outpatient Medications   Medication Sig Dispense Refill     alendronate (FOSAMAX) 70 MG tablet Take 1 tablet (70 mg) by mouth every 7 days 12 tablet 3     atorvastatin (LIPITOR) 10 MG tablet Take 1 tablet (10 mg) by mouth daily 90 tablet 3     Calcium Carb-Cholecalciferol (CALCIUM 600/VITAMIN D3) 600-800 MG-UNIT TABS        carvedilol (COREG) 6.25 MG tablet Take 1 tablet (6.25 mg) by mouth 2 times daily (with meals) 120 tablet 0     chlorthalidone (HYGROTON) 25 MG tablet Take 0.5 tablets (12.5 mg) by mouth daily 30 tablet 0     FEXOFENADINE HCL PO Take 180 mg by mouth daily       fish oil-omega-3 fatty acids 1000 MG capsule Take 2 g by mouth daily       fluticasone (FLONASE) 50 MCG/ACT spray Spray 1 spray into both nostrils daily       MELATONIN PO Take 5 mg by mouth At Bedtime       multivitamin, therapeutic with minerals (MULTI-VITAMIN) TABS tablet Take 1 tablet by mouth daily       tolterodine ER (DETROL LA) 4 MG 24 hr capsule Take 1 capsule (4 mg) by mouth  daily 30 capsule 3     Social History     Tobacco Use     Smoking status: Former Smoker     Smokeless tobacco: Never Used   Substance Use Topics     Alcohol use: Yes     Comment: rarely       ROS:  CONSTITUTIONAL:NEGATIVE  for fevers.  INTEGUMENTARY/SKIN: negative for rashes.    EYES: positive for watery eyes.    ENT/MOUTH: positive for nasal congestion.   RESP:positive for sneezing.      OBJECTIVE:  /80 (BP Location: Right arm, Patient Position: Sitting, Cuff Size: Adult Regular)   Pulse 75   Temp 98.3  F (36.8  C) (Oral)   Wt 80.5 kg (177 lb 6.4 oz)   SpO2 98%   BMI 32.45 kg/m    GENERAL APPEARANCE: healthy, alert and no distress  EYES: Eyes grossly normal to inspection  HENT: TM's normal bilaterally, nasal turbinates erythematous, swollen and oral mucous membranes moist, no erythema noted  NECK: supple, nontender, no lymphadenopathy  RESP: lungs clear to auscultation - no rales, rhonchi or wheezes  CV: regular rates and rhythm, normal S1 S2, no murmur noted  SKIN: no suspicious lesions or rashes    ASSESSMENT:  Allergic Rhinitis    PLAN:  Stop the Fexofenadine.  Start Rx:  Zyrtec  Continue the Flonase nasal spray.   Eat bland, soft, mushy foods for now to help with the unsettled sensation in the stomach.  .    follow up with the primary care provider if not better in 10 days.     Andi Chiu MD

## 2019-05-24 NOTE — PATIENT INSTRUCTIONS
Malika to follow up with Primary Care provider regarding elevated blood pressure.      Continue the Flonase nasal spray    Eat a bland, soft, mushy diet to help decrease some of the unsettled sensation in the stomach.     Tylenol for the headaches.      follow up with your primary care provider if not better in 10 days.

## 2019-05-31 DIAGNOSIS — M85.80 OSTEOPENIA, UNSPECIFIED LOCATION: ICD-10-CM

## 2019-05-31 RX ORDER — ALENDRONATE SODIUM 70 MG/1
70 TABLET ORAL
Qty: 12 TABLET | Refills: 3 | OUTPATIENT
Start: 2019-05-31

## 2019-05-31 NOTE — TELEPHONE ENCOUNTER
Medication refused, refill requested too soon. Prescription ordered on 4/6/19 for 12 tablets with 3 additional refills.

## 2019-06-06 ENCOUNTER — CARE COORDINATION (OUTPATIENT)
Dept: CARDIOLOGY | Facility: CLINIC | Age: 79
End: 2019-06-06

## 2019-06-06 ENCOUNTER — ALLIED HEALTH/NURSE VISIT (OUTPATIENT)
Dept: CARDIOLOGY | Facility: CLINIC | Age: 79
End: 2019-06-06
Payer: COMMERCIAL

## 2019-06-06 VITALS — SYSTOLIC BLOOD PRESSURE: 120 MMHG | DIASTOLIC BLOOD PRESSURE: 68 MMHG | HEART RATE: 86 BPM

## 2019-06-06 DIAGNOSIS — I10 BENIGN ESSENTIAL HYPERTENSION: Primary | ICD-10-CM

## 2019-06-06 DIAGNOSIS — I10 BENIGN ESSENTIAL HYPERTENSION: ICD-10-CM

## 2019-06-06 DIAGNOSIS — E87.6 HYPOKALEMIA: ICD-10-CM

## 2019-06-06 DIAGNOSIS — R06.09 DYSPNEA ON EXERTION: ICD-10-CM

## 2019-06-06 LAB
ANION GAP SERPL CALCULATED.3IONS-SCNC: 10 MMOL/L (ref 3–14)
BUN SERPL-MCNC: 19 MG/DL (ref 7–30)
CALCIUM SERPL-MCNC: 9.2 MG/DL (ref 8.5–10.1)
CHLORIDE SERPL-SCNC: 101 MMOL/L (ref 94–109)
CO2 SERPL-SCNC: 25 MMOL/L (ref 20–32)
CREAT SERPL-MCNC: 0.64 MG/DL (ref 0.52–1.04)
GFR SERPL CREATININE-BSD FRML MDRD: 86 ML/MIN/{1.73_M2}
GLUCOSE SERPL-MCNC: 155 MG/DL (ref 70–99)
POTASSIUM SERPL-SCNC: 3.2 MMOL/L (ref 3.4–5.3)
SODIUM SERPL-SCNC: 136 MMOL/L (ref 133–144)

## 2019-06-06 PROCEDURE — 36415 COLL VENOUS BLD VENIPUNCTURE: CPT | Performed by: PHYSICIAN ASSISTANT

## 2019-06-06 PROCEDURE — 80048 BASIC METABOLIC PNL TOTAL CA: CPT | Performed by: PHYSICIAN ASSISTANT

## 2019-06-06 PROCEDURE — 99207 ZZC NO CHARGE NURSE ONLY: CPT | Performed by: PHYSICIAN ASSISTANT

## 2019-06-06 NOTE — PROGRESS NOTES
Pt in clinic for nurse visit and BMP per Divya Reeder PA-C.     4/17/19 last OV w/ Divya Reeder PA-C. Pt asymptomatic, /79 Na was 132 on atenolol 50 mg and atenolol/chlorthalidone 25 mg daily. Stopped atenolol 50 mg and started losartan 25 mg daily.     5/15/19 Nurse visit, /82  HR 66, asymptomatic. Anah stopped atenolol/chlorthalidone and losartan. Started carvedilol 6.25 mg BID and chlorthalidone 12.5mg daily. Follow-up in 3 wk w/ BMP.     Today pt took carvedilol 6.25 mg and chlorthalidone 12.5 mg at 7 AM. Pt is asymptomatic.   Manual BP at 1258 PM was 120/68 (right arm, sitting in chair, adult regular size cuff)  HR 84    Pt reports home BP before meds is ~130-140's/70's. BPs after meds in afternoon are 120's/60's.   BMP results below compared to previous:  Component      Latest Ref Rng & Units 4/16/2019 5/15/2019 6/6/2019   Sodium      133 - 144 mmol/L 132 (L) 130 (L) 136   Potassium      3.4 - 5.3 mmol/L 3.8 3.7 3.2 (L)   Chloride      94 - 109 mmol/L 96 98 101   Carbon Dioxide      20 - 32 mmol/L 29 26 25   Anion Gap      3 - 14 mmol/L 7 7 10   Glucose      70 - 99 mg/dL 101 (H) 105 (H) 155 (H)   Urea Nitrogen      7 - 30 mg/dL 15 18 19   Creatinine      0.52 - 1.04 mg/dL 0.63 0.54 0.64   GFR Estimate      >60 mL/min/1.73:m2 87 >90 86   GFR Estimate If Black      >60 mL/min/1.73:m2 >90 >90 >90   Calcium      8.5 - 10.1 mg/dL 9.3 9.0 9.2       Will route to ordering provider Divya Reeder PA-C   Primary cardiologist: Previously Dr. Yung - needs to establish w/ new cardiologist at follow-up ordered for 4/2020.   In-clinic cardiologist: Dr. Monica Johnson RN 06/06/19 3:11 PM

## 2019-06-06 NOTE — NURSING NOTE
Pt in clinic for nurse only visit and BMP per Divya Reeder PA-C.  See care coordination encounter for details.   Ordering provider: Divya Reeder PA-C   Primary cardiologist: previously Dr. Yung - needs to establish w/ new cardiologist 4/2020  In-Clinic cardiologist: Dr. Monica Johnson RN 06/06/19 1:04 PM

## 2019-06-06 NOTE — PROGRESS NOTES
At 5/15 nurse visit, Na was lower at 130, so stopped losartan and lowered chlorthalidone.   Na better on lower dose chorthalidone and off losartan.   But K is a little low.   Add KCl 20 mEq daily.    BMP in 2 weeks.     BP is controlled with med changes.     Divya Reeder PA-C 6/6/2019 5:54 PM

## 2019-06-07 RX ORDER — POTASSIUM CHLORIDE 1500 MG/1
20 TABLET, EXTENDED RELEASE ORAL DAILY
Qty: 30 TABLET | Refills: 3 | Status: SHIPPED | OUTPATIENT
Start: 2019-06-07 | End: 2019-10-03

## 2019-06-07 NOTE — PROGRESS NOTES
Called pt, reviewed Divya Reeder PA-C recommendations. Pt understands, agrees w/ plan.   Sent Rx for KCl 20 mEq daily.   Ordered BMP, scheduled for 6/25/19.   Alex MARTÍNEZ

## 2019-06-25 DIAGNOSIS — I10 BENIGN ESSENTIAL HYPERTENSION: ICD-10-CM

## 2019-06-25 DIAGNOSIS — E87.6 HYPOKALEMIA: ICD-10-CM

## 2019-06-25 LAB
ANION GAP SERPL CALCULATED.3IONS-SCNC: 7 MMOL/L (ref 3–14)
BUN SERPL-MCNC: 24 MG/DL (ref 7–30)
CALCIUM SERPL-MCNC: 8.5 MG/DL (ref 8.5–10.1)
CHLORIDE SERPL-SCNC: 101 MMOL/L (ref 94–109)
CO2 SERPL-SCNC: 24 MMOL/L (ref 20–32)
CREAT SERPL-MCNC: 0.63 MG/DL (ref 0.52–1.04)
GFR SERPL CREATININE-BSD FRML MDRD: 87 ML/MIN/{1.73_M2}
GLUCOSE SERPL-MCNC: 113 MG/DL (ref 70–99)
POTASSIUM SERPL-SCNC: 3.9 MMOL/L (ref 3.4–5.3)
SODIUM SERPL-SCNC: 132 MMOL/L (ref 133–144)

## 2019-06-25 PROCEDURE — 36415 COLL VENOUS BLD VENIPUNCTURE: CPT | Performed by: PHYSICIAN ASSISTANT

## 2019-06-25 PROCEDURE — 80048 BASIC METABOLIC PNL TOTAL CA: CPT | Performed by: PHYSICIAN ASSISTANT

## 2019-06-25 NOTE — PROGRESS NOTES
- 5/15/19 nurse visit Na was 130, stopped losartan and lowered chlorthalidone.   - 6/6 Na better but K a little low. Anasudha started on KCl 20 mEq daily.   - BMP 2 weeks later below compared to previous:    Component      Latest Ref Rng & Units 5/15/2019 6/6/2019 6/25/2019   Sodium      133 - 144 mmol/L 130 (L) 136 132 (L)   Potassium      3.4 - 5.3 mmol/L 3.7 3.2 (L) 3.9   Chloride      94 - 109 mmol/L 98 101 101   Carbon Dioxide      20 - 32 mmol/L 26 25 24   Anion Gap      3 - 14 mmol/L 7 10 7   Glucose      70 - 99 mg/dL 105 (H) 155 (H) 113 (H)   Urea Nitrogen      7 - 30 mg/dL 18 19 24   Creatinine      0.52 - 1.04 mg/dL 0.54 0.64 0.63   GFR Estimate      >60 mL/min/1.73:m2 >90 86 87   GFR Estimate If Black      >60 mL/min/1.73:m2 >90 >90 >90   Calcium      8.5 - 10.1 mg/dL 9.0 9.2 8.5     Routing to Divya Johnson RN 06/25/19 12:48 PM

## 2019-06-26 NOTE — PROGRESS NOTES
Sent discoapi message to pt w/ lab results and Divya Reeder PACaitlinC recommendations. Follow-up ordered for 4/2020.   Alex MARTÍNEZ 06/26/19 3:36 PM

## 2019-07-11 DIAGNOSIS — R06.09 DYSPNEA ON EXERTION: ICD-10-CM

## 2019-07-11 DIAGNOSIS — I10 BENIGN ESSENTIAL HYPERTENSION: ICD-10-CM

## 2019-07-12 RX ORDER — CHLORTHALIDONE 25 MG/1
12.5 TABLET ORAL DAILY
Qty: 30 TABLET | Refills: 0 | Status: SHIPPED | OUTPATIENT
Start: 2019-07-12 | End: 2019-09-13

## 2019-07-12 RX ORDER — CARVEDILOL 6.25 MG/1
6.25 TABLET ORAL 2 TIMES DAILY WITH MEALS
Qty: 120 TABLET | Refills: 0 | Status: SHIPPED | OUTPATIENT
Start: 2019-07-12 | End: 2019-09-13

## 2019-07-23 ENCOUNTER — ANCILLARY PROCEDURE (OUTPATIENT)
Dept: CARDIOLOGY | Facility: CLINIC | Age: 79
End: 2019-07-23
Attending: INTERNAL MEDICINE
Payer: COMMERCIAL

## 2019-07-23 DIAGNOSIS — Z95.0 PACEMAKER: ICD-10-CM

## 2019-07-23 PROCEDURE — 93294 REM INTERROG EVL PM/LDLS PM: CPT | Performed by: INTERNAL MEDICINE

## 2019-07-23 PROCEDURE — 93296 REM INTERROG EVL PM/IDS: CPT | Performed by: INTERNAL MEDICINE

## 2019-07-26 LAB
MDC_IDC_LEAD_IMPLANT_DT: NORMAL
MDC_IDC_LEAD_IMPLANT_DT: NORMAL
MDC_IDC_LEAD_LOCATION: NORMAL
MDC_IDC_LEAD_LOCATION: NORMAL
MDC_IDC_LEAD_LOCATION_DETAIL_1: NORMAL
MDC_IDC_LEAD_LOCATION_DETAIL_1: NORMAL
MDC_IDC_LEAD_MFG: NORMAL
MDC_IDC_LEAD_MFG: NORMAL
MDC_IDC_LEAD_MODEL: NORMAL
MDC_IDC_LEAD_MODEL: NORMAL
MDC_IDC_LEAD_POLARITY_TYPE: NORMAL
MDC_IDC_LEAD_POLARITY_TYPE: NORMAL
MDC_IDC_LEAD_SERIAL: NORMAL
MDC_IDC_LEAD_SERIAL: NORMAL
MDC_IDC_MSMT_BATTERY_DTM: NORMAL
MDC_IDC_MSMT_BATTERY_IMPEDANCE: 435 OHM
MDC_IDC_MSMT_BATTERY_REMAINING_LONGEVITY: 99 MO
MDC_IDC_MSMT_BATTERY_STATUS: NORMAL
MDC_IDC_MSMT_BATTERY_VOLTAGE: 2.79 V
MDC_IDC_MSMT_LEADCHNL_RA_IMPEDANCE_VALUE: 603 OHM
MDC_IDC_MSMT_LEADCHNL_RA_PACING_THRESHOLD_AMPLITUDE: 0.38 V
MDC_IDC_MSMT_LEADCHNL_RA_PACING_THRESHOLD_PULSEWIDTH: 0.4 MS
MDC_IDC_MSMT_LEADCHNL_RV_IMPEDANCE_VALUE: 592 OHM
MDC_IDC_MSMT_LEADCHNL_RV_PACING_THRESHOLD_AMPLITUDE: 0.88 V
MDC_IDC_MSMT_LEADCHNL_RV_PACING_THRESHOLD_PULSEWIDTH: 0.4 MS
MDC_IDC_PG_IMPLANT_DTM: NORMAL
MDC_IDC_PG_MFG: NORMAL
MDC_IDC_PG_MODEL: NORMAL
MDC_IDC_PG_SERIAL: NORMAL
MDC_IDC_PG_TYPE: NORMAL
MDC_IDC_SESS_CLINIC_NAME: NORMAL
MDC_IDC_SESS_DTM: NORMAL
MDC_IDC_SESS_TYPE: NORMAL
MDC_IDC_SET_BRADY_AT_MODE_SWITCH_MODE: NORMAL
MDC_IDC_SET_BRADY_AT_MODE_SWITCH_RATE: 175 {BEATS}/MIN
MDC_IDC_SET_BRADY_LOWRATE: 60 {BEATS}/MIN
MDC_IDC_SET_BRADY_MAX_SENSOR_RATE: 130 {BEATS}/MIN
MDC_IDC_SET_BRADY_MAX_TRACKING_RATE: 130 {BEATS}/MIN
MDC_IDC_SET_BRADY_MODE: NORMAL
MDC_IDC_SET_BRADY_PAV_DELAY_LOW: 200 MS
MDC_IDC_SET_BRADY_SAV_DELAY_LOW: 180 MS
MDC_IDC_SET_LEADCHNL_RA_PACING_AMPLITUDE: 1.5 V
MDC_IDC_SET_LEADCHNL_RA_PACING_ANODE_ELECTRODE_1: NORMAL
MDC_IDC_SET_LEADCHNL_RA_PACING_ANODE_LOCATION_1: NORMAL
MDC_IDC_SET_LEADCHNL_RA_PACING_CAPTURE_MODE: NORMAL
MDC_IDC_SET_LEADCHNL_RA_PACING_CATHODE_ELECTRODE_1: NORMAL
MDC_IDC_SET_LEADCHNL_RA_PACING_CATHODE_LOCATION_1: NORMAL
MDC_IDC_SET_LEADCHNL_RA_PACING_POLARITY: NORMAL
MDC_IDC_SET_LEADCHNL_RA_PACING_PULSEWIDTH: 0.4 MS
MDC_IDC_SET_LEADCHNL_RA_SENSING_ANODE_ELECTRODE_1: NORMAL
MDC_IDC_SET_LEADCHNL_RA_SENSING_ANODE_LOCATION_1: NORMAL
MDC_IDC_SET_LEADCHNL_RA_SENSING_CATHODE_ELECTRODE_1: NORMAL
MDC_IDC_SET_LEADCHNL_RA_SENSING_CATHODE_LOCATION_1: NORMAL
MDC_IDC_SET_LEADCHNL_RA_SENSING_POLARITY: NORMAL
MDC_IDC_SET_LEADCHNL_RA_SENSING_SENSITIVITY: 1 MV
MDC_IDC_SET_LEADCHNL_RV_PACING_AMPLITUDE: 2.25 V
MDC_IDC_SET_LEADCHNL_RV_PACING_ANODE_ELECTRODE_1: NORMAL
MDC_IDC_SET_LEADCHNL_RV_PACING_ANODE_LOCATION_1: NORMAL
MDC_IDC_SET_LEADCHNL_RV_PACING_CAPTURE_MODE: NORMAL
MDC_IDC_SET_LEADCHNL_RV_PACING_CATHODE_ELECTRODE_1: NORMAL
MDC_IDC_SET_LEADCHNL_RV_PACING_CATHODE_LOCATION_1: NORMAL
MDC_IDC_SET_LEADCHNL_RV_PACING_POLARITY: NORMAL
MDC_IDC_SET_LEADCHNL_RV_PACING_PULSEWIDTH: 0.4 MS
MDC_IDC_SET_LEADCHNL_RV_SENSING_ANODE_ELECTRODE_1: NORMAL
MDC_IDC_SET_LEADCHNL_RV_SENSING_ANODE_LOCATION_1: NORMAL
MDC_IDC_SET_LEADCHNL_RV_SENSING_CATHODE_ELECTRODE_1: NORMAL
MDC_IDC_SET_LEADCHNL_RV_SENSING_CATHODE_LOCATION_1: NORMAL
MDC_IDC_SET_LEADCHNL_RV_SENSING_POLARITY: NORMAL
MDC_IDC_SET_LEADCHNL_RV_SENSING_SENSITIVITY: 2.8 MV
MDC_IDC_SET_ZONE_DETECTION_INTERVAL: 333.33 MS
MDC_IDC_SET_ZONE_DETECTION_INTERVAL: 342.86 MS
MDC_IDC_SET_ZONE_TYPE: NORMAL
MDC_IDC_SET_ZONE_TYPE: NORMAL
MDC_IDC_STAT_AT_BURDEN_PERCENT: 0 %
MDC_IDC_STAT_AT_DTM_END: NORMAL
MDC_IDC_STAT_AT_DTM_START: NORMAL
MDC_IDC_STAT_AT_MODE_SW_COUNT: 1
MDC_IDC_STAT_BRADY_AP_VP_PERCENT: 44 %
MDC_IDC_STAT_BRADY_AP_VS_PERCENT: 0 %
MDC_IDC_STAT_BRADY_AS_VP_PERCENT: 56 %
MDC_IDC_STAT_BRADY_AS_VS_PERCENT: 0 %
MDC_IDC_STAT_BRADY_DTM_END: NORMAL
MDC_IDC_STAT_BRADY_DTM_START: NORMAL
MDC_IDC_STAT_EPISODE_RECENT_COUNT: 0
MDC_IDC_STAT_EPISODE_RECENT_COUNT: 1
MDC_IDC_STAT_EPISODE_RECENT_COUNT_DTM_END: NORMAL
MDC_IDC_STAT_EPISODE_RECENT_COUNT_DTM_END: NORMAL
MDC_IDC_STAT_EPISODE_RECENT_COUNT_DTM_START: NORMAL
MDC_IDC_STAT_EPISODE_RECENT_COUNT_DTM_START: NORMAL
MDC_IDC_STAT_EPISODE_TYPE: NORMAL
MDC_IDC_STAT_EPISODE_TYPE: NORMAL

## 2019-08-04 DIAGNOSIS — R35.0 URINARY FREQUENCY: ICD-10-CM

## 2019-08-05 NOTE — TELEPHONE ENCOUNTER
"Requested Prescriptions   Pending Prescriptions Disp Refills     tolterodine ER (DETROL LA) 4 MG 24 hr capsule [Pharmacy Med Name: TOLTERODINE TARTRATE ER 4MG CP24] 30 capsule 3     Sig: TAKE ONE CAPSULE BY MOUTH ONCE DAILY   Last Written Prescription Date:  04/05/2019  Last Fill Quantity: 30,  # refills: 03   Last office visit: 4/5/2019 with prescribing provider:     Future Office Visit:      Muscarinic Antagonists (Urinary Incontinence Agents) Passed - 8/4/2019  1:09 PM        Passed - Recent (12 mo) or future (30 days) visit within the authorizing provider's specialty     Patient had office visit in the last 12 months or has a visit in the next 30 days with authorizing provider or within the authorizing provider's specialty.  See \"Patient Info\" tab in inbasket, or \"Choose Columns\" in Meds & Orders section of the refill encounter.              Passed - Patient does not have a diagnosis of glaucoma on the problem list     If glaucoma diagnosis is new, refer refill to physician.          Passed - Medication is active on med list        Passed - Patient is 18 years of age or older        "

## 2019-08-06 RX ORDER — TOLTERODINE 4 MG/1
CAPSULE, EXTENDED RELEASE ORAL
Qty: 30 CAPSULE | Refills: 3 | Status: SHIPPED | OUTPATIENT
Start: 2019-08-06 | End: 2019-12-03

## 2019-08-09 ENCOUNTER — TRANSFERRED RECORDS (OUTPATIENT)
Dept: HEALTH INFORMATION MANAGEMENT | Facility: CLINIC | Age: 79
End: 2019-08-09

## 2019-08-09 ENCOUNTER — THERAPY VISIT (OUTPATIENT)
Dept: PHYSICAL THERAPY | Facility: CLINIC | Age: 79
End: 2019-08-09
Payer: COMMERCIAL

## 2019-08-09 DIAGNOSIS — M25.551 HIP PAIN, RIGHT: Primary | ICD-10-CM

## 2019-08-09 PROCEDURE — 97162 PT EVAL MOD COMPLEX 30 MIN: CPT | Mod: GP | Performed by: PHYSICAL THERAPIST

## 2019-08-09 PROCEDURE — 97110 THERAPEUTIC EXERCISES: CPT | Mod: GP | Performed by: PHYSICAL THERAPIST

## 2019-08-09 ASSESSMENT — ACTIVITIES OF DAILY LIVING (ADL)
DEEP_SQUATTING: SLIGHT DIFFICULTY
GOING_UP_1_FLIGHT_OF_STAIRS: SLIGHT DIFFICULTY
GETTING_INTO_AND_OUT_OF_AN_AVERAGE_CAR: SLIGHT DIFFICULTY
HOS_ADL_COUNT: 13
GOING_DOWN_1_FLIGHT_OF_STAIRS: SLIGHT DIFFICULTY
HOS_ADL_ITEM_SCORE_TOTAL: 40
STANDING_FOR_15_MINUTES: NO DIFFICULTY AT ALL
SITTING_FOR_15_MINUTES: NO DIFFICULTY AT ALL
PUTTING_ON_SOCKS_AND_SHOES: SLIGHT DIFFICULTY
WALKING_15_MINUTES_OR_GREATER: SLIGHT DIFFICULTY
STEPPING_UP_AND_DOWN_CURBS: SLIGHT DIFFICULTY
WALKING_INITIALLY: NO DIFFICULTY AT ALL
ROLLING_OVER_IN_BED: MODERATE DIFFICULTY
TWISTING/PIVOTING_ON_INVOLVED_LEG: SLIGHT DIFFICULTY
WALKING_APPROXIMATELY_10_MINUTES: NO DIFFICULTY AT ALL
HOS_ADL_HIGHEST_POTENTIAL_SCORE: 52
LIGHT_TO_MODERATE_WORK: SLIGHT DIFFICULTY
HEAVY_WORK: MODERATE DIFFICULTY
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
## 2019-08-13 PROBLEM — M25.551 HIP PAIN, RIGHT: Status: ACTIVE | Noted: 2019-08-13

## 2019-08-28 ENCOUNTER — THERAPY VISIT (OUTPATIENT)
Dept: PHYSICAL THERAPY | Facility: CLINIC | Age: 79
End: 2019-08-28
Payer: COMMERCIAL

## 2019-08-28 DIAGNOSIS — M25.551 HIP PAIN, RIGHT: ICD-10-CM

## 2019-08-28 PROCEDURE — 97530 THERAPEUTIC ACTIVITIES: CPT | Mod: GP | Performed by: PHYSICAL THERAPIST

## 2019-08-28 PROCEDURE — 97110 THERAPEUTIC EXERCISES: CPT | Mod: GP | Performed by: PHYSICAL THERAPIST

## 2019-09-04 ENCOUNTER — THERAPY VISIT (OUTPATIENT)
Dept: PHYSICAL THERAPY | Facility: CLINIC | Age: 79
End: 2019-09-04
Payer: COMMERCIAL

## 2019-09-04 DIAGNOSIS — M25.551 HIP PAIN, RIGHT: ICD-10-CM

## 2019-09-04 PROCEDURE — 97530 THERAPEUTIC ACTIVITIES: CPT | Mod: GP | Performed by: PHYSICAL THERAPIST

## 2019-09-04 PROCEDURE — 97110 THERAPEUTIC EXERCISES: CPT | Mod: GP | Performed by: PHYSICAL THERAPIST

## 2019-09-11 ENCOUNTER — THERAPY VISIT (OUTPATIENT)
Dept: PHYSICAL THERAPY | Facility: CLINIC | Age: 79
End: 2019-09-11
Payer: COMMERCIAL

## 2019-09-11 DIAGNOSIS — M25.551 HIP PAIN, RIGHT: ICD-10-CM

## 2019-09-11 PROCEDURE — 97110 THERAPEUTIC EXERCISES: CPT | Mod: GP | Performed by: PHYSICAL THERAPIST

## 2019-09-13 DIAGNOSIS — I10 BENIGN ESSENTIAL HYPERTENSION: ICD-10-CM

## 2019-09-13 DIAGNOSIS — R06.09 DYSPNEA ON EXERTION: ICD-10-CM

## 2019-09-13 RX ORDER — CARVEDILOL 6.25 MG/1
6.25 TABLET ORAL 2 TIMES DAILY WITH MEALS
Qty: 120 TABLET | Refills: 3 | Status: SHIPPED | OUTPATIENT
Start: 2019-09-13 | End: 2020-01-14 | Stop reason: DRUGHIGH

## 2019-09-13 RX ORDER — CHLORTHALIDONE 25 MG/1
12.5 TABLET ORAL DAILY
Qty: 30 TABLET | Refills: 0 | Status: SHIPPED | OUTPATIENT
Start: 2019-09-13 | End: 2019-11-06

## 2019-09-13 NOTE — TELEPHONE ENCOUNTER
Medication Refilled: chlorthalidone and carvedilol  Last office visit: 19  Last Labs/EK19  Next office visit: 2020 orders in [facility]  Pharmacy sent to: MURALI Omer RN

## 2019-09-17 NOTE — PROGRESS NOTES
HIP: (* indicates patient's pain)   MMT R MMT L   Flexion 4 4   abduction 3- 4-   Extension 4- 3

## 2019-09-19 ENCOUNTER — OFFICE VISIT (OUTPATIENT)
Dept: SLEEP MEDICINE | Facility: CLINIC | Age: 79
End: 2019-09-19
Payer: COMMERCIAL

## 2019-09-19 VITALS
WEIGHT: 176 LBS | DIASTOLIC BLOOD PRESSURE: 72 MMHG | SYSTOLIC BLOOD PRESSURE: 141 MMHG | HEIGHT: 62 IN | BODY MASS INDEX: 32.39 KG/M2 | HEART RATE: 68 BPM | OXYGEN SATURATION: 96 %

## 2019-09-19 DIAGNOSIS — G47.00 INSOMNIA, UNSPECIFIED TYPE: ICD-10-CM

## 2019-09-19 DIAGNOSIS — G47.33 OSA (OBSTRUCTIVE SLEEP APNEA): Primary | ICD-10-CM

## 2019-09-19 PROCEDURE — 99215 OFFICE O/P EST HI 40 MIN: CPT | Performed by: PHYSICIAN ASSISTANT

## 2019-09-19 ASSESSMENT — MIFFLIN-ST. JEOR: SCORE: 1241.71

## 2019-09-19 NOTE — PROGRESS NOTES
Sleep Consultation:    Date on this visit: 9/19/2019    Malika Velasco is sent by [facility] for a sleep consultation regarding TRAVIS.    Primary Physician: Karen Navarro     Malika Velasco presents for management of previously diagnosed TRAVIS. She has not been sleeping well for several months.  Her medical history is significant for HTN, aortic valve sclerosis, pacemaker for AV block, hyperlipidemia, OA.      Malika had a PSG at [facility] on 1/20/2005. Her AHI was 18/hr, RDI 34/hr, O2 ankit 83%. Her weight was 139. She did not have any significant PLMs.      She is on auto CPAP 7-15 cm.     The compliance data shows that s/he has used the CPAP for 30/30 nights, 96.7% of nights for >4 hours.  The 90th% pressure is 13.3 cm.  The average time in large leak is 0 sec.  The average nightly usage is 6:41.  The average AHI is 6.8/hr (1.1/hr Chelsey, 2.7/hr OAs, 3/hr hyp). The snore index is 21. Her sleep schedule appears consistent. She does get up 3-6 times per night.       She has not been sleeping well for several months. It comes and goes.  It is related mostly to pain or feeling uncomfortable. She also wakes with night sweats during the summer.    She feels things are not going very well with CPAP. She wakes with a dry nose and mouth. Her nose sometimes gets so dry she gets congested. She uses a full face mask. The mask is over 6 months old. She occasionally notices mask leak. She thinks the dry nose and mouth improve when she has a new mask. She does use the humidifier. It does not run through a lot of water. She has tried turning the humidifier up and then it is too wet/humid in her mask. Her machine is about 6 years old. She does not know if she snores with CPAP as her  passed away 4-5 years ago. She is comfortable with the pressures.     Malika goes to sleep at 11:00 PM during the week. She wakes up at 6:15 AM with an alarm. She falls asleep in 5 minutes.  Malika denies difficulty falling asleep.  She  wakes up 2-5 times a night for 5 minutes to over an hour before falling back to sleep.  Malika wakes up to go to the bathroom, pain/discomfort and dry nose or mouth.  On weekends, Malika goes to sleep at 11:00 PM.  She wakes up at 6:00-7:00 AM without an alarm. She falls asleep in 5 minutes.  Patient gets an average of 6 hours of sleep per night. She is doing physical therapy for hip pain. She takes Advil PM and melatonin, but does not feel they help for very long.    Patient does rarely worry in bed about things she has to do the next day and does not use electronics in bed, watch TV in bed and read in bed.     Malika does not do shift work.  She works day shifts 3 days per week 9 AM to 11:45 AM. She works at [employer] as a . She is  and lives alone.    Malika does not have a regular bed partner. Patient sleeps on her back and sides (mostly). She denies nort arousals, morning headaches, morning confusion and restless legs. Malika denies any bruxism (used to but now has an upper plate which she removes at night), sleep walking, sleep talking, dream enactment, sleep paralysis, cataplexy and hypnogogic/hypnopompic hallucinations.    Malika has difficulty breathing through her nose (uses Flonase, but it does not last long) and depression, denies claustrophobia, reflux at night and heartburn.      Malika has gained 20-30 pounds in 15 years.  Patient describes themself as a morning person.  She would prefer to go to sleep at 11:00 PM and wake up at 6:00-7:00 AM.  Patient's [place] Sleepiness score 8/24 inconsistent with daytime sleepiness.      Malika naps mostly inadvertently 3-4 times per week for 30 minutes, sometimes feels refreshed after naps. She has a hard time shutting her mind off when she tries to take naps. She generally keeps herself busy. She may doze watching TV.  She denies dozing while driving. She did years ago on a long drive.  Patient was counseled on the importance of driving  while alert, to pull over if drowsy, or nap before getting into the vehicle if sleepy.  She uses 4 cups/day of coffee. Last caffeine intake is usually before noon.    Allergies:    Allergies   Allergen Reactions     Zantac [Ranitidine]      Headache       Medications:    Current Outpatient Medications   Medication Sig Dispense Refill     alendronate (FOSAMAX) 70 MG tablet Take 1 tablet (70 mg) by mouth every 7 days 12 tablet 3     atorvastatin (LIPITOR) 10 MG tablet Take 1 tablet (10 mg) by mouth daily 90 tablet 3     Calcium Carb-Cholecalciferol (CALCIUM 600/VITAMIN D3) 600-800 MG-UNIT TABS        carvedilol (COREG) 6.25 MG tablet Take 1 tablet (6.25 mg) by mouth 2 times daily (with meals) 120 tablet 3     cetirizine (ZYRTEC) 10 MG tablet Take 1 tablet (10 mg) by mouth daily 30 tablet 3     chlorthalidone (HYGROTON) 25 MG tablet Take 0.5 tablets (12.5 mg) by mouth daily 30 tablet 0     FEXOFENADINE HCL PO Take 180 mg by mouth daily       fish oil-omega-3 fatty acids 1000 MG capsule Take 2 g by mouth daily       fluticasone (FLONASE) 50 MCG/ACT spray Spray 1 spray into both nostrils daily       MELATONIN PO Take 5 mg by mouth At Bedtime       multivitamin, therapeutic with minerals (MULTI-VITAMIN) TABS tablet Take 1 tablet by mouth daily       potassium chloride ER (K-DUR/KLOR-CON M) 20 MEQ CR tablet Take 1 tablet (20 mEq) by mouth daily 30 tablet 3     tolterodine ER (DETROL LA) 4 MG 24 hr capsule TAKE ONE CAPSULE BY MOUTH ONCE DAILY 30 capsule 3       Problem List:  Patient Active Problem List    Diagnosis Date Noted     Hip pain, right 08/13/2019     Priority: Medium     ACP (advance care planning) 07/26/2017     Priority: Medium     Advance Care Planning 7/26/2017: Recommend Code Status in chart and patient's Advance Care Planning documents be reviewed to ensure alignment with patient's current wishes.  Added by Vicky FARR Advance Care Planning Liaison       TRAVIS (obstructive sleep apnea)      Priority:  Medium     Moderate on CPAP       Aortic valve sclerosis      Priority: Medium     Pacemaker 03/09/2017     Priority: Medium     For complete heart block       Hyperlipidemia LDL goal <130 03/09/2017     Priority: Medium     Benign essential hypertension 03/09/2017     Priority: Medium     Osteopenia 03/09/2017     Priority: Medium     Osteoarthritis      Priority: Medium     Squamous cell carcinoma, face      Priority: Medium     Back pain      Priority: Medium        Past Medical/Surgical History:  Past Medical History:   Diagnosis Date     Allergic rhinitis      Anxiety      Aortic valve sclerosis      Back pain      GERD (gastroesophageal reflux disease)      Heart block AV complete (H) 2008    pacemaker     Hyperlipidemia      Hypertension      Major depression      TRAVIS (obstructive sleep apnea)     moderate     Osteoarthritis      Squamous cell carcinoma, face      Past Surgical History:   Procedure Laterality Date     ARTHROSCOPY KNEE Left      AS TOTAL KNEE ARTHROPLASTY Left 05/2015     IMPLANT PACEMAKER  1997     TONSILLECTOMY       TUBAL LIGATION         Social History:  Social History     Socioeconomic History     Marital status:      Spouse name: Not on file     Number of children: Not on file     Years of education: Not on file     Highest education level: Not on file   Occupational History     Not on file   Social Needs     Financial resource strain: Not on file     Food insecurity:     Worry: Not on file     Inability: Not on file     Transportation needs:     Medical: Not on file     Non-medical: Not on file   Tobacco Use     Smoking status: Former Smoker     Smokeless tobacco: Never Used   Substance and Sexual Activity     Alcohol use: Yes     Comment: rarely     Drug use: No     Sexual activity: Never   Lifestyle     Physical activity:     Days per week: Not on file     Minutes per session: Not on file     Stress: Not on file   Relationships     Social connections:     Talks on phone: Not on  file     Gets together: Not on file     Attends Roman [employer] service: Not on file     Active member of club or organization: Not on file     Attends meetings of clubs or organizations: Not on file     Relationship status: Not on file     Intimate partner violence:     Fear of current or ex partner: Not on file     Emotionally abused: Not on file     Physically abused: Not on file     Forced sexual activity: Not on file   Other Topics Concern     Parent/sibling w/ CABG, MI or angioplasty before 65F 55M? Not Asked   Social History Narrative     Not on file       Family History:  Family History   Problem Relation Age of Onset     Heart Disease Mother      Dementia Mother      Heart Disease Father        Review of Systems:  A complete review of systems reviewed by me is negative with the exeption of what has been mentioned in the history of present illness.  CONSTITUTIONAL: NEGATIVE for weight loss, fever, chills, sweats.  CONSTITUTIONAL:  POSITIVE for  weight gain, night sweats and drug allergies   EYES: NEGATIVE for changes in vision, blind spots, double vision.  ENT: NEGATIVE for ear pain, sore throat, sinus pain, bloody nose  ENT:  POSITIVE for  post-nasal drip and runny nose  CARDIAC: NEGATIVE for fast heartbeats or fluttering in chest, chest pain or pressure, breathlessness when lying flat.  CARDIAC:  POSITIVE for  swollen legs, swollen feet and HTN  NEUROLOGIC: NEGATIVE headaches, weakness or numbness in the arms or legs.  DERMATOLOGIC: NEGATIVE for rashes, new moles or change in mole(s)  PULMONARY: NEGATIVE SOB at rest, SOB with activity, dry cough, coughing up blood, wheezing or whistling when breathing.    PULMONARY:  POSITIVE for  productive cough  GASTROINTESTINAL: NEGATIVE for nausea or vomitting, loose or watery stools, fat or grease in stools, constipation, abdominal pain, bowel movements black in color or blood noted.  GENITOURINARY: NEGATIVE for pain during urination, blood in urine, irregular menstrual  "periods.  GENITOURINARY:  POSITIVE for  urinating more frequently than usual  MUSCULOSKELETAL: NEGATIVE for swollen joints.  MUSCULOSKELETAL:  POSITIVE for  muscle pain and bone or joint pain  ENDOCRINE: NEGATIVE for increased thirst or urination, diabetes.  LYMPHATIC: NEGATIVE for swollen lymph nodes, lumps or bumps in the breasts or nipple discharge.  MENTAL HEALTH: POSITIVE for depression    Physical Examination:  Vitals: BP (!) 141/72   Pulse 68   Ht 1.575 m (5' 2.01\")   Wt 79.8 kg (176 lb)   SpO2 96%   BMI 32.18 kg/m      Neck Cir (cm): 36 cm    [place] Total Score 9/19/2019   Total score - [place] 8       MARISA Total Score: 18 (09/19/19 1027)    GENERAL APPEARANCE: healthy, alert, no distress and cooperative  EYES: Eyes grossly normal to inspection, PERRL, conjunctivae and sclerae normal and lids and lashes normal  HENT: nose and mouth without ulcers or lesions, oropharynx crowded, tongue base enlarged and upper plate  NECK: no adenopathy, no asymmetry, masses, or scars, thyroid normal to palpation and trachea midline and normal to palpation  RESP: lungs clear to auscultation - no rales, rhonchi or wheezes  CV: regular rates and rhythm, normal S1 S2, no S3 or S4, no murmur, click or rub and no irregular beats  LYMPHATICS: no cervical adenopathy  MS: extremities normal- no gross deformities noted  NEURO: Normal strength and tone, mentation intact, speech normal, cranial nerves 2-12 intact and antalgic gait  Mallampati Class: IV.  Tonsillar Stage: 0  surgically removed.    Impression/Plan:    (G47.33) TRAVIS (obstructive sleep apnea)  (primary encounter diagnosis)  Comment: Malika presents to establish care for previously diagnosed TRAVIS. Her pressures are 7-15 cm. Her download shows her AHI is mildly elevated at 6.8/hr, mostly obstructive. Her pressures often increase to the upper limit. Her download does not show significant mask leak, but her mask is old. Her machine is about 6 years old. It seems to be working " fine. She has gained 25 pounds since her study 15 years ago.   Plan: Comprehensive DME        Pressures changed to 10-16 cm. A prescription was written for new supplies. We talked about getting a new machine as she is due for one. She will talk with [facility]. If they have difficulty getting parts for her current machine, she will contact me for an order for a new machine.     (G47.00) Insomnia, unspecified type  Comment: Malika is primarily bothered by difficulty maintaining sleep. She falls asleep quickly, but wakes 3-6 times per night and sometimes has difficulty getting back to sleep. She attributes the awakenings primarily to pain or discomfort. Her sleep schedule is consistent and not clearly excessive. She denies worrying in bed but does have depression. She stays pretty active in the day, so does not nap much.  Plan: Talk with primary about pain control. If still struggling to sleep, we can consider a sleep aid like trazodone or doxepin. Reduce time in bed to 7 hours per night and avoid napping.       Literature provided regarding insomnia.      She will follow up with me in approximately 3 months.       Polysomnography reviewed.  Obstructive sleep apnea reviewed.  Complications of untreated sleep apnea were reviewed.  45 minutes was spent during this visit, over 50% in counseling and coordination of care.   Bennett Goltz, PA-C    CC: Karen Navarro MD

## 2019-09-19 NOTE — PATIENT INSTRUCTIONS
Talk with [facility] about getting new supplies. If they can't get you parts to your machine, contact me through [employer] and I will order a new machine.  Talk with primary about pain control. If still struggling to sleep, we can consider a sleep aid like trazodone or doxepin.   You could consider reducing your time in bed to about 7 hours per night. Avoid sleep in the daytime.   General recommendations for sleep problems (Insomnia)  Allow 2-4 weeks to see results     Establish a regular sleep schedule    Most people only need 7-8 hours of sleep.  Don't be in bed longer than you need     to sleep or you will end up spending more time awake in bed. This trains your    brain to think of the bed as a place to not sleep.  Go to bed at same time each night   Get up at same time each day - Set an alarm everyday (even weekends). This is one of    the most important tips. It prevents you from relying on your insomnia to get you    up on time for your day. That actually reinforces insomnia. It also will help your    body get into a pattern where you start feeling tired at a consistent time each    night.  The body functions best when you keep a consistent routine.  Avoid sleeping-in and napping. Anytime you sleep during the day, you will be less tired at    night. You may be tired enough to fall asleep, but you will wake more in the    middle of the night because you will have met your sleep need before the night is    done.   Cut down time in bed (if not asleep, get up)- Use your bed only for sleep and sex    Anytime you spend time in bed doing activities other than sleep (reading,    watching TV, working, playing on the computer or phone, or even just laying in    bed trying to sleep), you are training  your brain to think of the bed as a place to    do activities other than sleep. If you are not falling asleep within 20-30 minutes,    get out of bed. While out of bed, avoid bright lights. Avoid work or chores.  Being    productive in the middle of the night reinforces waking up at night. Find relaxing,    not particularly entertaining activities like reading, listening to music, or relaxation    exercises. Go back to bed if you start feeling groggy, or after about 30 minutes,    even if not feeling very tired. Sometimes, just getting out of bed stops the pattern    of getting frustrated about laying in bed not sleeping, and that can help you fall    asleep.   Avoid trying to force yourself to sleep- sleep is not like everything else. The harder you    work at most things, the more you can accomplish. The harder you work at    sleep, the less you will sleep.     Make the bedroom comfortable - quiet, dark and cool are better. Consider ear plugs    (silicon). Use dark blinds or wear an eye mask if needed     Make a relaxing routine prior to bedtime  Relaxation exercises:   Progressive muscle relaxation: Relax each muscle group individually    Begin with your feet, flex, then relax. Try to imagine your feet feeling heavy and sinking into the bed. Move to your calves, do the same thing. Work through each muscle group toward your head.    Relaxing Mental Imagery: Try to imagine a trip that you took and found relaxing, or imagine a day at the beach. Try to walk yourself through the day in your mind as if you were dreaming it. Try to imagine sensing the different experiences, such as feeling sand between your toes, the heat of the sun on your skin, seeing the waves crashing the shore, the smell of the salt water, etc.     Deal with your worries before bedtime    Set aside a worry time around dinner time for 10-15 minutes. Write down the    things that are on your mind. Plan time in the coming days to address those    issues. Brainstorm ideas on how you will deal with them. Try to identify issues    that are out of your control, and try to let those issues go.  Listen to relaxation tapes   Classical Music or Nature sounds   Back  Massage   Get regular exercise each day (at least 1-2 hours before bedtime)   Take medications only as directed   Eat a light bedtime snack or warm drink   Warm milk   Warm herbal tea (non-caffeinated)       Things to avoid   No overstimulating activities just before bed   No competitive games before bedtime   No exciting television programs before bedtime   Avoid caffeine after lunchtime   Avoid chocolate   Do not use alcohol to induce sleep (worsens Insomnia)   Do not take someone else's sleeping pills   Do not look at the clock when awakening   Do not turn on light when getting up to use bathroom, use a nightlight     Online Programs     www.[employer] (pronounced shut eye). There is a fee for this program. Enter the code  [place]  if you decide to enroll in this program.      www.[employer].com (pronounced sleep ee oh). There is a fee for this program. Enter the code  [place]  if you decide to enroll in this program.     Suggested Resources  Insomnia Treatment Books     Overcoming Insomnia by Filipe Pickard and Vero Sanchez (2008)    No More Sleepless Nights by Mustapha Fairbanks and Layla Garcia (1996)    Say Kim to Insomnia by Lee Tang (2009)    The Insomnia Workbook by Maddy Doan and Jose Juan Park (2009)    The Insomnia Answer by Joel Clemens and Lenin Garcia (2006)      Stress Management and Relaxation Books    The Relaxation and Stress Reduction Workbook by Gaby Reaves, Arleth Rosenberg and Obie Way (2008)    Stress Management Workbook: Techniques and Self-Assessment Procedures by Zarina Rodriguez and Prakash Ma (1997)    A Mindfulness-Based Stress Reduction Workbook by Bandar Mckeon and Brenda Garibay (2010)    The Complete Stress Management Workbook by Claude Vogel, Mehran Gillis and Aki Nixon (1996)    Assert Yourself by Danielle Jaime and Breezy Jaime (1977)    Relaxation Resources for Computer Download   These websites offer resources to help  you relax. This list is for information only. [facility] is not responsible for the quality of services or the actions of any person or organization.  Progressive Muscle Relaxation (PMR):     http://www.[employer]progressive-muscle-relaxation-exercise.html     http://studentsupport.[facility]/counseling/resources/self-help/relaxation-and-stress-management/   Deep Breathing Exercises:    http://www.[employer]breathing-awareness.html     Meditation:     wwwThe Smartphone Physical    www.[employer])meditationJiangsu Sanh[employer] Industrial (Group)site.Head Held High You may have to pay for some of these resources.    Guided Imagery:    http://www.[employer]guided-imagery-scripts.html     http://[employer]/library/vxujbomscu-yqxbdv-ahntjsl/     Counseling / Behavioral Health  [facility]  Visit www.[email or web address] or call [phone] to find a clinic close to you.  Or call [phone] for [facility].      Your blood pressure was checked while you were in clinic today.  Please read the guidelines below about what these numbers mean and what you should do about them.  Your systolic blood pressure is the top number.  This is the pressure when the heart is pumping.  Your diastolic blood pressure is the bottom number.  This is the pressure in between beats.  If your systolic blood pressure is less than 120 and your diastolic blood pressure is less than 80, then your blood pressure is normal. There is nothing more that you need to do about it  If your systolic blood pressure is 120-139 or your diastolic blood pressure is 80-89, your blood pressure may be higher than it should be.  You should have your blood pressure re-checked within a year by a primary care provider.  If your systolic blood pressure is 140 or greater or your diastolic blood pressure is 90 or greater, you may have high blood pressure.  High blood pressure is treatable, but if left untreated over time it can put you at  risk for heart attack, stroke, or kidney failure.  You should have your blood pressure re-checked by a primary care provider within the next four weeks.

## 2019-09-19 NOTE — NURSING NOTE
"Chief Complaint   Patient presents with     Consult     prev study 2005 Alevism, using cpap respironic every night, continuity of care, having awful sleep, waking quite often.       Initial BP (!) 141/72   Pulse 68   Ht 1.575 m (5' 2.01\")   Wt 79.8 kg (176 lb)   SpO2 96%   BMI 32.18 kg/m   Estimated body mass index is 32.18 kg/m  as calculated from the following:    Height as of this encounter: 1.575 m (5' 2.01\").    Weight as of this encounter: 79.8 kg (176 lb).    Medication Reconciliation: complete    Neck circumference: 14.25 inches / 36 centimeters.    DME: yes repironic 550, 560    ESS 8      "

## 2019-09-24 ENCOUNTER — TELEPHONE (OUTPATIENT)
Dept: SLEEP MEDICINE | Facility: CLINIC | Age: 79
End: 2019-09-24

## 2019-09-25 ENCOUNTER — THERAPY VISIT (OUTPATIENT)
Dept: PHYSICAL THERAPY | Facility: CLINIC | Age: 79
End: 2019-09-25
Payer: COMMERCIAL

## 2019-09-25 DIAGNOSIS — M25.551 HIP PAIN, RIGHT: ICD-10-CM

## 2019-09-25 PROCEDURE — 97110 THERAPEUTIC EXERCISES: CPT | Mod: GP | Performed by: PHYSICAL THERAPIST

## 2019-09-25 ASSESSMENT — ACTIVITIES OF DAILY LIVING (ADL)
WALKING_15_MINUTES_OR_GREATER: NO DIFFICULTY AT ALL
TWISTING/PIVOTING_ON_INVOLVED_LEG: SLIGHT DIFFICULTY
PUTTING_ON_SOCKS_AND_SHOES: NO DIFFICULTY AT ALL
WALKING_INITIALLY: SLIGHT DIFFICULTY
ROLLING_OVER_IN_BED: SLIGHT DIFFICULTY
HOS_ADL_ITEM_SCORE_TOTAL: 57
RECREATIONAL_ACTIVITIES: SLIGHT DIFFICULTY
WALKING_UP_STEEP_HILLS: NO DIFFICULTY AT ALL
WALKING_APPROXIMATELY_10_MINUTES: NO DIFFICULTY AT ALL
HOW_WOULD_YOU_RATE_YOUR_CURRENT_LEVEL_OF_FUNCTION_DURING_YOUR_USUAL_ACTIVITIES_OF_DAILY_LIVING_FROM_0_TO_100_WITH_100_BEING_YOUR_LEVEL_OF_FUNCTION_PRIOR_TO_YOUR_HIP_PROBLEM_AND_0_BEING_THE_INABILITY_TO_PERFORM_ANY_OF_YOUR_USUAL_DAILY_ACTIVITIES?: 90
HOS_ADL_SCORE(%): 89.06
HOS_ADL_COUNT: 16
GETTING_INTO_AND_OUT_OF_AN_AVERAGE_CAR: SLIGHT DIFFICULTY
GOING_UP_1_FLIGHT_OF_STAIRS: NO DIFFICULTY AT ALL
HOS_ADL_HIGHEST_POTENTIAL_SCORE: 64
STEPPING_UP_AND_DOWN_CURBS: NO DIFFICULTY AT ALL
DEEP_SQUATTING: NO DIFFICULTY AT ALL
GOING_DOWN_1_FLIGHT_OF_STAIRS: NO DIFFICULTY AT ALL
LIGHT_TO_MODERATE_WORK: NO DIFFICULTY AT ALL
SITTING_FOR_15_MINUTES: NO DIFFICULTY AT ALL
STANDING_FOR_15_MINUTES: SLIGHT DIFFICULTY
WALKING_DOWN_STEEP_HILLS: NO DIFFICULTY AT ALL
HEAVY_WORK: SLIGHT DIFFICULTY

## 2019-09-25 NOTE — PROGRESS NOTES
PROGRESS  REPORT    Progress reporting period is from Aug 9, 2019 to Sep 25, 2019.       SUBJECTIVE  Subjective changes noted by patient: The hip is doing okay, but she is getting some pain in the shin thayt she thinks is related to the exercises. Also has some pain in the back of the knee. Has been doing a normal amount of walking, not more. She feels she is doing well with the hip and does not need further therapy.    Current pain level is 0/10  .     Initial Pain level: 10/10.   Changes in function:  Yes (See Goal flowsheet attached for changes in current functional level)  Adverse reaction to treatment or activity: None    OBJECTIVE  Changes noted in objective findings:  Yes, Patient demonstrates good form and a good understanding of her home exercise program as well as an increase in strength      HIP: (* indicates patient's pain)   MMT R MMT L   Flexion 4 4   abduction 3- 4   Extension 3+ 4-         ASSESSMENT/PLAN  Updated problem list and treatment plan: Diagnosis 1:  Right hip pain due to busitis  STG/LTGs have been met or progress has been made towards goals:  Yes (See Goal flow sheet completed today.)  Assessment of Progress: The patient's condition is improving.  The patient has met all of their long term goals.  Self Management Plans:  Patient is independent in a home treatment program.  Patient is independent in self management of symptoms.  I have re-evaluated this patient and find that the nature, scope, duration and intensity of the therapy is appropriate for the medical condition of the patient.  Malika continues to require the following intervention to meet STG and LTG's:  PT intervention is no longer required to meet STG/LTG.    Recommendations:  This patient is ready to be discharged from therapy and continue their home treatment program.    It was recommended that the patient follow-up with her physician regarding her knee and shin pain.     Please refer to the daily flowsheet for treatment  today, total treatment time and time spent performing 1:1 timed codes.

## 2019-09-25 NOTE — LETTER
KIMBERLEY HESS RUBIA PT  [id number] [facility]  [address]  [facility] [address]  [phone]    2019    Re: Malika Velasco   :   1940  MRN:  [id number]   REFERRING PHYSICIAN:   Adam HESS RUBIA PT  Date of Initial Evaluation:  19  Visits:  Rxs Used: 5  Reason for Referral:  Hip pain, right    EVALUATION SUMMARY    PROGRESS  REPORT    Progress reporting period is from Aug 9, 2019 to Sep 25, 2019.       SUBJECTIVE  Subjective changes noted by patient: The hip is doing okay, but she is getting some pain in the shin thayt she thinks is related to the exercises. Also has some pain in the back of the knee. Has been doing a normal amount of walking, not more. She feels she is doing well with the hip and does not need further therapy.    Current pain level is 0/10  .     Initial Pain level: 10/10.   Changes in function:  Yes (See Goal flowsheet attached for changes in current functional level)  Adverse reaction to treatment or activity: None    OBJECTIVE  Changes noted in objective findings:  Yes, Patient demonstrates good form and a good understanding of her home exercise program as well as an increase in strength    HIP: (* indicates patient's pain)   MMT R MMT L   Flexion 4 4   abduction 3- 4   Extension 3+ 4-       ASSESSMENT/PLAN  Updated problem list and treatment plan: Diagnosis 1:  Right hip pain due to busitis  STG/LTGs have been met or progress has been made towards goals:  Yes (See Goal flow sheet completed today.)  Assessment of Progress: The patient's condition is improving.  The patient has met all of their long term goals.  Self Management Plans:  Patient is independent in a home treatment program.  Patient is independent in self management of symptoms.    Re: Malika Velasco   :   1940      I have re-evaluated this patient and find that the nature, scope, duration and intensity of the therapy is appropriate for the medical condition of the  patient.  Malika continues to require the following intervention to meet STG and LTG's:  PT intervention is no longer required to meet STG/LTG.    Recommendations:  This patient is ready to be discharged from therapy and continue their home treatment program.    It was recommended that the patient follow-up with her physician regarding her knee and shin pain.     Thank you for your referral.    INQUIRIES  Therapist: ANGELLA Reynolds [facility]  [id number] [place] [id number]  Phone: [phone]  Fax: [phone]

## 2019-10-03 DIAGNOSIS — E87.6 HYPOKALEMIA: ICD-10-CM

## 2019-10-03 DIAGNOSIS — I10 BENIGN ESSENTIAL HYPERTENSION: ICD-10-CM

## 2019-10-03 RX ORDER — POTASSIUM CHLORIDE 1500 MG/1
20 TABLET, EXTENDED RELEASE ORAL DAILY
Qty: 30 TABLET | Refills: 3 | Status: SHIPPED | OUTPATIENT
Start: 2019-10-03 | End: 2020-02-04

## 2019-10-16 ENCOUNTER — OFFICE VISIT (OUTPATIENT)
Dept: URGENT CARE | Facility: URGENT CARE | Age: 79
End: 2019-10-16
Payer: COMMERCIAL

## 2019-10-16 VITALS
WEIGHT: 179.2 LBS | HEART RATE: 80 BPM | TEMPERATURE: 96.5 F | OXYGEN SATURATION: 98 % | BODY MASS INDEX: 32.77 KG/M2 | SYSTOLIC BLOOD PRESSURE: 153 MMHG | DIASTOLIC BLOOD PRESSURE: 84 MMHG

## 2019-10-16 DIAGNOSIS — J01.90 ACUTE SINUSITIS WITH SYMPTOMS > 10 DAYS: Primary | ICD-10-CM

## 2019-10-16 PROCEDURE — 99214 OFFICE O/P EST MOD 30 MIN: CPT | Performed by: FAMILY MEDICINE

## 2019-10-16 RX ORDER — FLUTICASONE PROPIONATE 50 MCG
2 SPRAY, SUSPENSION (ML) NASAL DAILY
Qty: 16 G | Refills: 0 | Status: SHIPPED | OUTPATIENT
Start: 2019-10-16

## 2019-10-16 NOTE — PATIENT INSTRUCTIONS
Okay to take zyrtec - max of 2 tablets daily  Okay to continue with saline spray  Start flonase (steroid nasal spray)  Take full course of antibiotic - Augmentin - for sinus infection.    Malika to follow up with Primary Care provider regarding elevated blood pressure.      Patient Education     Sinusitis (Antibiotic Treatment)    The sinuses are air-filled spaces within the bones of the face. They connect to the inside of the nose. Sinusitis is an inflammation of the tissue that lines the sinuses. Sinusitis can occur during a cold. It can also happen due to allergies to pollens and other particles in the air. Sinusitis can cause symptoms of sinus congestion and a feeling of fullness. A sinus infection causes fever, headache, and facial pain. There is often green or yellow fluid draining from the nose or into the back of the throat (post-nasal drip). You have been given antibiotics to treat this condition.  Home care    Take the full course of antibiotics as instructed. Do not stop taking them, even when you feel better.    Drink plenty of water, hot tea, and other liquids. This may help thin nasal mucus. It also may help your sinuses drain fluids.    Heat may help soothe painful areas of your face. Use a towel soaked in hot water. Or,  the shower and direct the warm spray onto your face. Using a vaporizer along with a menthol rub at night may also help soothe symptoms.     An expectorant with guaifenesin may help thin nasal mucus and help your sinuses drain fluids.    You can use an over-the-counter decongestant, unless a similar medicine was prescribed to you. Nasal sprays work the fastest. Use one that contains phenylephrine or oxymetazoline. First blow your nose gently. Then use the spray. Do not use these medicines more often than directed on the label. If you do, your symptoms may get worse. You may also take pills that contain pseudoephedrine. Don t use products that combine multiple medicines. This is  because side effects may be increased. Read labels. You can also ask the pharmacist for help. (People with high blood pressure should not use decongestants. They can raise blood pressure.)    Over-the-counter antihistamines may help if allergies contributed to your sinusitis.      Do not use nasal rinses or irrigation during an acute sinus infection, unless your healthcare provider tells you to. Rinsing may spread the infection to other areas in your sinuses.    Use acetaminophen or ibuprofen to control pain, unless another pain medicine was prescribed to you. If you have chronic liver or kidney disease or ever had a stomach ulcer, talk with your healthcare provider before using these medicines. (Aspirin should never be taken by anyone under age 18 who is ill with a fever. It may cause severe liver damage.)    Don't smoke. This can make symptoms worse.  Follow-up care  Follow up with your healthcare provider or our staff if you are not better in 1 week.  When to seek medical advice  Call your healthcare provider if any of these occur:    Facial pain or headache that gets worse    Stiff neck    Unusual drowsiness or confusion    Swelling of your forehead or eyelids    Vision problems, such as blurred or double vision    Fever of 100.4 F (38 C) or higher, or as directed by your healthcare provider    Seizure    Breathing problems    Symptoms don't go away in 10 days  Prevention  Here are steps you can take to help prevent an infection:    Keep good hand washing habits.    Don t have close contact with people who have sore throats, colds, or other upper respiratory infections.    Don t smoke, and stay away from secondhand smoke.    Stay up to date with of your vaccines.  Date Last Reviewed: 11/1/2017 2000-2018 [employer]. [address] [place] [address], [place] [address]. All rights reserved. This information is not intended as a substitute for professional medical care. Always follow your healthcare  professional's instructions.

## 2019-10-16 NOTE — PROGRESS NOTES
SUBJECTIVE:   Malika Velasco is a 78 year old female presenting with a chief complaint of sinus symptoms.  Nose is plugged up and unable to breath.  No fever.  Endorsed post nasal drip and will cough due to this.  When able to blow, usually clear with few yellow drainage.  Onset of symptoms was 2 1/2 week(s) ago.  Course of illness is worsening.    Severity moderate  Current and Associated symptoms: nasal congestion  Treatment measures tried include zyrtec, benadryl, sinus nasal spray, .  Predisposing factors include allergic rhinitis, HTN.    Past Medical History:   Diagnosis Date     Allergic rhinitis      Anxiety      Aortic valve sclerosis      Back pain      GERD (gastroesophageal reflux disease)      Heart block AV complete (H) 2008    pacemaker     Hyperlipidemia      Hypertension      Major depression      TRAVIS (obstructive sleep apnea)     moderate     Osteoarthritis      Squamous cell carcinoma, face      Current Outpatient Medications   Medication Sig Dispense Refill     alendronate (FOSAMAX) 70 MG tablet Take 1 tablet (70 mg) by mouth every 7 days 12 tablet 3     atorvastatin (LIPITOR) 10 MG tablet Take 1 tablet (10 mg) by mouth daily 90 tablet 3     Calcium Carb-Cholecalciferol (CALCIUM 600/VITAMIN D3) 600-800 MG-UNIT TABS        carvedilol (COREG) 6.25 MG tablet Take 1 tablet (6.25 mg) by mouth 2 times daily (with meals) 120 tablet 3     cetirizine (ZYRTEC) 10 MG tablet Take 1 tablet (10 mg) by mouth daily 30 tablet 3     chlorthalidone (HYGROTON) 25 MG tablet Take 0.5 tablets (12.5 mg) by mouth daily 30 tablet 0     FEXOFENADINE HCL PO Take 180 mg by mouth daily       fish oil-omega-3 fatty acids 1000 MG capsule Take 2 g by mouth daily       fluticasone (FLONASE) 50 MCG/ACT spray Spray 1 spray into both nostrils daily       MELATONIN PO Take 5 mg by mouth At Bedtime       multivitamin, therapeutic with minerals (MULTI-VITAMIN) TABS tablet Take 1 tablet by mouth daily       potassium chloride ER  (K-DUR/KLOR-CON M) 20 MEQ CR tablet Take 1 tablet (20 mEq) by mouth daily 30 tablet 3     tolterodine ER (DETROL LA) 4 MG 24 hr capsule TAKE ONE CAPSULE BY MOUTH ONCE DAILY 30 capsule 3     Social History     Tobacco Use     Smoking status: Former Smoker     Packs/day: 0.50     Years: 3.00     Pack years: 1.50     Types: Cigarettes     Smokeless tobacco: Never Used   Substance Use Topics     Alcohol use: Yes     Comment: rarely, yearly       ROS:  Review of systems negative except as stated above.    OBJECTIVE:  BP (!) 153/84   Pulse 80   Temp 96.5  F (35.8  C)   Wt 81.3 kg (179 lb 3.2 oz)   SpO2 98%   BMI 32.77 kg/m    GENERAL APPEARANCE: healthy, alert and no distress  EYES: EOMI,  PERRL, conjunctiva clear  HENT: ear canals and TM's normal.  Nose and mouth without ulcers, erythema or lesions  NECK: supple, nontender, no lymphadenopathy  RESP: lungs clear to auscultation - no rales, rhonchi or wheezes  CV: regular rates and rhythm  PSYCH: mentation appears normal and affect normal/bright    ASSESSMENT/PLAN:  (J01.90) Acute sinusitis with symptoms > 10 days  (primary encounter diagnosis)  Plan: fluticasone (FLONASE) 50 MCG/ACT nasal spray,         amoxicillin-clavulanate (AUGMENTIN) 875-125 MG         tablet            Reassurance given, reviewed symptomatic treatment with tylenol, rest and plenty of fluids.  RX Augmentin given for sinus infection due to prolong symptoms and co-morbid medical problems.  RX flonase given to help with nasal congestion, minimize use of afrin.  Okay to take zyrtec - 2 tablets daily if needed.      BP elevated today, has not taken her medication as she has been here at the clinic.  Encourage medication adherence and to recheck BP with nurse visit or thru pharmacy    Follow up with primary provider if no improvement of symptoms in 2 weeks    Zia Whaley MD, MD  October 16, 2019 6:06 PM

## 2019-10-29 ENCOUNTER — ANCILLARY PROCEDURE (OUTPATIENT)
Dept: CARDIOLOGY | Facility: CLINIC | Age: 79
End: 2019-10-29
Attending: INTERNAL MEDICINE
Payer: COMMERCIAL

## 2019-10-29 DIAGNOSIS — Z95.0 PACEMAKER: ICD-10-CM

## 2019-10-29 PROCEDURE — 93296 REM INTERROG EVL PM/IDS: CPT | Performed by: INTERNAL MEDICINE

## 2019-10-29 PROCEDURE — 93294 REM INTERROG EVL PM/LDLS PM: CPT | Performed by: INTERNAL MEDICINE

## 2019-11-04 LAB
MDC_IDC_LEAD_IMPLANT_DT: NORMAL
MDC_IDC_LEAD_IMPLANT_DT: NORMAL
MDC_IDC_LEAD_LOCATION: NORMAL
MDC_IDC_LEAD_LOCATION: NORMAL
MDC_IDC_LEAD_LOCATION_DETAIL_1: NORMAL
MDC_IDC_LEAD_LOCATION_DETAIL_1: NORMAL
MDC_IDC_LEAD_MFG: NORMAL
MDC_IDC_LEAD_MFG: NORMAL
MDC_IDC_LEAD_MODEL: NORMAL
MDC_IDC_LEAD_MODEL: NORMAL
MDC_IDC_LEAD_POLARITY_TYPE: NORMAL
MDC_IDC_LEAD_POLARITY_TYPE: NORMAL
MDC_IDC_LEAD_SERIAL: NORMAL
MDC_IDC_LEAD_SERIAL: NORMAL
MDC_IDC_MSMT_BATTERY_DTM: NORMAL
MDC_IDC_MSMT_BATTERY_IMPEDANCE: 485 OHM
MDC_IDC_MSMT_BATTERY_REMAINING_LONGEVITY: 87 MO
MDC_IDC_MSMT_BATTERY_STATUS: NORMAL
MDC_IDC_MSMT_BATTERY_VOLTAGE: 2.79 V
MDC_IDC_MSMT_LEADCHNL_RA_IMPEDANCE_VALUE: 584 OHM
MDC_IDC_MSMT_LEADCHNL_RA_PACING_THRESHOLD_AMPLITUDE: 0.38 V
MDC_IDC_MSMT_LEADCHNL_RA_PACING_THRESHOLD_PULSEWIDTH: 0.4 MS
MDC_IDC_MSMT_LEADCHNL_RV_IMPEDANCE_VALUE: 567 OHM
MDC_IDC_MSMT_LEADCHNL_RV_PACING_THRESHOLD_AMPLITUDE: 1.25 V
MDC_IDC_MSMT_LEADCHNL_RV_PACING_THRESHOLD_PULSEWIDTH: 0.4 MS
MDC_IDC_PG_IMPLANT_DTM: NORMAL
MDC_IDC_PG_MFG: NORMAL
MDC_IDC_PG_MODEL: NORMAL
MDC_IDC_PG_SERIAL: NORMAL
MDC_IDC_PG_TYPE: NORMAL
MDC_IDC_SESS_CLINIC_NAME: NORMAL
MDC_IDC_SESS_DTM: NORMAL
MDC_IDC_SESS_TYPE: NORMAL
MDC_IDC_SET_BRADY_AT_MODE_SWITCH_MODE: NORMAL
MDC_IDC_SET_BRADY_AT_MODE_SWITCH_RATE: 175 {BEATS}/MIN
MDC_IDC_SET_BRADY_LOWRATE: 60 {BEATS}/MIN
MDC_IDC_SET_BRADY_MAX_SENSOR_RATE: 130 {BEATS}/MIN
MDC_IDC_SET_BRADY_MAX_TRACKING_RATE: 130 {BEATS}/MIN
MDC_IDC_SET_BRADY_MODE: NORMAL
MDC_IDC_SET_BRADY_PAV_DELAY_LOW: 200 MS
MDC_IDC_SET_BRADY_SAV_DELAY_LOW: 180 MS
MDC_IDC_SET_LEADCHNL_RA_PACING_AMPLITUDE: 1.5 V
MDC_IDC_SET_LEADCHNL_RA_PACING_ANODE_ELECTRODE_1: NORMAL
MDC_IDC_SET_LEADCHNL_RA_PACING_ANODE_LOCATION_1: NORMAL
MDC_IDC_SET_LEADCHNL_RA_PACING_CAPTURE_MODE: NORMAL
MDC_IDC_SET_LEADCHNL_RA_PACING_CATHODE_ELECTRODE_1: NORMAL
MDC_IDC_SET_LEADCHNL_RA_PACING_CATHODE_LOCATION_1: NORMAL
MDC_IDC_SET_LEADCHNL_RA_PACING_POLARITY: NORMAL
MDC_IDC_SET_LEADCHNL_RA_PACING_PULSEWIDTH: 0.4 MS
MDC_IDC_SET_LEADCHNL_RA_SENSING_ANODE_ELECTRODE_1: NORMAL
MDC_IDC_SET_LEADCHNL_RA_SENSING_ANODE_LOCATION_1: NORMAL
MDC_IDC_SET_LEADCHNL_RA_SENSING_CATHODE_ELECTRODE_1: NORMAL
MDC_IDC_SET_LEADCHNL_RA_SENSING_CATHODE_LOCATION_1: NORMAL
MDC_IDC_SET_LEADCHNL_RA_SENSING_POLARITY: NORMAL
MDC_IDC_SET_LEADCHNL_RA_SENSING_SENSITIVITY: 1 MV
MDC_IDC_SET_LEADCHNL_RV_PACING_AMPLITUDE: 2.5 V
MDC_IDC_SET_LEADCHNL_RV_PACING_ANODE_ELECTRODE_1: NORMAL
MDC_IDC_SET_LEADCHNL_RV_PACING_ANODE_LOCATION_1: NORMAL
MDC_IDC_SET_LEADCHNL_RV_PACING_CAPTURE_MODE: NORMAL
MDC_IDC_SET_LEADCHNL_RV_PACING_CATHODE_ELECTRODE_1: NORMAL
MDC_IDC_SET_LEADCHNL_RV_PACING_CATHODE_LOCATION_1: NORMAL
MDC_IDC_SET_LEADCHNL_RV_PACING_POLARITY: NORMAL
MDC_IDC_SET_LEADCHNL_RV_PACING_PULSEWIDTH: 0.4 MS
MDC_IDC_SET_LEADCHNL_RV_SENSING_ANODE_ELECTRODE_1: NORMAL
MDC_IDC_SET_LEADCHNL_RV_SENSING_ANODE_LOCATION_1: NORMAL
MDC_IDC_SET_LEADCHNL_RV_SENSING_CATHODE_ELECTRODE_1: NORMAL
MDC_IDC_SET_LEADCHNL_RV_SENSING_CATHODE_LOCATION_1: NORMAL
MDC_IDC_SET_LEADCHNL_RV_SENSING_POLARITY: NORMAL
MDC_IDC_SET_LEADCHNL_RV_SENSING_SENSITIVITY: 2.8 MV
MDC_IDC_SET_ZONE_DETECTION_INTERVAL: 333.33 MS
MDC_IDC_SET_ZONE_DETECTION_INTERVAL: 342.86 MS
MDC_IDC_SET_ZONE_TYPE: NORMAL
MDC_IDC_SET_ZONE_TYPE: NORMAL
MDC_IDC_STAT_AT_BURDEN_PERCENT: 0 %
MDC_IDC_STAT_AT_DTM_END: NORMAL
MDC_IDC_STAT_AT_DTM_START: NORMAL
MDC_IDC_STAT_AT_MODE_SW_COUNT: 1
MDC_IDC_STAT_BRADY_AP_VP_PERCENT: 33 %
MDC_IDC_STAT_BRADY_AP_VS_PERCENT: 0 %
MDC_IDC_STAT_BRADY_AS_VP_PERCENT: 67 %
MDC_IDC_STAT_BRADY_AS_VS_PERCENT: 0 %
MDC_IDC_STAT_BRADY_DTM_END: NORMAL
MDC_IDC_STAT_BRADY_DTM_START: NORMAL
MDC_IDC_STAT_EPISODE_RECENT_COUNT: 0
MDC_IDC_STAT_EPISODE_RECENT_COUNT: 1
MDC_IDC_STAT_EPISODE_RECENT_COUNT_DTM_END: NORMAL
MDC_IDC_STAT_EPISODE_RECENT_COUNT_DTM_END: NORMAL
MDC_IDC_STAT_EPISODE_RECENT_COUNT_DTM_START: NORMAL
MDC_IDC_STAT_EPISODE_RECENT_COUNT_DTM_START: NORMAL
MDC_IDC_STAT_EPISODE_TYPE: NORMAL
MDC_IDC_STAT_EPISODE_TYPE: NORMAL

## 2019-11-06 DIAGNOSIS — I10 BENIGN ESSENTIAL HYPERTENSION: ICD-10-CM

## 2019-11-06 DIAGNOSIS — R06.09 DYSPNEA ON EXERTION: ICD-10-CM

## 2019-11-06 RX ORDER — CHLORTHALIDONE 25 MG/1
12.5 TABLET ORAL DAILY
Qty: 90 TABLET | Refills: 1 | Status: SHIPPED | OUTPATIENT
Start: 2019-11-06 | End: 2020-10-28

## 2019-11-06 NOTE — TELEPHONE ENCOUNTER
Medication Refilled: Chlorthalidone  Last office visit: 19  Last Labs/EK19  Next office visit: 2020 - orders in [facility]  Pharmacy sent to: [facility] pharmacy Emiliana Omer RN

## 2019-11-25 DIAGNOSIS — G47.33 OBSTRUCTIVE SLEEP APNEA (ADULT) (PEDIATRIC): Primary | ICD-10-CM

## 2019-11-26 ENCOUNTER — DOCUMENTATION ONLY (OUTPATIENT)
Dept: SLEEP MEDICINE | Facility: CLINIC | Age: 79
End: 2019-11-26

## 2019-11-26 NOTE — PROGRESS NOTES
Patient was offered choice of vendor and chose [facility].  Patient Malika Velasco was set up at [facility] on November 26, 2019. Patient received a [employer] DreamStation Auto. Pressures were set at 10-16 cm H2O.   Patient s ramp is 5 cm H2O for Auto and FLEX/EPR is A Flex, 2.  Patient received a [employer] Mask name: FORMA  Full Face mask size Medium, Large, heated tubing and heated humidifier.  Patient is enrolled in the STM Program and does need to meet compliance. Patient has a follow up on 01/23/2019 with Bennett Goltz, PA-C. Ashley Turcotte

## 2019-12-02 ENCOUNTER — DOCUMENTATION ONLY (OUTPATIENT)
Dept: SLEEP MEDICINE | Facility: CLINIC | Age: 79
End: 2019-12-02

## 2019-12-02 NOTE — PROGRESS NOTES
3 DAY STM VISIT    Diagnostic AHI: 18    PSG    Patient contacted for 3 day STM visit  Subjective measures:  Pt states things are going well and has no issues or complaints.  Pt is benefiting from therapy.      Replacement device: Yes  STM ordered by provider: Yes     Device type: Auto-CPAP  PAP settings from order::  CPAP min 10 cm  H20       CPAP max 16 cm  H20         Mask type:    Full Face Mask     Device settings from machine      Min CPAP 10            Max CPAP 16         Assessment: Nightly usage over four hours.  Action plan: Patient to have 14 day STM visit. Patient has a follow up visit scheduled:   yes within 31-90 days of set up.    Total time spent on remote patient monitoring data analysis and patient contact today:   12  minutes

## 2019-12-03 DIAGNOSIS — R35.0 URINARY FREQUENCY: ICD-10-CM

## 2019-12-03 NOTE — TELEPHONE ENCOUNTER
"Requested Prescriptions   Pending Prescriptions Disp Refills     tolterodine ER (DETROL LA) 4 MG 24 hr capsule [Pharmacy Med Name: TOLTERODINE TARTRATE ER 4MG CP24] 30 capsule 3     Sig: TAKE ONE CAPSULE BY MOUTH ONCE DAILY  Last Written Prescription Date:  8/6/19  Last Fill Quantity: 30 cap,  # refills: 3   Last office visit: 4/5/2019 with prescribing provider:  Ramon   Future Office Visit:         Muscarinic Antagonists (Urinary Incontinence Agents) Passed - 12/3/2019  8:23 AM        Passed - Recent (12 mo) or future (30 days) visit within the authorizing provider's specialty     Patient has had an office visit with the authorizing provider or a provider within the authorizing providers department within the previous 12 mos or has a future within next 30 days. See \"Patient Info\" tab in inbasket, or \"Choose Columns\" in Meds & Orders section of the refill encounter.              Passed - Patient does not have a diagnosis of glaucoma on the problem list     If glaucoma diagnosis is new, refer refill to physician.          Passed - Medication is active on med list        Passed - Patient is 18 years of age or older           "

## 2019-12-04 RX ORDER — TOLTERODINE 4 MG/1
CAPSULE, EXTENDED RELEASE ORAL
Qty: 30 CAPSULE | Refills: 3 | Status: SHIPPED | OUTPATIENT
Start: 2019-12-04 | End: 2020-02-07 | Stop reason: ALTCHOICE

## 2019-12-11 ENCOUNTER — DOCUMENTATION ONLY (OUTPATIENT)
Dept: SLEEP MEDICINE | Facility: CLINIC | Age: 79
End: 2019-12-11

## 2019-12-11 NOTE — PROGRESS NOTES
14 DAY STM VISIT    Diagnostic AHI: 18   PSG      Message left for patient to return call     Assessment: Pt not meeting objective benchmarks for AHI    Frequent breaks in therapy during the night where she turns ramp back on.  During these ramp times she is having clusters of apnea events.     Action plan: waiting for patient to return call.  and pt to have 30 day STM visit.    Device type: Auto-CPAP    PAP settings: CPAP min 10 cm  H20       CPAP max 16 cm  H20             90th % [id number] cm  H20    Mask type:  Full Face Mask    Objective measures: 14 day rolling measures         Compliance  85 %     % of night spent in large leak  1 % last  upload      AHI 7.32   last  Upload-events occurring primarily during ramp periods      Average number of minutes 368          Objective measure goal  Compliance   Goal >70%  Leak   Goal < 10%  AHI  Goal < 5  Usage  Goal >240      Total time spent on accessing, reviewing and interpreting remote patient PAP therapy data:   15  minutes      Total time spent with direct patient communication :   1 minutes

## 2019-12-12 NOTE — PROGRESS NOTES
Patient returned call.    Subjective measures: pt is still waking frequently during night, however she is not yet feeling benefit from therapy.        Assessment: Pt not meeting objective benchmarks for AHI Patient failing following subjective benchmarks: pressure issues and not feeling benefit from therapy    Action plan:pt to have 30 day STM visit.  Changed to a shorter ramp period due to elevated AHI during long ramp times.     Total time spent on remote patient monitoring data analysis and patient contact today:   5 minutes

## 2019-12-31 ENCOUNTER — DOCUMENTATION ONLY (OUTPATIENT)
Dept: SLEEP MEDICINE | Facility: CLINIC | Age: 79
End: 2019-12-31
Payer: COMMERCIAL

## 2019-12-31 NOTE — PROGRESS NOTES
30 DAY [facility] VISIT    Diagnostic AHI: 18  PSG      Message left for patient to return call.    Assessment: Pt meeting objective benchmarks.     Action plan: waiting for patient to return call.   Patient has scheduled a follow up visit with Bennett Goltz, PA-C on 1/23/2020.  Device type: Auto-CPAP  PAP settings: CPAP min 10 cm  H20     CPAP max 16 cm  H20     90th % [id number] cm  H20    Mask type:  Full Face Mask    Objective measures: 14 day rolling measures         Compliance  100 %     % of night spent in large leak  3 % last  upload      AHI 5.79   last  upload      Average number of minutes 412          Objective measure goal  Compliance   Goal >70%  Leak   Goal < 10%  AHI  Goal < 5  Usage  Goal >240      Total time spent on accessing, reviewing and interpreting remote patient PAP therapy data:   10 minutes      Total time spent with direct patient communication :   1 minutes    Total time spent on remote patient monitoring analysis for last 30 days:   44 min

## 2020-01-14 ENCOUNTER — OFFICE VISIT (OUTPATIENT)
Dept: INTERNAL MEDICINE | Facility: CLINIC | Age: 80
End: 2020-01-14
Payer: COMMERCIAL

## 2020-01-14 VITALS
HEART RATE: 75 BPM | WEIGHT: 183 LBS | SYSTOLIC BLOOD PRESSURE: 160 MMHG | BODY MASS INDEX: 33.46 KG/M2 | DIASTOLIC BLOOD PRESSURE: 84 MMHG | RESPIRATION RATE: 16 BRPM | OXYGEN SATURATION: 95 % | TEMPERATURE: 97.6 F

## 2020-01-14 DIAGNOSIS — I10 BENIGN ESSENTIAL HYPERTENSION: ICD-10-CM

## 2020-01-14 DIAGNOSIS — G44.219 EPISODIC TENSION-TYPE HEADACHE, NOT INTRACTABLE: Primary | ICD-10-CM

## 2020-01-14 DIAGNOSIS — R11.2 NON-INTRACTABLE VOMITING WITH NAUSEA, UNSPECIFIED VOMITING TYPE: ICD-10-CM

## 2020-01-14 LAB
ANION GAP SERPL CALCULATED.3IONS-SCNC: 5 MMOL/L (ref 3–14)
BASOPHILS # BLD AUTO: 0 10E9/L (ref 0–0.2)
BASOPHILS NFR BLD AUTO: 0.3 %
BUN SERPL-MCNC: 13 MG/DL (ref 7–30)
CALCIUM SERPL-MCNC: 9.4 MG/DL (ref 8.5–10.1)
CHLORIDE SERPL-SCNC: 99 MMOL/L (ref 94–109)
CO2 SERPL-SCNC: 28 MMOL/L (ref 20–32)
CREAT SERPL-MCNC: 0.52 MG/DL (ref 0.52–1.04)
DIFFERENTIAL METHOD BLD: ABNORMAL
EOSINOPHIL # BLD AUTO: 0.2 10E9/L (ref 0–0.7)
EOSINOPHIL NFR BLD AUTO: 1.7 %
ERYTHROCYTE [DISTWIDTH] IN BLOOD BY AUTOMATED COUNT: 14.7 % (ref 10–15)
GFR SERPL CREATININE-BSD FRML MDRD: >90 ML/MIN/{1.73_M2}
GLUCOSE SERPL-MCNC: 146 MG/DL (ref 70–99)
HCT VFR BLD AUTO: 41.4 % (ref 35–47)
HGB BLD-MCNC: 13.9 G/DL (ref 11.7–15.7)
LYMPHOCYTES # BLD AUTO: 1.2 10E9/L (ref 0.8–5.3)
LYMPHOCYTES NFR BLD AUTO: 11.1 %
MCH RBC QN AUTO: 26.9 PG (ref 26.5–33)
MCHC RBC AUTO-ENTMCNC: 33.6 G/DL (ref 31.5–36.5)
MCV RBC AUTO: 80 FL (ref 78–100)
MONOCYTES # BLD AUTO: 0.7 10E9/L (ref 0–1.3)
MONOCYTES NFR BLD AUTO: 6 %
NEUTROPHILS # BLD AUTO: 8.8 10E9/L (ref 1.6–8.3)
NEUTROPHILS NFR BLD AUTO: 80.9 %
PLATELET # BLD AUTO: 308 10E9/L (ref 150–450)
POTASSIUM SERPL-SCNC: 3.8 MMOL/L (ref 3.4–5.3)
RBC # BLD AUTO: 5.17 10E12/L (ref 3.8–5.2)
SODIUM SERPL-SCNC: 132 MMOL/L (ref 133–144)
WBC # BLD AUTO: 10.8 10E9/L (ref 4–11)

## 2020-01-14 PROCEDURE — 96372 THER/PROPH/DIAG INJ SC/IM: CPT | Performed by: PHYSICIAN ASSISTANT

## 2020-01-14 PROCEDURE — 99214 OFFICE O/P EST MOD 30 MIN: CPT | Mod: 25 | Performed by: PHYSICIAN ASSISTANT

## 2020-01-14 PROCEDURE — 85025 COMPLETE CBC W/AUTO DIFF WBC: CPT | Performed by: PHYSICIAN ASSISTANT

## 2020-01-14 PROCEDURE — 80048 BASIC METABOLIC PNL TOTAL CA: CPT | Performed by: PHYSICIAN ASSISTANT

## 2020-01-14 PROCEDURE — 36415 COLL VENOUS BLD VENIPUNCTURE: CPT | Performed by: PHYSICIAN ASSISTANT

## 2020-01-14 RX ORDER — KETOROLAC TROMETHAMINE 30 MG/ML
30 INJECTION, SOLUTION INTRAMUSCULAR; INTRAVENOUS ONCE
Status: COMPLETED | OUTPATIENT
Start: 2020-01-14 | End: 2020-01-14

## 2020-01-14 RX ORDER — ONDANSETRON 4 MG/1
4 TABLET, ORALLY DISINTEGRATING ORAL ONCE
Status: COMPLETED | OUTPATIENT
Start: 2020-01-14 | End: 2020-01-14

## 2020-01-14 RX ORDER — CARVEDILOL 12.5 MG/1
12.5 TABLET ORAL 2 TIMES DAILY WITH MEALS
Qty: 60 TABLET | Refills: 3 | Status: SHIPPED | OUTPATIENT
Start: 2020-01-14 | End: 2020-06-04

## 2020-01-14 RX ORDER — ONDANSETRON 4 MG/1
4 TABLET, ORALLY DISINTEGRATING ORAL EVERY 8 HOURS PRN
Qty: 10 TABLET | Refills: 0 | Status: SHIPPED | OUTPATIENT
Start: 2020-01-14 | End: 2020-02-04

## 2020-01-14 RX ADMIN — KETOROLAC TROMETHAMINE 30 MG: 30 INJECTION, SOLUTION INTRAMUSCULAR; INTRAVENOUS at 14:35

## 2020-01-14 RX ADMIN — ONDANSETRON 4 MG: 4 TABLET, ORALLY DISINTEGRATING ORAL at 14:33

## 2020-01-14 NOTE — PATIENT INSTRUCTIONS
Check blood pressure at home     Recheck with Dr Navarro or cardiology in one month.    Increase coreg to 12.5 mg twice a day   TWO of the 6.25 mg tabs twice a day       Monitor headache and nausea rest this week. Fluids.   If new symptoms recheck in clinic.

## 2020-01-14 NOTE — PROGRESS NOTES
Subjective     Malika Velasco is a 79 year old female who presents to clinic today for the following health issues:    HPI   Headache  Onset: 3-4 days    Description:   Location: bilateral in the frontal area, bilateral in the temporal area   Character: throbbing pain, dull pain, sharp pain, squeezing pain  Frequency:  2-3x a day  Duration:  hours    Intensity: severe    Progression of Symptoms:  worsening    Accompanying Signs & Symptoms:  Stiff neck: no  Neck or upper back pain: no  Fever: no  Sinus pressure: no  Nausea or vomiting: YES- vomited once- very gassy  Dizziness: no  Numbness: no  Weakness: YES- mild  Visual changes: no    History:   Head trauma: no  Family history of migraines: YES- self- but has been a long time since last one  Previous tests for headaches: no  Neurologist evaluations: no  Able to do daily activities: YES- until today  Wake with a headaches: YES- this am  Do headaches wake you up: YES- last night  Daily pain medication use: YES- ibuprofen  Work/school stressors/changes: no    Precipitating factors:   Does light make it worse: no  Does sound make it worse: no    Alleviating factors:  Does sleep help: YES    Therapies Tried and outcome: Tylenol- mildly helpful  Dull headache mostly, this am more sharp  No recent runny nose, congestion or sore throat.  No abdominal pain or diarrhea   Denies any dysuria    -------------------------------------    BP Readings from Last 3 Encounters:   01/14/20 (!) 160/84   10/16/19 (!) 153/84   09/19/19 (!) 141/72    Wt Readings from Last 3 Encounters:   01/14/20 83 kg (183 lb)   10/16/19 81.3 kg (179 lb 3.2 oz)   09/19/19 79.8 kg (176 lb)                    -------------------------------------  Reviewed and updated as needed this visit by Provider  Allergies  Meds         Review of Systems   ROS COMP: Constitutional, HEENT, cardiovascular, pulmonary, gi and gu systems are negative, except as otherwise noted.      Objective    BP (!) 160/84 (BP  Location: Left arm, Patient Position: Chair, Cuff Size: Adult Regular)   Pulse 75   Temp 97.6  F (36.4  C) (Oral)   Resp 16   Wt 83 kg (183 lb)   SpO2 95%   BMI 33.46 kg/m    Body mass index is 33.46 kg/m .  Physical Exam   GENERAL: healthy, alert and no distress  HENT: normal cephalic/atraumatic, ear canals and TM's normal, nose and mouth without ulcers or lesions, oropharynx clear, oral mucous membranes moist and No temporal tenderness  NECK: no adenopathy, no asymmetry, masses, or scars and thyroid normal to palpation  RESP: lungs clear to auscultation - no rales, rhonchi or wheezes  CV: regular rates and rhythm and normal S1 S2, no S3 or S4  ABDOMEN: soft, nontender, no hepatosplenomegaly, no masses and bowel sounds normal  MS: no gross musculoskeletal defects noted, no edema  SKIN: no suspicious lesions or rashes    Diagnostic Test Results:  Results for orders placed or performed in visit on 01/14/20 (from the past 24 hour(s))   CBC with platelets differential   Result Value Ref Range    WBC 10.8 4.0 - 11.0 10e9/L    RBC Count 5.17 3.8 - 5.2 10e12/L    Hemoglobin 13.9 11.7 - 15.7 g/dL    Hematocrit 41.4 35.0 - 47.0 %    MCV 80 78 - 100 fl    MCH 26.9 26.5 - 33.0 pg    MCHC 33.6 31.5 - 36.5 g/dL    RDW 14.7 10.0 - 15.0 %    Platelet Count 308 150 - 450 10e9/L    % Neutrophils 80.9 %    % Lymphocytes 11.1 %    % Monocytes 6.0 %    % Eosinophils 1.7 %    % Basophils 0.3 %    Absolute Neutrophil 8.8 (H) 1.6 - 8.3 10e9/L    Absolute Lymphocytes 1.2 0.8 - 5.3 10e9/L    Absolute Monocytes 0.7 0.0 - 1.3 10e9/L    Absolute Eosinophils 0.2 0.0 - 0.7 10e9/L    Absolute Basophils 0.0 0.0 - 0.2 10e9/L    Diff Method Automated Method    Basic metabolic panel   Result Value Ref Range    Sodium 132 (L) 133 - 144 mmol/L    Potassium 3.8 3.4 - 5.3 mmol/L    Chloride 99 94 - 109 mmol/L    Carbon Dioxide 28 20 - 32 mmol/L    Anion Gap 5 3 - 14 mmol/L    Glucose 146 (H) 70 - 99 mg/dL    Urea Nitrogen 13 7 - 30 mg/dL     "Creatinine 0.52 0.52 - 1.04 mg/dL    GFR Estimate >90 >60 mL/min/[1.73_m2]    GFR Estimate If Black >90 >60 mL/min/[1.73_m2]    Calcium 9.4 8.5 - 10.1 mg/dL           Assessment & Plan     1. Episodic tension-type headache, not intractable  Frontal headache   Does not appear migrainous   - CBC with platelets differential  - Basic metabolic panel  - ketorolac (TORADOL) injection 30 mg    2. Non-intractable vomiting with nausea, unspecified vomiting type    - CBC with platelets differential  - Basic metabolic panel  - ondansetron (ZOFRAN-ODT) ODT tab 4 mg  - ondansetron (ZOFRAN-ODT) 4 MG ODT tab; Take 1 tablet (4 mg) by mouth every 8 hours as needed for nausea  Dispense: 10 tablet; Refill: 0    3. Benign essential hypertension  Not under control based on last several BP measurements   - carvedilol (COREG) 12.5 MG tablet; Take 1 tablet (12.5 mg) by mouth 2 times daily (with meals)  Dispense: 60 tablet; Refill: 3     BMI:   Estimated body mass index is 33.46 kg/m  as calculated from the following:    Height as of 9/19/19: 1.575 m (5' 2.01\").    Weight as of this encounter: 83 kg (183 lb).   Weight management plan: Patient was referred to their PCP to discuss a diet and exercise plan.        Patient Instructions   Check blood pressure at home     Recheck with Dr Navarro or cardiology in one month.    Increase coreg to 12.5 mg twice a day   TWO of the 6.25 mg tabs twice a day       Monitor headache and nausea rest this week. Fluids.   If new symptoms recheck in clinic.           Return in about 4 weeks (around 2/11/2020) for BP Recheck cardiology or Dr Navarro .    Kesha Fermin PA-C  [facility]      "

## 2020-01-16 ENCOUNTER — OFFICE VISIT (OUTPATIENT)
Dept: URGENT CARE | Facility: URGENT CARE | Age: 80
End: 2020-01-16
Payer: COMMERCIAL

## 2020-01-16 ENCOUNTER — TELEPHONE (OUTPATIENT)
Dept: INTERNAL MEDICINE | Facility: CLINIC | Age: 80
End: 2020-01-16

## 2020-01-16 VITALS
RESPIRATION RATE: 16 BRPM | OXYGEN SATURATION: 98 % | SYSTOLIC BLOOD PRESSURE: 136 MMHG | HEART RATE: 66 BPM | TEMPERATURE: 99.1 F | BODY MASS INDEX: 33.46 KG/M2 | WEIGHT: 183 LBS | DIASTOLIC BLOOD PRESSURE: 82 MMHG

## 2020-01-16 DIAGNOSIS — R11.2 NON-INTRACTABLE VOMITING WITH NAUSEA, UNSPECIFIED VOMITING TYPE: Primary | ICD-10-CM

## 2020-01-16 DIAGNOSIS — J06.9 VIRAL URI: ICD-10-CM

## 2020-01-16 PROCEDURE — 99213 OFFICE O/P EST LOW 20 MIN: CPT | Performed by: HOSPITALIST

## 2020-01-16 RX ORDER — ONDANSETRON 4 MG/1
4 TABLET, ORALLY DISINTEGRATING ORAL EVERY 8 HOURS PRN
Qty: 20 TABLET | Refills: 0 | Status: SHIPPED | OUTPATIENT
Start: 2020-01-16 | End: 2020-02-04

## 2020-01-16 NOTE — TELEPHONE ENCOUNTER
Patient is calling because she was seen on 1/14/20 at the [facility]. She is still having a headache and feeling nauseated and she is not sure what she should do.

## 2020-01-17 NOTE — PROGRESS NOTES
Pt came here for nausea and vomiting for the last 2 days. Pt has some post nasal drip. No fever or chill. No chest pain or shortness of breath    Allergies   Allergen Reactions     Zantac [Ranitidine]      Headache       Past Medical History:   Diagnosis Date     Allergic rhinitis      Anxiety      Aortic valve sclerosis      Back pain      GERD (gastroesophageal reflux disease)      Heart block AV complete (H) 2008    pacemaker     Hyperlipidemia      Hypertension      Major depression      TRAVIS (obstructive sleep apnea)     moderate     Osteoarthritis      Squamous cell carcinoma, face        alendronate (FOSAMAX) 70 MG tablet, Take 1 tablet (70 mg) by mouth every 7 days  atorvastatin (LIPITOR) 10 MG tablet, Take 1 tablet (10 mg) by mouth daily  Calcium Carb-Cholecalciferol (CALCIUM 600/VITAMIN D3) 600-800 MG-UNIT TABS,   carvedilol (COREG) 12.5 MG tablet, Take 1 tablet (12.5 mg) by mouth 2 times daily (with meals)  cetirizine (ZYRTEC) 10 MG tablet, Take 1 tablet (10 mg) by mouth daily  chlorthalidone (HYGROTON) 25 MG tablet, Take 0.5 tablets (12.5 mg) by mouth daily  FEXOFENADINE HCL PO, Take 180 mg by mouth daily  fish oil-omega-3 fatty acids 1000 MG capsule, Take 2 g by mouth daily  fluticasone (FLONASE) 50 MCG/ACT nasal spray, Spray 2 sprays into both nostrils daily  fluticasone (FLONASE) 50 MCG/ACT spray, Spray 1 spray into both nostrils daily  MELATONIN PO, Take 5 mg by mouth At Bedtime  multivitamin, therapeutic with minerals (MULTI-VITAMIN) TABS tablet, Take 1 tablet by mouth daily  ondansetron (ZOFRAN-ODT) 4 MG ODT tab, Take 1 tablet (4 mg) by mouth every 8 hours as needed for nausea  potassium chloride ER (K-DUR/KLOR-CON M) 20 MEQ CR tablet, Take 1 tablet (20 mEq) by mouth daily  tolterodine ER (DETROL LA) 4 MG 24 hr capsule, TAKE ONE CAPSULE BY MOUTH ONCE DAILY    No current facility-administered medications on file prior to visit.       Social History     Tobacco Use     Smoking status: Former Smoker      Packs/day: 0.50     Years: 3.00     Pack years: 1.50     Types: Cigarettes     Smokeless tobacco: Never Used   Substance Use Topics     Alcohol use: Yes     Comment: rarely, yearly       ROS:  12 point ROS is done and aside that mention above all other review of system is negative    OBJECTIVE:  /82   Pulse 66   Temp 99.1  F (37.3  C) (Oral)   Resp 16   Wt 83 kg (183 lb)   SpO2 98%   BMI 33.46 kg/m    GENERAL APPEARANCE: ill. alert and moderate distress  EYES: conjunctiva clear  EARS:no cerumen.   Ear canals no erythema, TM's intact no erythema .    NOSE/MOUTH: Nose and mouth is normal, no erythema or lesions  THROAT: no erythema w/ no tonsillar enlargement . positive exudates  NECK: supple, nontender, no lymphadenopathy  RESP: lungs clear to auscultation - no rales, rhonchi or wheezes  CV: regular rates and rhythm, normal S1 S2, no murmur noted  NEURO: awake, alert        Recent Results (from the past 168 hour(s))   CBC with platelets differential    Collection Time: 01/14/20  2:12 PM   Result Value Ref Range    WBC 10.8 4.0 - 11.0 10e9/L    RBC Count 5.17 3.8 - 5.2 10e12/L    Hemoglobin 13.9 11.7 - 15.7 g/dL    Hematocrit 41.4 35.0 - 47.0 %    MCV 80 78 - 100 fl    MCH 26.9 26.5 - 33.0 pg    MCHC 33.6 31.5 - 36.5 g/dL    RDW 14.7 10.0 - 15.0 %    Platelet Count 308 150 - 450 10e9/L    % Neutrophils 80.9 %    % Lymphocytes 11.1 %    % Monocytes 6.0 %    % Eosinophils 1.7 %    % Basophils 0.3 %    Absolute Neutrophil 8.8 (H) 1.6 - 8.3 10e9/L    Absolute Lymphocytes 1.2 0.8 - 5.3 10e9/L    Absolute Monocytes 0.7 0.0 - 1.3 10e9/L    Absolute Eosinophils 0.2 0.0 - 0.7 10e9/L    Absolute Basophils 0.0 0.0 - 0.2 10e9/L    Diff Method Automated Method    Basic metabolic panel    Collection Time: 01/14/20  2:12 PM   Result Value Ref Range    Sodium 132 (L) 133 - 144 mmol/L    Potassium 3.8 3.4 - 5.3 mmol/L    Chloride 99 94 - 109 mmol/L    Carbon Dioxide 28 20 - 32 mmol/L    Anion Gap 5 3 - 14 mmol/L     Glucose 146 (H) 70 - 99 mg/dL    Urea Nitrogen 13 7 - 30 mg/dL    Creatinine 0.52 0.52 - 1.04 mg/dL    GFR Estimate >90 >60 mL/min/[1.73_m2]    GFR Estimate If Black >90 >60 mL/min/[1.73_m2]    Calcium 9.4 8.5 - 10.1 mg/dL        ASSESSMENT:     ICD-10-CM    1. Non-intractable vomiting with nausea, unspecified vomiting type R11.2 ondansetron (ZOFRAN-ODT) 4 MG ODT tab   2. Viral URI J06.9          PLAN:    Seem to be viral URI Tylenol and ibuprofen prn for  Pain or fever. Will give zofran for the nausea  Lots of rest and fluids.  Follow up in 4-7 days if not better or sooner if getting worse .    Luis Chavez MD MD

## 2020-01-22 NOTE — TELEPHONE ENCOUNTER
Patient states she is feeling much better, eating and drinking, urinating well and overall feels she is back to her baseline.  States she needs to schedule an appointment with primary care provider but will need to call back as she is not prepared to schedule at this time.  JOSE DANIEL Root R.N.

## 2020-01-23 ENCOUNTER — OFFICE VISIT (OUTPATIENT)
Dept: SLEEP MEDICINE | Facility: CLINIC | Age: 80
End: 2020-01-23
Payer: COMMERCIAL

## 2020-01-23 VITALS
HEART RATE: 75 BPM | SYSTOLIC BLOOD PRESSURE: 139 MMHG | OXYGEN SATURATION: 96 % | HEIGHT: 63 IN | DIASTOLIC BLOOD PRESSURE: 79 MMHG | BODY MASS INDEX: 32.43 KG/M2 | WEIGHT: 183 LBS

## 2020-01-23 DIAGNOSIS — G47.01 INSOMNIA DUE TO MEDICAL CONDITION: ICD-10-CM

## 2020-01-23 DIAGNOSIS — G47.33 OSA (OBSTRUCTIVE SLEEP APNEA): Primary | ICD-10-CM

## 2020-01-23 PROCEDURE — 99214 OFFICE O/P EST MOD 30 MIN: CPT | Performed by: PHYSICIAN ASSISTANT

## 2020-01-23 RX ORDER — TRAZODONE HYDROCHLORIDE 50 MG/1
TABLET, FILM COATED ORAL
Qty: 30 TABLET | Refills: 1 | Status: SHIPPED | OUTPATIENT
Start: 2020-01-23 | End: 2020-03-23

## 2020-01-23 ASSESSMENT — MIFFLIN-ST. JEOR: SCORE: 1276.27

## 2020-01-23 NOTE — NURSING NOTE
"Chief Complaint   Patient presents with     RECHECK     3 month f/u cpap       Initial /79   Pulse 75   Ht 1.588 m (5' 2.5\")   Wt 83 kg (183 lb)   SpO2 96%   BMI 32.94 kg/m   Estimated body mass index is 32.94 kg/m  as calculated from the following:    Height as of this encounter: 1.588 m (5' 2.5\").    Weight as of this encounter: 83 kg (183 lb).    Medication Reconciliation: complete        DME: yes respironic dreamstation on modem    ESS 14  "

## 2020-01-23 NOTE — PROGRESS NOTES
Sleep Follow-Up Visit:    Date on this visit: 1/23/2020    Malika Velasco comes in today for follow-up of her treatment of TRAVIS and insomnia. She was initially seen at the [facility] for management of previously diagnosed TRAVIS. She has not been sleeping well for several months.  Her medical history is significant for HTN, aortic valve sclerosis, pacemaker for AV block, hyperlipidemia, OA.       Malika had a PSG at [facility] on 1/20/2005. Her AHI was 18/hr, RDI 34/hr, O2 ankit 83%. Her weight was 139. She did not have any significant PLMs.       She is on auto CPAP 10-16 cm. Her pressures were 7-15 cm at the last visit. She uses a full face mask. She likes the new machine. She continues to wake too many times to urinate in the night. She urinates frequently in the day as well. Her pain has improved so now if is urinary frequency that is disrupting her sleep more than pain. She is comfortable with the pressures. She does get dry nose and mouth. She uses Biotene before bed.  She does not get dry mouth in the day. It is not worse than before her last pressure change.  She replaced her cushion a couple of weeks ago and her mask leak has improved. She uses a [employer] mask. She likes it. Her jaw used to drop with other styles. Her humidifier does not run out, but it comes close.     The compliance data shows that the patient used the CPAP for 30/30 nights, 100% of nights for >4 hours.  The 90th% pressure is 11.8 cm.  The average time in large leak is 10 min.  The average nightly usage is 6:58.  The average AHI is 5.7/hr (1.1/hr CA, 1.6/hr OA, 3/hr hyp). The snore index is 3.6 The download shows she wakes 2-6 times per night and hits the ramp.    Her sleep schedule is pretty consistent between 10-11PM to 6-7 AM. She does not purposefully nap, but she does often doze watching TV, especially in the afternoon. She will get up and try to do something. She plans to sign up at the  again to join  [employer]. Her ESS is 14/24.    She has been on trazodone in the past and recalls it worked for her.     Past medical/surgical history, family history, social history, medications and allergies were reviewed.      Problem List:  Patient Active Problem List    Diagnosis Date Noted     ACP (advance care planning) 07/26/2017     Priority: Medium     Advance Care Planning 7/26/2017: Recommend Code Status in chart and patient's Advance Care Planning documents be reviewed to ensure alignment with patient's current wishes.  Added by Vicky Antony [facility] Advance Care Planning Liaison       TRAVIS (obstructive sleep apnea)      Priority: Medium     Moderate on CPAP       Aortic valve sclerosis      Priority: Medium     Pacemaker 03/09/2017     Priority: Medium     For complete heart block       Hyperlipidemia LDL goal <130 03/09/2017     Priority: Medium     Benign essential hypertension 03/09/2017     Priority: Medium     Osteopenia 03/09/2017     Priority: Medium     Osteoarthritis      Priority: Medium     Squamous cell carcinoma, face      Priority: Medium     Back pain      Priority: Medium        Impression/Plan:    (G47.33) TRAVIS (obstructive sleep apnea)  (primary encounter diagnosis)  Comment: Her apnea appears mostly well treated. Occasionally, she will have a mildly elevated AHI, sometimes central apnea, sometimes obstructive. Mask leak has been better since changing her mask cushion.  Plan: Continue auto CPAP 10-16 cm.     (G47.01) Insomnia due to medical condition  Comment: Her sleep is very fragmented, waking 2-5+ times per night. She attributes this to waking to urinate. She used to attribute it to pain, but that has improved. She urinated frequently in the day as well. Tolterodine has not helped. Her sleep schedule is regular and not excessive. I do not think sleep compression will help in this case. She does doze when sitting in the recliner.  Plan: traZODone (DESYREL) 50 MG tablet        Take 25-50 mg  trazodone at bedtime. She will work on being more active in the daytime, plans to join the [employer]. That should help reduce inadvertent dozing, which should further help her sleep consolidation. She may quit the tolterodine, she was advised to notify her prescribing provider if she does.      She will follow up with me in about 2-3 month(s).     Twenty-five minutes spent with patient, all of which were spent face-to-face counseling, consulting, coordinating plan of care.      Bennett Goltz, PA-C    CC: No ref. provider found

## 2020-01-23 NOTE — NURSING NOTE
"Chief Complaint   Patient presents with     RECHECK     3 month f/u cpap       Initial /79   Pulse 75   Ht 1.588 m (5' 2.5\")   Wt 83 kg (183 lb)   SpO2 96%   BMI 32.94 kg/m   Estimated body mass index is 32.94 kg/m  as calculated from the following:    Height as of this encounter: 1.588 m (5' 2.5\").    Weight as of this encounter: 83 kg (183 lb).    Medication Reconciliation: complete        DME:yes    ESS 14  "

## 2020-01-23 NOTE — PATIENT INSTRUCTIONS
Your blood pressure was checked while you were in clinic today.  Please read the guidelines below about what these numbers mean and what you should do about them.  Your systolic blood pressure is the top number.  This is the pressure when the heart is pumping.  Your diastolic blood pressure is the bottom number.  This is the pressure in between beats.  If your systolic blood pressure is less than 120 and your diastolic blood pressure is less than 80, then your blood pressure is normal. There is nothing more that you need to do about it  If your systolic blood pressure is 120-139 or your diastolic blood pressure is 80-89, your blood pressure may be higher than it should be.  You should have your blood pressure re-checked within a year by a primary care provider.  If your systolic blood pressure is 140 or greater or your diastolic blood pressure is 90 or greater, you may have high blood pressure.  High blood pressure is treatable, but if left untreated over time it can put you at risk for heart attack, stroke, or kidney failure.  You should have your blood pressure re-checked by a primary care provider within the next four weeks.  Your BMI is There is no height or weight on file to calculate BMI.  Weight management is a personal decision.  If you are interested in exploring weight loss strategies, the following discussion covers the approaches that may be successful. Body mass index (BMI) is one way to tell whether you are at a healthy weight, overweight, or obese. It measures your weight in relation to your height.  A BMI of 18.5 to 24.9 is in the healthy range. A person with a BMI of 25 to 29.9 is considered overweight, and someone with a BMI of 30 or greater is considered obese. More than two-thirds of American adults are considered overweight or obese.  Being overweight or obese increases the risk for further weight gain. Excess weight may lead to heart disease and diabetes.  Creating and following plans for  healthy eating and physical activity may help you improve your health.  Weight control is part of healthy lifestyle and includes exercise, emotional health, and healthy eating habits. Careful eating habits lifelong are the mainstay of weight control. Though there are significant health benefits from weight loss, long-term weight loss with diet alone may be very difficult to achieve- studies show long-term success with dietary management in less than 10% of people. Attaining a healthy weight may be especially difficult to achieve in those with severe obesity. In some cases, medications, devices and surgical management might be considered.  What can you do?  If you are overweight or obese and are interested in methods for weight loss, you should discuss this with your provider.     Consider reducing daily calorie intake by 500 calories.     Keep a food journal.     Avoiding skipping meals, consider cutting portions instead.    Diet combined with exercise helps maintain muscle while optimizing fat loss. Strength training is particularly important for building and maintaining muscle mass. Exercise helps reduce stress, increase energy, and improves fitness. Increasing exercise without diet control, however, may not burn enough calories to loose weight.       Start walking three days a week 10-20 minutes at a time    Work towards walking thirty minutes five days a week     Eventually, increase the speed of your walking for 1-2 minutes at time    In addition, we recommend that you review healthy lifestyles and methods for weight loss available through the National Institutes of Health patient information sites:  http://win.niddk.nih.gov/publications/index.htm    And look into health and wellness programs that may be available through your health insurance provider, employer, local community center, or [employer].

## 2020-01-24 ENCOUNTER — OFFICE VISIT (OUTPATIENT)
Dept: URGENT CARE | Facility: URGENT CARE | Age: 80
End: 2020-01-24
Payer: COMMERCIAL

## 2020-01-24 VITALS
SYSTOLIC BLOOD PRESSURE: 149 MMHG | HEART RATE: 88 BPM | DIASTOLIC BLOOD PRESSURE: 82 MMHG | TEMPERATURE: 97.2 F | OXYGEN SATURATION: 96 %

## 2020-01-24 DIAGNOSIS — R35.0 URINARY FREQUENCY: Primary | ICD-10-CM

## 2020-01-24 LAB
ALBUMIN UR-MCNC: NEGATIVE MG/DL
APPEARANCE UR: CLEAR
BACTERIA #/AREA URNS HPF: ABNORMAL /HPF
BILIRUB UR QL STRIP: NEGATIVE
COLOR UR AUTO: YELLOW
GLUCOSE UR STRIP-MCNC: NEGATIVE MG/DL
HGB UR QL STRIP: ABNORMAL
KETONES UR STRIP-MCNC: NEGATIVE MG/DL
LEUKOCYTE ESTERASE UR QL STRIP: NEGATIVE
NITRATE UR QL: NEGATIVE
PH UR STRIP: 6.5 PH (ref 5–7)
RBC #/AREA URNS AUTO: ABNORMAL /HPF
SOURCE: ABNORMAL
SP GR UR STRIP: 1.01 (ref 1–1.03)
UROBILINOGEN UR STRIP-ACNC: 0.2 EU/DL (ref 0.2–1)
WBC #/AREA URNS AUTO: ABNORMAL /HPF

## 2020-01-24 PROCEDURE — 81001 URINALYSIS AUTO W/SCOPE: CPT | Performed by: PHYSICIAN ASSISTANT

## 2020-01-24 PROCEDURE — 99213 OFFICE O/P EST LOW 20 MIN: CPT | Performed by: PHYSICIAN ASSISTANT

## 2020-01-24 ASSESSMENT — ENCOUNTER SYMPTOMS
DYSURIA: 0
NAUSEA: 0
HEMATURIA: 0
FREQUENCY: 1
VOMITING: 0
DIARRHEA: 0
FEVER: 0

## 2020-01-24 NOTE — PROGRESS NOTES
SUBJECTIVE:   Malika Velasco is a 79 year old female presenting with a chief complaint of   Chief Complaint   Patient presents with     Urgent Care     Urinary Problem     Frequent urination, burning/itchy started a couple months ago-getting worse        She is an established patient of [facility].    UTI    Onset of symptoms was 2 month(s) ago.  Course of illness is worsening  Severity moderate  Current and associated symptoms frequency. Has had some dysuria in the past couple days reason which prompted this visit.   Treatment and measures tried : She is on Detrol for this. Patient reports it is not helping.   Predisposing factors include none  Patient denies rigors, flank pain, temperature > 101 degrees F., vomiting and vaginal discharge        Review of Systems   Constitutional: Negative for fever.   Gastrointestinal: Negative for diarrhea, nausea and vomiting.   Genitourinary: Positive for frequency. Negative for dysuria, hematuria, urgency and vaginal discharge.       Past Medical History:   Diagnosis Date     Allergic rhinitis      Anxiety      Aortic valve sclerosis      Back pain      GERD (gastroesophageal reflux disease)      Heart block AV complete (H) 2008    pacemaker     Hyperlipidemia      Hypertension      Major depression      TRAVIS (obstructive sleep apnea)     moderate     Osteoarthritis      Squamous cell carcinoma, face      Family History   Problem Relation Age of Onset     Heart Disease Mother      Dementia Mother      Heart Disease Father      Sleep Apnea Sister      Lung Cancer Sister      Current Outpatient Medications   Medication Sig Dispense Refill     alendronate (FOSAMAX) 70 MG tablet Take 1 tablet (70 mg) by mouth every 7 days 12 tablet 3     atorvastatin (LIPITOR) 10 MG tablet Take 1 tablet (10 mg) by mouth daily 90 tablet 3     Calcium Carb-Cholecalciferol (CALCIUM 600/VITAMIN D3) 600-800 MG-UNIT TABS        carvedilol (COREG) 12.5 MG tablet Take 1 tablet (12.5 mg) by mouth 2 times  daily (with meals) 60 tablet 3     chlorthalidone (HYGROTON) 25 MG tablet Take 0.5 tablets (12.5 mg) by mouth daily 90 tablet 1     FEXOFENADINE HCL PO Take 180 mg by mouth daily       fish oil-omega-3 fatty acids 1000 MG capsule Take 2 g by mouth daily       fluticasone (FLONASE) 50 MCG/ACT spray Spray 1 spray into both nostrils daily       multivitamin, therapeutic with minerals (MULTI-VITAMIN) TABS tablet Take 1 tablet by mouth daily       ondansetron (ZOFRAN-ODT) 4 MG ODT tab Take 1 tablet (4 mg) by mouth every 8 hours as needed for nausea 20 tablet 0     potassium chloride ER (K-DUR/KLOR-CON M) 20 MEQ CR tablet Take 1 tablet (20 mEq) by mouth daily 30 tablet 3     tolterodine ER (DETROL LA) 4 MG 24 hr capsule TAKE ONE CAPSULE BY MOUTH ONCE DAILY 30 capsule 3     traZODone (DESYREL) 50 MG tablet Take 1/2 to 1 tablet by mouth at bedtime as needed. 30 tablet 1     cetirizine (ZYRTEC) 10 MG tablet Take 1 tablet (10 mg) by mouth daily 30 tablet 3     fluticasone (FLONASE) 50 MCG/ACT nasal spray Spray 2 sprays into both nostrils daily 16 g 0     MELATONIN PO Take 5 mg by mouth At Bedtime       ondansetron (ZOFRAN-ODT) 4 MG ODT tab Take 1 tablet (4 mg) by mouth every 8 hours as needed for nausea 10 tablet 0     Social History     Tobacco Use     Smoking status: Former Smoker     Packs/day: 0.50     Years: 3.00     Pack years: 1.50     Types: Cigarettes     Smokeless tobacco: Never Used   Substance Use Topics     Alcohol use: Yes     Comment: rarely, yearly       OBJECTIVE  BP (!) 149/82 (BP Location: Right arm, Patient Position: Sitting, Cuff Size: Adult Regular)   Pulse 88   Temp 97.2  F (36.2  C) (Tympanic)   SpO2 96%   Breastfeeding No     Physical Exam  Constitutional:       General: She is not in acute distress.     Appearance: She is well-developed.   HENT:      Head: Normocephalic and atraumatic.      Right Ear: External ear normal.      Left Ear: External ear normal.   Eyes:      Conjunctiva/sclera:  Conjunctivae normal.   Neck:      Musculoskeletal: Normal range of motion.   Cardiovascular:      Rate and Rhythm: Regular rhythm.      Heart sounds: Normal heart sounds.   Pulmonary:      Effort: Pulmonary effort is normal. No respiratory distress.      Breath sounds: Normal breath sounds.   Abdominal:      General: Bowel sounds are normal. There is no distension.      Palpations: Abdomen is soft.      Tenderness: There is no abdominal tenderness. There is no right CVA tenderness, left CVA tenderness or guarding.   Skin:     General: Skin is warm and dry.   Neurological:      Mental Status: She is alert.         Labs:  Results for orders placed or performed in visit on 01/24/20 (from the past 24 hour(s))   *UA reflex to Microscopic and Culture ([place] and [facility] (except [place] and [place])   Result Value Ref Range    Color Urine Yellow     Appearance Urine Clear     Glucose Urine Negative NEG^Negative mg/dL    Bilirubin Urine Negative NEG^Negative    Ketones Urine Negative NEG^Negative mg/dL    Specific Gravity Urine 1.010 1.003 - 1.035    Blood Urine Trace (A) NEG^Negative    pH Urine 6.5 5.0 - 7.0 pH    Protein Albumin Urine Negative NEG^Negative mg/dL    Urobilinogen Urine 0.2 0.2 - 1.0 EU/dL    Nitrite Urine Negative NEG^Negative    Leukocyte Esterase Urine Negative NEG^Negative    Source Midstream Urine    Urine Microscopic   Result Value Ref Range    WBC Urine 0 - 5 OTO5^0 - 5 /HPF    RBC Urine O - 2 OTO2^O - 2 /HPF    Bacteria Urine Few (A) NEG^Negative /HPF           ASSESSMENT:      ICD-10-CM    1. Urinary frequency R35.0 *UA reflex to Microscopic and Culture ([place] and [facility] (except [place] and [place])     Urine Microscopic          PLAN:    Urinary frequency: Urinalysis not suggestive of infection today. Patient is on Detrol 4 mg daily for overactive bladder. I have recommended follow up with pcp. May benefit from urology referral. Patient agrees.     Followup:    If not  improving or if condition worsens, follow up with your Primary Care Provider

## 2020-01-31 ASSESSMENT — ENCOUNTER SYMPTOMS
COUGH: 0
PALPITATIONS: 0
MYALGIAS: 1
DYSURIA: 0
HEARTBURN: 1
DIARRHEA: 0
HEMATURIA: 0
NERVOUS/ANXIOUS: 0
NAUSEA: 0
PARESTHESIAS: 0
FEVER: 0
CONSTIPATION: 0
CHILLS: 0
EYE PAIN: 0
SORE THROAT: 0
JOINT SWELLING: 0
ABDOMINAL PAIN: 0
DIZZINESS: 0
HEADACHES: 1
BREAST MASS: 0
HEMATOCHEZIA: 0
ARTHRALGIAS: 0
FREQUENCY: 1
SHORTNESS OF BREATH: 0
WEAKNESS: 1

## 2020-01-31 ASSESSMENT — ACTIVITIES OF DAILY LIVING (ADL): CURRENT_FUNCTION: NO ASSISTANCE NEEDED

## 2020-02-04 ENCOUNTER — ANCILLARY PROCEDURE (OUTPATIENT)
Dept: CARDIOLOGY | Facility: CLINIC | Age: 80
End: 2020-02-04
Attending: INTERNAL MEDICINE
Payer: COMMERCIAL

## 2020-02-04 ENCOUNTER — OFFICE VISIT (OUTPATIENT)
Dept: INTERNAL MEDICINE | Facility: CLINIC | Age: 80
End: 2020-02-04
Payer: COMMERCIAL

## 2020-02-04 ENCOUNTER — TELEPHONE (OUTPATIENT)
Dept: INTERNAL MEDICINE | Facility: CLINIC | Age: 80
End: 2020-02-04

## 2020-02-04 VITALS
TEMPERATURE: 98.9 F | SYSTOLIC BLOOD PRESSURE: 132 MMHG | BODY MASS INDEX: 31.84 KG/M2 | DIASTOLIC BLOOD PRESSURE: 74 MMHG | HEART RATE: 86 BPM | HEIGHT: 63 IN | OXYGEN SATURATION: 95 % | RESPIRATION RATE: 16 BRPM | WEIGHT: 179.7 LBS

## 2020-02-04 DIAGNOSIS — E87.6 HYPOKALEMIA: ICD-10-CM

## 2020-02-04 DIAGNOSIS — M85.80 OSTEOPENIA, UNSPECIFIED LOCATION: ICD-10-CM

## 2020-02-04 DIAGNOSIS — Z00.00 ENCOUNTER FOR ROUTINE ADULT HEALTH EXAMINATION WITHOUT ABNORMAL FINDINGS: Primary | ICD-10-CM

## 2020-02-04 DIAGNOSIS — I10 BENIGN ESSENTIAL HYPERTENSION: ICD-10-CM

## 2020-02-04 DIAGNOSIS — M79.10 MUSCLE PAIN: ICD-10-CM

## 2020-02-04 DIAGNOSIS — E78.5 HYPERLIPIDEMIA LDL GOAL <130: ICD-10-CM

## 2020-02-04 DIAGNOSIS — R35.0 URINARY FREQUENCY: ICD-10-CM

## 2020-02-04 DIAGNOSIS — Z95.0 PACEMAKER: ICD-10-CM

## 2020-02-04 LAB
CREAT UR-MCNC: 14 MG/DL
MICROALBUMIN UR-MCNC: 15 MG/L
MICROALBUMIN/CREAT UR: 104.96 MG/G CR (ref 0–25)

## 2020-02-04 PROCEDURE — 36415 COLL VENOUS BLD VENIPUNCTURE: CPT | Performed by: INTERNAL MEDICINE

## 2020-02-04 PROCEDURE — 80061 LIPID PANEL: CPT | Performed by: INTERNAL MEDICINE

## 2020-02-04 PROCEDURE — 93294 REM INTERROG EVL PM/LDLS PM: CPT | Performed by: INTERNAL MEDICINE

## 2020-02-04 PROCEDURE — 82043 UR ALBUMIN QUANTITATIVE: CPT | Performed by: INTERNAL MEDICINE

## 2020-02-04 PROCEDURE — 99214 OFFICE O/P EST MOD 30 MIN: CPT | Mod: 25 | Performed by: INTERNAL MEDICINE

## 2020-02-04 PROCEDURE — 99397 PER PM REEVAL EST PAT 65+ YR: CPT | Performed by: INTERNAL MEDICINE

## 2020-02-04 PROCEDURE — 80048 BASIC METABOLIC PNL TOTAL CA: CPT | Performed by: INTERNAL MEDICINE

## 2020-02-04 PROCEDURE — 93296 REM INTERROG EVL PM/IDS: CPT | Performed by: INTERNAL MEDICINE

## 2020-02-04 RX ORDER — POTASSIUM CHLORIDE 1500 MG/1
20 TABLET, EXTENDED RELEASE ORAL DAILY
Qty: 90 TABLET | Refills: 3 | Status: SHIPPED | OUTPATIENT
Start: 2020-02-04 | End: 2020-11-27

## 2020-02-04 RX ORDER — ALENDRONATE SODIUM 70 MG/1
70 TABLET ORAL
Qty: 12 TABLET | Refills: 3 | Status: SHIPPED | OUTPATIENT
Start: 2020-02-04 | End: 2020-11-27

## 2020-02-04 RX ORDER — ATORVASTATIN CALCIUM 10 MG/1
10 TABLET, FILM COATED ORAL DAILY
Qty: 90 TABLET | Refills: 3 | Status: SHIPPED | OUTPATIENT
Start: 2020-02-04 | End: 2020-11-27

## 2020-02-04 ASSESSMENT — ENCOUNTER SYMPTOMS
DIFFICULTY URINATING: 0
MUSCLE CRAMPS: 0
DIZZINESS: 0
STIFFNESS: 1
MYALGIAS: 1
HEMATURIA: 0
FEVER: 0
NECK PAIN: 0
EYE PAIN: 0
FREQUENCY: 1
COUGH: 0
NAUSEA: 0
JOINT SWELLING: 0
NERVOUS/ANXIOUS: 0
ARTHRALGIAS: 1
NERVOUS/ANXIOUS: 0
HEADACHES: 1
JOINT SWELLING: 0
PALPITATIONS: 0
MUSCLE WEAKNESS: 0
SORE THROAT: 0
CONSTIPATION: 0
DIARRHEA: 0
HEMATOCHEZIA: 0
DYSURIA: 0
BREAST MASS: 0
ABDOMINAL PAIN: 0
BACK PAIN: 0
ARTHRALGIAS: 0
DEPRESSION: 1
FLANK PAIN: 0
HEARTBURN: 1
WEAKNESS: 1
INSOMNIA: 1
SHORTNESS OF BREATH: 0
HEMATURIA: 0
DYSURIA: 0
DECREASED CONCENTRATION: 1
CHILLS: 0
PARESTHESIAS: 0
MYALGIAS: 1

## 2020-02-04 ASSESSMENT — MIFFLIN-ST. JEOR: SCORE: 1261.3

## 2020-02-04 ASSESSMENT — ACTIVITIES OF DAILY LIVING (ADL): CURRENT_FUNCTION: NO ASSISTANCE NEEDED

## 2020-02-04 NOTE — NURSING NOTE
"BP (!) 140/80 (BP Location: Left arm, Patient Position: Sitting, Cuff Size: Adult Regular)   Pulse 86   Temp 98.9  F (37.2  C) (Oral)   Resp 16   Ht 1.588 m (5' 2.5\")   Wt 81.5 kg (179 lb 11.2 oz)   SpO2 95%   BMI 32.34 kg/m      "

## 2020-02-04 NOTE — TELEPHONE ENCOUNTER
Please advise on message below.  Writer looked in chart for any form with the exercises on it and was unable to locate it.  thanks

## 2020-02-04 NOTE — PROGRESS NOTES
"SUBJECTIVE:   Malika Velasco is a 79 year old female who presents for Preventive Visit.  Are you in the first 12 months of your Medicare coverage?  No    Healthy Habits:     In general, how would you rate your overall health?  Good    Frequency of exercise:  None    Do you usually eat at least 4 servings of fruit and vegetables a day, include whole grains    & fiber and avoid regularly eating high fat or \"junk\" foods?  Yes    Taking medications regularly:  Yes    Medication side effects:  None    Ability to successfully perform activities of daily living:  No assistance needed    Home Safety:  No safety concerns identified    Hearing Impairment:  Difficulty following a conversation in a noisy restaurant or crowded room, find that men's voices are easier to understand than woman's and difficulty understanding soft or whispered speech    In the past 6 months, have you been bothered by leaking of urine? Yes    In general, how would you rate your overall mental or emotional health?  Good      PHQ-2 Total Score: 0    Additional concerns today:  No  Do you feel safe in your environment? Yes  Have you ever done Advance Care Planning? (For example, a Health Directive, POLST, or a discussion with a medical provider or your loved ones about your wishes): Yes, advance care planning is on file.  Fall risk  Fallen 2 or more times in the past year?: No  Any fall with injury in the past year?: No  click delete button to remove this line now  Cognitive Screening   1) Repeat 3 items (Leader, Season, Table)    2) Clock draw: NORMAL  3) 3 item recall: Recalls 3 objects  Results: 3 items recalled: COGNITIVE IMPAIRMENT LESS LIKELY    Mini-CogTM Margret Lewis. Licensed by the author for use in [facility]; reprinted with permission ([email or web address]). All rights reserved.      Do you have sleep apnea, excessive snoring or daytime drowsiness?: yes    Problems:  1.  Hypertension: Last year, cardiology changed her from " atenolol to carvedilol.  At another clinic, her dose was increased from 6.25 mg a day to 12.5 mg a day on 1/14/20. She checks her blood pressure regularly, this morning was 132/74.  It is rarely at 140, all the rest are less than 140, never greater than 140.  She reports no concerns about her medication.   2.  Hyperlipidemia: Stable on her medication  3. TRAVIS: stable    Current concerns:  1. She complains of some pain in her buttock and posterior proximal thigh.  This tends to bother her when she is getting up from sitting, gets better after walking while.  It is not really bothering her at night.  She does not recall any injury or any unusual activity.  2.  He has been having worsening urinary frequency and urgency.  She was checked for UTI at urgent care a couple weeks ago which was negative.  Did talk about sending her to urology but never gave her referral.  She would like to go see them now.    Patient Active Problem List   Diagnosis     Pacemaker     Hyperlipidemia LDL goal <130     Benign essential hypertension     Osteopenia     Osteoarthritis     Squamous cell carcinoma, face     Back pain     Aortic valve sclerosis     TRAVIS (obstructive sleep apnea)     ACP (advance care planning)     Current Outpatient Medications   Medication Sig Dispense Refill     alendronate (FOSAMAX) 70 MG tablet Take 1 tablet (70 mg) by mouth every 7 days 12 tablet 3     atorvastatin (LIPITOR) 10 MG tablet Take 1 tablet (10 mg) by mouth daily 90 tablet 3     Calcium Carb-Cholecalciferol (CALCIUM 600/VITAMIN D3) 600-800 MG-UNIT TABS        carvedilol (COREG) 12.5 MG tablet Take 1 tablet (12.5 mg) by mouth 2 times daily (with meals) 60 tablet 3     cetirizine (ZYRTEC) 10 MG tablet Take 1 tablet (10 mg) by mouth daily 30 tablet 3     chlorthalidone (HYGROTON) 25 MG tablet Take 0.5 tablets (12.5 mg) by mouth daily 90 tablet 1     FEXOFENADINE HCL PO Take 180 mg by mouth daily       fish oil-omega-3 fatty acids 1000 MG capsule Take 2 g by  mouth daily       fluticasone (FLONASE) 50 MCG/ACT nasal spray Spray 2 sprays into both nostrils daily 16 g 0     multivitamin, therapeutic with minerals (MULTI-VITAMIN) TABS tablet Take 1 tablet by mouth daily       potassium chloride ER (K-DUR/KLOR-CON M) 20 MEQ CR tablet Take 1 tablet (20 mEq) by mouth daily 30 tablet 3     tolterodine ER (DETROL LA) 4 MG 24 hr capsule TAKE ONE CAPSULE BY MOUTH ONCE DAILY 30 capsule 3     traZODone (DESYREL) 50 MG tablet Take 1/2 to 1 tablet by mouth at bedtime as needed. 30 tablet 1     MELATONIN PO Take 5 mg by mouth At Bedtime        Reviewed and updated as needed this visit by clinical staff     Reviewed and updated as needed this visit by Provider        Social History     Tobacco Use     Smoking status: Former Smoker     Packs/day: 0.50     Years: 3.00     Pack years: 1.50     Types: Cigarettes     Smokeless tobacco: Never Used   Substance Use Topics     Alcohol use: Yes     Comment: rarely, yearly     If you drink alcohol do you typically have >3 drinks per day or >7 drinks per week? No    Alcohol Use 1/31/2020   Prescreen: >3 drinks/day or >7 drinks/week? Not Applicable     Current providers sharing in care for this patient include:   Patient Care Team:  Karen Navarro MD as PCP - General (Internal Medicine)  Karen Navarro MD as Assigned PCP    The following health maintenance items are reviewed in Epic and correct as of today:  Health Maintenance   Topic Date Due     MEDICARE ANNUAL WELLNESS VISIT  10/31/2007     FALL RISK ASSESSMENT  05/25/2019     MAMMO SCREENING  06/20/2020     ADVANCE CARE PLANNING  03/15/2023     DTAP/TDAP/TD IMMUNIZATION (3 - Td) 06/02/2027     DEXA  Completed     PHQ-2  Completed     INFLUENZA VACCINE  Completed     PNEUMOCOCCAL IMMUNIZATION 65+ LOW/MEDIUM RISK  Completed     ZOSTER IMMUNIZATION  Completed     IPV IMMUNIZATION  Aged Out     MENINGITIS IMMUNIZATION  Aged Out     Patient Active Problem List   Diagnosis     Pacemaker      Hyperlipidemia LDL goal <130     Benign essential hypertension     Osteopenia     Osteoarthritis     Squamous cell carcinoma, face     Back pain     Aortic valve sclerosis     TRAVIS (obstructive sleep apnea)     ACP (advance care planning)     Current Outpatient Medications   Medication Sig Dispense Refill     alendronate (FOSAMAX) 70 MG tablet Take 1 tablet (70 mg) by mouth every 7 days 12 tablet 3     atorvastatin (LIPITOR) 10 MG tablet Take 1 tablet (10 mg) by mouth daily 90 tablet 3     Calcium Carb-Cholecalciferol (CALCIUM 600/VITAMIN D3) 600-800 MG-UNIT TABS        carvedilol (COREG) 12.5 MG tablet Take 1 tablet (12.5 mg) by mouth 2 times daily (with meals) 60 tablet 3     cetirizine (ZYRTEC) 10 MG tablet Take 1 tablet (10 mg) by mouth daily 30 tablet 3     chlorthalidone (HYGROTON) 25 MG tablet Take 0.5 tablets (12.5 mg) by mouth daily 90 tablet 1     FEXOFENADINE HCL PO Take 180 mg by mouth daily       fish oil-omega-3 fatty acids 1000 MG capsule Take 2 g by mouth daily       fluticasone (FLONASE) 50 MCG/ACT nasal spray Spray 2 sprays into both nostrils daily 16 g 0     multivitamin, therapeutic with minerals (MULTI-VITAMIN) TABS tablet Take 1 tablet by mouth daily       potassium chloride ER (K-DUR/KLOR-CON M) 20 MEQ CR tablet Take 1 tablet (20 mEq) by mouth daily 30 tablet 3     tolterodine ER (DETROL LA) 4 MG 24 hr capsule TAKE ONE CAPSULE BY MOUTH ONCE DAILY 30 capsule 3     traZODone (DESYREL) 50 MG tablet Take 1/2 to 1 tablet by mouth at bedtime as needed. 30 tablet 1     MELATONIN PO Take 5 mg by mouth At Bedtime            Review of Systems   Constitutional: Negative for chills and fever.   HENT: Positive for hearing loss. Negative for congestion, ear pain and sore throat.    Eyes: Negative for pain and visual disturbance.   Respiratory: Negative for cough and shortness of breath.    Cardiovascular: Negative for chest pain, palpitations and peripheral edema.   Gastrointestinal: Positive for heartburn.  "Negative for abdominal pain, constipation, diarrhea, hematochezia and nausea.   Breasts:  Negative for tenderness, breast mass and discharge.   Genitourinary: Positive for frequency and urgency. Negative for dysuria, genital sores, hematuria, vaginal bleeding and vaginal discharge.   Musculoskeletal: Positive for myalgias. Negative for arthralgias and joint swelling.   Skin: Negative for rash.   Neurological: Positive for weakness and headaches. Negative for dizziness and paresthesias.   Psychiatric/Behavioral: Negative for mood changes. The patient is not nervous/anxious.    The hearing loss is not acute, stable.  Heartburn is very infrequent.  The headache is better.  She does not really have true muscle weakness.      OBJECTIVE:      Physical Exam    Patient alert, in no acute distress  /74 (BP Location: Left arm, Patient Position: Sitting, Cuff Size: Adult Regular)   Pulse 86   Temp 98.9  F (37.2  C) (Oral)   Resp 16   Ht 1.588 m (5' 2.5\")   Wt 81.5 kg (179 lb 11.2 oz)   SpO2 95%   BMI 32.34 kg/m      HEENT: extraocular movements are intact, pupils equal and reactive to light and accommodation, TMs clear, oropharynx clear  NECK: Neck supple. No adenopathy. Thyroid symmetric, normal size,, Carotids without bruits.  PULMONARY: clear to auscultation  CARDIAC: Regular S1, S2 is 2/6 systolic ejection murmur  PULSES: 2/2   ABDOMINAL: Soft, nontender.  Normal bowel sounds.  No hepatosplenomegaly or abnormal masses  BREAST: No breast masses or tenderness, No axillary masses or tenderness and No galactorrhea  REFLEXES: 1+ throughout  There is some tenderness of the lower gluteal muscles, proximal hamstring on the right side.    ASSESSMENT / PLAN:   1. Encounter for routine adult health examination without abnormal findings  Up to date    2. Benign essential hypertension  Improved control, she does see cardiology today so we will let them check again.  Continue current medication  - Basic metabolic panel  " "(Ca, Cl, CO2, Creat, Gluc, K, Na, BUN)  - Albumin Random Urine Quantitative with Creat Ratio  - potassium chloride ER (K-DUR/KLOR-CON M) 20 MEQ CR tablet; Take 1 tablet (20 mEq) by mouth daily  Dispense: 90 tablet; Refill: 3    3. Hyperlipidemia LDL goal <130  Recheck lab, continue medication  - Lipid panel reflex to direct LDL Fasting  - atorvastatin (LIPITOR) 10 MG tablet; Take 1 tablet (10 mg) by mouth daily  Dispense: 90 tablet; Refill: 3    4. Urinary frequency  Refer to urology  - UROLOGY ADULT REFERRAL    5. Osteopenia, unspecified location  Stable, continue medication  - alendronate (FOSAMAX) 70 MG tablet; Take 1 tablet (70 mg) by mouth every 7 days  Dispense: 12 tablet; Refill: 3    6. Hypokalemia  Continue med  - potassium chloride ER (K-DUR/KLOR-CON M) 20 MEQ CR tablet; Take 1 tablet (20 mEq) by mouth daily  Dispense: 90 tablet; Refill: 3    Muscle pain: Given a handout with some stretches and exercises for proximal hamstring and piriformis muscles.    COUNSELING:  Reviewed preventive health counseling, as reflected in patient instructions    Estimated body mass index is 32.94 kg/m  as calculated from the following:    Height as of 1/23/20: 1.588 m (5' 2.5\").    Weight as of 1/23/20: 83 kg (183 lb).    Weight management plan: Discussed healthy diet and exercise guidelines     reports that she has quit smoking. Her smoking use included cigarettes. She has a 1.50 pack-year smoking history. She has never used smokeless tobacco.      Appropriate preventive services were discussed with this patient, including applicable screening as appropriate for cardiovascular disease, diabetes, osteopenia/osteoporosis, and glaucoma.  As appropriate for age/gender, discussed screening for colorectal cancer, prostate cancer, breast cancer, and cervical cancer. Checklist reviewing preventive services available has been given to the patient.    Reviewed patients plan of care and provided an AVS. The Basic Care Plan (routine " screening as documented in Health Maintenance) for Malika meets the Care Plan requirement. This Care Plan has been established and reviewed with the Patient.    Counseling Resources:  ATP IV Guidelines  Pooled Cohorts Equation Calculator  Breast Cancer Risk Calculator  FRAX Risk Assessment  ICSI Preventive Guidelines  Dietary Guidelines for Americans, 2010  ticketea's MyPlate  ASA Prophylaxis  Lung CA Screening    Karen Navarro MD  [facility]    Identified Health Risks:

## 2020-02-04 NOTE — TELEPHONE ENCOUNTER
MEDICAL RECORDS REQUEST   [facility]  Urology Clinic  [address] [place] [address]  PHONE: [phone]  Fax: [phone]        FUTURE VISIT INFORMATION                                                   Mlaika Velasco, : 1940 scheduled for future visit at [facility]    APPOINTMENT INFORMATION:    Date: 20 5:30PM    Provider:  Denia Blum RN    Reason for Visit/Diagnosis: Frequent urination    REFERRAL INFORMATION:    Referring provider:  ERIKA JAVIER    Specialty: MD    Referring providers clinic:  [facility]    Clinic contact number:  [phone]    RECORDS REQUESTED FOR VISIT                                                     NOTES  STATUS/DETAILS   OFFICE NOTE from referring provider  yes   OFFICE NOTE from other specialist  no   DISCHARGE SUMMARY from hospital  no   DISCHARGE REPORT from the ER  no   OPERATIVE REPORT  no   MEDICATION LIST  yes   LABS     URINALYSIS (UA)  yes     PRE-VISIT CHECKLIST      Record collection complete Yes- Internal recs in epic   Appointment appropriately scheduled           (right time/right provider) Yes   MyChart activation Yes   Questionnaire complete If no, please explain: In process      Completed by: Vero Bateman

## 2020-02-04 NOTE — PATIENT INSTRUCTIONS
PREVENTIVE HEALTH RECOMMENDATIONS:   Vaccines: Get a flu shot each year. Get a tetanus shot every 10 years.   Exercise for at least 150 minutes a week (an average of 30 minutes a day, 5 days of the week). This will help you control your weight and prevent disease.  Limit alcohol to one drink per day.  No smoking.   Wear sunscreen to prevent skin cancer.   See your dentist twice a year for an exam and cleaning.  Try to get Calcium 1200 mg total per day. It is best to not take it all at once. Try to get Vitamin D at least 8928-8429 units (25-50 mcg) per day.    BMI or Body Mass Index is a way of indicating weight and health risk for cardiovascular diseases, high blood pressure, diabetes.   Definitions:    Underweight is less than 18.5 and will be associated with health risk.   Normal BMI is 18.5 to 25   Overweight is 25-29   Obesity is 30 or greater   Morbid Obesity is 40 or greater or 35 or greater with diabetes, prediabetes or abnormal blood sugar, high blood pressure or elevated cholesterol  Obesity and Morbid Obesity are associated with higher health risks. Lowering calories, exercising more may lower your BMI and even small decreases can have positive impact on lowering health risks.   Your Body mass index is 32.34 kg/m ..,

## 2020-02-04 NOTE — TELEPHONE ENCOUNTER
Patient calls to request a new exercise sheet be mailed to her home address.  Patient states she was given one at her OV and has lost it.

## 2020-02-05 LAB
ANION GAP SERPL CALCULATED.3IONS-SCNC: 5 MMOL/L (ref 3–14)
BUN SERPL-MCNC: 20 MG/DL (ref 7–30)
CALCIUM SERPL-MCNC: 9.4 MG/DL (ref 8.5–10.1)
CHLORIDE SERPL-SCNC: 103 MMOL/L (ref 94–109)
CHOLEST SERPL-MCNC: 114 MG/DL
CO2 SERPL-SCNC: 28 MMOL/L (ref 20–32)
CREAT SERPL-MCNC: 0.61 MG/DL (ref 0.52–1.04)
GFR SERPL CREATININE-BSD FRML MDRD: 87 ML/MIN/{1.73_M2}
GLUCOSE SERPL-MCNC: 101 MG/DL (ref 70–99)
HDLC SERPL-MCNC: 42 MG/DL
LDLC SERPL CALC-MCNC: 42 MG/DL
NONHDLC SERPL-MCNC: 72 MG/DL
POTASSIUM SERPL-SCNC: 4.7 MMOL/L (ref 3.4–5.3)
SODIUM SERPL-SCNC: 136 MMOL/L (ref 133–144)
TRIGL SERPL-MCNC: 151 MG/DL

## 2020-02-06 ENCOUNTER — PRE VISIT (OUTPATIENT)
Dept: UROLOGY | Facility: CLINIC | Age: 80
End: 2020-02-06

## 2020-02-06 NOTE — TELEPHONE ENCOUNTER
Reason for visit: Urinary frequency     Relevant information: pt is referred by Dr. Navarro    Records/imaging/labs/orders: records available    Pt called: no need for a call    At Rooming: regular

## 2020-02-07 ENCOUNTER — OFFICE VISIT (OUTPATIENT)
Dept: UROLOGY | Facility: CLINIC | Age: 80
End: 2020-02-07
Payer: COMMERCIAL

## 2020-02-07 ENCOUNTER — PRE VISIT (OUTPATIENT)
Dept: UROLOGY | Facility: CLINIC | Age: 80
End: 2020-02-07

## 2020-02-07 VITALS — SYSTOLIC BLOOD PRESSURE: 128 MMHG | DIASTOLIC BLOOD PRESSURE: 81 MMHG | HEART RATE: 80 BPM

## 2020-02-07 DIAGNOSIS — R35.0 URINARY FREQUENCY: Primary | ICD-10-CM

## 2020-02-07 RX ORDER — TROSPIUM CHLORIDE 20 MG/1
20 TABLET, FILM COATED ORAL
Qty: 60 TABLET | Refills: 3 | Status: SHIPPED | OUTPATIENT
Start: 2020-02-07 | End: 2020-03-25

## 2020-02-07 ASSESSMENT — PAIN SCALES - GENERAL: PAINLEVEL: SEVERE PAIN (6)

## 2020-02-07 NOTE — PROGRESS NOTES
Chief Complaint:   Frequent urination         History of Present Illness:    Malika Velasco is a very pleasant 79 year old female who presents for evaluation of the above. Per chart review, she was evaluated at urgent care a few weeks ago for symptoms of urinary frequency and urgency, but UA was ultimately negative.      Today, she reports some stress incontinence with coughing and sneezing. She often gets a pressure in lower abdomen when she needs to void, which is unusual for her. She was struggling with hourly nocturia, but this has reduced to only twice per night since starting trazodone for sleep. She has been taking Detrol LA 4 mg daily for the past year to help manage daytime frequency, which she feels was helpful at first, but has now lost its effectiveness over the past month. She is now back to hourly daytime voiding with a pattern of double-voiding. She denies caffeine or excessive fluid intake.          Past Medical History:     Past Medical History:   Diagnosis Date     Allergic rhinitis      Anxiety      Aortic valve sclerosis      Back pain      GERD (gastroesophageal reflux disease)      Heart block AV complete (H) 2008    pacemaker     Hyperlipidemia      Hypertension      Major depression      TRAVIS (obstructive sleep apnea)     moderate     Osteoarthritis      Squamous cell carcinoma, face             Past Surgical History:     Past Surgical History:   Procedure Laterality Date     ARTHROSCOPY KNEE Left      AS TOTAL KNEE ARTHROPLASTY Left 05/2015     IMPLANT PACEMAKER  1997     TONSILLECTOMY       TUBAL LIGATION              Medications     Current Outpatient Medications   Medication     alendronate (FOSAMAX) 70 MG tablet     atorvastatin (LIPITOR) 10 MG tablet     Calcium Carb-Cholecalciferol (CALCIUM 600/VITAMIN D3) 600-800 MG-UNIT TABS     carvedilol (COREG) 12.5 MG tablet     cetirizine (ZYRTEC) 10 MG tablet     chlorthalidone (HYGROTON) 25 MG tablet     FEXOFENADINE HCL PO     fish  oil-omega-3 fatty acids 1000 MG capsule     fluticasone (FLONASE) 50 MCG/ACT nasal spray     MELATONIN PO     multivitamin, therapeutic with minerals (MULTI-VITAMIN) TABS tablet     potassium chloride ER (K-DUR/KLOR-CON M) 20 MEQ CR tablet     traZODone (DESYREL) 50 MG tablet     trospium (SANCTURA) 20 MG tablet     No current facility-administered medications for this visit.             Family History:     Family History   Problem Relation Age of Onset     Heart Disease Mother      Dementia Mother      Heart Disease Father      Sleep Apnea Sister      Lung Cancer Sister             Social History:     Social History     Socioeconomic History     Marital status:      Spouse name: Not on file     Number of children: Not on file     Years of education: Not on file     Highest education level: Not on file   Occupational History     Not on file   Social Needs     Financial resource strain: Not on file     Food insecurity:     Worry: Not on file     Inability: Not on file     Transportation needs:     Medical: Not on file     Non-medical: Not on file   Tobacco Use     Smoking status: Former Smoker     Packs/day: 0.50     Years: 3.00     Pack years: 1.50     Types: Cigarettes     Smokeless tobacco: Never Used   Substance and Sexual Activity     Alcohol use: Yes     Comment: rarely, yearly     Drug use: No     Sexual activity: Never   Lifestyle     Physical activity:     Days per week: Not on file     Minutes per session: Not on file     Stress: Not on file   Relationships     Social connections:     Talks on phone: Not on file     Gets together: Not on file     Attends [employer] service: Not on file     Active member of club or organization: Not on file     Attends meetings of clubs or organizations: Not on file     Relationship status: Not on file     Intimate partner violence:     Fear of current or ex partner: Not on file     Emotionally abused: Not on file     Physically abused: Not on file     Forced sexual  activity: Not on file   Other Topics Concern     Parent/sibling w/ CABG, MI or angioplasty before 65F 55M? Not Asked   Social History Narrative     Not on file            Allergies:   Zantac [ranitidine]         Review of Systems:  From intake questionnaire   Negative 14 system review except as noted on HPI, nurse's note.         Physical Exam:   Patient is a 79 year old  female   Vitals: Blood pressure 128/81, pulse 80, not currently breastfeeding.  General Appearance Adult: Alert, no acute distress, oriented  Lungs: no respiratory distress, or pursed lip breathing  Heart: No obvious jugular venous distension present  Abdomen: soft, nontender, no organomegaly or masses, There is no height or weight on file to calculate BMI.  : deferred     Uroflow and Post-Void Residual by Bladder Scan     PVR: 24 mL      Labs and Pathology:    I personally reviewed all applicable laboratory data and went over findings with patient  Significant for:    CBC RESULTS:  Recent Labs   Lab Test 01/14/20  1412 03/01/17  0630   WBC 10.8 4.0   HGB 13.9 14.8    246        BMP RESULTS:  Recent Labs   Lab Test 02/04/20  1023 01/14/20  1412 06/25/19  0955 06/06/19  1311    132* 132* 136   POTASSIUM 4.7 3.8 3.9 3.2*   CHLORIDE 103 99 101 101   CO2 28 28 24 25   ANIONGAP 5 5 7 10   * 146* 113* 155*   BUN 20 13 24 19   CR 0.61 0.52 0.63 0.64   GFRESTIMATED 87 >90 87 86   GFRESTBLACK >90 >90 >90 >90   SAVANNAH 9.4 9.4 8.5 9.2       UA RESULTS:   Recent Labs   Lab Test 01/24/20  1421 03/01/17  0800   SG 1.010 1.011   URINEPH 6.5 7.0   NITRITE Negative Negative   RBCU O - 2 <1   WBCU 0 - 5 <1            Assessment and Plan:     Assessment: 79 year old female who presents for the evaluation of hourly daytime urinary frequency, which she has been taking Detrol 4 mg daily to manage. However, this has seemed to lose its effectiveness over the past month. Fortunately, her nocturia has improved since starting trazodone for sleep. Recent UA  negative for infection or hematuria. PVR low today at 24 mL, demonstrating good bladder emptying.     Plan:  -Stop Detrol.  -Start trospium 20 mg BID.   -Follow up with me in 6-8 weeks to review symptoms.       Denia Blum CNP  Department of Urology

## 2020-02-07 NOTE — LETTER
2/7/2020       RE: Malika Velasco  [id number]  [place] [id number]     Dear Colleague,    Thank you for referring your patient, Malika Velasco, to the [facility] UROLOGY AND INST FOR PROSTATE AND UROLOGIC CANCERS at [facility]. Please see a copy of my visit note below.            Chief Complaint:   Frequent urination         History of Present Illness:    Malika Velasco is a very pleasant 79 year old female who presents for evaluation of the above. Per chart review, she was evaluated at urgent care a few weeks ago for symptoms of urinary frequency and urgency, but UA was ultimately negative.      Today, she reports some stress incontinence with coughing and sneezing. She often gets a pressure in lower abdomen when she needs to void, which is unusual for her. She was struggling with hourly nocturia, but this has reduced to only twice per night since starting trazodone for sleep. She has been taking Detrol LA 4 mg daily for the past year to help manage daytime frequency, which she feels was helpful at first, but has now lost its effectiveness over the past month. She is now back to hourly daytime voiding with a pattern of double-voiding. She denies caffeine or excessive fluid intake.          Past Medical History:     Past Medical History:   Diagnosis Date     Allergic rhinitis      Anxiety      Aortic valve sclerosis      Back pain      GERD (gastroesophageal reflux disease)      Heart block AV complete (H) 2008    pacemaker     Hyperlipidemia      Hypertension      Major depression      TRAVIS (obstructive sleep apnea)     moderate     Osteoarthritis      Squamous cell carcinoma, face             Past Surgical History:     Past Surgical History:   Procedure Laterality Date     ARTHROSCOPY KNEE Left      AS TOTAL KNEE ARTHROPLASTY Left 05/2015     IMPLANT PACEMAKER  1997     TONSILLECTOMY       TUBAL LIGATION              Medications     Current  Outpatient Medications   Medication     alendronate (FOSAMAX) 70 MG tablet     atorvastatin (LIPITOR) 10 MG tablet     Calcium Carb-Cholecalciferol (CALCIUM 600/VITAMIN D3) 600-800 MG-UNIT TABS     carvedilol (COREG) 12.5 MG tablet     cetirizine (ZYRTEC) 10 MG tablet     chlorthalidone (HYGROTON) 25 MG tablet     FEXOFENADINE HCL PO     fish oil-omega-3 fatty acids 1000 MG capsule     fluticasone (FLONASE) 50 MCG/ACT nasal spray     MELATONIN PO     multivitamin, therapeutic with minerals (MULTI-VITAMIN) TABS tablet     potassium chloride ER (K-DUR/KLOR-CON M) 20 MEQ CR tablet     traZODone (DESYREL) 50 MG tablet     trospium (SANCTURA) 20 MG tablet     No current facility-administered medications for this visit.             Family History:     Family History   Problem Relation Age of Onset     Heart Disease Mother      Dementia Mother      Heart Disease Father      Sleep Apnea Sister      Lung Cancer Sister             Social History:     Social History     Socioeconomic History     Marital status:      Spouse name: Not on file     Number of children: Not on file     Years of education: Not on file     Highest education level: Not on file   Occupational History     Not on file   Social Needs     Financial resource strain: Not on file     Food insecurity:     Worry: Not on file     Inability: Not on file     Transportation needs:     Medical: Not on file     Non-medical: Not on file   Tobacco Use     Smoking status: Former Smoker     Packs/day: 0.50     Years: 3.00     Pack years: 1.50     Types: Cigarettes     Smokeless tobacco: Never Used   Substance and Sexual Activity     Alcohol use: Yes     Comment: rarely, yearly     Drug use: No     Sexual activity: Never   Lifestyle     Physical activity:     Days per week: Not on file     Minutes per session: Not on file     Stress: Not on file   Relationships     Social connections:     Talks on phone: Not on file     Gets together: Not on file     Attends  Yarsanism service: Not on file     Active member of club or organization: Not on file     Attends meetings of clubs or organizations: Not on file     Relationship status: Not on file     Intimate partner violence:     Fear of current or ex partner: Not on file     Emotionally abused: Not on file     Physically abused: Not on file     Forced sexual activity: Not on file   Other Topics Concern     Parent/sibling w/ CABG, MI or angioplasty before 65F 55M? Not Asked   Social History Narrative     Not on file            Allergies:   Zantac [ranitidine]         Review of Systems:  From intake questionnaire   Negative 14 system review except as noted on HPI, nurse's note.         Physical Exam:   Patient is a 79 year old  female   Vitals: Blood pressure 128/81, pulse 80, not currently breastfeeding.  General Appearance Adult: Alert, no acute distress, oriented  Lungs: no respiratory distress, or pursed lip breathing  Heart: No obvious jugular venous distension present  Abdomen: soft, nontender, no organomegaly or masses, There is no height or weight on file to calculate BMI.  : deferred     Uroflow and Post-Void Residual by Bladder Scan     PVR: 24 mL      Labs and Pathology:    I personally reviewed all applicable laboratory data and went over findings with patient  Significant for:    CBC RESULTS:  Recent Labs   Lab Test 01/14/20  1412 03/01/17  0630   WBC 10.8 4.0   HGB 13.9 14.8    246        BMP RESULTS:  Recent Labs   Lab Test 02/04/20  1023 01/14/20  1412 06/25/19  0955 06/06/19  1311    132* 132* 136   POTASSIUM 4.7 3.8 3.9 3.2*   CHLORIDE 103 99 101 101   CO2 28 28 24 25   ANIONGAP 5 5 7 10   * 146* 113* 155*   BUN 20 13 24 19   CR 0.61 0.52 0.63 0.64   GFRESTIMATED 87 >90 87 86   GFRESTBLACK >90 >90 >90 >90   SAVANNAH 9.4 9.4 8.5 9.2       UA RESULTS:   Recent Labs   Lab Test 01/24/20  1421 03/01/17  0800   SG 1.010 1.011   URINEPH 6.5 7.0   NITRITE Negative Negative   RBCU O - 2 <1   WBCU 0 - 5  <1            Assessment and Plan:     Assessment: 79 year old female who presents for the evaluation of hourly daytime urinary frequency, which she has been taking Detrol 4 mg daily to manage. However, this has seemed to lose its effectiveness over the past month. Fortunately, her nocturia has improved since starting trazodone for sleep. Recent UA negative for infection or hematuria. PVR low today at 24 mL, demonstrating good bladder emptying.     Plan:  -Stop Detrol.  -Start trospium 20 mg BID.   -Follow up with me in 6-8 weeks to review symptoms.       Denia Blum, CNP  Department of Urology

## 2020-02-07 NOTE — NURSING NOTE
Chief Complaint   Patient presents with     Consult For     Urinary Frequency     x 8 months. states shes goes every hour during the day and up 2 times at night        Blood pressure 128/81, pulse 80, not currently breastfeeding. There is no height or weight on file to calculate BMI.    Patient Active Problem List   Diagnosis     Pacemaker     Hyperlipidemia LDL goal <130     Benign essential hypertension     Osteopenia     Osteoarthritis     Squamous cell carcinoma, face     Back pain     Aortic valve sclerosis     TRAVIS (obstructive sleep apnea)     ACP (advance care planning)       Allergies   Allergen Reactions     Zantac [Ranitidine]      Headache       Current Outpatient Medications   Medication Sig Dispense Refill     alendronate (FOSAMAX) 70 MG tablet Take 1 tablet (70 mg) by mouth every 7 days 12 tablet 3     atorvastatin (LIPITOR) 10 MG tablet Take 1 tablet (10 mg) by mouth daily 90 tablet 3     Calcium Carb-Cholecalciferol (CALCIUM 600/VITAMIN D3) 600-800 MG-UNIT TABS        carvedilol (COREG) 12.5 MG tablet Take 1 tablet (12.5 mg) by mouth 2 times daily (with meals) 60 tablet 3     cetirizine (ZYRTEC) 10 MG tablet Take 1 tablet (10 mg) by mouth daily 30 tablet 3     chlorthalidone (HYGROTON) 25 MG tablet Take 0.5 tablets (12.5 mg) by mouth daily 90 tablet 1     FEXOFENADINE HCL PO Take 180 mg by mouth daily       fish oil-omega-3 fatty acids 1000 MG capsule Take 2 g by mouth daily       fluticasone (FLONASE) 50 MCG/ACT nasal spray Spray 2 sprays into both nostrils daily 16 g 0     MELATONIN PO Take 5 mg by mouth At Bedtime       multivitamin, therapeutic with minerals (MULTI-VITAMIN) TABS tablet Take 1 tablet by mouth daily       potassium chloride ER (K-DUR/KLOR-CON M) 20 MEQ CR tablet Take 1 tablet (20 mEq) by mouth daily 90 tablet 3     tolterodine ER (DETROL LA) 4 MG 24 hr capsule TAKE ONE CAPSULE BY MOUTH ONCE DAILY 30 capsule 3     traZODone (DESYREL) 50 MG tablet Take 1/2 to 1 tablet by mouth at  bedtime as needed. 30 tablet 1       Social History     Tobacco Use     Smoking status: Former Smoker     Packs/day: 0.50     Years: 3.00     Pack years: 1.50     Types: Cigarettes     Smokeless tobacco: Never Used   Substance Use Topics     Alcohol use: Yes     Comment: rarely, yearly     Drug use: No       Madison Foote CMA  2/7/2020  5:30 PM

## 2020-02-08 NOTE — PATIENT INSTRUCTIONS
UROLOGY CLINIC VISIT PATIENT INSTRUCTIONS    1) Stop the Detrol.  2) Start trospium 20 mg daily. I have sent this to your pharmacy.  3) Follow up with me to review your symptoms in 6-8 weeks.     If you have any issues, questions or concerns in the meantime, do not hesitate to contact us at [phone] or via [employer].     It was a pleasure meeting with you today.  Thank you for allowing me and my team the privilege of caring for you today.  YOU are the reason we are here, and I truly hope we provided you with the excellent service you deserve.  Please let us know if there is anything else we can do for you so that we can be sure you are leaving completely satisfied with your care experience.    Denia Blum, CNP

## 2020-02-25 LAB
MDC_IDC_LEAD_IMPLANT_DT: NORMAL
MDC_IDC_LEAD_IMPLANT_DT: NORMAL
MDC_IDC_LEAD_LOCATION: NORMAL
MDC_IDC_LEAD_LOCATION: NORMAL
MDC_IDC_LEAD_LOCATION_DETAIL_1: NORMAL
MDC_IDC_LEAD_LOCATION_DETAIL_1: NORMAL
MDC_IDC_LEAD_MFG: NORMAL
MDC_IDC_LEAD_MFG: NORMAL
MDC_IDC_LEAD_MODEL: NORMAL
MDC_IDC_LEAD_MODEL: NORMAL
MDC_IDC_LEAD_POLARITY_TYPE: NORMAL
MDC_IDC_LEAD_POLARITY_TYPE: NORMAL
MDC_IDC_LEAD_SERIAL: NORMAL
MDC_IDC_LEAD_SERIAL: NORMAL
MDC_IDC_MSMT_BATTERY_DTM: NORMAL
MDC_IDC_MSMT_BATTERY_IMPEDANCE: 485 OHM
MDC_IDC_MSMT_BATTERY_REMAINING_LONGEVITY: 100 MO
MDC_IDC_MSMT_BATTERY_STATUS: NORMAL
MDC_IDC_MSMT_BATTERY_VOLTAGE: 2.79 V
MDC_IDC_MSMT_LEADCHNL_RA_IMPEDANCE_VALUE: 635 OHM
MDC_IDC_MSMT_LEADCHNL_RA_PACING_THRESHOLD_AMPLITUDE: 0.38 V
MDC_IDC_MSMT_LEADCHNL_RA_PACING_THRESHOLD_PULSEWIDTH: 0.4 MS
MDC_IDC_MSMT_LEADCHNL_RV_IMPEDANCE_VALUE: 680 OHM
MDC_IDC_MSMT_LEADCHNL_RV_PACING_THRESHOLD_AMPLITUDE: 0.88 V
MDC_IDC_MSMT_LEADCHNL_RV_PACING_THRESHOLD_PULSEWIDTH: 0.4 MS
MDC_IDC_PG_IMPLANT_DTM: NORMAL
MDC_IDC_PG_MFG: NORMAL
MDC_IDC_PG_MODEL: NORMAL
MDC_IDC_PG_SERIAL: NORMAL
MDC_IDC_PG_TYPE: NORMAL
MDC_IDC_SESS_CLINIC_NAME: NORMAL
MDC_IDC_SESS_DTM: NORMAL
MDC_IDC_SESS_TYPE: NORMAL
MDC_IDC_SET_BRADY_AT_MODE_SWITCH_MODE: NORMAL
MDC_IDC_SET_BRADY_AT_MODE_SWITCH_RATE: 175 {BEATS}/MIN
MDC_IDC_SET_BRADY_LOWRATE: 60 {BEATS}/MIN
MDC_IDC_SET_BRADY_MAX_SENSOR_RATE: 130 {BEATS}/MIN
MDC_IDC_SET_BRADY_MAX_TRACKING_RATE: 130 {BEATS}/MIN
MDC_IDC_SET_BRADY_MODE: NORMAL
MDC_IDC_SET_BRADY_PAV_DELAY_LOW: 200 MS
MDC_IDC_SET_BRADY_SAV_DELAY_LOW: 180 MS
MDC_IDC_SET_LEADCHNL_RA_PACING_AMPLITUDE: 1.5 V
MDC_IDC_SET_LEADCHNL_RA_PACING_ANODE_ELECTRODE_1: NORMAL
MDC_IDC_SET_LEADCHNL_RA_PACING_ANODE_LOCATION_1: NORMAL
MDC_IDC_SET_LEADCHNL_RA_PACING_CAPTURE_MODE: NORMAL
MDC_IDC_SET_LEADCHNL_RA_PACING_CATHODE_ELECTRODE_1: NORMAL
MDC_IDC_SET_LEADCHNL_RA_PACING_CATHODE_LOCATION_1: NORMAL
MDC_IDC_SET_LEADCHNL_RA_PACING_POLARITY: NORMAL
MDC_IDC_SET_LEADCHNL_RA_PACING_PULSEWIDTH: 0.4 MS
MDC_IDC_SET_LEADCHNL_RA_SENSING_ANODE_ELECTRODE_1: NORMAL
MDC_IDC_SET_LEADCHNL_RA_SENSING_ANODE_LOCATION_1: NORMAL
MDC_IDC_SET_LEADCHNL_RA_SENSING_CATHODE_ELECTRODE_1: NORMAL
MDC_IDC_SET_LEADCHNL_RA_SENSING_CATHODE_LOCATION_1: NORMAL
MDC_IDC_SET_LEADCHNL_RA_SENSING_POLARITY: NORMAL
MDC_IDC_SET_LEADCHNL_RA_SENSING_SENSITIVITY: 1 MV
MDC_IDC_SET_LEADCHNL_RV_PACING_AMPLITUDE: 2 V
MDC_IDC_SET_LEADCHNL_RV_PACING_ANODE_ELECTRODE_1: NORMAL
MDC_IDC_SET_LEADCHNL_RV_PACING_ANODE_LOCATION_1: NORMAL
MDC_IDC_SET_LEADCHNL_RV_PACING_CAPTURE_MODE: NORMAL
MDC_IDC_SET_LEADCHNL_RV_PACING_CATHODE_ELECTRODE_1: NORMAL
MDC_IDC_SET_LEADCHNL_RV_PACING_CATHODE_LOCATION_1: NORMAL
MDC_IDC_SET_LEADCHNL_RV_PACING_POLARITY: NORMAL
MDC_IDC_SET_LEADCHNL_RV_PACING_PULSEWIDTH: 0.4 MS
MDC_IDC_SET_LEADCHNL_RV_SENSING_ANODE_ELECTRODE_1: NORMAL
MDC_IDC_SET_LEADCHNL_RV_SENSING_ANODE_LOCATION_1: NORMAL
MDC_IDC_SET_LEADCHNL_RV_SENSING_CATHODE_ELECTRODE_1: NORMAL
MDC_IDC_SET_LEADCHNL_RV_SENSING_CATHODE_LOCATION_1: NORMAL
MDC_IDC_SET_LEADCHNL_RV_SENSING_POLARITY: NORMAL
MDC_IDC_SET_LEADCHNL_RV_SENSING_SENSITIVITY: 2.8 MV
MDC_IDC_SET_ZONE_DETECTION_INTERVAL: 333.33 MS
MDC_IDC_SET_ZONE_DETECTION_INTERVAL: 342.86 MS
MDC_IDC_SET_ZONE_TYPE: NORMAL
MDC_IDC_SET_ZONE_TYPE: NORMAL
MDC_IDC_STAT_AT_BURDEN_PERCENT: 0 %
MDC_IDC_STAT_AT_DTM_END: NORMAL
MDC_IDC_STAT_AT_DTM_START: NORMAL
MDC_IDC_STAT_AT_MODE_SW_COUNT: 2
MDC_IDC_STAT_BRADY_AP_VP_PERCENT: 25 %
MDC_IDC_STAT_BRADY_AP_VS_PERCENT: 0 %
MDC_IDC_STAT_BRADY_AS_VP_PERCENT: 75 %
MDC_IDC_STAT_BRADY_AS_VS_PERCENT: 0 %
MDC_IDC_STAT_BRADY_DTM_END: NORMAL
MDC_IDC_STAT_BRADY_DTM_START: NORMAL
MDC_IDC_STAT_EPISODE_RECENT_COUNT: 0
MDC_IDC_STAT_EPISODE_RECENT_COUNT: 1
MDC_IDC_STAT_EPISODE_RECENT_COUNT_DTM_END: NORMAL
MDC_IDC_STAT_EPISODE_RECENT_COUNT_DTM_END: NORMAL
MDC_IDC_STAT_EPISODE_RECENT_COUNT_DTM_START: NORMAL
MDC_IDC_STAT_EPISODE_RECENT_COUNT_DTM_START: NORMAL
MDC_IDC_STAT_EPISODE_TYPE: NORMAL
MDC_IDC_STAT_EPISODE_TYPE: NORMAL

## 2020-03-04 ENCOUNTER — PRE VISIT (OUTPATIENT)
Dept: UROLOGY | Facility: CLINIC | Age: 80
End: 2020-03-04

## 2020-03-04 PROBLEM — D04.39 SQUAMOUS CELL CARCINOMA IN SITU OF SKIN OF CHEEK: Status: ACTIVE | Noted: 2020-03-04

## 2020-03-04 PROBLEM — M17.0 OSTEOARTHRITIS OF BOTH KNEES: Status: ACTIVE | Noted: 2020-03-04

## 2020-03-04 PROBLEM — R73.01 ELEVATED FASTING BLOOD SUGAR: Status: ACTIVE | Noted: 2020-03-04

## 2020-03-04 PROBLEM — K21.00 REFLUX ESOPHAGITIS: Status: ACTIVE | Noted: 2020-03-04

## 2020-03-04 PROBLEM — N32.81 OVERACTIVE BLADDER: Status: ACTIVE | Noted: 2020-03-04

## 2020-03-04 PROBLEM — Z91.09 ENVIRONMENTAL ALLERGIES: Status: ACTIVE | Noted: 2020-03-04

## 2020-03-04 PROBLEM — H26.9 CATARACTS, BILATERAL: Status: ACTIVE | Noted: 2020-03-04

## 2020-03-04 PROBLEM — Z95.0 PACEMAKER: Status: ACTIVE | Noted: 2017-03-09

## 2020-03-04 PROBLEM — F41.8 DEPRESSION WITH ANXIETY: Status: ACTIVE | Noted: 2020-03-04

## 2020-03-04 NOTE — TELEPHONE ENCOUNTER
Reason for Visit: Medication and symptom check    Diagnosis: Urinary frequency    Orders/Procedures/Records: Records available    Contact Patient: N/A    Rooming Requirements: Normal      Shanice Cowart  03/04/20  10:57 AM

## 2020-03-18 ENCOUNTER — TELEPHONE (OUTPATIENT)
Dept: UROLOGY | Facility: CLINIC | Age: 80
End: 2020-03-18

## 2020-03-18 NOTE — TELEPHONE ENCOUNTER
Spoke with patient to discuss the precautions the clinic is taking due to COVID-19, including reducing foot traffic through the clinic. The patient agreed to change her upcoming appointment with Denia Blum CNP to a Telephone Visit at her previously scheduled time (3/25/2020 at 3:00 PM).      Shanice Cowart, EMT

## 2020-03-23 ENCOUNTER — VIRTUAL VISIT (OUTPATIENT)
Dept: SLEEP MEDICINE | Facility: CLINIC | Age: 80
End: 2020-03-23
Payer: COMMERCIAL

## 2020-03-23 VITALS — BODY MASS INDEX: 32.94 KG/M2 | WEIGHT: 179 LBS | HEIGHT: 62 IN

## 2020-03-23 DIAGNOSIS — G47.01 INSOMNIA DUE TO MEDICAL CONDITION: ICD-10-CM

## 2020-03-23 DIAGNOSIS — G47.33 OSA (OBSTRUCTIVE SLEEP APNEA): Primary | ICD-10-CM

## 2020-03-23 PROCEDURE — 99213 OFFICE O/P EST LOW 20 MIN: CPT | Mod: 95 | Performed by: PHYSICIAN ASSISTANT

## 2020-03-23 RX ORDER — TRAZODONE HYDROCHLORIDE 50 MG/1
TABLET, FILM COATED ORAL
Qty: 30 TABLET | Refills: 3 | Status: SHIPPED | OUTPATIENT
Start: 2020-03-23 | End: 2020-07-21

## 2020-03-23 ASSESSMENT — MIFFLIN-ST. JEOR: SCORE: 1250.19

## 2020-03-23 NOTE — PATIENT INSTRUCTIONS
Your BMI is Body mass index is 32.74 kg/m .  Weight management is a personal decision.  If you are interested in exploring weight loss strategies, the following discussion covers the approaches that may be successful. Body mass index (BMI) is one way to tell whether you are at a healthy weight, overweight, or obese. It measures your weight in relation to your height.  A BMI of 18.5 to 24.9 is in the healthy range. A person with a BMI of 25 to 29.9 is considered overweight, and someone with a BMI of 30 or greater is considered obese. More than two-thirds of American adults are considered overweight or obese.  Being overweight or obese increases the risk for further weight gain. Excess weight may lead to heart disease and diabetes.  Creating and following plans for healthy eating and physical activity may help you improve your health.  Weight control is part of healthy lifestyle and includes exercise, emotional health, and healthy eating habits. Careful eating habits lifelong are the mainstay of weight control. Though there are significant health benefits from weight loss, long-term weight loss with diet alone may be very difficult to achieve- studies show long-term success with dietary management in less than 10% of people. Attaining a healthy weight may be especially difficult to achieve in those with severe obesity. In some cases, medications, devices and surgical management might be considered.  What can you do?  If you are overweight or obese and are interested in methods for weight loss, you should discuss this with your provider.     Consider reducing daily calorie intake by 500 calories.     Keep a food journal.     Avoiding skipping meals, consider cutting portions instead.    Diet combined with exercise helps maintain muscle while optimizing fat loss. Strength training is particularly important for building and maintaining muscle mass. Exercise helps reduce stress, increase energy, and improves fitness.  Increasing exercise without diet control, however, may not burn enough calories to loose weight.       Start walking three days a week 10-20 minutes at a time    Work towards walking thirty minutes five days a week     Eventually, increase the speed of your walking for 1-2 minutes at time    In addition, we recommend that you review healthy lifestyles and methods for weight loss available through the National Institutes of Health patient information sites:  http://win.niddk.nih.gov/publications/index.htm    And look into health and wellness programs that may be available through your health insurance provider, employer, local community center, or kamran club.    Weight management plan: Patient was referred to their PCP to discuss a diet and exercise plan.

## 2020-03-23 NOTE — PROGRESS NOTES
"Malika Velasco is a 79 year old female who is being evaluated via a billable telephone visit.      The patient has been notified of following:     \"This telephone visit will be conducted via a call between you and your physician/provider. We have found that certain health care needs can be provided without the need for a physical exam.  This service lets us provide the care you need with a short phone conversation.  If a prescription is necessary we can send it directly to your pharmacy.  If lab work is needed we can place an order for that and you can then stop by our lab to have the test done at a later time.    If during the course of the call the physician/provider feels a telephone visit is not appropriate, you will not be charged for this service.\"     Malika Velasco complains of    Chief Complaint   Patient presents with     RECHECK     Follow up medication and wale       I have reviewed and updated the patient's Past Medical History, Social History, Family History and Medication List.    ALLERGIES  Zantac [ranitidine]    CPAP Follow-Up Visit:    Date on this visit: 3/23/2020    Malika Velasco has a follow-up of her treatment of WALE and insomnia. She was initially seen at the [facility] for management of previously diagnosed WALE. She has not been sleeping well for several months.  Her medical history is significant for HTN, aortic valve sclerosis, pacemaker for AV block, hyperlipidemia, OA.       Malika had a PSG at [facility] on 1/20/2005. Her AHI was 18/hr, RDI 34/hr, O2 ankit 83%. Her weight was 139. She did not have any significant PLMs.       The visit today was primarily to review efficacy of trazodone 50 mg in treating her insomnia. She was waking 2-6 times per night to urinate. She now wakes 2-3 times. She had been on tolterodine, but did not find it helpful. She has urinary frequency in the day as well. She saw a urologist last month and was switched to trospium 20 " mg twice daily. That helps some. She denies any side effects from trazodone, no morning grogginess or balance issues in particular.     She takes Tylenol around 2 AM. She is up for the day around 5 AM. She takes trospium and goes back to bed for another hour. She has to take the trospium an hour before breakfast. Bedtime is 10:30-11 PM.  She dozes briefly in the afternoon after lunch watching TV sitting on the couch. 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     She is on auto CPAP 10-16 cm. She likes her new CPAP and mask (Simplus).     The compliance data shows that the patient used the CPAP for 30/30 nights, 100% of nights for >4 hours.  The 90th% pressure is 11.9 cm.  The average time in large leak is 0 sec.  The average nightly usage is 6:58.  The average AHI is 5.5/hr (2/hr are centrals). The download shows she is getting up 2-3 times per night. She frequently hits the ramp when she gets back into bed. The snore index is 1.5. Her mask leak looks well controlled for the most part.       PAP machine: ResMed/Respironics     Interface:  Mask: Full face mask- Simplus  Chin strap: No  Leak: No  Using Humidifier: Yes  Condensation in hose or mask: No. She had to turn the humidity level down because the water chamber was nearly running empty.      Difficulties with therapy:    [-] snoring  [-] Difficulty tolerating the pressure- although she does use the ramp  [-] Epistaxis/dry nose:   [-] Nasal congestion  [-] Dry mouth: Gone since getting off of tolterodine.   [-] Mouth breathing:   [-] Pain/skin breakdown:      Improvements noted with CPAP:   [+] waking up more refreshed  [+] sleeping better with less arousals  [+] nocturia improved: largely from trazodone and trospium   [+] improved energy level during the day    Past medical/surgical history, family history, social history, medications and allergies were reviewed.      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  Added by Vicky Antony MSW Advance Care Planning Liaison       TRAVIS (obstructive sleep apnea)      Priority: Medium     Moderate on CPAP       Aortic valve sclerosis      Priority: Medium     Pacemaker 03/09/2017     Priority: Medium     For complete heart block    Overview:   -placed 11/21/1197 due to complete heart block with syncope  -generator change 5/21/2007 for KAREN  -KAREN reached 2/14/2015 - generator change 3/31/2015       Hyperlipidemia LDL goal <130 03/09/2017     Priority: Medium     Benign essential hypertension 03/09/2017     Priority: Medium     Osteopenia 03/09/2017     Priority: Medium     Osteoarthritis      Priority: Medium     Squamous cell carcinoma, face      Priority: Medium     Back pain      Priority: Medium     Allergic rhinitis due to pollen 12/09/2015     Priority: Medium     Status post total left knee replacement 05/27/2015     Priority: Medium     BEASLEY (dyspnea on exertion) 03/23/2015     Priority: Medium     Other specified counseling 11/03/2009     Priority: Medium     Overview:    Advance care planning session completed with patient on 11/3/09.  See  [facility] notes dated 11/3/09.   Overview:   forms filed       Fitting or adjustment of cardiac pacemaker 08/12/2008     Priority: Medium     Overview:   Date of last device in office evaluation: 7/15/2011  ? Type of device: dual chamber pacemaker    ?  and model: Medtronic  DR Waller     * Date of implant: gen change 5/21/07      ? Indication for device: sinus node dysfunction    ? Cardiac resynchronization therapy:   Y/n no    ? Battery longevity documented as less than 3  Months:  No -   ? Are any of the leads less than 3 months old: No -     ? Programming              ? Pacing mode and programmed lower rate: AAIR/DDDR 60 bpm              ? Rate-responsive sensor type, if programmed on: accelerometer    ? Is the patient pacemaker dependent:  Y/N yes      Underlying rhythm and heart rate if it can be determined:  CHB-no intrinsic R wave @ 30 bpm      ? What is the response of this device to magnet placement: asynchronous pacing     ? PM magnet pacing rate: DOO 85 bpm    ? Any alert status on CIED generator or lead:  No -       ? Last pacing threshold    Atrial   0.5V @ 0.4ms           Ventricular 1V @ 0.5ms  TIARA DIAZ RN,  Certified Cardiac Device Specialist ....................  7/15/2011   3:47 PM           Impression/Plan:    (G47.33) TRAVIS (obstructive sleep apnea)  (primary encounter diagnosis)  Comment: doing well with CPAP. AHI is 5.5/hr, 2/hr are centrals, likely related to getting up at night. She will doze a little after lunch, primarily after working in the morning.  Plan: Comprehensive DME        Continue auto CPAP 10-16 cm. A prescription was written for new supplies. We reviewed recommendations for cleaning and replacing supplies.    (G47.01) Insomnia due to medical condition  Comment: She has noticed a benefit from trazodone 50 mg, She was waking 2-6 times per night for the restroom, now down to 2-3. She switched from tolterodine to trospium and has noticed a little  benefit from that as well. She no longer gets dry mouth. No side effects.  Plan: traZODone (DESYREL) 50 MG tablet        I refilled trazodone for another 4 months.      She will follow up with me in about 1 year(s).     14 minutes (3:02 PM to 3:16 PM) spent with patient, all of which were spent face-to-face counseling, consulting, coordinating plan of care.      Bennett Goltz, PA-C    CC: No ref. provider found

## 2020-03-24 NOTE — PROGRESS NOTES
"Malika Velasco is a 79 year old female who is being evaluated via a billable telephone visit.      The patient has been notified of following:     \"This telephone visit will be conducted via a call between you and your physician/provider. We have found that certain health care needs can be provided without the need for a physical exam.  This service lets us provide the care you need with a short phone conversation.  If a prescription is necessary we can send it directly to your pharmacy.  If lab work is needed we can place an order for that and you can then stop by our lab to have the test done at a later time.    If during the course of the call the physician/provider feels a telephone visit is not appropriate, you will not be charged for this service.\"     Malika Velasco complains of    Chief Complaint   Patient presents with     Follow Up     Medication and symptom check     Follow up regarding urinary frequency and urgency. I last saw her in clinic on 2/7/20, and at that time, she reported some stress incontinence with coughing and sneezing in addition to her other symptoms. She reported getting a pressure sensation in her lower abdomen when needing to void, which was unusual for her. She was initially struggling with hourly nocturia, but this lessened to only twice per night since starting trazodone for sleep. She began taking Detrol LA 4 mg last year to help with her frequency, which was helpful at first, but then lost effectiveness. At that time, she was back to hourly daytime voiding with a pattern of double-voiding. Denied caffeine or excessive fluid intake. PVR was low at that time at 24 mL. Her Detrol was stopped, and she was started on trospium 20 mg BID.     Today, she states that although she has continued to experience occasional frequency, she feels her symptoms are fairly well-controlled on trospium. She admits to drinking large volumes of fluids throughout the day, as well as 2-3 cups of " coffee in the morning. She continues to get up twice per night, but states this is primarily to take medication, including Tylenol for pain, and not necessarily for urination. She would like to continue on the trospium for the time being.       I have reviewed and updated the patient's Past Medical History, Social History, Family History and Medication List.    ALLERGIES  Zantac [ranitidine]    Assessment/Plan:  79 year old female with urinary frequency and urgency, ongoing to some extent since switching from Detrol to trospium. However, this is likely partially related to her fluid and caffeine intake. Overall, however, she is satisfied with symptoms, and would like to continue the trospium at this point for the time being.   -Will send refills of trospium 20 mg BID to patient's pharmacy.  -Patient to follow up with me in 6 months for a symptom check.       Phone call duration: 9 minutes    Denia Blum, CNP  Department of Urology

## 2020-03-25 ENCOUNTER — VIRTUAL VISIT (OUTPATIENT)
Dept: UROLOGY | Facility: CLINIC | Age: 80
End: 2020-03-25
Payer: COMMERCIAL

## 2020-03-25 DIAGNOSIS — R35.0 URINARY FREQUENCY: ICD-10-CM

## 2020-03-25 RX ORDER — TROSPIUM CHLORIDE 20 MG/1
20 TABLET, FILM COATED ORAL
Qty: 60 TABLET | Refills: 6 | Status: SHIPPED | OUTPATIENT
Start: 2020-03-25 | End: 2020-11-06

## 2020-04-28 ENCOUNTER — TELEPHONE (OUTPATIENT)
Dept: CARDIOLOGY | Facility: CLINIC | Age: 80
End: 2020-04-28

## 2020-04-28 DIAGNOSIS — Z95.0 PACEMAKER: Primary | ICD-10-CM

## 2020-04-28 DIAGNOSIS — I44.2 ATRIOVENTRICULAR BLOCK, COMPLETE (H): ICD-10-CM

## 2020-05-20 ENCOUNTER — ANCILLARY PROCEDURE (OUTPATIENT)
Dept: CARDIOLOGY | Facility: CLINIC | Age: 80
End: 2020-05-20
Attending: INTERNAL MEDICINE
Payer: COMMERCIAL

## 2020-05-20 DIAGNOSIS — Z95.0 PACEMAKER: ICD-10-CM

## 2020-05-20 DIAGNOSIS — Z95.0 PACEMAKER: Primary | ICD-10-CM

## 2020-05-20 DIAGNOSIS — I44.2 ATRIOVENTRICULAR BLOCK, COMPLETE (H): ICD-10-CM

## 2020-05-20 DIAGNOSIS — Z95.0 CARDIAC PACEMAKER IN SITU: ICD-10-CM

## 2020-05-20 PROCEDURE — 93294 REM INTERROG EVL PM/LDLS PM: CPT | Performed by: INTERNAL MEDICINE

## 2020-05-20 PROCEDURE — 93296 REM INTERROG EVL PM/IDS: CPT | Performed by: INTERNAL MEDICINE

## 2020-05-26 LAB
MDC_IDC_LEAD_IMPLANT_DT: NORMAL
MDC_IDC_LEAD_IMPLANT_DT: NORMAL
MDC_IDC_LEAD_LOCATION: NORMAL
MDC_IDC_LEAD_LOCATION: NORMAL
MDC_IDC_LEAD_LOCATION_DETAIL_1: NORMAL
MDC_IDC_LEAD_LOCATION_DETAIL_1: NORMAL
MDC_IDC_LEAD_MFG: NORMAL
MDC_IDC_LEAD_MFG: NORMAL
MDC_IDC_LEAD_MODEL: NORMAL
MDC_IDC_LEAD_MODEL: NORMAL
MDC_IDC_LEAD_POLARITY_TYPE: NORMAL
MDC_IDC_LEAD_POLARITY_TYPE: NORMAL
MDC_IDC_LEAD_SERIAL: NORMAL
MDC_IDC_LEAD_SERIAL: NORMAL
MDC_IDC_MSMT_BATTERY_DTM: NORMAL
MDC_IDC_MSMT_BATTERY_IMPEDANCE: 561 OHM
MDC_IDC_MSMT_BATTERY_REMAINING_LONGEVITY: 95 MO
MDC_IDC_MSMT_BATTERY_STATUS: NORMAL
MDC_IDC_MSMT_BATTERY_VOLTAGE: 2.79 V
MDC_IDC_MSMT_LEADCHNL_RA_IMPEDANCE_VALUE: 624 OHM
MDC_IDC_MSMT_LEADCHNL_RA_PACING_THRESHOLD_AMPLITUDE: 0.5 V
MDC_IDC_MSMT_LEADCHNL_RA_PACING_THRESHOLD_PULSEWIDTH: 0.4 MS
MDC_IDC_MSMT_LEADCHNL_RV_IMPEDANCE_VALUE: 716 OHM
MDC_IDC_MSMT_LEADCHNL_RV_PACING_THRESHOLD_AMPLITUDE: 0.88 V
MDC_IDC_MSMT_LEADCHNL_RV_PACING_THRESHOLD_PULSEWIDTH: 0.4 MS
MDC_IDC_PG_IMPLANT_DTM: NORMAL
MDC_IDC_PG_MFG: NORMAL
MDC_IDC_PG_MODEL: NORMAL
MDC_IDC_PG_SERIAL: NORMAL
MDC_IDC_PG_TYPE: NORMAL
MDC_IDC_SESS_CLINIC_NAME: NORMAL
MDC_IDC_SESS_DTM: NORMAL
MDC_IDC_SESS_TYPE: NORMAL
MDC_IDC_SET_BRADY_AT_MODE_SWITCH_MODE: NORMAL
MDC_IDC_SET_BRADY_AT_MODE_SWITCH_RATE: 175 {BEATS}/MIN
MDC_IDC_SET_BRADY_LOWRATE: 60 {BEATS}/MIN
MDC_IDC_SET_BRADY_MAX_SENSOR_RATE: 130 {BEATS}/MIN
MDC_IDC_SET_BRADY_MAX_TRACKING_RATE: 130 {BEATS}/MIN
MDC_IDC_SET_BRADY_MODE: NORMAL
MDC_IDC_SET_BRADY_PAV_DELAY_LOW: 200 MS
MDC_IDC_SET_BRADY_SAV_DELAY_LOW: 180 MS
MDC_IDC_SET_LEADCHNL_RA_PACING_AMPLITUDE: 1.5 V
MDC_IDC_SET_LEADCHNL_RA_PACING_CAPTURE_MODE: NORMAL
MDC_IDC_SET_LEADCHNL_RA_PACING_POLARITY: NORMAL
MDC_IDC_SET_LEADCHNL_RA_PACING_PULSEWIDTH: 0.4 MS
MDC_IDC_SET_LEADCHNL_RA_SENSING_POLARITY: NORMAL
MDC_IDC_SET_LEADCHNL_RA_SENSING_SENSITIVITY: 1 MV
MDC_IDC_SET_LEADCHNL_RV_PACING_AMPLITUDE: 2 V
MDC_IDC_SET_LEADCHNL_RV_PACING_CAPTURE_MODE: NORMAL
MDC_IDC_SET_LEADCHNL_RV_PACING_POLARITY: NORMAL
MDC_IDC_SET_LEADCHNL_RV_PACING_PULSEWIDTH: 0.4 MS
MDC_IDC_SET_LEADCHNL_RV_SENSING_POLARITY: NORMAL
MDC_IDC_SET_LEADCHNL_RV_SENSING_SENSITIVITY: 2.8 MV
MDC_IDC_SET_ZONE_DETECTION_INTERVAL: 333.33 MS
MDC_IDC_SET_ZONE_DETECTION_INTERVAL: 342.86 MS
MDC_IDC_SET_ZONE_TYPE: NORMAL
MDC_IDC_SET_ZONE_TYPE: NORMAL
MDC_IDC_STAT_AT_BURDEN_PERCENT: 0 %
MDC_IDC_STAT_AT_DTM_END: NORMAL
MDC_IDC_STAT_AT_DTM_START: NORMAL
MDC_IDC_STAT_AT_MODE_SW_COUNT: 2
MDC_IDC_STAT_BRADY_AP_VP_PERCENT: 27 %
MDC_IDC_STAT_BRADY_AP_VS_PERCENT: 0 %
MDC_IDC_STAT_BRADY_AS_VP_PERCENT: 73 %
MDC_IDC_STAT_BRADY_AS_VS_PERCENT: 0 %
MDC_IDC_STAT_BRADY_DTM_END: NORMAL
MDC_IDC_STAT_BRADY_DTM_START: NORMAL
MDC_IDC_STAT_EPISODE_RECENT_COUNT: 0
MDC_IDC_STAT_EPISODE_RECENT_COUNT: 1
MDC_IDC_STAT_EPISODE_RECENT_COUNT_DTM_END: NORMAL
MDC_IDC_STAT_EPISODE_RECENT_COUNT_DTM_END: NORMAL
MDC_IDC_STAT_EPISODE_RECENT_COUNT_DTM_START: NORMAL
MDC_IDC_STAT_EPISODE_RECENT_COUNT_DTM_START: NORMAL
MDC_IDC_STAT_EPISODE_TYPE: NORMAL
MDC_IDC_STAT_EPISODE_TYPE: NORMAL

## 2020-06-04 DIAGNOSIS — I10 BENIGN ESSENTIAL HYPERTENSION: ICD-10-CM

## 2020-06-04 RX ORDER — CARVEDILOL 12.5 MG/1
12.5 TABLET ORAL 2 TIMES DAILY WITH MEALS
Qty: 60 TABLET | Refills: 7 | Status: SHIPPED | OUTPATIENT
Start: 2020-06-04 | End: 2020-11-27

## 2020-06-04 NOTE — TELEPHONE ENCOUNTER
Looks like RX is OK to refill. Send back to [facility] pool if additional action needed. Viridiana Rosas, CMA

## 2020-07-01 ENCOUNTER — VIRTUAL VISIT (OUTPATIENT)
Dept: CARDIOLOGY | Facility: CLINIC | Age: 80
End: 2020-07-01
Attending: PHYSICIAN ASSISTANT
Payer: COMMERCIAL

## 2020-07-01 VITALS
BODY MASS INDEX: 31.63 KG/M2 | DIASTOLIC BLOOD PRESSURE: 67 MMHG | WEIGHT: 178.5 LBS | SYSTOLIC BLOOD PRESSURE: 132 MMHG | HEART RATE: 68 BPM | HEIGHT: 63 IN

## 2020-07-01 DIAGNOSIS — Z95.0 CARDIAC PACEMAKER IN SITU: ICD-10-CM

## 2020-07-01 DIAGNOSIS — I10 ESSENTIAL HYPERTENSION: ICD-10-CM

## 2020-07-01 PROCEDURE — 99213 OFFICE O/P EST LOW 20 MIN: CPT | Mod: 95 | Performed by: INTERNAL MEDICINE

## 2020-07-01 ASSESSMENT — MIFFLIN-ST. JEOR: SCORE: 1255.86

## 2020-07-01 NOTE — PROGRESS NOTES
HISTORY OF PRESENT ILLNESS:  Sp PPM 1997 last generator replacement 2015/ Interrogation 5/2020 battery fine. No atib. Pacer working normally. bp now controlled.  Orders this Visit:  No orders of the defined types were placed in this encounter.    No orders of the defined types were placed in this encounter.    There are no discontinued medications.    Encounter Diagnoses   Name Primary?     Cardiac pacemaker in situ      Essential hypertension        CURRENT MEDICATIONS:  Current Outpatient Medications   Medication Sig Dispense Refill     alendronate (FOSAMAX) 70 MG tablet Take 1 tablet (70 mg) by mouth every 7 days 12 tablet 3     atorvastatin (LIPITOR) 10 MG tablet Take 1 tablet (10 mg) by mouth daily 90 tablet 3     Calcium Carb-Cholecalciferol (CALCIUM 600/VITAMIN D3) 600-800 MG-UNIT TABS        carvedilol (COREG) 12.5 MG tablet Take 1 tablet (12.5 mg) by mouth 2 times daily (with meals) 60 tablet 7     cetirizine (ZYRTEC) 10 MG tablet Take 1 tablet (10 mg) by mouth daily 30 tablet 3     chlorthalidone (HYGROTON) 25 MG tablet Take 0.5 tablets (12.5 mg) by mouth daily 90 tablet 1     fish oil-omega-3 fatty acids 1000 MG capsule Take 2 g by mouth daily       fluticasone (FLONASE) 50 MCG/ACT nasal spray Spray 2 sprays into both nostrils daily 16 g 0     MELATONIN PO Take 5 mg by mouth At Bedtime       multivitamin, therapeutic with minerals (MULTI-VITAMIN) TABS tablet Take 1 tablet by mouth daily       potassium chloride ER (K-DUR/KLOR-CON M) 20 MEQ CR tablet Take 1 tablet (20 mEq) by mouth daily 90 tablet 3     traZODone (DESYREL) 50 MG tablet Take 1/2 to 1 tablet by mouth at bedtime as needed. (Patient taking differently: 1 tablet by mouth at bedtime as needed.) 30 tablet 3     trospium (SANCTURA) 20 MG tablet Take 1 tablet (20 mg) by mouth 2 times daily (before meals) 60 tablet 6     FEXOFENADINE HCL PO Take 180 mg by mouth daily         ALLERGIES     Allergies   Allergen Reactions     Zantac [Ranitidine]       "Headache       PAST MEDICAL, SURGICAL, FAMILY, SOCIAL HISTORY:  History was reviewed and updated as needed, see medical record.    Review of Systems:  A 12-point review of systems was completed, see medical record for detailed review of systems information.    Physical Exam:  Vitals: /67   Pulse 68   Ht 1.588 m (5' 2.5\")   Wt 81 kg (178 lb 8 oz)   BMI 32.13 kg/m      Constitutional:           Skin:           Head:           Eyes:           ENT:           Neck:           Chest:           Cardiac:                    Abdomen:           Vascular:                                        Extremities and Back:           Neurological:           ASSESSMENT: stable       RECOMMENDATIONS: follow-up one year follow ppm in clinic      Recent Lab Results:  LIPID RESULTS:  Lab Results   Component Value Date    CHOL 114 02/04/2020    HDL 42 (L) 02/04/2020    LDL 42 02/04/2020    TRIG 151 (H) 02/04/2020       LIVER ENZYME RESULTS:  Lab Results   Component Value Date    AST 31 03/01/2017    ALT 5 04/10/2018       CBC RESULTS:  Lab Results   Component Value Date    WBC 10.8 01/14/2020    RBC 5.17 01/14/2020    HGB 13.9 01/14/2020    HCT 41.4 01/14/2020    MCV 80 01/14/2020    MCH 26.9 01/14/2020    MCHC 33.6 01/14/2020    RDW 14.7 01/14/2020     01/14/2020       BMP RESULTS:  Lab Results   Component Value Date     02/04/2020    POTASSIUM 4.7 02/04/2020    CHLORIDE 103 02/04/2020    CO2 28 02/04/2020    ANIONGAP 5 02/04/2020     (H) 02/04/2020    BUN 20 02/04/2020    CR 0.61 02/04/2020    GFRESTIMATED 87 02/04/2020    GFRESTBLACK >90 02/04/2020    SAVANNAH 9.4 02/04/2020        A1C RESULTS:  No results found for: A1C    INR RESULTS:  No results found for: INR    We greatly appreciate the opportunity to be involved in the care of your patient, Malika Velasco.    Sincerely,  Tal Santacruz MD      CC  Karen Navarro MD  [address]  [place] [address]                                      "

## 2020-07-01 NOTE — PROGRESS NOTES
"  Malika Velasco is a 79 year old female who is being evaluated via a billable video visit.      The patient has been notified of following:     \"This video visit will be conducted via a call between you and your physician/provider. We have found that certain health care needs can be provided without the need for an in-person physical exam.  This service lets us provide the care you need with a video conversation.  If a prescription is necessary we can send it directly to your pharmacy.  If lab work is needed we can place an order for that and you can then stop by our lab to have the test done at a later time.    Video visits are billed at different rates depending on your insurance coverage.  Please reach out to your insurance provider with any questions.    If during the course of the call the physician/provider feels a video visit is not appropriate, you will not be charged for this service.\"    Patient has given verbal consent for Video visit? Yes  How would you like to obtain your AVS? Mail a copy  Patient would like the video invitation sent by:     Text to [phone]    Will anyone else be joining your video visit?         Video-Visit Details    Type of service:  Video Visit    Video Start Time: 08:43  Video End Time: 08:50    Originating Location (pt. Location): home    Distant Location (provider location):  [facility]     Platform used for Video Visit: Doximity    See note          Patient reported vitals:  BP:132/67  Heart rate:68  Weight:178.5    Review Of Systems  Skin: negative  Eyes: glasses  Ears/Nose/Throat: negative  Respiratory: No shortness of breath, dyspnea on exertion, cough, or hemoptysis  Cardiovascular: negative  Gastrointestinal: negative  Genitourinary: frequency  Musculoskeletal: joint stiffness  Neurologic: negative  Psychiatric: negative  Hematologic/Lymphatic/Immunologic: allergies  Endocrine: negative    DELISA Dolan  "

## 2020-07-01 NOTE — PROGRESS NOTES
Service Date: 07/01/2020      VIDEO VISIT       This visit was conducted as a telemedicine visit on 07/01/2020 using the 2-Observe platform.  The patient location is at home.  The provider location is at the [facility].  The duration of the phone call was 10 minutes.  Video start time was 0843; video end time was 0850.      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      Mr. Velasco was initially seen by my colleague, Dr. Yung, in 2017.  She was last seen by Dr. Yung in 04/2018, and most recently saw our advanced level practitioner, Divya Reeder in 04/2019.      The patient underwent implantation of a dual-chamber pacemaker in 1997 at [facility] when she presented with syncope and third-degree AV block.  Since implantation of her pacemaker, the patient has been entirely free of cardiovascular symptoms.  Her most recent generator replacement was in 2015.  Her device was interrogated in 05/2020 and is functioning within normal specified parameters with good battery life.      When we last saw the patient, her blood pressure was not optimally controlled, but since then, after being placed on carvedilol and losartan, her blood pressures have been within American College of Cardiology recommended values.        She denies chest, arm, neck, jaw or back discomfort, syncope or focal neurologic deficit.  She has been compliant with her medical therapy.  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  She has been a  for 5 years and has 2 children.      PAST MEDICAL HISTORY:   1.   Third-degree AV block, status post pacemaker implantation in 1997.  Most recent generator replacement on 03/31/2015 with implantation of a Medtronic ADDRL1 Adapta device.  Device most recently interrogated in 05/2020 and functioning normally.   2.  Coronary artery disease, diagnosed with CT scan 2009.  On statin therapy with most recent LDL cholesterol is 42 in 2020.   3.  Hypertension, currently well controlled.   4.  Dyslipidemia.      ALLERGIES:  Ranitidine.      HABITS:  The patient does not abuse tobacco or alcohol.      SOCIAL HISTORY:  The patient is a .  She has 2 daughters, 1 is a stepdaughter.  She has 2 grandchildren.  She is  for 5 years.      MEDICATIONS:   1.  Alendronate (Fosamax) 70 mg every 7 days.   2.  Atorvastatin 10 mg daily.   3.  Carvedilol 12.5 mg b.i.d.   4.  Chlorthalidone 12.5 mg daily.   5.  Potassium 20 mEq daily.   6.  Trazodone 50 mg at bedtime as needed.      PHYSICAL EXAMINATION: Exam today is limited because of the telemedicine visit.  Her blood pressure is 132/67 with a heart rate of 68.  Her height is 1.6 meters, her weight is 81 kg.  Her BMI is 32.  She appears alert, appropriate, comfortable.  She answers questions.  She is not dyspneic.  She appears very comfortable with no diaphoresis.  She displays normal insight and judgment.   The remainder of the exam is limited by the telemedicine visit.      LABORATORY STUDIES:  Most recent LDL cholesterol was 42 on 02/04/2020.  Most recent potassium 4.7 with creatinine 0.61.      Her most recent pacemaker interrogation was reviewed and is normal.      ASSESSMENT:  Ms. Velasco is asymptomatic.  Her device is functioning normally and is being followed on a regular basis in Pacemaker Clinic.  Her lipid levels are optimal.  Her systolic blood pressure is acceptable.      We have reviewed the importance of regular activity and compliance with medical therapy.      RECOMMENDATIONS:   1.  I have nothing to add at this point to  Dr. Navarro's excellent medical care.   2.  Continue regular activity.   3.  We will follow the patient's pacemaker on a regular basis in clinic.   4.  Followup visit with me in 1 year.      We greatly appreciate the opportunity to be involved in the care of this most pleasant patient.      cc:      Karen Navarro MD    [facility]   [address]   [place] [address]         COREY LERMA MD             D: 2020   T: 2020   MT: ELINOR      Name:     DOC SOSA   MRN:      [id number]        Account:      [id number]   :      1940           Service Date: 2020      Document: [id number]

## 2020-07-01 NOTE — LETTER
7/1/2020    Karen Navarro MD  [address]  [facility] [address]    RE: Malika Velasco       Dear Colleague,    I had the pleasure of seeing Malika Velasco in the [facility].      Malika Velasco is a 79 year old female who is being evaluated via a billable video visit.      HISTORY OF PRESENT ILLNESS:  Sp PPM 1997 last generator replacement 2015/ Interrogation 5/2020 battery fine. No atib. Pacer working normally. bp now controlled.  Orders this Visit:  No orders of the defined types were placed in this encounter.    No orders of the defined types were placed in this encounter.    There are no discontinued medications.    Encounter Diagnoses   Name Primary?     Cardiac pacemaker in situ      Essential hypertension        CURRENT MEDICATIONS:  Current Outpatient Medications   Medication Sig Dispense Refill     alendronate (FOSAMAX) 70 MG tablet Take 1 tablet (70 mg) by mouth every 7 days 12 tablet 3     atorvastatin (LIPITOR) 10 MG tablet Take 1 tablet (10 mg) by mouth daily 90 tablet 3     Calcium Carb-Cholecalciferol (CALCIUM 600/VITAMIN D3) 600-800 MG-UNIT TABS        carvedilol (COREG) 12.5 MG tablet Take 1 tablet (12.5 mg) by mouth 2 times daily (with meals) 60 tablet 7     cetirizine (ZYRTEC) 10 MG tablet Take 1 tablet (10 mg) by mouth daily 30 tablet 3     chlorthalidone (HYGROTON) 25 MG tablet Take 0.5 tablets (12.5 mg) by mouth daily 90 tablet 1     fish oil-omega-3 fatty acids 1000 MG capsule Take 2 g by mouth daily       fluticasone (FLONASE) 50 MCG/ACT nasal spray Spray 2 sprays into both nostrils daily 16 g 0     MELATONIN PO Take 5 mg by mouth At Bedtime       multivitamin, therapeutic with minerals (MULTI-VITAMIN) TABS tablet Take 1 tablet by mouth daily       potassium chloride ER (K-DUR/KLOR-CON M) 20 MEQ CR tablet Take 1 tablet (20 mEq) by mouth daily 90 tablet 3     traZODone (DESYREL) 50 MG tablet Take 1/2 to 1 tablet by mouth at bedtime as  "needed. (Patient taking differently: 1 tablet by mouth at bedtime as needed.) 30 tablet 3     trospium (SANCTURA) 20 MG tablet Take 1 tablet (20 mg) by mouth 2 times daily (before meals) 60 tablet 6     FEXOFENADINE HCL PO Take 180 mg by mouth daily         ALLERGIES     Allergies   Allergen Reactions     Zantac [Ranitidine]      Headache       PAST MEDICAL, SURGICAL, FAMILY, SOCIAL HISTORY:  History was reviewed and updated as needed, see medical record.    Review of Systems:  A 12-point review of systems was completed, see medical record for detailed review of systems information.    Physical Exam:  Vitals: /67   Pulse 68   Ht 1.588 m (5' 2.5\")   Wt 81 kg (178 lb 8 oz)   BMI 32.13 kg/m      Constitutional:           Skin:           Head:           Eyes:           ENT:           Neck:           Chest:           Cardiac:                    Abdomen:           Vascular:                                        Extremities and Back:           Neurological:           ASSESSMENT: stable       RECOMMENDATIONS: follow-up one year follow ppm in clinic      Recent Lab Results:  LIPID RESULTS:  Lab Results   Component Value Date    CHOL 114 02/04/2020    HDL 42 (L) 02/04/2020    LDL 42 02/04/2020    TRIG 151 (H) 02/04/2020       LIVER ENZYME RESULTS:  Lab Results   Component Value Date    AST 31 03/01/2017    ALT 5 04/10/2018       CBC RESULTS:  Lab Results   Component Value Date    WBC 10.8 01/14/2020    RBC 5.17 01/14/2020    HGB 13.9 01/14/2020    HCT 41.4 01/14/2020    MCV 80 01/14/2020    MCH 26.9 01/14/2020    MCHC 33.6 01/14/2020    RDW 14.7 01/14/2020     01/14/2020       BMP RESULTS:  Lab Results   Component Value Date     02/04/2020    POTASSIUM 4.7 02/04/2020    CHLORIDE 103 02/04/2020    CO2 28 02/04/2020    ANIONGAP 5 02/04/2020     (H) 02/04/2020    BUN 20 02/04/2020    CR 0.61 02/04/2020    GFRESTIMATED 87 02/04/2020    GFRESTBLACK >90 02/04/2020    SAVANNAH 9.4 02/04/2020        A1C " RESULTS:  No results found for: A1C    INR RESULTS:  No results found for: INR      Service Date: 07/01/2020      VIDEO VISIT       This visit was conducted as a telemedicine visit on 07/01/2020 using the redealize platform.  The patient location is at home.  The provider location is at the [facility].  The duration of the phone call was 10 minutes.  Video start time was 0843; video end time was 0850.      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      Mr. Velasco was initially seen by my colleague, Dr. Yung, in 2017.  She was last seen by Dr. Yung in 04/2018, and most recently saw our advanced level practitioner, Divya Reeder in 04/2019.      The patient underwent implantation of a dual-chamber pacemaker in 1997 at [facility] when she presented with syncope and third-degree AV block.  Since implantation of her pacemaker, the patient has been entirely free of cardiovascular symptoms.  Her most recent generator replacement was in 2015.  Her device was interrogated in 05/2020 and is functioning within normal specified parameters with good battery life.      When we last saw the patient, her blood pressure was not optimally controlled, but since then, after being placed on carvedilol and losartan, her blood pressures have been within American College of Cardiology recommended values.        She denies chest, arm, neck, jaw or back discomfort, syncope or focal neurologic deficit.  She has been compliant with her medical therapy.  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   She has been a  for 5 years and has 2 children.      PAST MEDICAL HISTORY:   1.  Third-degree AV block, status post pacemaker implantation in 1997.  Most recent generator replacement on 03/31/2015 with implantation of a Medtronic ADDRL1 Adapta device.  Device most recently interrogated in 05/2020 and functioning normally.   2.  Coronary artery disease, diagnosed with CT scan 2009.  On statin therapy with most recent LDL cholesterol is 42 in 2020.   3.  Hypertension, currently well controlled.   4.  Dyslipidemia.      ALLERGIES:  Ranitidine.      HABITS:  The patient does not abuse tobacco or alcohol.      SOCIAL HISTORY:  The patient is a .  She has 2 daughters, 1 is a stepdaughter.  She has 2 grandchildren.  She is  for 5 years.      MEDICATIONS:   1.  Alendronate (Fosamax) 70 mg every 7 days.   2.  Atorvastatin 10 mg daily.   3.  Carvedilol 12.5 mg b.i.d.   4.  Chlorthalidone 12.5 mg daily.   5.  Potassium 20 mEq daily.   6.  Trazodone 50 mg at bedtime as needed.      PHYSICAL EXAMINATION: Exam today is limited because of the telemedicine visit.  Her blood pressure is 132/67 with a heart rate of 68.  Her height is 1.6 meters, her weight is 81 kg.  Her BMI is 32.  She appears alert, appropriate, comfortable.  She answers questions.  She is not dyspneic.  She appears very comfortable with no diaphoresis.  She displays normal insight and judgment.   The remainder of the exam is limited by the telemedicine visit.      LABORATORY STUDIES:  Most recent LDL cholesterol was 42 on 02/04/2020.  Most recent potassium 4.7 with creatinine 0.61.      Her most recent pacemaker interrogation was reviewed and is normal.      ASSESSMENT:  Ms. Velasco is asymptomatic.  Her device is functioning normally and is being followed on a regular basis in Pacemaker Clinic.  Her lipid levels are optimal.  Her systolic blood pressure is acceptable.      We have reviewed the importance of regular activity and compliance with  medical therapy.      RECOMMENDATIONS:   1.  I have nothing to add at this point to Dr. Navarro's excellent medical care.   2.  Continue regular activity.   3.  We will follow the patient's pacemaker on a regular basis in clinic.   4.  Followup visit with me in 1 year.      We greatly appreciate the opportunity to be involved in the care of this most pleasant patient.        Thank you for allowing me to participate in the care of your patient.    Sincerely,     Tal Santacruz MD     [facility]

## 2020-07-21 DIAGNOSIS — G47.01 INSOMNIA DUE TO MEDICAL CONDITION: ICD-10-CM

## 2020-07-21 NOTE — TELEPHONE ENCOUNTER
Pending RX TRAZODONE 50 mg TABS, DISP:  30, SIG:  Take 1/2-1 tablet by mouth at bedtime as needed, REF^3      Last Written Prescription Date:  3/23/2020  Last Fill Quantity:30,   # refills: 3  Last Office Visit: 3/23/2020  Future Office visit:         Route to Bennett Goltz, PA-C for review.      Routing refill request to provider for review/approval because:  Drug not on the G, P or [facility] refill protocol or controlled substance

## 2020-07-22 RX ORDER — TRAZODONE HYDROCHLORIDE 50 MG/1
TABLET, FILM COATED ORAL
Qty: 30 TABLET | Refills: 3 | Status: SHIPPED | OUTPATIENT
Start: 2020-07-22 | End: 2020-11-17

## 2020-08-07 ENCOUNTER — TRANSFERRED RECORDS (OUTPATIENT)
Dept: HEALTH INFORMATION MANAGEMENT | Facility: CLINIC | Age: 80
End: 2020-08-07

## 2020-08-12 ENCOUNTER — THERAPY VISIT (OUTPATIENT)
Dept: PHYSICAL THERAPY | Facility: CLINIC | Age: 80
End: 2020-08-12
Payer: COMMERCIAL

## 2020-08-12 DIAGNOSIS — M25.551 BILATERAL HIP PAIN: ICD-10-CM

## 2020-08-12 DIAGNOSIS — M25.552 BILATERAL HIP PAIN: ICD-10-CM

## 2020-08-12 PROCEDURE — 97110 THERAPEUTIC EXERCISES: CPT | Mod: GP | Performed by: PHYSICAL THERAPIST

## 2020-08-12 PROCEDURE — 97161 PT EVAL LOW COMPLEX 20 MIN: CPT | Mod: GP | Performed by: PHYSICAL THERAPIST

## 2020-08-12 NOTE — PROGRESS NOTES
[facility] Initial Evaluation  Subjective:  Pt has had cortisone shots in her low back and in her R hip a couple - they helped for a short while.  She reports not having much cartilage between her lower vertebra.  She reports that she has been told that a fusion might be helpful.    The history is provided by the patient. No  was used.   Patient Health History  Malika Velasco being seen for B hip pain.     Problem began: 8/10/2019.   Problem occurred: insiduous   Pain is reported as 5/10 on pain scale.  General health as reported by patient is good.  Pertinent medical history includes: high blood pressure, osteoarthritis and overweight.   Red flags:  None as reported by patient.     Surgeries include:  Orthopedic surgery and heart surgery. Other surgery history details: L TKA 5 years ago, pacemaker.    Current medications:  Anti-inflammatory (see Epic).    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   Primary job tasks include:  Other.   Other job/home tasks details: empty trash cans and pushes a cart to clean a couple of bathrooms, vacuuming.                Therapist Generated HPI Evaluation         Type of problem:  Bilateral hips.    This is a recurrent condition.  Condition occurred with:  Insidious onset.    Patient reports pain:  Lateral.        Associated symptoms:  Tingling and loss of motion/stiffness (tingling in lateral R thigh intermittently - however she feels that is getting better). Exacerbated by: getting up after prolonged sitting, feels better after walking a little further.  Relieved by: Aleve or ibuprofen.                              Objective:  System                                           Hip Evaluation  Hip PROM:  Hip PROM:  Left Hip:    Normal  Right Hip:  Normal                          Hip Strength:    Flexion:   Left: 3+/5   Pain:  Right: 5-/5   Pain:                      Abduction:   Left: 3/5     Pain:Right: 3-/5    Pain:    Internal Rotation:  Left: 5/5    Pain:Right: 4+/5   +  Pain:  External Rotation:  Left: 4-/5   Pain:  Right: 5/5   Pain:  Knee Flexion:  Left: 5/5   Pain:Right: 5/5   Pain:  Knee Extension:  Left: 5/5   Pain:Right: 3+/5    ++  Pain:                       General     ROS    Assessment/Plan:    Patient is a 79 year old female with both sides hip complaints.    Patient has the following significant findings with corresponding treatment plan.                Diagnosis 1:  B hip pain  Pain -  hot/cold therapy and manual therapy  Decreased ROM/flexibility - manual therapy, therapeutic exercise and home program  Decreased strength - therapeutic exercise, therapeutic activities and home program  Impaired muscle performance - neuro re-education and home program  Decreased function - therapeutic activities and home program    Therapy Evaluation Codes:    Cumulative Therapy Evaluation is: Low complexity.    Previous and current functional limitations:  (See Goal Flow Sheet for this information)    Short term and Long term goals: (See Goal Flow Sheet for this information)     Communication ability:  Patient appears to be able to clearly communicate and understand verbal and written communication and follow directions correctly.  Treatment Explanation - The following has been discussed with the patient:   RX ordered/plan of care  Anticipated outcomes  Possible risks and side effects  This patient would benefit from PT intervention to resume normal activities.   Rehab potential is good.    Frequency:  1 X week, once daily  Duration:  for 8 weeks  Discharge Plan:  Achieve all LTG.  Independent in home treatment program.  Reach maximal therapeutic benefit.    Please refer to the daily flowsheet for treatment today, total treatment time and time spent performing 1:1 timed codes.

## 2020-08-12 NOTE — LETTER
[facility]  [address]  [address]  [place] [place] [address]  [phone]    2020    Re: Malika Velasco   :   1940  MRN:  [id number]   REFERRING PHYSICIAN:   Adam HESS [facility] PT  Date of Initial Evaluation:  20  Visits:  Rxs Used: 1  Reason for Referral:  Bilateral hip pain    EVALUATION SUMMARY    [facility] Initial Evaluation  Subjective:  Pt has had cortisone shots in her low back and in her R hip a couple - they helped for a short while.  She reports not having much cartilage between her lower vertebra.  She reports that she has been told that a fusion might be helpful.    The history is provided by the patient. No  was used.   Patient Health History  Malika Velasco being seen for B hip pain.     Problem began: 8/10/2019.   Problem occurred: insiduous   Pain is reported as 5/10 on pain scale.  General health as reported by patient is good.  Pertinent medical history includes: high blood pressure, osteoarthritis and overweight.   Red flags:  None as reported by patient.     Surgeries include:  Orthopedic surgery and heart surgery. Other surgery history details: L TKA 5 years ago, pacemaker.    Current medications:  Anti-inflammatory (see Epic).    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   Primary job tasks include:  Other.   Other job/home tasks details: empty trash cans and pushes a cart to clean a couple of bathrooms, vacuuming.                Therapist Generated HPI Evaluation         Type of problem:  Bilateral hips.    This is a recurrent condition.  Condition occurred with:  Insidious onset.    Patient reports pain:  Lateral.    Re: Malika Velasco   :   1940            Associated symptoms:  Tingling and loss of motion/stiffness (tingling in lateral R thigh intermittently - however she feels that is getting better).  Exacerbated by: getting up after prolonged sitting, feels better after walking a little further.  Relieved by: Aleve or ibuprofen.    Objective:  Hip Evaluation  Hip PROM:  Hip PROM:  Left Hip:    Normal  Right Hip:  Normal  Hip Strength:    Flexion:   Left: 3+/5   Pain:  Right: 5-/5   Pain:                 Abduction:  Left: 3/5     Pain:Right: 3-/5    Pain:  Internal Rotation:  Left: 5/5    Pain:Right: 4+/5   +  Pain:  External Rotation:  Left: 4-/5   Pain:  Right: 5/5   Pain:  Knee Flexion:  Left: 5/5   Pain:Right: 5/5   Pain:  Knee Extension:  Left: 5/5   Pain:Right: 3+/5    ++  Pain:    Assessment/Plan:    Patient is a 79 year old female with both sides hip complaints.    Patient has the following significant findings with corresponding treatment plan.                Diagnosis 1:  B hip pain  Pain -  hot/cold therapy and manual therapy  Decreased ROM/flexibility - manual therapy, therapeutic exercise and home program  Decreased strength - therapeutic exercise, therapeutic activities and home program  Impaired muscle performance - neuro re-education and home program  Decreased function - therapeutic activities and home program    Therapy Evaluation Codes:    Cumulative Therapy Evaluation is: Low complexity.    Previous and current functional limitations:  (See Goal Flow Sheet for this information)    Short term and Long term goals: (See Goal Flow Sheet for this information)     Communication ability:  Patient appears to be able to clearly communicate and understand verbal and written communication and follow directions correctly.  Treatment Explanation - The following has been discussed with the patient:   RX ordered/plan of care  Anticipated outcomes  Possible risks and side effects  This patient would benefit from PT intervention to resume normal activities.   Rehab potential is good.              Re: Malika Velasco   :   1940      Frequency:  1 X week, once daily  Duration:  for 8 weeks  Discharge  Plan:  Achieve all LTG.  Independent in home treatment program.  Reach maximal therapeutic benefit.    Thank you for your referral.    INQUIRIES  Therapist: Maddy Bates PT, Cert.[facility] PT  [address] [address] [address]  Phone: [phone]  Fax: [phone]

## 2020-08-19 ENCOUNTER — THERAPY VISIT (OUTPATIENT)
Dept: PHYSICAL THERAPY | Facility: CLINIC | Age: 80
End: 2020-08-19
Payer: COMMERCIAL

## 2020-08-19 DIAGNOSIS — M25.552 BILATERAL HIP PAIN: ICD-10-CM

## 2020-08-19 DIAGNOSIS — M25.551 BILATERAL HIP PAIN: ICD-10-CM

## 2020-08-19 PROCEDURE — 97112 NEUROMUSCULAR REEDUCATION: CPT | Mod: GP | Performed by: PHYSICAL THERAPY ASSISTANT

## 2020-08-19 PROCEDURE — 97110 THERAPEUTIC EXERCISES: CPT | Mod: GP | Performed by: PHYSICAL THERAPY ASSISTANT

## 2020-08-26 ENCOUNTER — THERAPY VISIT (OUTPATIENT)
Dept: PHYSICAL THERAPY | Facility: CLINIC | Age: 80
End: 2020-08-26
Payer: COMMERCIAL

## 2020-08-26 DIAGNOSIS — M25.551 BILATERAL HIP PAIN: ICD-10-CM

## 2020-08-26 DIAGNOSIS — M25.552 BILATERAL HIP PAIN: ICD-10-CM

## 2020-08-26 PROCEDURE — 97112 NEUROMUSCULAR REEDUCATION: CPT | Mod: GP | Performed by: PHYSICAL THERAPY ASSISTANT

## 2020-08-26 PROCEDURE — 97110 THERAPEUTIC EXERCISES: CPT | Mod: GP | Performed by: PHYSICAL THERAPY ASSISTANT

## 2020-09-10 ENCOUNTER — THERAPY VISIT (OUTPATIENT)
Dept: PHYSICAL THERAPY | Facility: CLINIC | Age: 80
End: 2020-09-10
Payer: COMMERCIAL

## 2020-09-10 DIAGNOSIS — M25.551 BILATERAL HIP PAIN: ICD-10-CM

## 2020-09-10 DIAGNOSIS — M25.552 BILATERAL HIP PAIN: ICD-10-CM

## 2020-09-10 PROCEDURE — 97110 THERAPEUTIC EXERCISES: CPT | Mod: GP | Performed by: PHYSICAL THERAPY ASSISTANT

## 2020-09-10 PROCEDURE — 97112 NEUROMUSCULAR REEDUCATION: CPT | Mod: GP | Performed by: PHYSICAL THERAPY ASSISTANT

## 2020-09-24 ENCOUNTER — THERAPY VISIT (OUTPATIENT)
Dept: PHYSICAL THERAPY | Facility: CLINIC | Age: 80
End: 2020-09-24
Payer: COMMERCIAL

## 2020-09-24 DIAGNOSIS — M25.551 BILATERAL HIP PAIN: ICD-10-CM

## 2020-09-24 DIAGNOSIS — M25.552 BILATERAL HIP PAIN: ICD-10-CM

## 2020-09-24 PROCEDURE — 97112 NEUROMUSCULAR REEDUCATION: CPT | Mod: GP | Performed by: PHYSICAL THERAPY ASSISTANT

## 2020-09-24 PROCEDURE — 97110 THERAPEUTIC EXERCISES: CPT | Mod: GP | Performed by: PHYSICAL THERAPY ASSISTANT

## 2020-09-24 NOTE — LETTER
[facility]  [address], [address]  [place] [address]  [phone]    2020    Re: Malika Velasco   :   1940  MRN:  [id number]   [facility]ING PHYSICIAN[facility]   [facility]    Date of Initial Evaluation:  20  Visits:  Rxs Used: 5  Reason for Referral:  Bilateral hip pain    DISCHARGE REPORT    Progress reporting period is from 20 to 20.      SUBJECTIVE  Subjective changes noted by patient:  Patient reports that she is about 95% back to normal.  Patient states that she can walk her dog for 30 minutes.  Patient states that getting in and out of the chair is easy.  Feels that the exercises have been very helpful..      Current pain level is  0/10    Previous pain level was:       Changes in function:  Yes (See Goal flowsheet attached for changes in current functional level)     Adverse reaction to treatment or activity: None     OBJECTIVE  Changes noted in objective findings:  Yes, patient has a good home exercise program and is able to perform those independently.  Patient is able to ambulate stairs in reciprocal pattern without pain.  Encourage patient to continue with her home exercise program and walking program.  Left lower extremity generally at a 5-/5 with hip flexion, abduction, knee extension and flexion..         ASSESSMENT/PLAN  Updated problem list and treatment plan:   STG/LTGs have been met:  Yes (See Goal flow sheet completed today.)  Progress toward STG/LTGs have been made:  Yes (See Goal flow sheet completed today.)  Assessment of Progress: The patient's condition is improving.  Self Management Plans:  Patient is independent in a home treatment program.  Patient is independent in self management of symptoms.    Malika continues to require the following intervention to meet STG and LT's:  PT intervention is no longer required to meet STG/LTG.    Recommendations:  This patient is ready to be discharged from therapy and  continue their home treatment program.                  Re: Malika Velasco   :   1940    Thank you for your referral.    INQUIRIES  Therapist: VALERIE Rose PT  [facility]  [address] [address]  Phone: [phone]  Fax: [phone]

## 2020-09-24 NOTE — PROGRESS NOTES
Subjective:  HPI  Physical Exam                    Objective:  System    Physical Exam    General     ROS    Assessment/Plan:    ASSESSMENT/PLAN  Updated problem list and treatment plan:   STG/LTGs have been met:  Yes (See Goal flow sheet completed today.)  Progress toward STG/LTGs have been made:  Yes (See Goal flow sheet completed today.)  Assessment of Progress: The patient's condition is improving.  Self Management Plans:  Patient is independent in a home treatment program.  Patient is independent in self management of symptoms.    Malika continues to require the following intervention to meet STG and LT's:  PT intervention is no longer required to meet STG/LTG.    Recommendations:  This patient is ready to be discharged from therapy and continue their home treatment program.    Please refer to the daily flowsheet for treatment today, total treatment time and time spent performing 1:1 timed codes.

## 2020-09-24 NOTE — PROGRESS NOTES
Subjective:  HPI  Physical Exam                    Objective:  System    Physical Exam    General     ROS    Assessment/Plan:    DISCHARGE REPORT    Progress reporting period is from 8/12/20 to 9/24/20.      SUBJECTIVE  Subjective changes noted by patient:  Patient reports that she is about 95% back to normal.  Patient states that she can walk her dog for 30 minutes.  Patient states that getting in and out of the chair is easy.  Feels that the exercises have been very helpful..      Current pain level is  0/10    Previous pain level was:       Changes in function:  Yes (See Goal flowsheet attached for changes in current functional level)     Adverse reaction to treatment or activity: None     OBJECTIVE  Changes noted in objective findings:  Yes, patient has a good home exercise program and is able to perform those independently.  Patient is able to ambulate stairs in reciprocal pattern without pain.  Encourage patient to continue with her home exercise program and walking program.  Left lower extremity generally at a 5-/5 with hip flexion, abduction, knee extension and flexion..

## 2020-10-02 ENCOUNTER — TRANSFERRED RECORDS (OUTPATIENT)
Dept: HEALTH INFORMATION MANAGEMENT | Facility: CLINIC | Age: 80
End: 2020-10-02

## 2020-10-28 DIAGNOSIS — I10 BENIGN ESSENTIAL HYPERTENSION: ICD-10-CM

## 2020-10-28 DIAGNOSIS — R06.09 DYSPNEA ON EXERTION: ICD-10-CM

## 2020-10-28 RX ORDER — CHLORTHALIDONE 25 MG/1
12.5 TABLET ORAL DAILY
Qty: 45 TABLET | Refills: 3 | Status: SHIPPED | OUTPATIENT
Start: 2020-10-28 | End: 2021-01-19

## 2020-10-28 NOTE — TELEPHONE ENCOUNTER
Received refill request for:  Chlorthalidone  Last OV was: 7/1/2020 with Dr. Santacruz  Labs/EKG: last BMP 2/4/2020  F/U scheduled: orders in Epic for 7/2021  New script sent to: [facility]

## 2020-11-06 DIAGNOSIS — R35.0 URINARY FREQUENCY: ICD-10-CM

## 2020-11-06 RX ORDER — TROSPIUM CHLORIDE 20 MG/1
20 TABLET, FILM COATED ORAL
Qty: 60 TABLET | Refills: 2 | Status: SHIPPED | OUTPATIENT
Start: 2020-11-06 | End: 2020-12-04

## 2020-11-06 NOTE — TELEPHONE ENCOUNTER
Centralized Medication Refill Team note:    trospium (SANCTURA) 20 MG tablet  Take 1 tablet (20 mg) by mouth 2 times daily (before meals  Last Written Prescription Date:  3/25/20  Last Fill Quantity: 60,   # refills: 6  Last Office Visit : 3/25/20  Future Office visit: 6 months     refilled per protocol. Scheduling has been notified to contact the pt for appointment.

## 2020-11-06 NOTE — TELEPHONE ENCOUNTER
[facility] Call Center    Phone Message    May a detailed message be left on voicemail: yes     Reason for Call: Medication Refill Request    Has the patient contacted the pharmacy for the refill? Yes   Name of medication being requested: trospium (SANCTURA) 20 MG tablet  Provider who prescribed the medication: Denia Blum  Pharmacy:  [facility] [place]  Date medication is needed: As soon as possible         Action Taken: Message routed to:  [facility] Med refill team    Travel Screening: Not Applicable

## 2020-11-12 ENCOUNTER — TELEPHONE (OUTPATIENT)
Dept: UROLOGY | Facility: CLINIC | Age: 80
End: 2020-11-12

## 2020-11-12 NOTE — TELEPHONE ENCOUNTER
Left message for pt to call and schedule a virtual visit with Denia Blum for a 6 month follow up for symptom check.

## 2020-11-12 NOTE — TELEPHONE ENCOUNTER
----- Message from Magalis Waters RN sent at 11/6/2020  5:29 PM CST -----  Regarding: Appt 6 month Zager due  Please call to schedule.    Thanks    I do not need any follow up on the scheduling of this appointment unless the patient will no longer be receiving care in our clinic.

## 2020-11-13 ENCOUNTER — DOCUMENTATION ONLY (OUTPATIENT)
Dept: CARE COORDINATION | Facility: CLINIC | Age: 80
End: 2020-11-13

## 2020-11-17 DIAGNOSIS — G47.01 INSOMNIA DUE TO MEDICAL CONDITION: ICD-10-CM

## 2020-11-17 RX ORDER — TRAZODONE HYDROCHLORIDE 50 MG/1
TABLET, FILM COATED ORAL
Qty: 30 TABLET | Refills: 3 | Status: SHIPPED | OUTPATIENT
Start: 2020-11-17 | End: 2020-12-03

## 2020-11-17 NOTE — TELEPHONE ENCOUNTER
traZODone (DESYREL) 50 MG tablet    Last Written Prescription Date:  7/21/2020  Last Fill Quantity:30,   # refills: 3  Last Office Visit: 3/23/2020  Future Office visit: 1 year                Routing refill request to provider for review/approval because:  Drug not on the [facility], [facility] or [facility] refill protocol or controlled substance

## 2020-11-27 ENCOUNTER — PRE VISIT (OUTPATIENT)
Dept: UROLOGY | Facility: CLINIC | Age: 80
End: 2020-11-27

## 2020-11-27 ENCOUNTER — TELEPHONE (OUTPATIENT)
Dept: FAMILY MEDICINE | Facility: CLINIC | Age: 80
End: 2020-11-27

## 2020-11-27 DIAGNOSIS — J30.2 SEASONAL ALLERGIC RHINITIS, UNSPECIFIED TRIGGER: ICD-10-CM

## 2020-11-27 DIAGNOSIS — R06.09 DYSPNEA ON EXERTION: ICD-10-CM

## 2020-11-27 DIAGNOSIS — J01.90 ACUTE SINUSITIS WITH SYMPTOMS > 10 DAYS: ICD-10-CM

## 2020-11-27 DIAGNOSIS — E78.5 HYPERLIPIDEMIA LDL GOAL <130: ICD-10-CM

## 2020-11-27 DIAGNOSIS — M85.80 OSTEOPENIA, UNSPECIFIED LOCATION: ICD-10-CM

## 2020-11-27 DIAGNOSIS — I10 BENIGN ESSENTIAL HYPERTENSION: ICD-10-CM

## 2020-11-27 DIAGNOSIS — E87.6 HYPOKALEMIA: ICD-10-CM

## 2020-11-27 NOTE — TELEPHONE ENCOUNTER
.Reason for Call:   Prescriptions/new pharmacy    Detailed comments: Patient is calling to request having all her medications/refills sent to [facility] vs her current [facility] pharmacy for cost efficiency. [facility] need authorization;   Carvediol 12.5 milligram tab, refill not needed until January but getting this taken care of now to avoid delays    The rest of her medications are prescribed by pcp/sending message as well;   Karen Navarro    Phone Number Patient can be reached at: Home number on file [phone] (home)    Best Time: any    Can we leave a detailed message on this number? YES    Call taken on 11/27/2020 at 2:28 PM by Kareen Sue

## 2020-11-30 ENCOUNTER — TELEPHONE (OUTPATIENT)
Dept: UROLOGY | Facility: CLINIC | Age: 80
End: 2020-11-30

## 2020-11-30 DIAGNOSIS — R35.0 URINARY FREQUENCY: Primary | ICD-10-CM

## 2020-11-30 RX ORDER — POTASSIUM CHLORIDE 1500 MG/1
20 TABLET, EXTENDED RELEASE ORAL DAILY
Qty: 90 TABLET | Refills: 0 | Status: SHIPPED | OUTPATIENT
Start: 2020-11-30 | End: 2021-02-14

## 2020-11-30 RX ORDER — ALENDRONATE SODIUM 70 MG/1
70 TABLET ORAL
Qty: 12 TABLET | Refills: 0 | Status: SHIPPED | OUTPATIENT
Start: 2020-11-30 | End: 2021-02-14

## 2020-11-30 RX ORDER — CARVEDILOL 12.5 MG/1
12.5 TABLET ORAL 2 TIMES DAILY WITH MEALS
Qty: 180 TABLET | Refills: 0 | Status: SHIPPED | OUTPATIENT
Start: 2020-11-30 | End: 2021-02-14

## 2020-11-30 RX ORDER — ATORVASTATIN CALCIUM 10 MG/1
10 TABLET, FILM COATED ORAL DAILY
Qty: 90 TABLET | Refills: 0 | Status: SHIPPED | OUTPATIENT
Start: 2020-11-30 | End: 2021-02-14

## 2020-11-30 RX ORDER — TROSPIUM CHLORIDE 20 MG/1
20 TABLET, FILM COATED ORAL
Qty: 180 TABLET | Refills: 1 | Status: SHIPPED | OUTPATIENT
Start: 2020-11-30 | End: 2022-02-24

## 2020-11-30 NOTE — TELEPHONE ENCOUNTER
Chief Complaint   Patient presents with     Refill Request     trospium chloride 20 mg   [facility]      Sylwia Chan MA

## 2020-12-03 DIAGNOSIS — G47.01 INSOMNIA DUE TO MEDICAL CONDITION: ICD-10-CM

## 2020-12-03 RX ORDER — TRAZODONE HYDROCHLORIDE 50 MG/1
TABLET, FILM COATED ORAL
Qty: 90 TABLET | Refills: 1 | Status: SHIPPED | OUTPATIENT
Start: 2020-12-11 | End: 2021-05-25

## 2020-12-03 NOTE — TELEPHONE ENCOUNTER
Received faxed RX refill request for 90 day supply trazodone HCL 50 mg tablets from [facility].  Current RX for trazodone HCL 50 mg had been sent to [facility] [place], 11/17/2020, 30 days, with 3 refills.  Called and spoke with Babar, filled 30 days on 11/17/2020.  LOV was 3/23/2020, with a return in one year.    Pending RX  Trazodone HCL 50 mg, DISP 90 tablets, SIG:  Take 1 tablet by mouth at bedtime as needed  R^1  Last Written Prescription Date: 11/17/2020  Last Fill Quantity:30,   # refills:3  Last Office Visit: 3/23/2020  Future Office visit:   RTC one year for cpap/insomnia    Route new RX Bennett Goltz, PA-C for review        Routing refill request to provider for review/approval because:  Drug not on the [facility], [facility] or [facility] refill protocol or controlled substance

## 2020-12-04 ENCOUNTER — VIRTUAL VISIT (OUTPATIENT)
Dept: UROLOGY | Facility: CLINIC | Age: 80
End: 2020-12-04
Payer: COMMERCIAL

## 2020-12-04 DIAGNOSIS — R39.15 URINARY URGENCY: ICD-10-CM

## 2020-12-04 DIAGNOSIS — R35.0 URINARY FREQUENCY: Primary | ICD-10-CM

## 2020-12-04 PROCEDURE — 99213 OFFICE O/P EST LOW 20 MIN: CPT | Mod: 95 | Performed by: NURSE PRACTITIONER

## 2020-12-04 NOTE — LETTER
"12/4/2020       RE: Malika Velasco  [address]  [place] [address]     Dear Colleague,    Thank you for referring your patient, Malika Velasco, to the [facility] [place] [facility]. Please see a copy of my visit note below.    Malika Velasco is a 80 year old female who is being evaluated via a billable video visit.      Video Visit Technology for this patient: [employer] Video Visit- Patient was left in waiting room      The patient has been notified of following:     \"This video visit will be conducted via a call between you and your physician/provider. We have found that certain health care needs can be provided without the need for an in-person physical exam.  This service lets us provide the care you need with a video conversation.  If a prescription is necessary we can send it directly to your pharmacy.  If lab work is needed we can place an order for that and you can then stop by our lab to have the test done at a later time.    Video visits are billed at different rates depending on your insurance coverage.  Please reach out to your insurance provider with any questions.    If during the course of the call the physician/provider feels a video visit is not appropriate, you will not be charged for this service.\"    Patient has given verbal consent for Video visit? Yes  How would you like to obtain your AVS? MyChart  If you are dropped from the video visit, the video invite should be resent to: Send to e-mail at: [email or web address]  Will anyone else be joining your video visit? No      Malika Velasco complains of   Chief Complaint   Patient presents with     Follow Up     Urinary frequency, urgency       80 year old female who follows up today via virtual visit regarding urinary frequency and urgency. Please see our last visit note dated 3/25/20 for a thorough history. In brief, her symptoms had been ongoing " to some extent since switching from Detrol to trospium. However, this was considered likely to be related to her fluid and caffeine intake. Overall, however, she was satisfied with her symptoms at that time and opted to continue this.    Today, she states that her symptoms continue to be well-controlled with the trospium. She does not wish to make any changes to her regimen today.       Exam:   Patient is a 80 year old female   General Appearance: Well groomed, hygenic  Respiratory: No cough, no respiratory distress or labored breathing  Musculoskeletal: Grossly normal with no gross deficits  Skin: No discoloration or apparent rashes  Neurologic: No tremors  Psychiatric: Alert and oriented  Further examination is deferred due to the nature of our visit.       Assessment/Plan:  80 year old female with urinary frequency and urgency well controlled with trospium. Had this prescription recently renewed.   -Continue trospium 20 mg BID.   -Will plan to see her back in one year, or sooner if needed.         Video-Visit Details    Type of service:  Video Visit    Video Start Time: 3:00 PM   Video End Time: 3:10 PM    Originating Location (pt. Location): Home    Distant Location (provider location):  [facility] [place]     Platform used for Video Visit: Marybel Blum, CNP

## 2020-12-04 NOTE — PROGRESS NOTES
"Malika Velasco is a 80 year old female who is being evaluated via a billable video visit.      Video Visit Technology for this patient: Marybel Video Visit- Patient was left in waiting room      The patient has been notified of following:     \"This video visit will be conducted via a call between you and your physician/provider. We have found that certain health care needs can be provided without the need for an in-person physical exam.  This service lets us provide the care you need with a video conversation.  If a prescription is necessary we can send it directly to your pharmacy.  If lab work is needed we can place an order for that and you can then stop by our lab to have the test done at a later time.    Video visits are billed at different rates depending on your insurance coverage.  Please reach out to your insurance provider with any questions.    If during the course of the call the physician/provider feels a video visit is not appropriate, you will not be charged for this service.\"    Patient has given verbal consent for Video visit? Yes  How would you like to obtain your AVS? MyChart  If you are dropped from the video visit, the video invite should be resent to: Send to e-mail at: [email or web address]  Will anyone else be joining your video visit? No      Malika Velasco complains of   Chief Complaint   Patient presents with     Follow Up     Urinary frequency, urgency       80 year old female who follows up today via virtual visit regarding urinary frequency and urgency. Please see our last visit note dated 3/25/20 for a thorough history. In brief, her symptoms had been ongoing to some extent since switching from Detrol to trospium. However, this was considered likely to be related to her fluid and caffeine intake. Overall, however, she was satisfied with her symptoms at that time and opted to continue this.    Today, she states that her symptoms continue to be well-controlled with the " trospium. She does not wish to make any changes to her regimen today.       Exam:   Patient is a 80 year old female   General Appearance: Well groomed, hygenic  Respiratory: No cough, no respiratory distress or labored breathing  Musculoskeletal: Grossly normal with no gross deficits  Skin: No discoloration or apparent rashes  Neurologic: No tremors  Psychiatric: Alert and oriented  Further examination is deferred due to the nature of our visit.       Assessment/Plan:  80 year old female with urinary frequency and urgency well controlled with trospium. Had this prescription recently renewed.   -Continue trospium 20 mg BID.   -Will plan to see her back in one year, or sooner if needed.         Video-Visit Details    Type of service:  Video Visit    Video Start Time: 3:00 PM   Video End Time: 3:10 PM    Originating Location (pt. Location): Home    Distant Location (provider location):  [facility] [place]     Platform used for Video Visit: Marybel Blum, CNP

## 2020-12-04 NOTE — PATIENT INSTRUCTIONS
UROLOGY CLINIC VISIT PATIENT INSTRUCTIONS    -Continue trospium.  -Follow up with me in 1 year, or sooner if needed.     If you have any issues, questions or concerns in the meantime, do not hesitate to contact us at [phone] or via [employer].     It was a pleasure meeting with you today.  Thank you for allowing me and my team the privilege of caring for you today.  YOU are the reason we are here, and I truly hope we provided you with the excellent service you deserve.  Please let us know if there is anything else we can do for you so that we can be sure you are leaving completely satisfied with your care experience.    Denia Blum, CNP

## 2020-12-13 ENCOUNTER — HEALTH MAINTENANCE LETTER (OUTPATIENT)
Age: 80
End: 2020-12-13

## 2021-01-19 DIAGNOSIS — I10 BENIGN ESSENTIAL HYPERTENSION: ICD-10-CM

## 2021-01-19 DIAGNOSIS — R06.09 DYSPNEA ON EXERTION: ICD-10-CM

## 2021-01-19 RX ORDER — CHLORTHALIDONE 25 MG/1
12.5 TABLET ORAL DAILY
Qty: 45 TABLET | Refills: 2 | Status: SHIPPED | OUTPATIENT
Start: 2021-01-19 | End: 2021-10-11

## 2021-01-19 NOTE — TELEPHONE ENCOUNTER
Mychart message from pt requesting a new script for Chlorthalidone 12.5 mg daily sent to [facility]. Sent in new script. TANYA Dodson

## 2021-01-27 ENCOUNTER — E-VISIT (OUTPATIENT)
Dept: INTERNAL MEDICINE | Facility: CLINIC | Age: 81
End: 2021-01-27
Payer: COMMERCIAL

## 2021-01-27 DIAGNOSIS — R05.9 COUGH: Primary | ICD-10-CM

## 2021-01-27 PROCEDURE — 99421 OL DIG E/M SVC 5-10 MIN: CPT | Performed by: INTERNAL MEDICINE

## 2021-01-28 DIAGNOSIS — R05.9 COUGH: ICD-10-CM

## 2021-01-28 LAB
LABORATORY COMMENT REPORT: NORMAL
SARS-COV-2 RNA RESP QL NAA+PROBE: NEGATIVE
SARS-COV-2 RNA RESP QL NAA+PROBE: NORMAL
SPECIMEN SOURCE: NORMAL
SPECIMEN SOURCE: NORMAL

## 2021-01-28 PROCEDURE — U0005 INFEC AGEN DETEC AMPLI PROBE: HCPCS | Performed by: INTERNAL MEDICINE

## 2021-01-28 PROCEDURE — U0003 INFECTIOUS AGENT DETECTION BY NUCLEIC ACID (DNA OR RNA); SEVERE ACUTE RESPIRATORY SYNDROME CORONAVIRUS 2 (SARS-COV-2) (CORONAVIRUS DISEASE [COVID-19]), AMPLIFIED PROBE TECHNIQUE, MAKING USE OF HIGH THROUGHPUT TECHNOLOGIES AS DESCRIBED BY CMS-2020-01-R: HCPCS | Performed by: INTERNAL MEDICINE

## 2021-01-31 ENCOUNTER — IMMUNIZATION (OUTPATIENT)
Dept: NURSING | Facility: CLINIC | Age: 81
End: 2021-01-31
Payer: COMMERCIAL

## 2021-01-31 PROCEDURE — 91300 PR COVID VAC PFIZER DIL RECON 30 MCG/0.3 ML IM: CPT

## 2021-01-31 PROCEDURE — 0001A PR COVID VAC PFIZER DIL RECON 30 MCG/0.3 ML IM: CPT

## 2021-02-02 ASSESSMENT — ENCOUNTER SYMPTOMS
BREAST MASS: 0
FEVER: 0
NAUSEA: 0
HEMATURIA: 0
WEAKNESS: 0
HEARTBURN: 0
NERVOUS/ANXIOUS: 0
JOINT SWELLING: 0
DIARRHEA: 0
DIZZINESS: 0
ARTHRALGIAS: 1
MYALGIAS: 0
SORE THROAT: 0
COUGH: 0
ABDOMINAL PAIN: 0
PARESTHESIAS: 0
HEMATOCHEZIA: 0
HEADACHES: 0
EYE PAIN: 0
FREQUENCY: 0
CHILLS: 0
PALPITATIONS: 0
CONSTIPATION: 0
SHORTNESS OF BREATH: 0
DYSURIA: 0

## 2021-02-02 ASSESSMENT — ACTIVITIES OF DAILY LIVING (ADL): CURRENT_FUNCTION: NO ASSISTANCE NEEDED

## 2021-02-04 DIAGNOSIS — Z12.31 VISIT FOR SCREENING MAMMOGRAM: ICD-10-CM

## 2021-02-04 PROCEDURE — 77067 SCR MAMMO BI INCL CAD: CPT | Mod: TC | Performed by: RADIOLOGY

## 2021-02-04 PROCEDURE — 77063 BREAST TOMOSYNTHESIS BI: CPT | Mod: TC | Performed by: RADIOLOGY

## 2021-02-09 ENCOUNTER — OFFICE VISIT (OUTPATIENT)
Dept: INTERNAL MEDICINE | Facility: CLINIC | Age: 81
End: 2021-02-09
Payer: COMMERCIAL

## 2021-02-09 DIAGNOSIS — I10 BENIGN ESSENTIAL HYPERTENSION: ICD-10-CM

## 2021-02-09 DIAGNOSIS — E78.5 HYPERLIPIDEMIA LDL GOAL <130: ICD-10-CM

## 2021-02-09 DIAGNOSIS — Z00.00 ENCOUNTER FOR ANNUAL WELLNESS EXAM IN MEDICARE PATIENT: Primary | ICD-10-CM

## 2021-02-09 DIAGNOSIS — M85.80 OSTEOPENIA, UNSPECIFIED LOCATION: ICD-10-CM

## 2021-02-09 DIAGNOSIS — Z11.59 SCREENING FOR VIRAL DISEASE: ICD-10-CM

## 2021-02-09 DIAGNOSIS — E87.6 HYPOKALEMIA: ICD-10-CM

## 2021-02-09 PROCEDURE — 82043 UR ALBUMIN QUANTITATIVE: CPT | Performed by: INTERNAL MEDICINE

## 2021-02-09 PROCEDURE — 80048 BASIC METABOLIC PNL TOTAL CA: CPT | Performed by: INTERNAL MEDICINE

## 2021-02-09 PROCEDURE — G0438 PPPS, INITIAL VISIT: HCPCS | Performed by: INTERNAL MEDICINE

## 2021-02-09 PROCEDURE — 99214 OFFICE O/P EST MOD 30 MIN: CPT | Mod: 25 | Performed by: INTERNAL MEDICINE

## 2021-02-09 PROCEDURE — 86803 HEPATITIS C AB TEST: CPT | Performed by: INTERNAL MEDICINE

## 2021-02-09 PROCEDURE — 80061 LIPID PANEL: CPT | Performed by: INTERNAL MEDICINE

## 2021-02-09 PROCEDURE — 36415 COLL VENOUS BLD VENIPUNCTURE: CPT | Performed by: INTERNAL MEDICINE

## 2021-02-09 RX ORDER — TROSPIUM CHLORIDE 20 MG/1
20 TABLET, FILM COATED ORAL
Qty: 180 TABLET | Refills: 1 | Status: CANCELLED | OUTPATIENT
Start: 2021-02-09

## 2021-02-09 RX ORDER — TRAZODONE HYDROCHLORIDE 50 MG/1
TABLET, FILM COATED ORAL
Qty: 90 TABLET | Refills: 1 | Status: CANCELLED | OUTPATIENT
Start: 2021-02-09

## 2021-02-09 ASSESSMENT — ENCOUNTER SYMPTOMS
FREQUENCY: 0
SHORTNESS OF BREATH: 0
ARTHRALGIAS: 1
SORE THROAT: 0
DIARRHEA: 0
WEAKNESS: 0
CONSTIPATION: 0
JOINT SWELLING: 0
ABDOMINAL PAIN: 0
MYALGIAS: 0
CHILLS: 0
FEVER: 0
DIZZINESS: 0
BREAST MASS: 0
PALPITATIONS: 0
HEARTBURN: 0
HEMATOCHEZIA: 0
HEMATURIA: 0
NERVOUS/ANXIOUS: 0
PARESTHESIAS: 0
DYSURIA: 0
HEADACHES: 0
COUGH: 0
EYE PAIN: 0
NAUSEA: 0

## 2021-02-09 ASSESSMENT — ACTIVITIES OF DAILY LIVING (ADL): CURRENT_FUNCTION: NO ASSISTANCE NEEDED

## 2021-02-09 ASSESSMENT — MIFFLIN-ST. JEOR: SCORE: 1238.61

## 2021-02-09 NOTE — NURSING NOTE
"BP (!) 141/76 (BP Location: Left arm, Patient Position: Sitting, Cuff Size: Adult Regular)   Pulse 87   Temp 98.3  F (36.8  C) (Oral)   Resp 18   Ht 1.588 m (5' 2.5\")   Wt 79.7 kg (175 lb 12.8 oz)   SpO2 97%   BMI 31.64 kg/m      "

## 2021-02-09 NOTE — PROGRESS NOTES
"SUBJECTIVE:   Malika Velasco is a 80 year old female who presents for Preventive Visit.      Patient has been advised of split billing requirements and indicates understanding: Yes   Are you in the first 12 months of your Medicare coverage?  No    Healthy Habits:     In general, how would you rate your overall health?  Good    Frequency of exercise:  None    Do you usually eat at least 4 servings of fruit and vegetables a day, include whole grains    & fiber and avoid regularly eating high fat or \"junk\" foods?  Yes    Taking medications regularly:  Yes    Medication side effects:  None    Ability to successfully perform activities of daily living:  No assistance needed    Home Safety:  No safety concerns identified    Hearing Impairment:  No hearing concerns    In the past 6 months, have you been bothered by leaking of urine?  No    In general, how would you rate your overall mental or emotional health?  Excellent      PHQ-2 Total Score: 0    Additional concerns today:  No    Do you feel safe in your environment? Yes    Have you ever done Advance Care Planning? (For example, a Health Directive, POLST, or a discussion with a medical provider or your loved ones about your wishes): Yes, advance care planning is on file.      Fall risk  Fallen 2 or more times in the past year?: No  Any fall with injury in the past year?: No    Cognitive Screening   1) Repeat 3 items (Leader, Season, Table)    2) Clock draw: NORMAL  3) 3 item recall: Recalls 3 objects  Results: 3 items recalled: COGNITIVE IMPAIRMENT LESS LIKELY    Mini-CogTM Copyright [employer]. Licensed by the author for use in [facility]; reprinted with permission ([email or web address]). All rights reserved.      Do you have sleep apnea, excessive snoring or daytime drowsiness?: yes    Reviewed and updated as needed this visit by clinical staff                 Problems:   1. HTN: home /72, no concerns about medication.   2. Hyperlipidemia: stable on " med  3. 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     Current Outpatient Medications   Medication Sig Dispense Refill     alendronate (FOSAMAX) 70 MG tablet Take 1 tablet (70 mg) by mouth every 7 days 12 tablet 3     atorvastatin (LIPITOR) 10 MG tablet Take 1 tablet (10 mg) by mouth daily 90 tablet 3     Calcium Carb-Cholecalciferol (CALCIUM 600/VITAMIN D3) 600-800 MG-UNIT TABS        carvedilol (COREG) 12.5 MG tablet Take 1 tablet (12.5 mg) by mouth 2 times daily (with meals) 180 tablet 3     cetirizine (ZYRTEC) 10 MG tablet Take 1 tablet (10 mg) by mouth daily 30 tablet 3     chlorthalidone (HYGROTON) 25 MG tablet Take 0.5 tablets (12.5 mg) by mouth daily 45 tablet 2     fish oil-omega-3 fatty acids 1000 MG capsule Take 2 g by mouth daily       fluticasone (FLONASE) 50 MCG/ACT nasal spray Spray 2 sprays into both nostrils daily 16 g 0     MELATONIN PO Take 5 mg by mouth At Bedtime       multivitamin, therapeutic with minerals (MULTI-VITAMIN) TABS tablet Take 1 tablet by mouth daily       potassium chloride ER (KLOR-CON M) 20 MEQ CR tablet Take 1 tablet (20 mEq) by mouth daily 90 tablet 3     traZODone (DESYREL) 50 MG tablet Take 1 tablet by mouth at bedtime as needed. 90 tablet 1     trospium (SANCTURA) 20 MG tablet Take 1 tablet (20 mg) by mouth 2 times daily (before meals) 180 tablet 1      Reviewed and updated as needed this visit by Provider                Social History     Tobacco Use     Smoking status:  Former Smoker     Packs/day: 0.50     Years: 3.00     Pack years: 1.50     Types: Cigarettes     Smokeless tobacco: Never Used   Substance Use Topics     Alcohol use: Yes     Comment: rarely, yearly     If you drink alcohol do you typically have >3 drinks per day or >7 drinks per week? No    Alcohol Use 2/2/2021   Prescreen: >3 drinks/day or >7 drinks/week? No   Prescreen: >3 drinks/day or >7 drinks/week? -   No flowsheet data found.        Current providers sharing in care for this patient include:   Patient Care Team:  Karen Navarro MD as PCP - General (Internal Medicine)  Karen Navarro MD as Assigned PCP  Denia Blum CNP as Nurse Practitioner (Nurse Practitioner)  Mary Constantino RN as Registered Nurse  Tal Santacruz MD as Assigned Heart and Vascular Provider    The following health maintenance items are reviewed in Epic and correct as of today:  Health Maintenance   Topic Date Due     HEPATITIS C SCREENING  10/31/1958     FALL RISK ASSESSMENT  02/04/2021     MEDICARE ANNUAL WELLNESS VISIT  02/04/2021     COVID-19 Vaccine (2 of 2 - Pfizer series) 02/21/2021     MAMMO SCREENING  02/04/2022     LIPID  02/04/2025     ADVANCE CARE PLANNING  02/04/2025     DTAP/TDAP/TD IMMUNIZATION (3 - Td) 06/02/2027     DEXA  04/25/2034     PHQ-2  Completed     INFLUENZA VACCINE  Completed     Pneumococcal Vaccine: 65+ Years  Completed     ZOSTER IMMUNIZATION  Completed     Pneumococcal Vaccine: Pediatrics (0 to 5 Years) and At-Risk Patients (6 to 64 Years)  Aged Out     IPV IMMUNIZATION  Aged Out     MENINGITIS IMMUNIZATION  Aged Out     HEPATITIS B IMMUNIZATION  Aged Out       Review of Systems   Constitutional: Negative for chills and fever.   HENT: Negative for congestion, ear pain, hearing loss and sore throat.    Eyes: Negative for pain and visual disturbance.   Respiratory: Negative for cough and shortness of breath.    Cardiovascular: Negative for chest pain, palpitations and peripheral edema.  "  Gastrointestinal: Negative for abdominal pain, constipation, diarrhea, heartburn, hematochezia and nausea.   Breasts:  Negative for tenderness, breast mass and discharge.   Genitourinary: Positive for urgency and vaginal discharge. Negative for dysuria, frequency, genital sores, hematuria, pelvic pain and vaginal bleeding.   Musculoskeletal: Positive for arthralgias. Negative for joint swelling and myalgias.   Skin: Negative for rash.   Neurological: Negative for dizziness, weakness, headaches and paresthesias.   Psychiatric/Behavioral: Negative for mood changes. The patient is not nervous/anxious.      Bladder symptoms better on medication  Some left foot soreness.     OBJECTIVE:   /76 (BP Location: Left arm, Patient Position: Sitting, Cuff Size: Adult Regular)   Pulse 87   Temp 98.3  F (36.8  C) (Oral)   Resp 18   Ht 1.588 m (5' 2.5\")   Wt 79.7 kg (175 lb 12.8 oz)   SpO2 97%   BMI 31.64 kg/m   Estimated body mass index is 32.13 kg/m  as calculated from the following:    Height as of 7/1/20: 1.588 m (5' 2.5\").    Weight as of 7/1/20: 81 kg (178 lb 8 oz).  Physical Exam        HEENT: extraocular movements are intact, pupils equal and reactive to light and accommodation, TMs clear  NECK: Neck supple. No adenopathy. Thyroid symmetric, normal size,, Carotids without bruits.  PULMONARY: clear to auscultation  CARDIAC: regular rate and rhythm and no murmurs, clicks, or gallops  PULSES: 2/2 throughout  BACK: no spinal or CVAT  ABDOMINAL: Soft, nontender.  Normal bowel sounds.  No hepatosplenomegaly or abnormal masses  REFLEXES: 2+ throughout        ASSESSMENT / PLAN:   1. Encounter for annual wellness exam in Medicare patient  Advised about COVID vaccine    2. Benign essential hypertension  Controlled, continue med   - carvedilol (COREG) 12.5 MG tablet; Take 1 tablet (12.5 mg) by mouth 2 times daily (with meals)  Dispense: 180 tablet; Refill: 3  - Basic metabolic panel  (Ca, Cl, CO2, Creat, Gluc, K, Na, " "BUN)  - Albumin Random Urine Quantitative with Creat Ratio  - potassium chloride ER (KLOR-CON M) 20 MEQ CR tablet; Take 1 tablet (20 mEq) by mouth daily  Dispense: 90 tablet; Refill: 3    3. Osteopenia, unspecified location  Stable, continue med another year  - alendronate (FOSAMAX) 70 MG tablet; Take 1 tablet (70 mg) by mouth every 7 days  Dispense: 12 tablet; Refill: 3    4. Hyperlipidemia LDL goal <130  Stable, check lab  - atorvastatin (LIPITOR) 10 MG tablet; Take 1 tablet (10 mg) by mouth daily  Dispense: 90 tablet; Refill: 3  - Lipid panel reflex to direct LDL Fasting    5. Hypokalemia  stable  - Basic metabolic panel  (Ca, Cl, CO2, Creat, Gluc, K, Na, BUN)  - potassium chloride ER (KLOR-CON M) 20 MEQ CR tablet; Take 1 tablet (20 mEq) by mouth daily  Dispense: 90 tablet; Refill: 3    6. Screening for viral disease    - Hepatitis C Screen Reflex to HCV RNA Quant and Genotype    Patient has been advised of split billing requirements and indicates understanding: Yes  COUNSELING:  Reviewed preventive health counseling, as reflected in patient instructions    Estimated body mass index is 31.64 kg/m  as calculated from the following:    Height as of this encounter: 1.588 m (5' 2.5\").    Weight as of this encounter: 79.7 kg (175 lb 12.8 oz).    Weight management plan: Discussed healthy diet and exercise guidelines    She reports that she has quit smoking. Her smoking use included cigarettes. She has a 1.50 pack-year smoking history. She has never used smokeless tobacco.      Appropriate preventive services were discussed with this patient, including applicable screening as appropriate for cardiovascular disease, diabetes, osteopenia/osteoporosis, and glaucoma.  As appropriate for age/gender, discussed screening for colorectal cancer, prostate cancer, breast cancer, and cervical cancer. Checklist reviewing preventive services available has been given to the patient.    Reviewed patients plan of care and provided an " AVS. The Basic Care Plan (routine screening as documented in Health Maintenance) for Malika meets the Care Plan requirement. This Care Plan has been established and reviewed with the Patient.    Counseling Resources:  ATP IV Guidelines  Pooled Cohorts Equation Calculator  Breast Cancer Risk Calculator  Breast Cancer: Medication to Reduce Risk  FRAX Risk Assessment  ICSI Preventive Guidelines  Dietary Guidelines for Americans, 2010  Ui[employer]'s MyPlate  ASA Prophylaxis  Lung CA Screening    Karen Navarro MD  [facility]    Identified Health Risks:

## 2021-02-10 LAB
ANION GAP SERPL CALCULATED.3IONS-SCNC: 5 MMOL/L (ref 3–14)
BUN SERPL-MCNC: 13 MG/DL (ref 7–30)
CALCIUM SERPL-MCNC: 9.7 MG/DL (ref 8.5–10.1)
CHLORIDE SERPL-SCNC: 101 MMOL/L (ref 94–109)
CHOLEST SERPL-MCNC: 125 MG/DL
CO2 SERPL-SCNC: 27 MMOL/L (ref 20–32)
CREAT SERPL-MCNC: 0.66 MG/DL (ref 0.52–1.04)
CREAT UR-MCNC: 25 MG/DL
GFR SERPL CREATININE-BSD FRML MDRD: 85 ML/MIN/{1.73_M2}
GLUCOSE SERPL-MCNC: 101 MG/DL (ref 70–99)
HCV AB SERPL QL IA: NONREACTIVE
HDLC SERPL-MCNC: 48 MG/DL
LDLC SERPL CALC-MCNC: 44 MG/DL
MICROALBUMIN UR-MCNC: 8 MG/L
MICROALBUMIN/CREAT UR: 30.28 MG/G CR (ref 0–25)
NONHDLC SERPL-MCNC: 77 MG/DL
POTASSIUM SERPL-SCNC: 4.2 MMOL/L (ref 3.4–5.3)
SODIUM SERPL-SCNC: 133 MMOL/L (ref 133–144)
TRIGL SERPL-MCNC: 166 MG/DL

## 2021-02-14 VITALS
OXYGEN SATURATION: 97 % | HEIGHT: 63 IN | BODY MASS INDEX: 31.15 KG/M2 | TEMPERATURE: 98.3 F | HEART RATE: 87 BPM | RESPIRATION RATE: 18 BRPM | SYSTOLIC BLOOD PRESSURE: 139 MMHG | DIASTOLIC BLOOD PRESSURE: 76 MMHG | WEIGHT: 175.8 LBS

## 2021-02-14 RX ORDER — ALENDRONATE SODIUM 70 MG/1
70 TABLET ORAL
Qty: 12 TABLET | Refills: 3 | Status: SHIPPED | OUTPATIENT
Start: 2021-02-14 | End: 2022-02-24

## 2021-02-14 RX ORDER — POTASSIUM CHLORIDE 1500 MG/1
20 TABLET, EXTENDED RELEASE ORAL DAILY
Qty: 90 TABLET | Refills: 3 | Status: SHIPPED | OUTPATIENT
Start: 2021-02-14 | End: 2022-02-24

## 2021-02-14 RX ORDER — ATORVASTATIN CALCIUM 10 MG/1
10 TABLET, FILM COATED ORAL DAILY
Qty: 90 TABLET | Refills: 3 | Status: SHIPPED | OUTPATIENT
Start: 2021-02-14 | End: 2022-02-24

## 2021-02-14 RX ORDER — CARVEDILOL 12.5 MG/1
12.5 TABLET ORAL 2 TIMES DAILY WITH MEALS
Qty: 180 TABLET | Refills: 3 | Status: SHIPPED | OUTPATIENT
Start: 2021-02-14 | End: 2022-02-24

## 2021-02-21 ENCOUNTER — IMMUNIZATION (OUTPATIENT)
Dept: NURSING | Facility: CLINIC | Age: 81
End: 2021-02-21
Attending: INTERNAL MEDICINE
Payer: COMMERCIAL

## 2021-02-21 PROCEDURE — 91300 PR COVID VAC PFIZER DIL RECON 30 MCG/0.3 ML IM: CPT

## 2021-02-21 PROCEDURE — 0002A PR COVID VAC PFIZER DIL RECON 30 MCG/0.3 ML IM: CPT

## 2021-03-23 DIAGNOSIS — G47.33 OBSTRUCTIVE SLEEP APNEA: Primary | ICD-10-CM

## 2021-04-07 VITALS — BODY MASS INDEX: 31.01 KG/M2 | HEIGHT: 63 IN | WEIGHT: 175 LBS

## 2021-04-07 ASSESSMENT — MIFFLIN-ST. JEOR: SCORE: 1234.98

## 2021-04-07 NOTE — PATIENT INSTRUCTIONS
Your BMI is Body mass index is 31.5 kg/m .  Weight management is a personal decision.  If you are interested in exploring weight loss strategies, the following discussion covers the approaches that may be successful. Body mass index (BMI) is one way to tell whether you are at a healthy weight, overweight, or obese. It measures your weight in relation to your height.  A BMI of 18.5 to 24.9 is in the healthy range. A person with a BMI of 25 to 29.9 is considered overweight, and someone with a BMI of 30 or greater is considered obese. More than two-thirds of American adults are considered overweight or obese.  Being overweight or obese increases the risk for further weight gain. Excess weight may lead to heart disease and diabetes.  Creating and following plans for healthy eating and physical activity may help you improve your health.  Weight control is part of healthy lifestyle and includes exercise, emotional health, and healthy eating habits. Careful eating habits lifelong are the mainstay of weight control. Though there are significant health benefits from weight loss, long-term weight loss with diet alone may be very difficult to achieve- studies show long-term success with dietary management in less than 10% of people. Attaining a healthy weight may be especially difficult to achieve in those with severe obesity. In some cases, medications, devices and surgical management might be considered.  What can you do?  If you are overweight or obese and are interested in methods for weight loss, you should discuss this with your provider.     Consider reducing daily calorie intake by 500 calories.     Keep a food journal.     Avoiding skipping meals, consider cutting portions instead.    Diet combined with exercise helps maintain muscle while optimizing fat loss. Strength training is particularly important for building and maintaining muscle mass. Exercise helps reduce stress, increase energy, and improves fitness.  Increasing exercise without diet control, however, may not burn enough calories to loose weight.       Start walking three days a week 10-20 minutes at a time    Work towards walking thirty minutes five days a week     Eventually, increase the speed of your walking for 1-2 minutes at time    In addition, we recommend that you review healthy lifestyles and methods for weight loss available through the National Institutes of Health patient information sites:  http://win.niddk.nih.gov/publications/index.htm    And look into health and wellness programs that may be available through your health insurance provider, employer, local community center, or kamran club.    Weight management plan: Patient was referred to their PCP to discuss a diet and exercise plan.

## 2021-04-07 NOTE — PROGRESS NOTES
Malika is a 80 year old who is being evaluated via a billable video visit.      How would you like to obtain your AVS? MyChart  If the video visit is dropped, the invitation should be resent by: Text to cell phone: [phone]  Will anyone else be joining your video visit? No      Video Start Time: 9:37 AM  Video-Visit Details    Type of service:  Video Visit    Video End Time:9:49 AM    Originating Location (pt. Location): Home    Distant Location (provider location):  [facility]     Platform used for Video Visit: [employer]      CPAP Follow-Up Visit:    Date on this visit: 4/8/2021    Malika Velasco has a follow-up of her treatment of TRAVIS and insomnia. She was initially seen at the [facility] [place] for management of previously diagnosed TRAVIS. She has not been sleeping well for several months.  Her medical history is significant for HTN, aortic valve sclerosis, pacemaker for AV block, hyperlipidemia, OA.       Malika had a PSG at [facility] on 1/20/2005. Her AHI was 18/hr, RDI 34/hr, O2 ankit 83%. Her weight was 139. She did not have any significant PLMs.     She has no specific concerns today.     PAP machine: RespirTubiss. Pressure settings: 10-16 cm    The compliance data shows that the patient used the CPAP for 30/30 nights, 100% of nights for >4 hours.  The 90th% pressure is 13.8 cm.  The average time in large leak is 0 sec.  The average nightly usage is 7:12.  The average AHI is 5.2/hr (1.5/hr centrals). The download shows she gets out of bed 0-2 times per night.       Interface:  Mask: Simplus mask. Gets new supplies about every 6 month  Chin strap: No  Leak: No  Using Humidifier: Yes, was running out if she had it set to 3.  Condensation in hose or mask: No     Difficulties with therapy:    [?] Snoring with CPAP: not observed  [-] Difficulty tolerating the pressure:  [-] Epistaxis/dry nose:   [-] Nasal congestion:  [-] Dry mouth:  [-] Mouth breathing:   [-]  Pain/skin breakdown:      Improvements noted with CPAP: she thinks so, has not slept without it in a long time.  [+] waking up more refreshed  [+] sleeping better with less arousals  [+] nocturia improved   [+] improved energy level during the day    Weight change since sleep study: 183# last year    Bedtime: 10:30-11 PM.  Wake time: 6-6:30 AM. Falls asleep in 5-10 minutes with 50 mg trazodone (no side effects). Wakes 0-1 time per night for 5 minutes. Wakes for the restroom. Much improved with trospium.  Naps: rarely, sometimes gets drowsy watching TV and may doze for a few minutes.       Past medical/surgical history, family history, social history, medications and allergies were reviewed.      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  Added by Vicky Antony MSW Advance Care Planning Liaison       TRAVIS (obstructive sleep apnea)      Priority: Medium     Moderate on CPAP       Aortic valve sclerosis      Priority: Medium     Pacemaker 03/09/2017     Priority: Medium     For complete heart block    Overview:   -placed 11/21/1197 due to complete heart block with syncope  -generator change 5/21/2007 for KAREN  -KAREN reached 2/14/2015 -  generator change 3/31/2015       Hyperlipidemia LDL goal <130 03/09/2017     Priority: Medium     Benign essential hypertension 03/09/2017     Priority: Medium     Osteopenia 03/09/2017     Priority: Medium     Back pain      Priority: Medium     Allergic rhinitis due to pollen 12/09/2015     Priority: Medium        Impression/Plan:    (G47.33) TRAVIS (obstructive sleep apnea)  (primary encounter diagnosis)  Comment: Malika is doing well with CPAP and sleep in general. Awakenings from nocturia have come down nicely with trospium and trazodone.   Plan: Comprehensive DME        Continue auto CPAP 10-16 cm. A prescription was written for new supplies. We reviewed recommendations for cleaning and replacing supplies.        She will follow up with me in about 1 year(s).     13 minutes were spent on the date of the encounter doing chart review, history and exam, documentation and further activities as noted above.      Bennett Goltz, PA-C    CC: No ref. provider found

## 2021-04-08 ENCOUNTER — VIRTUAL VISIT (OUTPATIENT)
Dept: SLEEP MEDICINE | Facility: CLINIC | Age: 81
End: 2021-04-08
Payer: COMMERCIAL

## 2021-04-08 DIAGNOSIS — I10 BENIGN ESSENTIAL HYPERTENSION: ICD-10-CM

## 2021-04-08 DIAGNOSIS — G47.33 OSA (OBSTRUCTIVE SLEEP APNEA): Primary | ICD-10-CM

## 2021-04-08 PROCEDURE — 99212 OFFICE O/P EST SF 10 MIN: CPT | Mod: 95 | Performed by: PHYSICIAN ASSISTANT

## 2021-04-08 RX ORDER — CARVEDILOL 12.5 MG/1
TABLET ORAL
Qty: 180 TABLET | Refills: 3 | OUTPATIENT
Start: 2021-04-08

## 2021-04-22 DIAGNOSIS — G47.01 INSOMNIA DUE TO MEDICAL CONDITION: ICD-10-CM

## 2021-04-22 RX ORDER — TRAZODONE HYDROCHLORIDE 50 MG/1
TABLET, FILM COATED ORAL
Qty: 90 TABLET | Refills: 1 | Status: CANCELLED | OUTPATIENT
Start: 2021-04-22

## 2021-04-23 NOTE — TELEPHONE ENCOUNTER
Malika was prescribed 90 tabs with 1 refill on 12/11. She should have about a month and a half worth left on her last refill. I wrote a GLG message asking if that request was generated by the pharmacy or if she is running out early. I will wait to hear back before signing.  Bennett Goltz, PA-C     Malika wrote back. This was an early request sent by the pharmacy. I told her I would wait until I hear from her to send a refill.  Bennett Goltz, PA-C

## 2021-05-25 DIAGNOSIS — G47.01 INSOMNIA DUE TO MEDICAL CONDITION: ICD-10-CM

## 2021-05-25 RX ORDER — TRAZODONE HYDROCHLORIDE 50 MG/1
TABLET, FILM COATED ORAL
Qty: 90 TABLET | Refills: 1 | Status: SHIPPED | OUTPATIENT
Start: 2021-05-25 | End: 2021-12-03

## 2021-08-23 DIAGNOSIS — Z95.0 CARDIAC PACEMAKER IN SITU: Primary | ICD-10-CM

## 2021-08-23 DIAGNOSIS — I44.2 ATRIOVENTRICULAR BLOCK, COMPLETE (H): ICD-10-CM

## 2021-08-24 ENCOUNTER — ANCILLARY PROCEDURE (OUTPATIENT)
Dept: CARDIOLOGY | Facility: CLINIC | Age: 81
End: 2021-08-24
Attending: INTERNAL MEDICINE
Payer: COMMERCIAL

## 2021-08-24 DIAGNOSIS — Z95.0 CARDIAC PACEMAKER IN SITU: ICD-10-CM

## 2021-08-24 DIAGNOSIS — I44.2 ATRIOVENTRICULAR BLOCK, COMPLETE (H): ICD-10-CM

## 2021-08-24 PROCEDURE — 93280 PM DEVICE PROGR EVAL DUAL: CPT | Performed by: INTERNAL MEDICINE

## 2021-08-26 LAB
MDC_IDC_LEAD_IMPLANT_DT: NORMAL
MDC_IDC_LEAD_IMPLANT_DT: NORMAL
MDC_IDC_LEAD_LOCATION: NORMAL
MDC_IDC_LEAD_LOCATION: NORMAL
MDC_IDC_LEAD_LOCATION_DETAIL_1: NORMAL
MDC_IDC_LEAD_LOCATION_DETAIL_1: NORMAL
MDC_IDC_LEAD_MFG: NORMAL
MDC_IDC_LEAD_MFG: NORMAL
MDC_IDC_LEAD_MODEL: NORMAL
MDC_IDC_LEAD_MODEL: NORMAL
MDC_IDC_LEAD_POLARITY_TYPE: NORMAL
MDC_IDC_LEAD_POLARITY_TYPE: NORMAL
MDC_IDC_LEAD_SERIAL: NORMAL
MDC_IDC_LEAD_SERIAL: NORMAL
MDC_IDC_MSMT_BATTERY_DTM: NORMAL
MDC_IDC_MSMT_BATTERY_IMPEDANCE: 845 OHM
MDC_IDC_MSMT_BATTERY_REMAINING_LONGEVITY: 77 MO
MDC_IDC_MSMT_BATTERY_STATUS: NORMAL
MDC_IDC_MSMT_BATTERY_VOLTAGE: 2.78 V
MDC_IDC_MSMT_LEADCHNL_RA_IMPEDANCE_VALUE: 615 OHM
MDC_IDC_MSMT_LEADCHNL_RA_PACING_THRESHOLD_AMPLITUDE: 0.5 V
MDC_IDC_MSMT_LEADCHNL_RA_PACING_THRESHOLD_PULSEWIDTH: 0.4 MS
MDC_IDC_MSMT_LEADCHNL_RA_SENSING_INTR_AMPL: 2.8 MV
MDC_IDC_MSMT_LEADCHNL_RV_IMPEDANCE_VALUE: 706 OHM
MDC_IDC_MSMT_LEADCHNL_RV_PACING_THRESHOLD_AMPLITUDE: 0.88 V
MDC_IDC_MSMT_LEADCHNL_RV_PACING_THRESHOLD_PULSEWIDTH: 0.4 MS
MDC_IDC_PG_IMPLANT_DTM: NORMAL
MDC_IDC_PG_MFG: NORMAL
MDC_IDC_PG_MODEL: NORMAL
MDC_IDC_PG_SERIAL: NORMAL
MDC_IDC_PG_TYPE: NORMAL
MDC_IDC_SESS_CLINIC_NAME: NORMAL
MDC_IDC_SESS_DTM: NORMAL
MDC_IDC_SESS_TYPE: NORMAL
MDC_IDC_SET_BRADY_AT_MODE_SWITCH_MODE: NORMAL
MDC_IDC_SET_BRADY_AT_MODE_SWITCH_RATE: 175 {BEATS}/MIN
MDC_IDC_SET_BRADY_LOWRATE: 60 {BEATS}/MIN
MDC_IDC_SET_BRADY_MAX_SENSOR_RATE: 130 {BEATS}/MIN
MDC_IDC_SET_BRADY_MAX_TRACKING_RATE: 130 {BEATS}/MIN
MDC_IDC_SET_BRADY_MODE: NORMAL
MDC_IDC_SET_BRADY_PAV_DELAY_LOW: 200 MS
MDC_IDC_SET_BRADY_SAV_DELAY_LOW: 180 MS
MDC_IDC_SET_LEADCHNL_RA_PACING_AMPLITUDE: 1.5 V
MDC_IDC_SET_LEADCHNL_RA_PACING_CAPTURE_MODE: NORMAL
MDC_IDC_SET_LEADCHNL_RA_PACING_POLARITY: NORMAL
MDC_IDC_SET_LEADCHNL_RA_PACING_PULSEWIDTH: 0.4 MS
MDC_IDC_SET_LEADCHNL_RA_SENSING_POLARITY: NORMAL
MDC_IDC_SET_LEADCHNL_RA_SENSING_SENSITIVITY: 1 MV
MDC_IDC_SET_LEADCHNL_RV_PACING_AMPLITUDE: 2 V
MDC_IDC_SET_LEADCHNL_RV_PACING_CAPTURE_MODE: NORMAL
MDC_IDC_SET_LEADCHNL_RV_PACING_POLARITY: NORMAL
MDC_IDC_SET_LEADCHNL_RV_PACING_PULSEWIDTH: 0.4 MS
MDC_IDC_SET_LEADCHNL_RV_SENSING_POLARITY: NORMAL
MDC_IDC_SET_LEADCHNL_RV_SENSING_SENSITIVITY: 2.8 MV
MDC_IDC_SET_ZONE_DETECTION_INTERVAL: 333.33 MS
MDC_IDC_SET_ZONE_DETECTION_INTERVAL: 342.86 MS
MDC_IDC_SET_ZONE_TYPE: NORMAL
MDC_IDC_SET_ZONE_TYPE: NORMAL
MDC_IDC_STAT_AT_BURDEN_PERCENT: 0 %
MDC_IDC_STAT_AT_DTM_END: NORMAL
MDC_IDC_STAT_AT_DTM_START: NORMAL
MDC_IDC_STAT_AT_MODE_SW_COUNT: 6
MDC_IDC_STAT_BRADY_AP_VP_PERCENT: 38 %
MDC_IDC_STAT_BRADY_AP_VS_PERCENT: 0 %
MDC_IDC_STAT_BRADY_AS_VP_PERCENT: 62 %
MDC_IDC_STAT_BRADY_AS_VS_PERCENT: 0 %
MDC_IDC_STAT_BRADY_DTM_END: NORMAL
MDC_IDC_STAT_BRADY_DTM_START: NORMAL
MDC_IDC_STAT_EPISODE_RECENT_COUNT: 0
MDC_IDC_STAT_EPISODE_RECENT_COUNT: 1
MDC_IDC_STAT_EPISODE_RECENT_COUNT_DTM_END: NORMAL
MDC_IDC_STAT_EPISODE_RECENT_COUNT_DTM_END: NORMAL
MDC_IDC_STAT_EPISODE_RECENT_COUNT_DTM_START: NORMAL
MDC_IDC_STAT_EPISODE_RECENT_COUNT_DTM_START: NORMAL
MDC_IDC_STAT_EPISODE_TYPE: NORMAL
MDC_IDC_STAT_EPISODE_TYPE: NORMAL

## 2021-09-26 ENCOUNTER — HEALTH MAINTENANCE LETTER (OUTPATIENT)
Age: 81
End: 2021-09-26

## 2021-09-28 ENCOUNTER — OFFICE VISIT (OUTPATIENT)
Dept: CARDIOLOGY | Facility: CLINIC | Age: 81
End: 2021-09-28
Payer: COMMERCIAL

## 2021-09-28 VITALS
DIASTOLIC BLOOD PRESSURE: 62 MMHG | WEIGHT: 177.8 LBS | HEIGHT: 63 IN | SYSTOLIC BLOOD PRESSURE: 130 MMHG | BODY MASS INDEX: 31.5 KG/M2 | HEART RATE: 70 BPM | OXYGEN SATURATION: 97 %

## 2021-09-28 DIAGNOSIS — I10 ESSENTIAL HYPERTENSION: ICD-10-CM

## 2021-09-28 DIAGNOSIS — Z95.0 CARDIAC PACEMAKER IN SITU: ICD-10-CM

## 2021-09-28 PROCEDURE — 99213 OFFICE O/P EST LOW 20 MIN: CPT | Performed by: INTERNAL MEDICINE

## 2021-09-28 ASSESSMENT — MIFFLIN-ST. JEOR: SCORE: 1247.69

## 2021-09-28 NOTE — LETTER
9/28/2021    Karen Navarro MD  [address]  [facility] [address]    RE: Malika Velasco       Dear Colleague,    I had the pleasure of seeing Malika Velasco in the [facility].    HISTORY OF PRESENT ILLNESS:  bp well controlled. Had knee cortisone injection.trying to avoid knee surgery. cholesterol excellent.  Housekeeping strength and balamce walks.Orders this Visit:  No orders of the defined types were placed in this encounter.    No orders of the defined types were placed in this encounter.    There are no discontinued medications.    Encounter Diagnoses   Name Primary?     Cardiac pacemaker in situ      Essential hypertension        CURRENT MEDICATIONS:  Current Outpatient Medications   Medication Sig Dispense Refill     alendronate (FOSAMAX) 70 MG tablet Take 1 tablet (70 mg) by mouth every 7 days 12 tablet 3     atorvastatin (LIPITOR) 10 MG tablet Take 1 tablet (10 mg) by mouth daily 90 tablet 3     Calcium Carb-Cholecalciferol (CALCIUM 600/VITAMIN D3) 600-800 MG-UNIT TABS        carvedilol (COREG) 12.5 MG tablet Take 1 tablet (12.5 mg) by mouth 2 times daily (with meals) 180 tablet 3     cetirizine (ZYRTEC) 10 MG tablet Take 1 tablet (10 mg) by mouth daily 30 tablet 3     chlorthalidone (HYGROTON) 25 MG tablet Take 0.5 tablets (12.5 mg) by mouth daily 45 tablet 2     fish oil-omega-3 fatty acids 1000 MG capsule Take 2 g by mouth daily       fluticasone (FLONASE) 50 MCG/ACT nasal spray Spray 2 sprays into both nostrils daily 16 g 0     MELATONIN PO Take 5 mg by mouth At Bedtime       multivitamin, therapeutic with minerals (MULTI-VITAMIN) TABS tablet Take 1 tablet by mouth daily       potassium chloride ER (KLOR-CON M) 20 MEQ CR tablet Take 1 tablet (20 mEq) by mouth daily 90 tablet 3     traZODone (DESYREL) 50 MG tablet Take 1 tablet by mouth at bedtime as needed. 90 tablet 1     trospium (SANCTURA) 20 MG tablet Take 1 tablet (20 mg) by  "mouth 2 times daily (before meals) 180 tablet 1       ALLERGIES     Allergies   Allergen Reactions     Zantac [Ranitidine]      Headache       PAST MEDICAL, SURGICAL, FAMILY, SOCIAL HISTORY:  History was reviewed and updated as needed, see medical record.    Review of Systems:  A 12-point review of systems was completed, see medical record for detailed review of systems information.    Physical Exam:  Vitals: /88 (BP Location: Right arm, Patient Position: Sitting, Cuff Size: Adult Regular)   Pulse 70   Ht 1.588 m (5' 2.5\")   Wt 80.6 kg (177 lb 12.8 oz)   LMP  (LMP Unknown)   SpO2 97%   Breastfeeding No   BMI 32.00 kg/m      Constitutional:           Skin:           Head:           Eyes:           ENT:           Neck:           Chest:           Cardiac:                    Abdomen:           Vascular:                                        Extremities and Back:           Neurological:           ASSESSMENT: Doing well bp good LDL good PPM working well.      RECOMMENDATIONS:   Continue present medications.       Recent Lab Results:  LIPID RESULTS:  Lab Results   Component Value Date    CHOL 125 02/09/2021    HDL 48 (L) 02/09/2021    LDL 44 02/09/2021    TRIG 166 (H) 02/09/2021       LIVER ENZYME RESULTS:  Lab Results   Component Value Date    AST 31 03/01/2017    ALT 5 04/10/2018       CBC RESULTS:  Lab Results   Component Value Date    WBC 10.8 01/14/2020    RBC 5.17 01/14/2020    HGB 13.9 01/14/2020    HCT 41.4 01/14/2020    MCV 80 01/14/2020    MCH 26.9 01/14/2020    MCHC 33.6 01/14/2020    RDW 14.7 01/14/2020     01/14/2020       BMP RESULTS:  Lab Results   Component Value Date     02/09/2021    POTASSIUM 4.2 02/09/2021    CHLORIDE 101 02/09/2021    CO2 27 02/09/2021    ANIONGAP 5 02/09/2021     (H) 02/09/2021    BUN 13 02/09/2021    CR 0.66 02/09/2021    GFRESTIMATED 85 02/09/2021    GFRESTBLACK >90 02/09/2021    SAVANNAH 9.7 02/09/2021        A1C RESULTS:  No results found for: " A1C    INR RESULTS:  No results found for: INR    We greatly appreciate the opportunity to be involved in the care of your patient, Malika Velasco.    Sincerely,  Tal Santacruz MD      CC  Tal Santacruz MD  [address]  [place] [id number]                                                                         Thank you for allowing me to participate in the care of your patient.      Sincerely,     Tal Santacruz MD     [facility]  cc:   Tal Santacruz MD  [address]  [place] [id number]

## 2021-09-28 NOTE — PROGRESS NOTES
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    Since the patient was last evaluated in 07/2020, she remains asymptomatic.  Her pacemaker was interrogated on 08/24/2021 and is functioning within normal parameters.  She denies syncope, chest pain, dyspnea, or lightheadedness.  The patient checks her blood pressure at home on a regular basis and it averages about 130/60.  She has been compliant with medical therapy.  The patient has been a  since 2015 but remains very physically active.  She received a cortisone injection in her knee recently.    PAST MEDICAL HISTORY:    1.  Third-degree AV block -- status post pacemaker implantation in 1997.  Most recent generator replacement on 03/03/2015.  Device interrogated on 08/24/2021, functioning normally.  Followup planned in December.  2.  Coronary artery disease -- diagnosed with CT scan 2009.  On statin therapy with most recent LDL cholesterol 44.  Free of angina.  3.  Hypertension.  4.  Dyslipidemia.    PHYSICAL EXAMINATION:    GENERAL:  Exam today demonstrates a very pleasant and cooperative 80-year-old woman.  VITAL SIGNS:  Her blood pressure is 130/62, heart rate 70.  Her height is 1.6 meters, her weight is 80.6 kg, her BMI is 32.  RESPIRATORY:  Her lungs are clear to percussion and auscultation.  CARDIOVASCULAR:  Shows a normal S1 with a normal S2.  There is no S3.  There is no murmur.    MEDICATIONS:    1.  Fosamax 70 mg every week.  2.  Atorvastatin 10 mg daily.  3.  Carvedilol 12.5 mg b.i.d.  4.  Chlorthalidone 12.5 mg daily.  5.  Potassium 20 mEq daily.  6.  Trazodone 50 mg at bedtime as needed.    LABORATORY STUDIES:  Most recent LDL  cholesterol is 44.  Most recent potassium is 4.2 with a creatinine of 0.56.    ASSESSMENT:  Ms. Velasco is asymptomatic on guideline-directed medical therapy with optimal LDL cholesterol and blood pressure levels.  Her pacemaker device is functioning normally.    RECOMMENDATIONS:    1.  Continue present therapy.  2.  Regular exercise at least 30-45 minutes 4-5 times a week.  3.  Mediterranean-style diet.  4.  Followup in about 1 year.  5.  Continue pacemaker interrogation at regular intervals.    We greatly appreciate the opportunity to care for your patient, Doc Velasco.    Tal Santacruz MD    cc:  Karen Navarro MD  [facility] [address] [place]  [address]      Tal Santacruz MD        D: 2021   T: 2021   MT: angeline    Name:     DOC VELASCO  MRN:      [id number]        Account:      [id number]   :      1940           Service Date: 2021       Document: [id number]

## 2021-09-28 NOTE — PROGRESS NOTES
HISTORY OF PRESENT ILLNESS:  bp well controlled. Had knee cortisone injection.trying to avoid knee surgery. cholesterol excellent.  Housekeeping strength and balamce walks.Orders this Visit:  No orders of the defined types were placed in this encounter.    No orders of the defined types were placed in this encounter.    There are no discontinued medications.    Encounter Diagnoses   Name Primary?     Cardiac pacemaker in situ      Essential hypertension        CURRENT MEDICATIONS:  Current Outpatient Medications   Medication Sig Dispense Refill     alendronate (FOSAMAX) 70 MG tablet Take 1 tablet (70 mg) by mouth every 7 days 12 tablet 3     atorvastatin (LIPITOR) 10 MG tablet Take 1 tablet (10 mg) by mouth daily 90 tablet 3     Calcium Carb-Cholecalciferol (CALCIUM 600/VITAMIN D3) 600-800 MG-UNIT TABS        carvedilol (COREG) 12.5 MG tablet Take 1 tablet (12.5 mg) by mouth 2 times daily (with meals) 180 tablet 3     cetirizine (ZYRTEC) 10 MG tablet Take 1 tablet (10 mg) by mouth daily 30 tablet 3     chlorthalidone (HYGROTON) 25 MG tablet Take 0.5 tablets (12.5 mg) by mouth daily 45 tablet 2     fish oil-omega-3 fatty acids 1000 MG capsule Take 2 g by mouth daily       fluticasone (FLONASE) 50 MCG/ACT nasal spray Spray 2 sprays into both nostrils daily 16 g 0     MELATONIN PO Take 5 mg by mouth At Bedtime       multivitamin, therapeutic with minerals (MULTI-VITAMIN) TABS tablet Take 1 tablet by mouth daily       potassium chloride ER (KLOR-CON M) 20 MEQ CR tablet Take 1 tablet (20 mEq) by mouth daily 90 tablet 3     traZODone (DESYREL) 50 MG tablet Take 1 tablet by mouth at bedtime as needed. 90 tablet 1     trospium (SANCTURA) 20 MG tablet Take 1 tablet (20 mg) by mouth 2 times daily (before meals) 180 tablet 1       ALLERGIES     Allergies   Allergen Reactions     Zantac [Ranitidine]      Headache       PAST MEDICAL, SURGICAL, FAMILY, SOCIAL HISTORY:  History was reviewed and updated as needed, see medical  "record.    Review of Systems:  A 12-point review of systems was completed, see medical record for detailed review of systems information.    Physical Exam:  Vitals: /88 (BP Location: Right arm, Patient Position: Sitting, Cuff Size: Adult Regular)   Pulse 70   Ht 1.588 m (5' 2.5\")   Wt 80.6 kg (177 lb 12.8 oz)   LMP  (LMP Unknown)   SpO2 97%   Breastfeeding No   BMI 32.00 kg/m      Constitutional:           Skin:           Head:           Eyes:           ENT:           Neck:           Chest:           Cardiac:                    Abdomen:           Vascular:                                        Extremities and Back:           Neurological:           ASSESSMENT: Doing well bp good LDL good PPM working well.      RECOMMENDATIONS:   Continue present medications.       Recent Lab Results:  LIPID RESULTS:  Lab Results   Component Value Date    CHOL 125 02/09/2021    HDL 48 (L) 02/09/2021    LDL 44 02/09/2021    TRIG 166 (H) 02/09/2021       LIVER ENZYME RESULTS:  Lab Results   Component Value Date    AST 31 03/01/2017    ALT 5 04/10/2018       CBC RESULTS:  Lab Results   Component Value Date    WBC 10.8 01/14/2020    RBC 5.17 01/14/2020    HGB 13.9 01/14/2020    HCT 41.4 01/14/2020    MCV 80 01/14/2020    MCH 26.9 01/14/2020    MCHC 33.6 01/14/2020    RDW 14.7 01/14/2020     01/14/2020       BMP RESULTS:  Lab Results   Component Value Date     02/09/2021    POTASSIUM 4.2 02/09/2021    CHLORIDE 101 02/09/2021    CO2 27 02/09/2021    ANIONGAP 5 02/09/2021     (H) 02/09/2021    BUN 13 02/09/2021    CR 0.66 02/09/2021    GFRESTIMATED 85 02/09/2021    GFRESTBLACK >90 02/09/2021    SAVANNAH 9.7 02/09/2021        A1C RESULTS:  No results found for: A1C    INR RESULTS:  No results found for: INR    We greatly appreciate the opportunity to be involved in the care of your patient, Malika Velasco.    Sincerely,  Tal Santacruz MD        Tal Santacruz MD  [address]  [place],  " [place] [address]

## 2021-10-11 DIAGNOSIS — R06.09 DYSPNEA ON EXERTION: ICD-10-CM

## 2021-10-11 DIAGNOSIS — I10 BENIGN ESSENTIAL HYPERTENSION: ICD-10-CM

## 2021-10-11 RX ORDER — CHLORTHALIDONE 25 MG/1
12.5 TABLET ORAL DAILY
Qty: 45 TABLET | Refills: 3 | Status: SHIPPED | OUTPATIENT
Start: 2021-10-11 | End: 2022-09-21

## 2021-12-01 ENCOUNTER — ANCILLARY PROCEDURE (OUTPATIENT)
Dept: CARDIOLOGY | Facility: CLINIC | Age: 81
End: 2021-12-01
Attending: INTERNAL MEDICINE
Payer: COMMERCIAL

## 2021-12-01 DIAGNOSIS — Z95.0 CARDIAC PACEMAKER IN SITU: ICD-10-CM

## 2021-12-01 PROCEDURE — 93296 REM INTERROG EVL PM/IDS: CPT | Performed by: INTERNAL MEDICINE

## 2021-12-01 PROCEDURE — 93294 REM INTERROG EVL PM/LDLS PM: CPT | Performed by: INTERNAL MEDICINE

## 2021-12-02 LAB
MDC_IDC_LEAD_IMPLANT_DT: NORMAL
MDC_IDC_LEAD_IMPLANT_DT: NORMAL
MDC_IDC_LEAD_LOCATION: NORMAL
MDC_IDC_LEAD_LOCATION: NORMAL
MDC_IDC_LEAD_LOCATION_DETAIL_1: NORMAL
MDC_IDC_LEAD_LOCATION_DETAIL_1: NORMAL
MDC_IDC_LEAD_MFG: NORMAL
MDC_IDC_LEAD_MFG: NORMAL
MDC_IDC_LEAD_MODEL: NORMAL
MDC_IDC_LEAD_MODEL: NORMAL
MDC_IDC_LEAD_POLARITY_TYPE: NORMAL
MDC_IDC_LEAD_POLARITY_TYPE: NORMAL
MDC_IDC_LEAD_SERIAL: NORMAL
MDC_IDC_LEAD_SERIAL: NORMAL
MDC_IDC_MSMT_BATTERY_DTM: NORMAL
MDC_IDC_MSMT_BATTERY_IMPEDANCE: 978 OHM
MDC_IDC_MSMT_BATTERY_REMAINING_LONGEVITY: 70 MO
MDC_IDC_MSMT_BATTERY_STATUS: NORMAL
MDC_IDC_MSMT_BATTERY_VOLTAGE: 2.78 V
MDC_IDC_MSMT_LEADCHNL_RA_IMPEDANCE_VALUE: 633 OHM
MDC_IDC_MSMT_LEADCHNL_RA_PACING_THRESHOLD_AMPLITUDE: 0.5 V
MDC_IDC_MSMT_LEADCHNL_RA_PACING_THRESHOLD_PULSEWIDTH: 0.4 MS
MDC_IDC_MSMT_LEADCHNL_RV_IMPEDANCE_VALUE: 760 OHM
MDC_IDC_MSMT_LEADCHNL_RV_PACING_THRESHOLD_AMPLITUDE: 0.88 V
MDC_IDC_MSMT_LEADCHNL_RV_PACING_THRESHOLD_PULSEWIDTH: 0.4 MS
MDC_IDC_PG_IMPLANT_DTM: NORMAL
MDC_IDC_PG_MFG: NORMAL
MDC_IDC_PG_MODEL: NORMAL
MDC_IDC_PG_SERIAL: NORMAL
MDC_IDC_PG_TYPE: NORMAL
MDC_IDC_SESS_CLINIC_NAME: NORMAL
MDC_IDC_SESS_DTM: NORMAL
MDC_IDC_SESS_TYPE: NORMAL
MDC_IDC_SET_BRADY_AT_MODE_SWITCH_MODE: NORMAL
MDC_IDC_SET_BRADY_AT_MODE_SWITCH_RATE: 175 {BEATS}/MIN
MDC_IDC_SET_BRADY_LOWRATE: 60 {BEATS}/MIN
MDC_IDC_SET_BRADY_MAX_SENSOR_RATE: 130 {BEATS}/MIN
MDC_IDC_SET_BRADY_MAX_TRACKING_RATE: 130 {BEATS}/MIN
MDC_IDC_SET_BRADY_MODE: NORMAL
MDC_IDC_SET_BRADY_PAV_DELAY_LOW: 200 MS
MDC_IDC_SET_BRADY_SAV_DELAY_LOW: 180 MS
MDC_IDC_SET_LEADCHNL_RA_PACING_AMPLITUDE: 1.5 V
MDC_IDC_SET_LEADCHNL_RA_PACING_CAPTURE_MODE: NORMAL
MDC_IDC_SET_LEADCHNL_RA_PACING_POLARITY: NORMAL
MDC_IDC_SET_LEADCHNL_RA_PACING_PULSEWIDTH: 0.4 MS
MDC_IDC_SET_LEADCHNL_RA_SENSING_POLARITY: NORMAL
MDC_IDC_SET_LEADCHNL_RA_SENSING_SENSITIVITY: 1 MV
MDC_IDC_SET_LEADCHNL_RV_PACING_AMPLITUDE: 2 V
MDC_IDC_SET_LEADCHNL_RV_PACING_CAPTURE_MODE: NORMAL
MDC_IDC_SET_LEADCHNL_RV_PACING_POLARITY: NORMAL
MDC_IDC_SET_LEADCHNL_RV_PACING_PULSEWIDTH: 0.4 MS
MDC_IDC_SET_LEADCHNL_RV_SENSING_POLARITY: NORMAL
MDC_IDC_SET_LEADCHNL_RV_SENSING_SENSITIVITY: 2.8 MV
MDC_IDC_SET_ZONE_DETECTION_INTERVAL: 333.33 MS
MDC_IDC_SET_ZONE_DETECTION_INTERVAL: 342.86 MS
MDC_IDC_SET_ZONE_TYPE: NORMAL
MDC_IDC_SET_ZONE_TYPE: NORMAL
MDC_IDC_STAT_AT_BURDEN_PERCENT: 0 %
MDC_IDC_STAT_AT_DTM_END: NORMAL
MDC_IDC_STAT_AT_DTM_START: NORMAL
MDC_IDC_STAT_AT_MODE_SW_COUNT: 1
MDC_IDC_STAT_BRADY_AP_VP_PERCENT: 60 %
MDC_IDC_STAT_BRADY_AP_VS_PERCENT: 0 %
MDC_IDC_STAT_BRADY_AS_VP_PERCENT: 39 %
MDC_IDC_STAT_BRADY_AS_VS_PERCENT: 0 %
MDC_IDC_STAT_BRADY_DTM_END: NORMAL
MDC_IDC_STAT_BRADY_DTM_START: NORMAL
MDC_IDC_STAT_EPISODE_RECENT_COUNT: 0
MDC_IDC_STAT_EPISODE_RECENT_COUNT: 0
MDC_IDC_STAT_EPISODE_RECENT_COUNT_DTM_END: NORMAL
MDC_IDC_STAT_EPISODE_RECENT_COUNT_DTM_END: NORMAL
MDC_IDC_STAT_EPISODE_RECENT_COUNT_DTM_START: NORMAL
MDC_IDC_STAT_EPISODE_RECENT_COUNT_DTM_START: NORMAL
MDC_IDC_STAT_EPISODE_TYPE: NORMAL
MDC_IDC_STAT_EPISODE_TYPE: NORMAL

## 2021-12-03 DIAGNOSIS — G47.01 INSOMNIA DUE TO MEDICAL CONDITION: ICD-10-CM

## 2021-12-03 RX ORDER — TRAZODONE HYDROCHLORIDE 50 MG/1
TABLET, FILM COATED ORAL
Qty: 90 TABLET | Refills: 1 | Status: SHIPPED | OUTPATIENT
Start: 2021-12-03 | End: 2022-04-26

## 2022-02-11 DIAGNOSIS — Z11.59 ENCOUNTER FOR SCREENING FOR OTHER VIRAL DISEASES: Primary | ICD-10-CM

## 2022-02-24 ENCOUNTER — OFFICE VISIT (OUTPATIENT)
Dept: INTERNAL MEDICINE | Facility: CLINIC | Age: 82
End: 2022-02-24
Payer: COMMERCIAL

## 2022-02-24 VITALS
OXYGEN SATURATION: 98 % | SYSTOLIC BLOOD PRESSURE: 154 MMHG | DIASTOLIC BLOOD PRESSURE: 64 MMHG | RESPIRATION RATE: 16 BRPM | BODY MASS INDEX: 32.37 KG/M2 | TEMPERATURE: 98.4 F | HEART RATE: 87 BPM | WEIGHT: 175.9 LBS | HEIGHT: 62 IN

## 2022-02-24 DIAGNOSIS — M85.80 OSTEOPENIA, UNSPECIFIED LOCATION: ICD-10-CM

## 2022-02-24 DIAGNOSIS — M17.11 PRIMARY OSTEOARTHRITIS OF RIGHT KNEE: ICD-10-CM

## 2022-02-24 DIAGNOSIS — E78.5 HYPERLIPIDEMIA LDL GOAL <130: ICD-10-CM

## 2022-02-24 DIAGNOSIS — I10 BENIGN ESSENTIAL HYPERTENSION: ICD-10-CM

## 2022-02-24 DIAGNOSIS — Z01.818 PRE-OP EXAM: Primary | ICD-10-CM

## 2022-02-24 DIAGNOSIS — Z22.322 POSITIVE NASAL CULTURE FOR METHICILLIN RESISTANT STAPHYLOCOCCUS AUREUS: ICD-10-CM

## 2022-02-24 DIAGNOSIS — E87.6 HYPOKALEMIA: ICD-10-CM

## 2022-02-24 LAB
ERYTHROCYTE [DISTWIDTH] IN BLOOD BY AUTOMATED COUNT: 12.9 % (ref 10–15)
HCT VFR BLD AUTO: 45 % (ref 35–47)
HGB BLD-MCNC: 15.5 G/DL (ref 11.7–15.7)
MCH RBC QN AUTO: 30.8 PG (ref 26.5–33)
MCHC RBC AUTO-ENTMCNC: 34.4 G/DL (ref 31.5–36.5)
MCV RBC AUTO: 89 FL (ref 78–100)
MRSA DNA SPEC QL NAA+PROBE: POSITIVE
PLATELET # BLD AUTO: 235 10E3/UL (ref 150–450)
RBC # BLD AUTO: 5.04 10E6/UL (ref 3.8–5.2)
SA TARGET DNA: POSITIVE
WBC # BLD AUTO: 5.6 10E3/UL (ref 4–11)

## 2022-02-24 PROCEDURE — 80061 LIPID PANEL: CPT | Performed by: INTERNAL MEDICINE

## 2022-02-24 PROCEDURE — 87640 STAPH A DNA AMP PROBE: CPT | Mod: 59 | Performed by: INTERNAL MEDICINE

## 2022-02-24 PROCEDURE — 80048 BASIC METABOLIC PNL TOTAL CA: CPT | Performed by: INTERNAL MEDICINE

## 2022-02-24 PROCEDURE — 85027 COMPLETE CBC AUTOMATED: CPT | Performed by: INTERNAL MEDICINE

## 2022-02-24 PROCEDURE — 36415 COLL VENOUS BLD VENIPUNCTURE: CPT | Performed by: INTERNAL MEDICINE

## 2022-02-24 PROCEDURE — 87641 MR-STAPH DNA AMP PROBE: CPT | Performed by: INTERNAL MEDICINE

## 2022-02-24 PROCEDURE — 82043 UR ALBUMIN QUANTITATIVE: CPT | Performed by: INTERNAL MEDICINE

## 2022-02-24 PROCEDURE — 99214 OFFICE O/P EST MOD 30 MIN: CPT | Performed by: INTERNAL MEDICINE

## 2022-02-24 PROCEDURE — 93000 ELECTROCARDIOGRAM COMPLETE: CPT | Performed by: INTERNAL MEDICINE

## 2022-02-24 PROCEDURE — 87081 CULTURE SCREEN ONLY: CPT | Performed by: INTERNAL MEDICINE

## 2022-02-24 RX ORDER — ATORVASTATIN CALCIUM 10 MG/1
10 TABLET, FILM COATED ORAL DAILY
Qty: 90 TABLET | Refills: 3 | Status: SHIPPED | OUTPATIENT
Start: 2022-02-24 | End: 2023-03-07

## 2022-02-24 RX ORDER — POTASSIUM CHLORIDE 1500 MG/1
20 TABLET, EXTENDED RELEASE ORAL DAILY
Qty: 90 TABLET | Refills: 3 | Status: SHIPPED | OUTPATIENT
Start: 2022-02-24 | End: 2022-04-11

## 2022-02-24 RX ORDER — ALENDRONATE SODIUM 70 MG/1
70 TABLET ORAL
Qty: 12 TABLET | Refills: 1 | Status: SHIPPED | OUTPATIENT
Start: 2022-02-24 | End: 2022-04-26

## 2022-02-24 RX ORDER — CARVEDILOL 12.5 MG/1
12.5 TABLET ORAL 2 TIMES DAILY WITH MEALS
Qty: 180 TABLET | Refills: 3 | Status: SHIPPED | OUTPATIENT
Start: 2022-02-24 | End: 2022-06-02

## 2022-02-24 NOTE — PROGRESS NOTES
[place] [address] [facility] [address]  Phone: [phone]  Primary Provider: Erika Navarro  Pre-op Performing Provider: ERIKA NAVARRO      PREOPERATIVE EVALUATION:  Today's date: 2/24/2022    Malika Velasco is a 81 year old female who presents for a preoperative evaluation.    Surgical Information:  Surgery/Procedure: Right total knee Arthroplasty   Surgery Location: [facility]   Surgeon: Dr. Garth Constantino   Surgery Date: 03/08/2022  Time of Surgery: 10:10 AM  Where patient plans to recover: At home with family  Fax number for surgical facility: Note does not need to be faxed, will be available electronically in Epic.    Type of Anesthesia Anticipated: to be determined    Assessment & Plan     The proposed surgical procedure is considered INTERMEDIATE risk.    Pre-op exam    - EKG 12-lead complete w/read - Clinics  - Methicillin Resistant Staph Aureus PCR  - Basic metabolic panel  (Ca, Cl, CO2, Creat, Gluc, K, Na, BUN)  - CBC with platelets  - MRSA Culture Reflex    Primary osteoarthritis of right knee      Positive nasal culture for methicillin resistant Staphylococcus aureus  Initial MRSA test came back positive, Bactroban ointment ordered.  A follow-up culture was negative but she was advised to complete the antibiotic ointment treatment course.  - mupirocin (BACTROBAN) 2 % external ointment; 0.5 grams each nostril twice daily for 5 days     Implanted Device:   - Type of device: Medtronic pacemaker      Risks and Recommendations:  The patient has the following additional risks and recommendations for perioperative complications:   - No identified additional risk factors other than previously addressed    Medication Instructions:  She will take her carvedilol the morning of surgery with sips of water.  She will take her potassium the evening before, can use Flonase the morning of surgery, hold the rest of the medications that  morning.      RECOMMENDATION:  APPROVAL GIVEN to proceed with proposed procedure, without further diagnostic evaluation.    }    Subjective     HPI related to upcoming procedure: She has osteoarthritis of the right knee, not managed with conservative treatment.    Preop Questions 2/22/2022   1. Have you ever had a heart attack or stroke? No   2. Have you ever had surgery on your heart or blood vessels, such as a stent placement, a coronary artery bypass, or surgery on an artery in your head, neck, heart, or legs? No   3. Do you have chest pain with activity? No   4. Do you have a history of  heart failure? No   5. Do you currently have a cold, bronchitis or symptoms of other infection? No   6. Do you have a cough, shortness of breath, or wheezing? No   7. Do you or anyone in your family have previous history of blood clots? No   8. Do you or does anyone in your family have a serious bleeding problem such as prolonged bleeding following surgeries or cuts? No   9. Have you ever had problems with anemia or been told to take iron pills? No   10. Have you had any abnormal blood loss such as black, tarry or bloody stools, or abnormal vaginal bleeding? No   11. Have you ever had a blood transfusion? No   12. Are you willing to have a blood transfusion if it is medically needed before, during, or after your surgery? Yes   13. Have you or any of your relatives ever had problems with anesthesia? YES - nausea and vomiting   14. Do you have sleep apnea, excessive snoring or daytime drowsiness? YES - known TRAVIS   14a. Do you have a CPAP machine? Yes   15. Do you have any artifical heart valves or other implanted medical devices like a pacemaker, defibrillator, or continuous glucose monitor? YES - pacemaker   15a. What type of device do you have? Medtronic   15b. Name of the clinic that manages your device:  [facility]   16. Do you have artificial joints? YES - see psh    17. Are you allergic to latex? No        Health Care Directive:  Patient has a Health Care Directive on file      Preoperative Review of :   reviewed - no record of controlled substances prescribed.      Status of Chronic Conditions:  Hypertension: Home readings and previous readings here been good, a little bit higher today but should be acceptable for surgery.      Review of Systems  GENERAL: negative for, fever, chills, weight loss, weight gain  EYES: negative  ENT: negative  RESPIRATORY: No dyspnea on exertion and No cough  CARDIOVASCULAR: negative for, palpitations, tachycardia, irregular heart beat and chest pain  GI: negative for, nausea, vomiting, abdominal pain, melena and hematochezia  : negative  MUSCULOSKELETAL: knee pain , some left shoulder pain   NEUROLOGIC: negative for, headaches, seizures, local weakness, numbness or tingling of hands and numbness or tingling of feet  SKIN: negative  ENDOCRINE: negative         Patient Active Problem List    Diagnosis Date Noted     Cataracts, bilateral 03/04/2020     Priority: Medium     Elevated fasting blood sugar 03/04/2020     Priority: Medium     Overview:   103       Environmental allergies 03/04/2020     Priority: Medium     Osteoarthritis of both knees 03/04/2020     Priority: Medium     Overview:   Dr Constantino, surgery       Overactive bladder 03/04/2020     Priority: Medium     Reflux esophagitis 03/04/2020     Priority: Medium     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 03/04/2020     Priority: Medium     Overview:   left side       ACP (advance care planning) 07/26/2017     Priority: Medium     Advance Care Planning 7/26/2017: Recommend Code Status in chart and patient's Advance Care Planning documents be reviewed to ensure alignment with patient's current wishes.  Added by Vicky FARR Advance Care Planning Liaison       TRAVIS (obstructive sleep apnea)      Priority: Medium     Moderate on CPAP       Aortic valve sclerosis      Priority: Medium     Pacemaker  03/09/2017     Priority: Medium     For complete heart block    Overview:   -placed 11/21/1197 due to complete heart block with syncope  -generator change 5/21/2007 for KAREN  -KAREN reached 2/14/2015 - generator change 3/31/2015       Hyperlipidemia LDL goal <130 03/09/2017     Priority: Medium     Benign essential hypertension 03/09/2017     Priority: Medium     Osteopenia 03/09/2017     Priority: Medium     Back pain      Priority: Medium     Allergic rhinitis due to pollen 12/09/2015     Priority: Medium      Past Medical History:   Diagnosis Date     Allergic rhinitis      Anxiety      Aortic valve sclerosis      Back pain      GERD (gastroesophageal reflux disease)      Heart block AV complete (H) 2008    pacemaker     Hyperlipidemia      Hypertension      Major depression      TRAVIS (obstructive sleep apnea)     moderate     Osteoarthritis      Pacemaker     Medtronic     PONV (postoperative nausea and vomiting)      Squamous cell carcinoma, face      Past Surgical History:   Procedure Laterality Date     ARTHROSCOPY KNEE Left      AS TOTAL KNEE ARTHROPLASTY Left 05/2015     CATARACT IOL, RT/LT Bilateral 2013     IMPLANT PACEMAKER  1997     TONSILLECTOMY       TUBAL LIGATION       Current Outpatient Medications   Medication Sig Dispense Refill     alendronate (FOSAMAX) 70 MG tablet Take 1 tablet (70 mg) by mouth every 7 days 12 tablet 3     atorvastatin (LIPITOR) 10 MG tablet Take 1 tablet (10 mg) by mouth daily 90 tablet 3     Calcium Carb-Cholecalciferol (CALCIUM 600/VITAMIN D3) 600-800 MG-UNIT TABS        carvedilol (COREG) 12.5 MG tablet Take 1 tablet (12.5 mg) by mouth 2 times daily (with meals) 180 tablet 3     cetirizine (ZYRTEC) 10 MG tablet Take 1 tablet (10 mg) by mouth daily 30 tablet 3     chlorthalidone (HYGROTON) 25 MG tablet Take 0.5 tablets (12.5 mg) by mouth daily 45 tablet 3     fish oil-omega-3 fatty acids 1000 MG capsule Take 2 g by mouth daily       fluticasone (FLONASE) 50 MCG/ACT nasal spray  "Spray 2 sprays into both nostrils daily 16 g 0     MELATONIN PO Take 5 mg by mouth At Bedtime       multivitamin, therapeutic with minerals (MULTI-VITAMIN) TABS tablet Take 1 tablet by mouth daily       potassium chloride ER (KLOR-CON M) 20 MEQ CR tablet Take 1 tablet (20 mEq) by mouth daily 90 tablet 3     traZODone (DESYREL) 50 MG tablet Take 1 tablet by mouth at bedtime as needed. 90 tablet 1       Allergies   Allergen Reactions     Zantac [Ranitidine]      Headache        Social History     Tobacco Use     Smoking status: Former Smoker     Packs/day: 0.50     Years: 3.00     Pack years: 1.50     Types: Cigarettes     Smokeless tobacco: Never Used   Substance Use Topics     Alcohol use: Yes     Comment: rarely, yearly       History   Drug Use No         Objective     Physical Exam    Patient is alert, oriented, cooperative in no acute distress.  BP (!) 154/64   Pulse 87   Temp 98.4  F (36.9  C) (Oral)   Resp 16   Ht 1.581 m (5' 2.25\")   Wt 79.8 kg (175 lb 14.4 oz)   LMP  (LMP Unknown)   SpO2 98%   BMI 31.91 kg/m      HEENT: PERRL, EOMI, TM's are normal.   NECK: No lymphadenopathy or thyromegaly. Carotid pulses full without bruits.  LUNGS: clear  CV: normal S1, S2 with 2/6 systolic ejection murmur, no S3 or S4 present. Pulses are 2/2 throughout. No JVD.  ABDOMEN: Nondistended, soft, nontender, no hepatosplenomegaly, no masses  EXTREMITIES: no edema present, tender right knee joint  NEUROLOGIC: Cranial nerves II-XII intact, reflexes 2/4 throughout, strength 5/5 except 4/5 left knee      Recent Labs   Lab Test 02/09/21  0844      POTASSIUM 4.2   CR 0.66        Diagnostics:  Recent Results (from the past 48 hour(s))   Methicillin Resistant Staph Aureus PCR    Collection Time: 02/24/22  9:56 AM    Specimen: Nose; Swab   Result Value Ref Range    MRSA Target DNA Positive (A) Negative    SA Target DNA Positive    MRSA Culture Reflex    Collection Time: 02/24/22  9:56 AM    Specimen: Nose; Swab   Result " Value Ref Range    Culture Culture in progress    Albumin Random Urine Quantitative with Creat Ratio    Collection Time: 02/24/22 10:47 AM   Result Value Ref Range    Creatinine Urine mg/dL 30 mg/dL    Albumin Urine mg/L 7 mg/L    Albumin Urine mg/g Cr 23.33 0.00 - 25.00 mg/g Cr   CBC with platelets    Collection Time: 02/24/22 10:51 AM   Result Value Ref Range    WBC Count 5.6 4.0 - 11.0 10e3/uL    RBC Count 5.04 3.80 - 5.20 10e6/uL    Hemoglobin 15.5 11.7 - 15.7 g/dL    Hematocrit 45.0 35.0 - 47.0 %    MCV 89 78 - 100 fL    MCH 30.8 26.5 - 33.0 pg    MCHC 34.4 31.5 - 36.5 g/dL    RDW 12.9 10.0 - 15.0 %    Platelet Count 235 150 - 450 10e3/uL      Follow-up nasal culture did not grow staph aureus.    EKG: appears normal, NSR, normal axis, normal intervals, no acute ST/T changes c/w ischemia, no LVH by voltage criteria, unchanged from previous tracings    Revised Cardiac Risk Index (RCRI):  The patient has the following serious cardiovascular risks for perioperative complications:   - No serious cardiac risks = 0 points     RCRI Interpretation: 0 points: Class I (very low risk - 0.4% complication rate)           Signed Electronically by: Karen Navarro MD  Copy of this evaluation report is provided to requesting physician.

## 2022-02-24 NOTE — NURSING NOTE
"BP (!) 160/88 (BP Location: Left arm, Patient Position: Sitting, Cuff Size: Adult Regular)   Pulse 87   Temp 98.4  F (36.9  C) (Oral)   Resp 16   Ht 1.581 m (5' 2.25\")   Wt 79.8 kg (175 lb 14.4 oz)   LMP  (LMP Unknown)   SpO2 98%   BMI 31.91 kg/m      "

## 2022-02-24 NOTE — PATIENT INSTRUCTIONS
The day before take potassium in morning and one at supper.     The morning of surgery take only Carvedilol.   You can take the flonase in the morning.

## 2022-02-25 LAB
CREAT UR-MCNC: 30 MG/DL
MICROALBUMIN UR-MCNC: 7 MG/L
MICROALBUMIN/CREAT UR: 23.33 MG/G CR (ref 0–25)

## 2022-02-25 RX ORDER — MUPIROCIN 20 MG/G
OINTMENT TOPICAL
Qty: 5 G | Refills: 0 | Status: SHIPPED | OUTPATIENT
Start: 2022-02-25 | End: 2022-04-26

## 2022-02-25 NOTE — PLAN OF CARE
Pt was tested for MRSA at pre-op and was positive. They started patient on Mupirocin. Dr. Constantino's office notified instructed that IF they want Vancomycin ordered pre-op they will need to order it.

## 2022-02-26 LAB
ANION GAP SERPL CALCULATED.3IONS-SCNC: 9 MMOL/L (ref 3–14)
BACTERIA SPEC CULT: NORMAL
BUN SERPL-MCNC: 18 MG/DL (ref 7–30)
CALCIUM SERPL-MCNC: 9.7 MG/DL (ref 8.5–10.1)
CHLORIDE BLD-SCNC: 104 MMOL/L (ref 94–109)
CHOLEST SERPL-MCNC: 150 MG/DL
CO2 SERPL-SCNC: 25 MMOL/L (ref 20–32)
CREAT SERPL-MCNC: 0.67 MG/DL (ref 0.52–1.04)
FASTING STATUS PATIENT QL REPORTED: YES
GFR SERPL CREATININE-BSD FRML MDRD: 88 ML/MIN/1.73M2
GLUCOSE BLD-MCNC: 101 MG/DL (ref 70–99)
HDLC SERPL-MCNC: 64 MG/DL
LDLC SERPL CALC-MCNC: 52 MG/DL
NONHDLC SERPL-MCNC: 86 MG/DL
POTASSIUM BLD-SCNC: 4.2 MMOL/L (ref 3.4–5.3)
SODIUM SERPL-SCNC: 138 MMOL/L (ref 133–144)
TRIGL SERPL-MCNC: 172 MG/DL

## 2022-03-03 ENCOUNTER — ANCILLARY PROCEDURE (OUTPATIENT)
Dept: CARDIOLOGY | Facility: CLINIC | Age: 82
End: 2022-03-03
Attending: INTERNAL MEDICINE
Payer: COMMERCIAL

## 2022-03-03 DIAGNOSIS — Z95.0 CARDIAC PACEMAKER IN SITU: ICD-10-CM

## 2022-03-03 PROCEDURE — 93296 REM INTERROG EVL PM/IDS: CPT | Performed by: INTERNAL MEDICINE

## 2022-03-03 PROCEDURE — 93294 REM INTERROG EVL PM/LDLS PM: CPT | Performed by: INTERNAL MEDICINE

## 2022-03-05 ENCOUNTER — LAB (OUTPATIENT)
Dept: URGENT CARE | Facility: URGENT CARE | Age: 82
End: 2022-03-05
Attending: ORTHOPAEDIC SURGERY
Payer: COMMERCIAL

## 2022-03-05 DIAGNOSIS — Z11.59 ENCOUNTER FOR SCREENING FOR OTHER VIRAL DISEASES: ICD-10-CM

## 2022-03-05 PROCEDURE — U0003 INFECTIOUS AGENT DETECTION BY NUCLEIC ACID (DNA OR RNA); SEVERE ACUTE RESPIRATORY SYNDROME CORONAVIRUS 2 (SARS-COV-2) (CORONAVIRUS DISEASE [COVID-19]), AMPLIFIED PROBE TECHNIQUE, MAKING USE OF HIGH THROUGHPUT TECHNOLOGIES AS DESCRIBED BY CMS-2020-01-R: HCPCS

## 2022-03-05 PROCEDURE — U0005 INFEC AGEN DETEC AMPLI PROBE: HCPCS

## 2022-03-06 LAB
MDC_IDC_LEAD_IMPLANT_DT: NORMAL
MDC_IDC_LEAD_IMPLANT_DT: NORMAL
MDC_IDC_LEAD_LOCATION: NORMAL
MDC_IDC_LEAD_LOCATION: NORMAL
MDC_IDC_LEAD_LOCATION_DETAIL_1: NORMAL
MDC_IDC_LEAD_LOCATION_DETAIL_1: NORMAL
MDC_IDC_LEAD_MFG: NORMAL
MDC_IDC_LEAD_MFG: NORMAL
MDC_IDC_LEAD_MODEL: NORMAL
MDC_IDC_LEAD_MODEL: NORMAL
MDC_IDC_LEAD_POLARITY_TYPE: NORMAL
MDC_IDC_LEAD_POLARITY_TYPE: NORMAL
MDC_IDC_LEAD_SERIAL: NORMAL
MDC_IDC_LEAD_SERIAL: NORMAL
MDC_IDC_MSMT_BATTERY_DTM: NORMAL
MDC_IDC_MSMT_BATTERY_IMPEDANCE: 1004 OHM
MDC_IDC_MSMT_BATTERY_REMAINING_LONGEVITY: 68 MO
MDC_IDC_MSMT_BATTERY_STATUS: NORMAL
MDC_IDC_MSMT_BATTERY_VOLTAGE: 2.77 V
MDC_IDC_MSMT_LEADCHNL_RA_IMPEDANCE_VALUE: 614 OHM
MDC_IDC_MSMT_LEADCHNL_RA_PACING_THRESHOLD_AMPLITUDE: 0.38 V
MDC_IDC_MSMT_LEADCHNL_RA_PACING_THRESHOLD_PULSEWIDTH: 0.4 MS
MDC_IDC_MSMT_LEADCHNL_RV_IMPEDANCE_VALUE: 715 OHM
MDC_IDC_MSMT_LEADCHNL_RV_PACING_THRESHOLD_AMPLITUDE: 0.88 V
MDC_IDC_MSMT_LEADCHNL_RV_PACING_THRESHOLD_PULSEWIDTH: 0.4 MS
MDC_IDC_PG_IMPLANT_DTM: NORMAL
MDC_IDC_PG_MFG: NORMAL
MDC_IDC_PG_MODEL: NORMAL
MDC_IDC_PG_SERIAL: NORMAL
MDC_IDC_PG_TYPE: NORMAL
MDC_IDC_SESS_CLINIC_NAME: NORMAL
MDC_IDC_SESS_DTM: NORMAL
MDC_IDC_SESS_TYPE: NORMAL
MDC_IDC_SET_BRADY_AT_MODE_SWITCH_MODE: NORMAL
MDC_IDC_SET_BRADY_AT_MODE_SWITCH_RATE: 175 {BEATS}/MIN
MDC_IDC_SET_BRADY_LOWRATE: 60 {BEATS}/MIN
MDC_IDC_SET_BRADY_MAX_SENSOR_RATE: 130 {BEATS}/MIN
MDC_IDC_SET_BRADY_MAX_TRACKING_RATE: 130 {BEATS}/MIN
MDC_IDC_SET_BRADY_MODE: NORMAL
MDC_IDC_SET_BRADY_PAV_DELAY_LOW: 200 MS
MDC_IDC_SET_BRADY_SAV_DELAY_LOW: 180 MS
MDC_IDC_SET_LEADCHNL_RA_PACING_AMPLITUDE: 1.5 V
MDC_IDC_SET_LEADCHNL_RA_PACING_CAPTURE_MODE: NORMAL
MDC_IDC_SET_LEADCHNL_RA_PACING_POLARITY: NORMAL
MDC_IDC_SET_LEADCHNL_RA_PACING_PULSEWIDTH: 0.4 MS
MDC_IDC_SET_LEADCHNL_RA_SENSING_POLARITY: NORMAL
MDC_IDC_SET_LEADCHNL_RA_SENSING_SENSITIVITY: 1 MV
MDC_IDC_SET_LEADCHNL_RV_PACING_AMPLITUDE: 2 V
MDC_IDC_SET_LEADCHNL_RV_PACING_CAPTURE_MODE: NORMAL
MDC_IDC_SET_LEADCHNL_RV_PACING_POLARITY: NORMAL
MDC_IDC_SET_LEADCHNL_RV_PACING_PULSEWIDTH: 0.4 MS
MDC_IDC_SET_LEADCHNL_RV_SENSING_POLARITY: NORMAL
MDC_IDC_SET_LEADCHNL_RV_SENSING_SENSITIVITY: 2.8 MV
MDC_IDC_SET_ZONE_DETECTION_INTERVAL: 333.33 MS
MDC_IDC_SET_ZONE_DETECTION_INTERVAL: 342.86 MS
MDC_IDC_SET_ZONE_TYPE: NORMAL
MDC_IDC_SET_ZONE_TYPE: NORMAL
MDC_IDC_STAT_AT_BURDEN_PERCENT: 0 %
MDC_IDC_STAT_AT_DTM_END: NORMAL
MDC_IDC_STAT_AT_DTM_START: NORMAL
MDC_IDC_STAT_AT_MODE_SW_COUNT: 3
MDC_IDC_STAT_BRADY_AP_VP_PERCENT: 66 %
MDC_IDC_STAT_BRADY_AP_VS_PERCENT: 0 %
MDC_IDC_STAT_BRADY_AS_VP_PERCENT: 34 %
MDC_IDC_STAT_BRADY_AS_VS_PERCENT: 0 %
MDC_IDC_STAT_BRADY_DTM_END: NORMAL
MDC_IDC_STAT_BRADY_DTM_START: NORMAL
MDC_IDC_STAT_EPISODE_RECENT_COUNT: 0
MDC_IDC_STAT_EPISODE_RECENT_COUNT: 1
MDC_IDC_STAT_EPISODE_RECENT_COUNT_DTM_END: NORMAL
MDC_IDC_STAT_EPISODE_RECENT_COUNT_DTM_END: NORMAL
MDC_IDC_STAT_EPISODE_RECENT_COUNT_DTM_START: NORMAL
MDC_IDC_STAT_EPISODE_RECENT_COUNT_DTM_START: NORMAL
MDC_IDC_STAT_EPISODE_TYPE: NORMAL
MDC_IDC_STAT_EPISODE_TYPE: NORMAL
SARS-COV-2 RNA RESP QL NAA+PROBE: NEGATIVE

## 2022-03-07 NOTE — PHARMACY-ADMISSION MEDICATION HISTORY
Medication reconciliation completed by pre-admitting.    Prior to Admission medications    Medication Sig Last Dose Taking? Auth Provider   Calcium Carb-Cholecalciferol (CALCIUM 600/VITAMIN D3) 600-800 MG-UNIT TABS   Yes Reported, Patient   cetirizine (ZYRTEC) 10 MG tablet Take 1 tablet (10 mg) by mouth daily  Yes Andi Chiu MD   chlorthalidone (HYGROTON) 25 MG tablet Take 0.5 tablets (12.5 mg) by mouth daily  Yes Tal Santacruz MD   fish oil-omega-3 fatty acids 1000 MG capsule Take 2 g by mouth daily  Yes Reported, Patient   fluticasone (FLONASE) 50 MCG/ACT nasal spray Spray 2 sprays into both nostrils daily  Yes Zia Whaley MD   MELATONIN PO Take 5 mg by mouth At Bedtime  Yes Reported, Patient   multivitamin, therapeutic with minerals (MULTI-VITAMIN) TABS tablet Take 1 tablet by mouth daily  Yes Reported, Patient   traZODone (DESYREL) 50 MG tablet Take 1 tablet by mouth at bedtime as needed.  Yes Goltz, Bennett Ezra, PA-C   alendronate (FOSAMAX) 70 MG tablet Take 1 tablet (70 mg) by mouth every 7 days   Karen Navarro MD   atorvastatin (LIPITOR) 10 MG tablet Take 1 tablet (10 mg) by mouth daily   Karen Navarro MD   carvedilol (COREG) 12.5 MG tablet Take 1 tablet (12.5 mg) by mouth 2 times daily (with meals)   Karen Navarro MD   mupirocin (BACTROBAN) 2 % external ointment 0.5 grams each nostril twice daily for 5 days   Karen Navarro MD   potassium chloride ER (KLOR-CON M) 20 MEQ CR tablet Take 1 tablet (20 mEq) by mouth daily   Karen Navarro MD

## 2022-03-08 ENCOUNTER — ANESTHESIA (OUTPATIENT)
Dept: SURGERY | Facility: CLINIC | Age: 82
End: 2022-03-08
Payer: COMMERCIAL

## 2022-03-08 ENCOUNTER — APPOINTMENT (OUTPATIENT)
Dept: GENERAL RADIOLOGY | Facility: CLINIC | Age: 82
End: 2022-03-08
Attending: ORTHOPAEDIC SURGERY
Payer: COMMERCIAL

## 2022-03-08 ENCOUNTER — ANESTHESIA EVENT (OUTPATIENT)
Dept: SURGERY | Facility: CLINIC | Age: 82
End: 2022-03-08
Payer: COMMERCIAL

## 2022-03-08 ENCOUNTER — HOSPITAL ENCOUNTER (OUTPATIENT)
Facility: CLINIC | Age: 82
Discharge: HOME | End: 2022-03-09
Attending: ORTHOPAEDIC SURGERY | Admitting: ORTHOPAEDIC SURGERY
Payer: COMMERCIAL

## 2022-03-08 ENCOUNTER — APPOINTMENT (OUTPATIENT)
Dept: PHYSICAL THERAPY | Facility: CLINIC | Age: 82
End: 2022-03-08
Attending: ORTHOPAEDIC SURGERY
Payer: COMMERCIAL

## 2022-03-08 DIAGNOSIS — Z96.651 TOTAL KNEE REPLACEMENT STATUS, RIGHT: Primary | ICD-10-CM

## 2022-03-08 PROCEDURE — 250N000011 HC RX IP 250 OP 636: Performed by: ORTHOPAEDIC SURGERY

## 2022-03-08 PROCEDURE — 999N000141 HC STATISTIC PRE-PROCEDURE NURSING ASSESSMENT: Performed by: ORTHOPAEDIC SURGERY

## 2022-03-08 PROCEDURE — 250N000013 HC RX MED GY IP 250 OP 250 PS 637: Performed by: PHYSICIAN ASSISTANT

## 2022-03-08 PROCEDURE — 258N000003 HC RX IP 258 OP 636: Performed by: NURSE ANESTHETIST, CERTIFIED REGISTERED

## 2022-03-08 PROCEDURE — 97161 PT EVAL LOW COMPLEX 20 MIN: CPT | Mod: GP | Performed by: PHYSICAL THERAPIST

## 2022-03-08 PROCEDURE — 999N000065 XR KNEE PORT RIGHT 1/2 VIEWS: Mod: RT

## 2022-03-08 PROCEDURE — 258N000001 HC RX 258: Performed by: ORTHOPAEDIC SURGERY

## 2022-03-08 PROCEDURE — 258N000003 HC RX IP 258 OP 636: Performed by: PHYSICIAN ASSISTANT

## 2022-03-08 PROCEDURE — 250N000011 HC RX IP 250 OP 636: Performed by: PHYSICIAN ASSISTANT

## 2022-03-08 PROCEDURE — 250N000009 HC RX 250: Performed by: NURSE ANESTHETIST, CERTIFIED REGISTERED

## 2022-03-08 PROCEDURE — C1776 JOINT DEVICE (IMPLANTABLE): HCPCS | Performed by: ORTHOPAEDIC SURGERY

## 2022-03-08 PROCEDURE — 258N000003 HC RX IP 258 OP 636: Performed by: ORTHOPAEDIC SURGERY

## 2022-03-08 PROCEDURE — 250N000013 HC RX MED GY IP 250 OP 250 PS 637: Performed by: ANESTHESIOLOGY

## 2022-03-08 PROCEDURE — 250N000013 HC RX MED GY IP 250 OP 250 PS 637: Performed by: INTERNAL MEDICINE

## 2022-03-08 PROCEDURE — 360N000077 HC SURGERY LEVEL 4, PER MIN: Performed by: ORTHOPAEDIC SURGERY

## 2022-03-08 PROCEDURE — 272N000001 HC OR GENERAL SUPPLY STERILE: Performed by: ORTHOPAEDIC SURGERY

## 2022-03-08 PROCEDURE — 370N000017 HC ANESTHESIA TECHNICAL FEE, PER MIN: Performed by: ORTHOPAEDIC SURGERY

## 2022-03-08 PROCEDURE — 250N000011 HC RX IP 250 OP 636: Performed by: NURSE ANESTHETIST, CERTIFIED REGISTERED

## 2022-03-08 PROCEDURE — 250N000011 HC RX IP 250 OP 636: Performed by: ANESTHESIOLOGY

## 2022-03-08 PROCEDURE — 97530 THERAPEUTIC ACTIVITIES: CPT | Mod: GP | Performed by: PHYSICAL THERAPIST

## 2022-03-08 PROCEDURE — 99213 OFFICE O/P EST LOW 20 MIN: CPT | Performed by: INTERNAL MEDICINE

## 2022-03-08 PROCEDURE — 250N000009 HC RX 250: Performed by: ORTHOPAEDIC SURGERY

## 2022-03-08 PROCEDURE — 710N000010 HC RECOVERY PHASE 1, LEVEL 2, PER MIN: Performed by: ORTHOPAEDIC SURGERY

## 2022-03-08 PROCEDURE — 250N000013 HC RX MED GY IP 250 OP 250 PS 637: Performed by: ORTHOPAEDIC SURGERY

## 2022-03-08 PROCEDURE — 271N000001 HC OR GENERAL SUPPLY NON-STERILE: Performed by: ORTHOPAEDIC SURGERY

## 2022-03-08 PROCEDURE — 99207 PR CONSULT E&M CHANGED TO INITIAL LEVEL: CPT | Performed by: INTERNAL MEDICINE

## 2022-03-08 PROCEDURE — 278N000051 HC OR IMPLANT GENERAL: Performed by: ORTHOPAEDIC SURGERY

## 2022-03-08 PROCEDURE — 97116 GAIT TRAINING THERAPY: CPT | Mod: GP | Performed by: PHYSICAL THERAPIST

## 2022-03-08 DEVICE — BONE CEMENT SIMPLEX FULL DOSE 6191-1-001: Type: IMPLANTABLE DEVICE | Site: KNEE | Status: FUNCTIONAL

## 2022-03-08 DEVICE — IMP COMP PATELLA HOWM TRI ASYM 29X09MM 555-L-299: Type: IMPLANTABLE DEVICE | Site: KNEE | Status: FUNCTIONAL

## 2022-03-08 DEVICE — IMPLANTABLE DEVICE: Type: IMPLANTABLE DEVICE | Site: KNEE | Status: FUNCTIONAL

## 2022-03-08 DEVICE — IMP INSERT BASEPLATE TIBIAL HOWM TRI 3 5521-B-300: Type: IMPLANTABLE DEVICE | Site: KNEE | Status: FUNCTIONAL

## 2022-03-08 DEVICE — IMP COMP FEM STRK TRIATHLN PS RT 3 5515-F-302: Type: IMPLANTABLE DEVICE | Site: KNEE | Status: FUNCTIONAL

## 2022-03-08 RX ORDER — FENTANYL CITRATE 50 UG/ML
INJECTION, SOLUTION INTRAMUSCULAR; INTRAVENOUS PRN
Status: DISCONTINUED | OUTPATIENT
Start: 2022-03-08 | End: 2022-03-08

## 2022-03-08 RX ORDER — LIDOCAINE HYDROCHLORIDE 10 MG/ML
INJECTION, SOLUTION INFILTRATION; PERINEURAL PRN
Status: DISCONTINUED | OUTPATIENT
Start: 2022-03-08 | End: 2022-03-08

## 2022-03-08 RX ORDER — HYDROMORPHONE HCL IN WATER/PF 6 MG/30 ML
0.2 PATIENT CONTROLLED ANALGESIA SYRINGE INTRAVENOUS
Status: DISCONTINUED | OUTPATIENT
Start: 2022-03-08 | End: 2022-03-09 | Stop reason: HOSPADM

## 2022-03-08 RX ORDER — NALOXONE HYDROCHLORIDE 0.4 MG/ML
0.2 INJECTION, SOLUTION INTRAMUSCULAR; INTRAVENOUS; SUBCUTANEOUS
Status: DISCONTINUED | OUTPATIENT
Start: 2022-03-08 | End: 2022-03-09 | Stop reason: HOSPADM

## 2022-03-08 RX ORDER — SODIUM CHLORIDE, SODIUM LACTATE, POTASSIUM CHLORIDE, CALCIUM CHLORIDE 600; 310; 30; 20 MG/100ML; MG/100ML; MG/100ML; MG/100ML
INJECTION, SOLUTION INTRAVENOUS CONTINUOUS PRN
Status: DISCONTINUED | OUTPATIENT
Start: 2022-03-08 | End: 2022-03-08

## 2022-03-08 RX ORDER — LIDOCAINE 40 MG/G
CREAM TOPICAL
Status: DISCONTINUED | OUTPATIENT
Start: 2022-03-08 | End: 2022-03-08 | Stop reason: HOSPADM

## 2022-03-08 RX ORDER — ALBUTEROL SULFATE 0.83 MG/ML
2.5 SOLUTION RESPIRATORY (INHALATION) EVERY 4 HOURS PRN
Status: DISCONTINUED | OUTPATIENT
Start: 2022-03-08 | End: 2022-03-08 | Stop reason: HOSPADM

## 2022-03-08 RX ORDER — CELECOXIB 200 MG/1
400 CAPSULE ORAL ONCE
Status: COMPLETED | OUTPATIENT
Start: 2022-03-08 | End: 2022-03-08

## 2022-03-08 RX ORDER — SODIUM CHLORIDE, SODIUM LACTATE, POTASSIUM CHLORIDE, CALCIUM CHLORIDE 600; 310; 30; 20 MG/100ML; MG/100ML; MG/100ML; MG/100ML
INJECTION, SOLUTION INTRAVENOUS CONTINUOUS
Status: DISCONTINUED | OUTPATIENT
Start: 2022-03-08 | End: 2022-03-08 | Stop reason: HOSPADM

## 2022-03-08 RX ORDER — CETIRIZINE HYDROCHLORIDE 10 MG/1
10 TABLET ORAL DAILY
Status: DISCONTINUED | OUTPATIENT
Start: 2022-03-08 | End: 2022-03-09 | Stop reason: HOSPADM

## 2022-03-08 RX ORDER — ACETAMINOPHEN 325 MG/1
650 TABLET ORAL EVERY 4 HOURS PRN
Status: DISCONTINUED | OUTPATIENT
Start: 2022-03-11 | End: 2022-03-09 | Stop reason: HOSPADM

## 2022-03-08 RX ORDER — VANCOMYCIN HYDROCHLORIDE 1 G/20ML
INJECTION, POWDER, LYOPHILIZED, FOR SOLUTION INTRAVENOUS PRN
Status: DISCONTINUED | OUTPATIENT
Start: 2022-03-08 | End: 2022-03-08 | Stop reason: HOSPADM

## 2022-03-08 RX ORDER — ONDANSETRON 4 MG/1
4 TABLET, ORALLY DISINTEGRATING ORAL EVERY 6 HOURS PRN
Status: DISCONTINUED | OUTPATIENT
Start: 2022-03-08 | End: 2022-03-09 | Stop reason: HOSPADM

## 2022-03-08 RX ORDER — POLYETHYLENE GLYCOL 3350 17 G/17G
17 POWDER, FOR SOLUTION ORAL DAILY
Status: DISCONTINUED | OUTPATIENT
Start: 2022-03-09 | End: 2022-03-09 | Stop reason: HOSPADM

## 2022-03-08 RX ORDER — PROPOFOL 10 MG/ML
INJECTION, EMULSION INTRAVENOUS CONTINUOUS PRN
Status: DISCONTINUED | OUTPATIENT
Start: 2022-03-08 | End: 2022-03-08

## 2022-03-08 RX ORDER — FLUTICASONE PROPIONATE 50 MCG
2 SPRAY, SUSPENSION (ML) NASAL DAILY
Status: DISCONTINUED | OUTPATIENT
Start: 2022-03-08 | End: 2022-03-09 | Stop reason: HOSPADM

## 2022-03-08 RX ORDER — FENTANYL CITRATE 50 UG/ML
25 INJECTION, SOLUTION INTRAMUSCULAR; INTRAVENOUS EVERY 5 MIN PRN
Status: DISCONTINUED | OUTPATIENT
Start: 2022-03-08 | End: 2022-03-08 | Stop reason: HOSPADM

## 2022-03-08 RX ORDER — NALOXONE HYDROCHLORIDE 0.4 MG/ML
0.4 INJECTION, SOLUTION INTRAMUSCULAR; INTRAVENOUS; SUBCUTANEOUS
Status: DISCONTINUED | OUTPATIENT
Start: 2022-03-08 | End: 2022-03-09 | Stop reason: HOSPADM

## 2022-03-08 RX ORDER — HYDROMORPHONE HCL IN WATER/PF 6 MG/30 ML
0.4 PATIENT CONTROLLED ANALGESIA SYRINGE INTRAVENOUS
Status: DISCONTINUED | OUTPATIENT
Start: 2022-03-08 | End: 2022-03-09 | Stop reason: HOSPADM

## 2022-03-08 RX ORDER — ONDANSETRON 4 MG/1
4 TABLET, ORALLY DISINTEGRATING ORAL EVERY 30 MIN PRN
Status: DISCONTINUED | OUTPATIENT
Start: 2022-03-08 | End: 2022-03-08 | Stop reason: HOSPADM

## 2022-03-08 RX ORDER — ONDANSETRON 2 MG/ML
4 INJECTION INTRAMUSCULAR; INTRAVENOUS EVERY 30 MIN PRN
Status: DISCONTINUED | OUTPATIENT
Start: 2022-03-08 | End: 2022-03-08 | Stop reason: HOSPADM

## 2022-03-08 RX ORDER — OXYCODONE HYDROCHLORIDE 5 MG/1
10 TABLET ORAL EVERY 4 HOURS PRN
Status: DISCONTINUED | OUTPATIENT
Start: 2022-03-08 | End: 2022-03-09 | Stop reason: HOSPADM

## 2022-03-08 RX ORDER — AMOXICILLIN 250 MG
1 CAPSULE ORAL 2 TIMES DAILY
Status: DISCONTINUED | OUTPATIENT
Start: 2022-03-08 | End: 2022-03-09 | Stop reason: HOSPADM

## 2022-03-08 RX ORDER — LABETALOL HYDROCHLORIDE 5 MG/ML
10 INJECTION, SOLUTION INTRAVENOUS
Status: DISCONTINUED | OUTPATIENT
Start: 2022-03-08 | End: 2022-03-08 | Stop reason: HOSPADM

## 2022-03-08 RX ORDER — ONDANSETRON 4 MG/1
4-8 TABLET, ORALLY DISINTEGRATING ORAL EVERY 8 HOURS PRN
Qty: 10 TABLET | Refills: 0 | Status: SHIPPED | OUTPATIENT
Start: 2022-03-08 | End: 2022-04-26

## 2022-03-08 RX ORDER — HYDRALAZINE HYDROCHLORIDE 20 MG/ML
10 INJECTION INTRAMUSCULAR; INTRAVENOUS EVERY 10 MIN PRN
Status: DISCONTINUED | OUTPATIENT
Start: 2022-03-08 | End: 2022-03-08 | Stop reason: HOSPADM

## 2022-03-08 RX ORDER — ACETAMINOPHEN 325 MG/1
975 TABLET ORAL EVERY 8 HOURS
Status: DISCONTINUED | OUTPATIENT
Start: 2022-03-08 | End: 2022-03-09 | Stop reason: HOSPADM

## 2022-03-08 RX ORDER — ATORVASTATIN CALCIUM 10 MG/1
10 TABLET, FILM COATED ORAL DAILY
Status: DISCONTINUED | OUTPATIENT
Start: 2022-03-08 | End: 2022-03-09 | Stop reason: HOSPADM

## 2022-03-08 RX ORDER — AMOXICILLIN 250 MG
1-2 CAPSULE ORAL 2 TIMES DAILY
Qty: 30 TABLET | Refills: 0 | Status: SHIPPED | OUTPATIENT
Start: 2022-03-08 | End: 2022-04-26

## 2022-03-08 RX ORDER — CEFAZOLIN SODIUM/WATER 2 G/20 ML
2 SYRINGE (ML) INTRAVENOUS
Status: COMPLETED | OUTPATIENT
Start: 2022-03-08 | End: 2022-03-08

## 2022-03-08 RX ORDER — OXYCODONE HYDROCHLORIDE 5 MG/1
5 TABLET ORAL EVERY 4 HOURS PRN
Status: DISCONTINUED | OUTPATIENT
Start: 2022-03-08 | End: 2022-03-08 | Stop reason: HOSPADM

## 2022-03-08 RX ORDER — ACETAMINOPHEN 325 MG/1
975 TABLET ORAL ONCE
Status: COMPLETED | OUTPATIENT
Start: 2022-03-08 | End: 2022-03-08

## 2022-03-08 RX ORDER — PROCHLORPERAZINE MALEATE 5 MG
5 TABLET ORAL EVERY 6 HOURS PRN
Status: DISCONTINUED | OUTPATIENT
Start: 2022-03-08 | End: 2022-03-09 | Stop reason: HOSPADM

## 2022-03-08 RX ORDER — FENTANYL CITRATE 50 UG/ML
25 INJECTION, SOLUTION INTRAMUSCULAR; INTRAVENOUS
Status: DISCONTINUED | OUTPATIENT
Start: 2022-03-08 | End: 2022-03-08 | Stop reason: HOSPADM

## 2022-03-08 RX ORDER — DEXAMETHASONE SODIUM PHOSPHATE 4 MG/ML
INJECTION, SOLUTION INTRA-ARTICULAR; INTRALESIONAL; INTRAMUSCULAR; INTRAVENOUS; SOFT TISSUE PRN
Status: DISCONTINUED | OUTPATIENT
Start: 2022-03-08 | End: 2022-03-08

## 2022-03-08 RX ORDER — ONDANSETRON 2 MG/ML
4 INJECTION INTRAMUSCULAR; INTRAVENOUS EVERY 6 HOURS PRN
Status: DISCONTINUED | OUTPATIENT
Start: 2022-03-08 | End: 2022-03-09 | Stop reason: HOSPADM

## 2022-03-08 RX ORDER — LIDOCAINE 40 MG/G
CREAM TOPICAL
Status: DISCONTINUED | OUTPATIENT
Start: 2022-03-08 | End: 2022-03-09 | Stop reason: HOSPADM

## 2022-03-08 RX ORDER — HYDROXYZINE HYDROCHLORIDE 10 MG/1
10 TABLET, FILM COATED ORAL EVERY 6 HOURS PRN
Status: DISCONTINUED | OUTPATIENT
Start: 2022-03-08 | End: 2022-03-09 | Stop reason: HOSPADM

## 2022-03-08 RX ORDER — HYDROXYZINE HYDROCHLORIDE 10 MG/1
10 TABLET, FILM COATED ORAL EVERY 6 HOURS PRN
Qty: 30 TABLET | Refills: 0 | Status: SHIPPED | OUTPATIENT
Start: 2022-03-08 | End: 2022-04-26

## 2022-03-08 RX ORDER — OXYCODONE HYDROCHLORIDE 5 MG/1
5 TABLET ORAL EVERY 4 HOURS PRN
Status: DISCONTINUED | OUTPATIENT
Start: 2022-03-08 | End: 2022-03-09 | Stop reason: HOSPADM

## 2022-03-08 RX ORDER — OXYCODONE HYDROCHLORIDE 5 MG/1
5-10 TABLET ORAL
Qty: 42 TABLET | Refills: 0 | Status: SHIPPED | OUTPATIENT
Start: 2022-03-08 | End: 2022-04-26

## 2022-03-08 RX ORDER — TRANEXAMIC ACID 650 MG/1
1950 TABLET ORAL ONCE
Status: COMPLETED | OUTPATIENT
Start: 2022-03-08 | End: 2022-03-08

## 2022-03-08 RX ORDER — CARVEDILOL 12.5 MG/1
12.5 TABLET ORAL 2 TIMES DAILY WITH MEALS
Status: DISCONTINUED | OUTPATIENT
Start: 2022-03-08 | End: 2022-03-09 | Stop reason: HOSPADM

## 2022-03-08 RX ORDER — SODIUM CHLORIDE, SODIUM LACTATE, POTASSIUM CHLORIDE, CALCIUM CHLORIDE 600; 310; 30; 20 MG/100ML; MG/100ML; MG/100ML; MG/100ML
INJECTION, SOLUTION INTRAVENOUS CONTINUOUS
Status: DISCONTINUED | OUTPATIENT
Start: 2022-03-08 | End: 2022-03-09 | Stop reason: HOSPADM

## 2022-03-08 RX ORDER — PROPOFOL 10 MG/ML
INJECTION, EMULSION INTRAVENOUS PRN
Status: DISCONTINUED | OUTPATIENT
Start: 2022-03-08 | End: 2022-03-08

## 2022-03-08 RX ORDER — METHADONE HYDROCHLORIDE 10 MG/ML
10 INJECTION, SOLUTION INTRAMUSCULAR; INTRAVENOUS; SUBCUTANEOUS ONCE
Status: COMPLETED | OUTPATIENT
Start: 2022-03-08 | End: 2022-03-08

## 2022-03-08 RX ORDER — HYDRALAZINE HYDROCHLORIDE 20 MG/ML
INJECTION INTRAMUSCULAR; INTRAVENOUS PRN
Status: DISCONTINUED | OUTPATIENT
Start: 2022-03-08 | End: 2022-03-08

## 2022-03-08 RX ORDER — CELECOXIB 200 MG/1
200 CAPSULE ORAL DAILY
Qty: 14 CAPSULE | Refills: 0 | Status: SHIPPED | OUTPATIENT
Start: 2022-03-08 | End: 2022-04-26

## 2022-03-08 RX ORDER — ACETAMINOPHEN 325 MG/1
650 TABLET ORAL EVERY 4 HOURS PRN
Qty: 100 TABLET | Refills: 0 | Status: SHIPPED | OUTPATIENT
Start: 2022-03-08 | End: 2023-06-22

## 2022-03-08 RX ORDER — ONDANSETRON 2 MG/ML
INJECTION INTRAMUSCULAR; INTRAVENOUS PRN
Status: DISCONTINUED | OUTPATIENT
Start: 2022-03-08 | End: 2022-03-08

## 2022-03-08 RX ORDER — BISACODYL 10 MG
10 SUPPOSITORY, RECTAL RECTAL DAILY PRN
Status: DISCONTINUED | OUTPATIENT
Start: 2022-03-08 | End: 2022-03-09 | Stop reason: HOSPADM

## 2022-03-08 RX ADMIN — ASPIRIN 325 MG: 325 TABLET, COATED ORAL at 15:05

## 2022-03-08 RX ADMIN — METHADONE HYDROCHLORIDE 10 MG: 10 INJECTION, SOLUTION INTRAMUSCULAR; INTRAVENOUS; SUBCUTANEOUS at 10:25

## 2022-03-08 RX ADMIN — CARVEDILOL 12.5 MG: 12.5 TABLET, FILM COATED ORAL at 17:19

## 2022-03-08 RX ADMIN — CETIRIZINE HYDROCHLORIDE 10 MG: 10 TABLET, FILM COATED ORAL at 15:05

## 2022-03-08 RX ADMIN — ATORVASTATIN CALCIUM 10 MG: 10 TABLET, FILM COATED ORAL at 15:04

## 2022-03-08 RX ADMIN — ACETAMINOPHEN 975 MG: 325 TABLET, FILM COATED ORAL at 09:26

## 2022-03-08 RX ADMIN — VANCOMYCIN HYDROCHLORIDE 1000 MG: 10 INJECTION, POWDER, LYOPHILIZED, FOR SOLUTION INTRAVENOUS at 22:17

## 2022-03-08 RX ADMIN — SENNOSIDES AND DOCUSATE SODIUM 1 TABLET: 50; 8.6 TABLET ORAL at 20:16

## 2022-03-08 RX ADMIN — OXYCODONE HYDROCHLORIDE 5 MG: 5 TABLET ORAL at 22:45

## 2022-03-08 RX ADMIN — CELECOXIB 400 MG: 200 CAPSULE ORAL at 09:26

## 2022-03-08 RX ADMIN — TRANEXAMIC ACID 1950 MG: 650 TABLET ORAL at 09:26

## 2022-03-08 RX ADMIN — PROPOFOL 120 MG: 10 INJECTION, EMULSION INTRAVENOUS at 10:25

## 2022-03-08 RX ADMIN — FLUTICASONE PROPIONATE 2 SPRAY: 50 SPRAY, METERED NASAL at 16:04

## 2022-03-08 RX ADMIN — OXYCODONE HYDROCHLORIDE 5 MG: 5 TABLET ORAL at 13:05

## 2022-03-08 RX ADMIN — VANCOMYCIN HYDROCHLORIDE 1000 MG: 5 INJECTION, POWDER, LYOPHILIZED, FOR SOLUTION INTRAVENOUS at 09:35

## 2022-03-08 RX ADMIN — SODIUM CHLORIDE, POTASSIUM CHLORIDE, SODIUM LACTATE AND CALCIUM CHLORIDE: 600; 310; 30; 20 INJECTION, SOLUTION INTRAVENOUS at 11:30

## 2022-03-08 RX ADMIN — DEXAMETHASONE SODIUM PHOSPHATE 8 MG: 4 INJECTION, SOLUTION INTRA-ARTICULAR; INTRALESIONAL; INTRAMUSCULAR; INTRAVENOUS; SOFT TISSUE at 10:25

## 2022-03-08 RX ADMIN — FENTANYL CITRATE 50 MCG: 50 INJECTION, SOLUTION INTRAMUSCULAR; INTRAVENOUS at 10:44

## 2022-03-08 RX ADMIN — ACETAMINOPHEN 975 MG: 325 TABLET, FILM COATED ORAL at 16:04

## 2022-03-08 RX ADMIN — Medication 1 G: at 10:26

## 2022-03-08 RX ADMIN — ONDANSETRON HYDROCHLORIDE 4 MG: 2 INJECTION, SOLUTION INTRAVENOUS at 10:34

## 2022-03-08 RX ADMIN — PROPOFOL 150 MCG/KG/MIN: 10 INJECTION, EMULSION INTRAVENOUS at 10:25

## 2022-03-08 RX ADMIN — HYDRALAZINE HYDROCHLORIDE 5 MG: 20 INJECTION INTRAMUSCULAR; INTRAVENOUS at 11:58

## 2022-03-08 RX ADMIN — LIDOCAINE HYDROCHLORIDE 50 MG: 10 INJECTION, SOLUTION INFILTRATION; PERINEURAL at 10:25

## 2022-03-08 RX ADMIN — SODIUM CHLORIDE, POTASSIUM CHLORIDE, SODIUM LACTATE AND CALCIUM CHLORIDE: 600; 310; 30; 20 INJECTION, SOLUTION INTRAVENOUS at 10:15

## 2022-03-08 ASSESSMENT — LIFESTYLE VARIABLES: TOBACCO_USE: 1

## 2022-03-08 ASSESSMENT — ENCOUNTER SYMPTOMS: DYSRHYTHMIAS: 1

## 2022-03-08 NOTE — CONSULTS
"[facility]  Consult Note - Hospitalist Service  Date of Admission:  3/8/2022  Consult Requested by: Dr. Constantino.  Reason for Consult: Postoperative management.    Assessment & Plan   Malika Velasco is a 81 year old female admitted on 3/8/2022. She has a past medical history significant for sleep apnea, complete heart block status post pacemaker placement, hypertension, hyperlipidemia, osteoarthritis, depression, and chronic hypokalemia.  She underwent right total knee arthroplasty on 3/8/2022.    1.  Right total knee arthroplasty.  By orthopedic surgery on 3/8/2022.  -Use incentive spirometry.  -Pain medications as needed.  -Work with therapy.    2.  Hypertension.  -Restart carvedilol 12.5 mg twice a day.  -Hold chlorthalidone.    3.  Hyperlipidemia.  -Restart atorvastatin 10 mg a day.    4.  Sleep apnea.  -Use CPAP while sleeping.         Patel Morrison DO  [facility]  Securely message with the Vocera Web Console (learn more here)  Text page via [facility] Paging/Directory       Hospitalist Service    Clinically Significant Risk Factors Present on Admission                 # Obesity: Estimated body mass index is 32.01 kg/m  as calculated from the following:    Height as of this encounter: 1.575 m (5' 2\").    Weight as of this encounter: 79.4 kg (175 lb).      ______________________________________________________________________    Chief Complaint   Right knee surgery.    History is obtained from the patient    History of Present Illness   Malika Velasco is a 81 year old female who has a past medical history significant for sleep apnea, complete heart block status post pacemaker placement, hypertension, hyperlipidemia, osteoarthritis, depression, and chronic hypokalemia.  She underwent right total knee arthroplasty on 3/8/2022.  She is not currently having knee pain.  She has had problems with significant nausea and vomiting due to anesthetics after previous " surgeries.  Not currently having any nausea.  No shortness of breath.  No other acute complaints.    Review of Systems   The 10 point Review of Systems is negative other than noted in the HPI     Past Medical History    I have reviewed this patient's medical history and updated it with pertinent information if needed.   Past Medical History:   Diagnosis Date     Allergic rhinitis      Anxiety      Aortic valve sclerosis      Back pain      GERD (gastroesophageal reflux disease)      Heart block AV complete (H) 2008    pacemaker     Hyperlipidemia      Hypertension      Major depression      TRAVIS (obstructive sleep apnea)     moderate     Osteoarthritis      Pacemaker     Medtronic     PONV (postoperative nausea and vomiting)      Squamous cell carcinoma, face        Past Surgical History   I have reviewed this patient's surgical history and updated it with pertinent information if needed.  Past Surgical History:   Procedure Laterality Date     ARTHROSCOPY KNEE Left      AS TOTAL KNEE ARTHROPLASTY Left 05/2015     CATARACT IOL, RT/LT Bilateral 2013     IMPLANT PACEMAKER  1997     TONSILLECTOMY       TUBAL LIGATION         Social History   I have reviewed this patient's social history and updated it with pertinent information if needed.  Social History     Tobacco Use     Smoking status: Former Smoker     Packs/day: 0.50     Years: 3.00     Pack years: 1.50     Types: Cigarettes     Smokeless tobacco: Never Used   Substance Use Topics     Alcohol use: Yes     Comment: rarely, yearly     Drug use: No       Family History   I have reviewed this patient's family history and updated it with pertinent information if needed.  Family History   Problem Relation Age of Onset     Heart Disease Mother      Dementia Mother      Heart Disease Father      Sleep Apnea Sister      Lung Cancer Sister    Brother with lung cancer.  Sister with breast cancer.    Medications   I have reviewed this patient's current medications    Allergies    Allergies   Allergen Reactions     Zantac [Ranitidine]      Headache       Physical Exam   Vital Signs: Temp: 97  F (36.1  C) Temp src: Temporal BP: 135/61 Pulse: 61   Resp: 18 SpO2: 94 % O2 Device: None (Room air) Oxygen Delivery: 6 LPM  Weight: 175 lbs 0 oz    Gen:  NAD, A&Ox3.  Eyes:  PERRL, sclera anicteric.  OP:  MMM, no lesions.  Neck:  Supple.  CV:  Regular, no murmurs.  Lung:  CTA b/l, normal effort.  Ab:  +BS, soft.  Skin:  Warm, dry to touch.  No rash.  Ext:  No pitting edema LE b/l.

## 2022-03-08 NOTE — PROGRESS NOTES
Patient vital signs are at baseline: Yes  Patient able to ambulate as they were prior to admission or with assist devices provided by therapies during their stay:  No,  Reason:  Has not gotten out of bed yet since arriving to floor at about 1350  Patient MUST void prior to discharge:  No,  Reason:  Due to void  Patient able to tolerate oral intake:  Yes  Pain has adequate pain control using Oral analgesics:  Yes  Does patient have an identified :  Yes  Has goal D/C date and time been discussed with patient:  Yes

## 2022-03-08 NOTE — PROGRESS NOTES
[facility]      OUTPATIENT PHYSICAL THERAPY EVALUATION  PLAN OF TREATMENT FOR OUTPATIENT REHABILITATION  (COMPLETE FOR INITIAL CLAIMS ONLY)  Patient's Last Name, First Name, M.I.  YOB: 1940  Malika Velasco                        Provider's Name  [facility] Medical Record No.  [id number]                               Onset Date:  03/08/22   Start of Care Date:  03/08/22      Type:     _X_PT   ___OT   ___SLP Medical Diagnosis:  R TKA                        PT Diagnosis:  Impaired functional mobility   Visits from SOC:  1   _________________________________________________________________________________  Plan of Treatment/Functional Goals    Planned Interventions: balance training, bed mobility training, gait training, home exercise program, patient/family education, ROM (range of motion), strengthening, stretching, transfer training     Goals: See Physical Therapy Goals on Care Plan in [employer] electronic health record.    Therapy Frequency: Daily  Predicted Duration of Therapy Intervention: 03/09/22  _________________________________________________________________________________    I CERTIFY THE NEED FOR THESE SERVICES FURNISHED UNDER        THIS PLAN OF TREATMENT AND WHILE UNDER MY CARE     (Physician co-signature of this document indicates review and certification of the therapy plan).                Certification date from: 03/08/22, Certification date to: 03/09/22    Referring Physician: Garth Constantino MD            Initial Assessment        See Physical Therapy evaluation dated 03/08/22 in [employer] electronic health record.

## 2022-03-08 NOTE — ANESTHESIA PREPROCEDURE EVALUATION
Anesthesia Pre-Procedure Evaluation    Patient: Malika Velasco   MRN: [id number] : 1940        Procedure : Procedure(s):  Right total knee arthroplasty          Past Medical History:   Diagnosis Date     Allergic rhinitis      Anxiety      Aortic valve sclerosis      Back pain      GERD (gastroesophageal reflux disease)      Heart block AV complete (H)     pacemaker     Hyperlipidemia      Hypertension      Major depression      TRAVIS (obstructive sleep apnea)     moderate     Osteoarthritis      Pacemaker     Medtronic     PONV (postoperative nausea and vomiting)      Squamous cell carcinoma, face       Past Surgical History:   Procedure Laterality Date     ARTHROSCOPY KNEE Left      AS TOTAL KNEE ARTHROPLASTY Left 2015     CATARACT IOL, RT/LT Bilateral 2013     IMPLANT PACEMAKER       TONSILLECTOMY       TUBAL LIGATION        Allergies   Allergen Reactions     Zantac [Ranitidine]      Headache      Social History     Tobacco Use     Smoking status: Former Smoker     Packs/day: 0.50     Years: 3.00     Pack years: 1.50     Types: Cigarettes     Smokeless tobacco: Never Used   Substance Use Topics     Alcohol use: Yes     Comment: rarely, yearly      Wt Readings from Last 1 Encounters:   22 79.4 kg (175 lb)        Anesthesia Evaluation            ROS/MED HX  ENT/Pulmonary:     (+) sleep apnea, moderate, uses CPAP, allergic rhinitis, tobacco use, Past use,     Neurologic:  - neg neurologic ROS     Cardiovascular:     (+) Dyslipidemia hypertension-----pacemaker, - Patient is dependent on pacemaker. dysrhythmias, a-fib, Previous cardiac testing   Echo: Date:  Results:  The left ventricle is normal in size. There is mild to moderate concentric  left ventricular hypertrophy. Left ventricular systolic function is normal.  The visual ejection fraction is estimated at 55-60%. Grade I or early  diastolic dysfunction. Diastolic Doppler findings (E/E' ratio and/or other  parameters) suggest left  ventricular filling pressures are indeterminate.  There is abnormal ventricular septal motion. No regional wall motion  abnormalities noted.     Right Ventricle  The right ventricle is grossly normal size. The right ventricular systolic  function is normal. The right ventricle is not well visualized. There is a  pacemaker lead in the right ventricle.     Atria  The left atrium is borderline dilated. Right atrial size is normal. Pacer wire  in right atrium. There is no atrial shunt seen.     Mitral Valve  The mitral valve leaflets appear normal. There is no evidence of stenosis,  fluttering, or prolapse. There is no mitral regurgitation noted.     Tricuspid Valve  The tricuspid valve is not well visualized, but is grossly normal. There is  trace to mild tricuspid regurgitation. The right ventricular systolic pressure  is approximated at 16.6 mmHg plus the right atrial pressure. Right ventricular  systolic pressure could be underestimated due to incomplete tricuspid  regurgitation velocity envelope. Normal IVC (1.5-2.5cm) with >50% respiratory  collapse; right atrial pressure is estimated at 5-10mmHg.        Aortic Valve  The aortic valve is trileaflet. There is mild trileaflet aortic sclerosis.  There is mild (1+) aortic regurgitation. No aortic stenosis is present.     Pulmonic Valve  The pulmonic valve is not well visualized. The pulmonic valve is not well  seen, but is grossly normal. There is no pulmonic valvular regurgitation.  Normal pulmonic valve velocity.     Vessels  The aortic root is not well visualized. Normal size ascending aorta. The IVC  is normal in size and reactivity with respiration, suggesting normal central  venous pressure.     Pericardium  There is no pericardial effusion.     Rhythm  Sinus rhythm was noted.  Stress Test: Date: Results:    ECG Reviewed: Date: Results:    Cath: Date: Results:      METS/Exercise Tolerance:     Hematologic:  - neg hematologic  ROS     Musculoskeletal:   (+)  arthritis,     GI/Hepatic:     (+) GERD, Asymptomatic on medication,     Renal/Genitourinary:  - neg Renal ROS     Endo:  - neg endo ROS     Psychiatric/Substance Use:     (+) psychiatric history anxiety and depression     Infectious Disease:  - neg infectious disease ROS     Malignancy:       Other:            Physical Exam    Airway        Mallampati: II   TM distance: > 3 FB   Neck ROM: full   Mouth opening: > 3 cm    Respiratory Devices and Support         Dental       (+) upper dentures and lower dentures      Cardiovascular   cardiovascular exam normal          Pulmonary   pulmonary exam normal                OUTSIDE LABS:  CBC:   Lab Results   Component Value Date    WBC 5.6 02/24/2022    WBC 10.8 01/14/2020    HGB 15.5 02/24/2022    HGB 13.9 01/14/2020    HCT 45.0 02/24/2022    HCT 41.4 01/14/2020     02/24/2022     01/14/2020     BMP:   Lab Results   Component Value Date     02/24/2022     02/09/2021    POTASSIUM 4.2 02/24/2022    POTASSIUM 4.2 02/09/2021    CHLORIDE 104 02/24/2022    CHLORIDE 101 02/09/2021    CO2 25 02/24/2022    CO2 27 02/09/2021    BUN 18 02/24/2022    BUN 13 02/09/2021    CR 0.67 02/24/2022    CR 0.66 02/09/2021     (H) 02/24/2022     (H) 02/09/2021     COAGS: No results found for: PTT, INR, FIBR  POC: No results found for: BGM, HCG, HCGS  HEPATIC:   Lab Results   Component Value Date    ALBUMIN 3.5 03/01/2017    PROTTOTAL 7.3 03/01/2017    ALT 5 04/10/2018    AST 31 03/01/2017    ALKPHOS 86 03/01/2017    BILITOTAL 0.8 03/01/2017     OTHER:   Lab Results   Component Value Date    LACT 1.0 03/01/2017    SAVANNAH 9.7 02/24/2022    LIPASE 150 03/01/2017       Anesthesia Plan    ASA Status:  3   NPO Status:  NPO Appropriate    Anesthesia Type: General.     - Airway: LMA   Induction: Intravenous.   Maintenance: TIVA.        Consents    Anesthesia Plan(s) and associated risks, benefits, and realistic alternatives discussed. Questions answered and  patient/representative(s) expressed understanding.    - Discussed:     - Discussed with:  Patient      - Extended Intubation/Ventilatory Support Discussed: No.      - Patient is DNR/DNI Status: No    Use of blood products discussed: No .     Postoperative Care    Pain management: Multi-modal analgesia, Oral pain medications, IV analgesics.     - Plan for long acting post-op opioid use   PONV prophylaxis: Ondansetron (or other 5HT-3), Dexamethasone or Solumedrol, Background Propofol Infusion     Comments:                Markie Constantino MD

## 2022-03-08 NOTE — ANESTHESIA POSTPROCEDURE EVALUATION
Patient: Malika Velasco    Procedure: Procedure(s):  Right total knee arthroplasty       Anesthesia Type:  General    Note:  Disposition: Admission   Postop Pain Control: Uneventful            Sign Out: Well controlled pain   PONV: No   Neuro/Psych: Uneventful            Sign Out: Acceptable/Baseline neuro status   Airway/Respiratory: Uneventful            Sign Out: Acceptable/Baseline resp. status   CV/Hemodynamics: Uneventful            Sign Out: Acceptable CV status   Other NRE: NONE   DID A NON-ROUTINE EVENT OCCUR? No           Last vitals:  Vitals Value Taken Time   /72 03/08/22 1330   Temp 98  F (36.7  C) 03/08/22 1315   Pulse 63 03/08/22 1335   Resp 18 03/08/22 1335   SpO2 94 % 03/08/22 1335   Vitals shown include unvalidated device data.    Electronically Signed By: Markie Constantino MD  March 8, 2022  4:05 PM

## 2022-03-08 NOTE — PLAN OF CARE
Goal Outcome Evaluation:  Arrived to floor at about 1350 from PACU. A&Ox4. VSS on RA. Capno WDL. Contact precautions maintained. LS clear. BS hypoactive. Last BM yesterday. Regular diet, tolerating. Denies N/V. C/o 4-5/10 knee pain, tolerable. Ice pack applied. Dressing to R knee, C/D/I. CMS intact. IV SL. Voiding via BR. Up with SBA, gait belt, and walker. Nursing will continue to monitor.

## 2022-03-08 NOTE — BRIEF OP NOTE
[facility]    Brief Operative Note    Pre-operative diagnosis: DJD (degenerative joint disease) [M19.90]  Post-operative diagnosis Same as pre-operative diagnosis    Procedure: Procedure(s):  Right total knee arthroplasty  Surgeon: Surgeon(s) and Role:     * Garth Constantino MD - Primary     * Henrry Don PA - Assisting  Anesthesia: General   Estimated Blood Loss: Less than 50 ml    Drains: None  Specimens: * No specimens in log *  Findings:   None.  Complications: None.  Implants:   Implant Name Type Inv. Item Serial No.  Lot No. LRB No. Used Action   BONE CEMENT SIMPLEX FULL DOSE 6191-1-001 - [id number] Cement, Bone BONE CEMENT SIMPLEX FULL DOSE 6191-1-001  [employer] [id number] Right 1 Implanted   BONE CEMENT SIMPLEX FULL DOSE 6191-1-001 - [id number] Cement, Bone BONE CEMENT SIMPLEX FULL DOSE 6191-1-001  [employer] [id number] Right 1 Implanted   IMP COMP PATELLA HOWM TRI ASYM [id number] 555-L-299 - [id number] Total Joint Component/Insert IMP COMP PATELLA HOWM TRI ASYM [id number] 555-L-299  [employer] [id number] Right 1 Implanted   IMP INSERT BASEPLATE TIBIAL HOWM TRI 3 5521-B-300 - [id number] Total Joint Component/Insert IMP INSERT BASEPLATE TIBIAL HOWM TRI 3 5521-B-300  [employer] [id number] Right 1 Implanted   IMP COMP FEM STRK TRIATHLN PS RT 3 5515-F-302 - [id number] Total Joint Component/Insert IMP COMP FEM STRK TRIATHLN PS RT 3 5515-F-302  [employer] [id number] Right 1 Implanted   Triathlon X3 Tibial Bearing Insert - PS     [id number] Right 1 Implanted

## 2022-03-08 NOTE — ANESTHESIA CARE TRANSFER NOTE
Patient: Malika Velasco    Procedure: Procedure(s):  Right total knee arthroplasty       Diagnosis: DJD (degenerative joint disease) [M19.90]  Diagnosis Additional Information: No value filed.    Anesthesia Type:   General     Note:    Oropharynx: spontaneously breathing  Level of Consciousness: awake  Oxygen Supplementation: face mask    Independent Airway: airway patency satisfactory and stable  Dentition: dentition unchanged  Vital Signs Stable: post-procedure vital signs reviewed and stable  Report to RN Given: handoff report given  Patient transferred to: PACU  Comments: To PACU, report to RN, oxygen per face mask.        Vitals:  Vitals Value Taken Time   /66 03/08/22 1223   Temp     Pulse 62 03/08/22 1225   Resp 21 03/08/22 1225   SpO2 98 % 03/08/22 1225   Vitals shown include unvalidated device data.    Electronically Signed By: LUISA Gomez CRNA  March 8, 2022  12:26 PM

## 2022-03-08 NOTE — PROGRESS NOTES
03/08/22 1721   Quick Adds   Quick Adds Certification   Type of Visit Initial PT Evaluation   Living Environment   People in Home alone   Current Living Arrangements apartment   Home Accessibility no concerns   Number of Stairs, Within Home, Primary none   Transportation Anticipated family or friend will provide   Living Environment Comments Pt lives in an ILF. Elevator access. Pt's daughter can stay with patient for week after discharge.    Self-Care   Usual Activity Tolerance moderate   Current Activity Tolerance moderate   Regular Exercise Yes   Activity/Exercise Type other (see comments)  (attends a stretching class 2x/week at her facility)   Equipment Currently Used at Home cane, straight;walker, rolling;grab bar, tub/shower   Fall history within last six months no   Activity/Exercise/Self-Care Comment Pt ambulates with either a cane or 4WW depending on her strength that date. pt reports that she has a shower chair and raised toilet seat if needed.    General Information   Onset of Illness/Injury or Date of Surgery 03/08/22   Referring Physician Garth Constantino MD   Patient/Family Therapy Goals Statement (PT) Discharge to home with OP PT starting friday 3/11   Pertinent History of Current Problem (include personal factors and/or comorbidities that impact the POC) Pt is POD0 R TKA. Pt with history of L TKA ~7years ago   Existing Precautions/Restrictions fall   Weight-Bearing Status - RLE weight-bearing as tolerated   Cognition   Affect/Mental Status (Cognition) WNL   Orientation Status (Cognition) oriented x 4   Pain Assessment   Patient Currently in Pain No   Integumentary/Edema   Integumentary/Edema Comments Ace wrap intact to R knee   Posture    Posture Forward head position;Protracted shoulders   Range of Motion (ROM)   ROM Comment R knee flexion to ~45 degrees, all other ROM WFL   Strength (Manual Muscle Testing)   Strength Comments Able to SLR B LE   Bed Mobility   Comment, (Bed Mobility) sit<>supine  with SBA, increased time   Transfers   Comment, (Transfers) sit<>stand with CGA   Gait/Stairs (Locomotion)   Distance in Feet (Required for LE Total Joints) 5'   Comment, (Gait/Stairs) CGA with FWW   Balance   Balance Comments Benefits from B UE support for safe dynamic mobility at this time   Sensory Examination   Sensory Perception patient reports no sensory changes   Coordination   Coordination no deficits were identified   Muscle Tone   Muscle Tone no deficits were identified   Clinical Impression   Criteria for Skilled Therapeutic Intervention Yes, treatment indicated   PT Diagnosis (PT) Impaired functional mobility   Influenced by the following impairments Pain, weakness, impaired R knee flexion   Functional limitations due to impairments Difficulty with bed mobility, transfers, ambulation   Clinical Presentation (PT Evaluation Complexity) Stable/Uncomplicated   Clinical Presentation Rationale medically stable, clear POC   Clinical Decision Making (Complexity) low complexity   Planned Therapy Interventions (PT) balance training;bed mobility training;gait training;home exercise program;patient/family education;ROM (range of motion);strengthening;stretching;transfer training   Risk & Benefits of therapy have been explained evaluation/treatment results reviewed;care plan/treatment goals reviewed;risks/benefits reviewed;current/potential barriers reviewed;participants voiced agreement with care plan;participants included;patient;daughter   PT Discharge Planning   PT Discharge Recommendation (DC Rec)   (per ortho)   PT Rationale for DC Rec Anticipate that at time of discharge pt will be SBA for all bed mobility, transfers, and gait with an AD.    PT Brief overview of current status CGA WW   Therapy Certification   Start of care date 03/08/22   Certification date from 03/08/22   Certification date to 03/09/22   Medical Diagnosis R TKA   Total Evaluation Time   Total Evaluation Time (Minutes) 10   Physical Therapy  Goals   PT Frequency Daily   PT Predicated Duration/Target Date for Goal Attainment 03/09/22   PT Goals Bed Mobility;Transfers;Gait   PT: Bed Mobility Supervision/stand-by assist;Supine to/from sit   PT: Transfers Supervision/stand-by assist;Sit to/from stand   PT: Gait Supervision/stand-by assist;Rolling walker;100 feet

## 2022-03-09 ENCOUNTER — APPOINTMENT (OUTPATIENT)
Dept: OCCUPATIONAL THERAPY | Facility: CLINIC | Age: 82
End: 2022-03-09
Attending: ORTHOPAEDIC SURGERY
Payer: COMMERCIAL

## 2022-03-09 ENCOUNTER — APPOINTMENT (OUTPATIENT)
Dept: PHYSICAL THERAPY | Facility: CLINIC | Age: 82
End: 2022-03-09
Attending: ORTHOPAEDIC SURGERY
Payer: COMMERCIAL

## 2022-03-09 VITALS
WEIGHT: 175 LBS | BODY MASS INDEX: 32.2 KG/M2 | OXYGEN SATURATION: 97 % | TEMPERATURE: 97.8 F | RESPIRATION RATE: 16 BRPM | HEART RATE: 64 BPM | DIASTOLIC BLOOD PRESSURE: 64 MMHG | HEIGHT: 62 IN | SYSTOLIC BLOOD PRESSURE: 164 MMHG

## 2022-03-09 LAB
ANION GAP SERPL CALCULATED.3IONS-SCNC: 8 MMOL/L (ref 3–14)
BUN SERPL-MCNC: 23 MG/DL (ref 7–30)
CALCIUM SERPL-MCNC: 8.6 MG/DL (ref 8.5–10.1)
CHLORIDE BLD-SCNC: 100 MMOL/L (ref 94–109)
CO2 SERPL-SCNC: 23 MMOL/L (ref 20–32)
CREAT SERPL-MCNC: 0.62 MG/DL (ref 0.52–1.04)
FASTING STATUS PATIENT QL REPORTED: NORMAL
GFR SERPL CREATININE-BSD FRML MDRD: 90 ML/MIN/1.73M2
GLUCOSE BLD-MCNC: 95 MG/DL (ref 70–99)
GLUCOSE BLD-MCNC: 95 MG/DL (ref 70–99)
HGB BLD-MCNC: 13 G/DL (ref 11.7–15.7)
POTASSIUM BLD-SCNC: 3.9 MMOL/L (ref 3.4–5.3)
SODIUM SERPL-SCNC: 131 MMOL/L (ref 133–144)

## 2022-03-09 PROCEDURE — 97535 SELF CARE MNGMENT TRAINING: CPT | Mod: GO

## 2022-03-09 PROCEDURE — 85018 HEMOGLOBIN: CPT | Performed by: ORTHOPAEDIC SURGERY

## 2022-03-09 PROCEDURE — 250N000013 HC RX MED GY IP 250 OP 250 PS 637: Performed by: INTERNAL MEDICINE

## 2022-03-09 PROCEDURE — 250N000013 HC RX MED GY IP 250 OP 250 PS 637: Performed by: ORTHOPAEDIC SURGERY

## 2022-03-09 PROCEDURE — 97110 THERAPEUTIC EXERCISES: CPT | Mod: GP | Performed by: PHYSICAL THERAPIST

## 2022-03-09 PROCEDURE — 97165 OT EVAL LOW COMPLEX 30 MIN: CPT | Mod: GO

## 2022-03-09 PROCEDURE — 97116 GAIT TRAINING THERAPY: CPT | Mod: GP | Performed by: PHYSICAL THERAPIST

## 2022-03-09 PROCEDURE — 36415 COLL VENOUS BLD VENIPUNCTURE: CPT | Performed by: ORTHOPAEDIC SURGERY

## 2022-03-09 PROCEDURE — 80048 BASIC METABOLIC PNL TOTAL CA: CPT | Performed by: INTERNAL MEDICINE

## 2022-03-09 RX ADMIN — POLYETHYLENE GLYCOL 3350 17 G: 17 POWDER, FOR SOLUTION ORAL at 11:34

## 2022-03-09 RX ADMIN — CARVEDILOL 12.5 MG: 12.5 TABLET, FILM COATED ORAL at 09:27

## 2022-03-09 RX ADMIN — SENNOSIDES AND DOCUSATE SODIUM 1 TABLET: 50; 8.6 TABLET ORAL at 09:27

## 2022-03-09 RX ADMIN — ATORVASTATIN CALCIUM 10 MG: 10 TABLET, FILM COATED ORAL at 09:27

## 2022-03-09 RX ADMIN — ACETAMINOPHEN 975 MG: 325 TABLET, FILM COATED ORAL at 09:28

## 2022-03-09 RX ADMIN — CETIRIZINE HYDROCHLORIDE 10 MG: 10 TABLET, FILM COATED ORAL at 09:27

## 2022-03-09 RX ADMIN — ACETAMINOPHEN 975 MG: 325 TABLET, FILM COATED ORAL at 01:01

## 2022-03-09 RX ADMIN — OXYCODONE HYDROCHLORIDE 5 MG: 5 TABLET ORAL at 09:29

## 2022-03-09 RX ADMIN — FLUTICASONE PROPIONATE 2 SPRAY: 50 SPRAY, METERED NASAL at 09:31

## 2022-03-09 RX ADMIN — ASPIRIN 325 MG: 325 TABLET, COATED ORAL at 09:28

## 2022-03-09 ASSESSMENT — ACTIVITIES OF DAILY LIVING (ADL): PREVIOUS_RESPONSIBILITIES: MEAL PREP;HOUSEKEEPING;LAUNDRY;MEDICATION MANAGEMENT;FINANCES;DRIVING

## 2022-03-09 NOTE — PLAN OF CARE
Patient vital signs are at baseline: Yes  Patient able to ambulate as they were prior to admission or with assist devices provided by therapies during their stay:  No,  Reason:  A1 gait belt and walker.  Patient MUST void prior to discharge:  Yes  Patient able to tolerate oral intake:  Yes  Pain has adequate pain control using Oral analgesics:  Yes  Does patient have an identified :  Yes, daughter   Has goal D/C date and time been discussed with patient:  Yes     VSS. Slept through shift with no major event. Calm and pleasant care.

## 2022-03-09 NOTE — PROGRESS NOTES
[facility]    Orthopedics Progress Note     Assessment & Plan   Malika Velasco is a 81 year old female who was admitted on 3/8/2022 for right TKA. Plan is to continue therapy and pain control this morning. Discharge home with daughter following therapy. Follow-up in two weeks.       Disposition Plan   Expected discharge: Today, recommended to prior living arrangement once therapy is completed.     Entered: Henrry Don 03/09/2022, 8:33 AM   Information in the above section will display in the discharge planner report.    LEONELA Zhang    Interval History   No major events overnight.     Physical Exam   Temp: 97.5  F (36.4  C) Temp src: Temporal BP: (!) 158/54 Pulse: 82   Resp: 16 SpO2: 91 % O2 Device: None (Room air) Oxygen Delivery: 6 LPM  Vitals:    03/08/22 0816   Weight: 79.4 kg (175 lb)     Vital Signs with Ranges  Temp:  [96.9  F (36.1  C)-98  F (36.7  C)] 97.5  F (36.4  C)  Pulse:  [60-82] 82  Resp:  [11-24] 16  BP: (122-158)/(53-76) 158/54  SpO2:  [90 %-98 %] 91 %  I/O last 3 completed shifts:  In: 2390 [P.O.:580; I.V.:1810]  Out: 20 [Blood:20]    Dressing c/d/i. Calves soft. NV intact distally.     Medications     lactated ringers Stopped (03/08/22 1600)       acetaminophen  975 mg Oral Q8H     aspirin  325 mg Oral Daily     atorvastatin  10 mg Oral Daily     carvedilol  12.5 mg Oral BID w/meals     cetirizine  10 mg Oral Daily     fluticasone  2 spray Both Nostrils Daily     polyethylene glycol  17 g Oral Daily     senna-docusate  1 tablet Oral BID     sodium chloride (PF)  3 mL Intracatheter Q8H       Data   Recent Labs   Lab 03/09/22  0742   HGB 13.0   *   POTASSIUM 3.9   CHLORIDE 100   CO2 23   BUN 23   CR 0.62   ANIONGAP 8   SAVANNAH 8.6   GLC 95  95

## 2022-03-09 NOTE — PROGRESS NOTES
[facility]      OUTPATIENT OCCUPATIONAL THERAPY  EVALUATION  PLAN OF TREATMENT FOR OUTPATIENT REHABILITATION  (COMPLETE FOR INITIAL CLAIMS ONLY)  Patient's Last Name, First Name, M.I.  YOB: 1940  MikeronitDonteMalika  J                          Provider's Name  [facility] Medical Record No.  [id number]                               Onset Date:  03/08/22   Start of Care Date:  03/09/22     Type:     ___PT   _X_OT   ___SLP Medical Diagnosis:  s/p R TKA                         OT Diagnosis:  Impaired ADLs, IADLs and mobility tasks    Visits from SOC:  1   _________________________________________________________________________________  Plan of Treatment/Functional Goals    Planned Interventions: ADL retraining, IADL retraining, transfer training   Goals: See Occupational Therapy Goals on Care Plan in [employer] electronic health record.    Therapy Frequency: One time eval and treatment  Predicted Duration of Therapy Intervention: 03/09/22  _________________________________________________________________________________    I CERTIFY THE NEED FOR THESE SERVICES FURNISHED UNDER        THIS PLAN OF TREATMENT AND WHILE UNDER MY CARE     (Physician co-signature of this document indicates review and certification of the therapy plan).                Certification date from: 03/09/22, Certification date to: 03/09/22    Referring Physician: Garth Constantino MD            Initial Assessment        See Occupational Therapy evaluation dated 03/09/22 in [employer] electronic health record.

## 2022-03-09 NOTE — PROGRESS NOTES
03/09/22 0718   Quick Adds   Type of Visit Initial Occupational Therapy Evaluation   Living Environment   People in Home alone   Current Living Arrangements apartment   Home Accessibility no concerns   Number of Stairs, Within Home, Primary none   Transportation Anticipated family or friend will provide   Living Environment Comments Pt lives alone in ILF, elevator access, no environmental concerns, walk in shower w/ shower chair, comfort height toilet.    Self-Care   Usual Activity Tolerance moderate   Current Activity Tolerance moderate   Regular Exercise Yes   Activity/Exercise Type other (see comments)  (attends a stretching class 2x/week at her facility)   Equipment Currently Used at Home cane, straight;walker, rolling;grab bar, tub/shower   Fall history within last six months no   Activity/Exercise/Self-Care Comment Pt reports mod I in all ADLs, IADLs and mobility tasks with use of cane or 4ww depending on how her feels. Pt reports daughter staying for 1 week after surgery.    Instrumental Activities of Daily Living (IADL)   Previous Responsibilities meal prep;housekeeping;laundry;medication management;finances;driving   IADL Comments Daughter available at discharge    General Information   Onset of Illness/Injury or Date of Surgery 03/08/22   Referring Physician Garth Constantino MD   Patient/Family Therapy Goal Statement (OT) Pt's goal is to d/c home   Additional Occupational Profile Info/Pertinent History of Current Problem Per chart: Pt is a 81 year old female s/p R TKA   Existing Precautions/Restrictions fall   Right Lower Extremity (Weight-bearing Status) weight-bearing as tolerated (WBAT)   Visual Perception   Visual Impairment/Limitations corrective lenses for reading   Pain Assessment   Patient Currently in Pain Yes, see Vital Sign flowsheet  (pain in RLE )   Bed Mobility   Bed Mobility supine-sit   Supine-Sit [place] (Bed Mobility) supervision   Transfers   Transfers sit-stand transfer;toilet  transfer   Sit-Stand Transfer   Sit-Stand Randle (Transfers) contact guard   Assistive Device (Sit-Stand Transfers) walker, front-wheeled   Toilet Transfer   Randle Level (Toilet Transfer) contact guard   Assistive Device (Toilet Transfer) walker, front-wheeled   Balance   Balance Comments CGA/SBA while utilizing FWW    Activities of Daily Living   BADL Assessment/Intervention lower body dressing;toileting;grooming   Lower Body Dressing Assessment/Training   Randle Level (Lower Body Dressing) minimum assist (75% patient effort)   Grooming Assessment/Training   Randle Level (Grooming) contact guard assist   Toileting   Randle Level (Toileting) contact guard assist   Clinical Impression   Criteria for Skilled Therapeutic Interventions Met (OT) Yes, treatment indicated   OT Diagnosis Impaired ADLs, IADLs and mobility tasks    OT Problem List-Impairments impacting ADL problems related to;activity tolerance impaired;pain   ADL comments/analysis Pt below baseline level of functioning in daily tasks    Assessment of Occupational Performance 5 or more Performance Deficits   Identified Performance Deficits Bathing, dressing, grooming, toileting, homemaking, transfers   Planned Therapy Interventions (OT) ADL retraining;IADL retraining;transfer training   Clinical Decision Making Complexity (OT) low complexity   Risk & Benefits of therapy have been explained evaluation/treatment results reviewed;care plan/treatment goals reviewed;risks/benefits reviewed;current/potential barriers reviewed;participants voiced agreement with care plan;participants included;patient   OT Discharge Planning   OT Discharge Recommendation (DC Rec)   (Defer to ortho)   OT Rationale for DC Rec Anticipate pt will require min A for LB dressing and assist for higher level IADLs (homemaking, driving, laundry, etc). Pt will have support for daughter initially upon return home.    Therapy Certification   Start of Care Date  03/09/22   Certification date from 03/09/22   Certification date to 03/09/22   Medical Diagnosis s/p R TKA    Total Evaluation Time (Minutes)   Total Evaluation Time (Minutes) 8   OT Goals   Therapy Frequency (OT) One time eval and treatment   OT Predicated Duration/Target Date for Goal Attainment 03/09/22   OT Goals Hygiene/Grooming;Lower Body Dressing;Toilet Transfer/Toileting;OT Goal 1   OT: Hygiene/Grooming supervision/stand-by assist;using adaptive equipment;while standing   OT: Lower Body Dressing Minimal assist;using adaptive equipment   OT: Toilet Transfer/Toileting Supervision/stand-by assist;toilet transfer;cleaning and garment management;using adaptive equipment   OT: Goal 1 Pt will perform walk in shower transfer with CGA in prep for safe transfer in discharge environment.       nica

## 2022-03-09 NOTE — PLAN OF CARE
Occupational Therapy Discharge Summary    Reason for therapy discharge:    All goals and outcomes met, no further needs identified.    Progress towards therapy goal(s). See goals on Care Plan in [facility] electronic health record for goal details.  Goals met    Therapy recommendation(s):    Defer to ortho MD.

## 2022-03-09 NOTE — PLAN OF CARE
Pt a/o x4. VSS. C/o pain, PRN oxycodone given. Assist of 1 with gait belt and walker. Ambulated to bathroom. Voiding adequately. CMS intact. Dressing clean dry and intact. Treating with vancomycin. CPAP on. Continue monitoring pain and possible discharge home tomorrow.

## 2022-03-09 NOTE — PLAN OF CARE
Physical Therapy Discharge Summary    Reason for therapy discharge:    Discharged to home with outpatient therapy.    Progress towards therapy goal(s). See goals on Care Plan in [facility] electronic health record for goal details.  Goals met    Therapy recommendation(s):    Continued therapy is recommended.  Rationale/Recommendations:  OP PT to maximize return to PLOF.

## 2022-03-09 NOTE — CONSULTS
Care Management Discharge Note    Discharge Date: 03/09/2022       Discharge Disposition:  home        Discharge Transportation: family or friend will provide    Private pay costs discussed: Not applicable      Additional Information:  Care Transitions Team: Following for CC, discharge planning, and disposition.        Per chart review MD has consulted CM/SW for potential for disposition concerns.     Per PT assessment: Anticipate that at time of discharge pt will be SBA for all bed mobility, transfers, and gait with an AD.     Please re- consult CM if there are changes/concern with above plan.      Bev Alcaraz RN, BSN CTS  Care Coordinator  [facility]   [phone]

## 2022-03-09 NOTE — PLAN OF CARE
Goal Outcome Evaluation:    Patient vital signs are at baseline: Yes afebrile RA  Patient able to ambulate as they were prior to admission or with assist devices provided by therapies during their stay:  Yes up with SBA gait belt and walker  Patient MUST void prior to discharge:  Yes  Patient able to tolerate oral intake:  Yes  Pain has adequate pain control using Oral analgesics:  Yes pain managed with prn Oxycodone and scheduled Tylenol.   Does patient have an identified :  Yes  Has goal D/C date and time been discussed with patient:  Yes    Pt A&Ox4. Cms intact. Ace wrap removed. Aquacel dressing c/d/I. Contact isolation maintained. Discharge medications given to pt form signed and on chart. Discharge instructions given and discussed questions answered. All belongings including her CPap taken with pt and her daughter. Pt left floor with support staff at 1200

## 2022-03-10 NOTE — DISCHARGE SUMMARY
Service Date: 03/09/2022  Discharge Date: 03/09/2022    ADMIT DIAGNOSIS:  Osteoarthrosis of the right knee.    DISCHARGE DIAGNOSIS:  Status post right total knee arthroplasty.    PROCEDURE:  Right total knee arthroplasty performed by Dr. Garth Constantino at [facility] on 03/08/2022.    INDICATIONS FOR ADMISSION:  The patient was admitted for postoperative observation cares and pain control following her total knee arthroplasty,    HOSPITAL COURSE:  On 03/08/2022, the patient underwent a planned right total knee arthroplasty at [facility].  She tolerated the procedure well and was transferred up to medical/surgical floor in stable condition.  During her stay, her vital signs remained stable.  Her hemoglobin dropped to a low of 13.0 postoperatively and she required no blood transfusions.  Physical and Occupational Therapy were consulted and at her time of discharge, she was ambulating well with a front-wheeled walker.  DVT and PE prophylaxis included early ambulation, lower extremity compression devices, lower extremity pneumatic devices and oral enteric-coated aspirin.  Incentive spirometry was utilized for pneumonia prophylaxis.  On 03/09/2022, the patient was in stable condition with pain well controlled on oral pain medication and she was discharged home.    DISCHARGE INSTRUCTIONS:  On 03/09/2022, the patient was discharged home with the following instructions:  Weightbearing as tolerates, ice the knee p.r.n. pain and swelling, keep incision clean and dry, and continue with physical therapy per total knee arthroplasty protocol.    DISCHARGE ORTHOPEDIC MEDICATIONS:  Included acetaminophen 650 mg by mouth every 4 hours as needed for mild pain, enteric-coated aspirin 325 mg by mouth daily, and oxycodone 5-10 mg by mouth every 3 hours as needed for moderate to severe pain.    FOLLOWUP:  This patient will follow up at [facility] in 2 weeks.    Garth Constantino MD    As Dictated by  BELL DAMIAN PA-C        D: 03/10/2022   T: 03/10/2022   MT: MARLO    Name:     DOC SOSA  MRN:      [id number]        Account:      [id number]   :      1940           Service Date: 2022                                  Discharge Date: 2022     Document: [id number]

## 2022-03-13 ENCOUNTER — HEALTH MAINTENANCE LETTER (OUTPATIENT)
Age: 82
End: 2022-03-13

## 2022-03-14 NOTE — OP NOTE
Procedure Date: 03/08/2022    PREOPERATIVE DIAGNOSES:  Right knee primary osteoarthritis.    POSTOPERATIVE DIAGNOSIS:  Right knee primary osteoarthritis.    PROCEDURE:  Right total knee arthroplasty.    SURGEON:  Garth Constantino MD    ASSISTANT:  Jerrell Don PA-C    ANESTHESIA:  General, regional and local.    ESTIMATED BLOOD LOSS:  Less than 20 mL    DRAINS:  None.    COMPLICATIONS:  None apparent.    INDICATIONS FOR PROCEDURE:  Malika has had a longstanding knee osteoarthritis and underwent conservative management including injections, medications, and therapy.  These failed to relieve her symptoms adequately due to poor quality of life and pain.  She elected for total knee arthroplasty.  Risks, benefits, and alternatives were reviewed in the clinic prior to the procedure and consent was signed today.    DESCRIPTION OF PROCEDURE:  Malika was brought to the Operating Room and placed supine on the operating table.  General endotracheal anesthesia was administered after a block was established in PAR.  The right lower extremity was then prepped and draped in usual sterile fashion for total knee arthroplasty.      After a timeout confirmed the appropriate limb, the limb was exsanguinated and a standard midline incision was undertaken with sharp dissection down to the patella and a medial parapatellar arthrotomy was performed.  Patella was easily everted.  A small medial release was performed, as she was a varus knee.  We were able to remove some of the bursal tissue and fat pad.  We then flexed up the knee, removed the ACL and meniscus tissue.  She had severe grade 4 changes in her knee.  We then used our opening reamer to open up the femoral canal, used our flex panda system, we took 8 off distally 5-degree valgus and we made a nice distal cut.  She was then sized to a size 3 and our 4-in-1 cutting jig was made to make our anterior, posterior, and chamfer cuts.  We then turned our attention to her tibia and made a nice flat  perpendicular tibia cut, taking 2 off medially and took roughly 7 off laterally, again consistent with our templating.  Once that was completed, we did use our soft tissue tensioning guide and felt it would be reasonable around a size 10 or 11, based on her varus and a little more aggressive medial release that was performed.  We then cut the box for posterior stabilized component, released the rest of the PCL meniscus tissue, and removed posterior osteophytes.  With that completed, we then did a formal trial.  We felt she was still hyperextending with a 10, so we opted to go to a 12, which gave us an excellent fit and range of motion with our trial.  We then cut the patella to accept a 29 mm asymmetric patellar and drilled out three holes.  We tested our tracking and she had excellent patellar tracking.  Once that was completed, we then removed our trial components.  We punched in ream to accept our universal tibial baseplate with a size 3.  We then irrigated copiously, injected our posterior capsule.  We then cemented our components without difficulty, waiting roughly 17 minutes for the cement to harden and the last three minutes, we did a Betadine soak.  This was irrigated out completely.  Once the cement had hardened, excess cement was removed.  Final trialing was performed and we were happy with the size 12, so the final 12 was impacted without difficulty.      Once that was completed, a final irrigation was performed.  A gram of powdered vancomycin was placed in the knee.  Standard layer closure.  Sterile dressings were applied.  She was brought to PACU in stable condition.  Needle and sponge counts correct.    PLAN:  The patient will be weightbearing as tolerates.  She will be started on enteric-coated aspirin and pneumo boots for DVT prophylaxis.  She was given Ancef 2 grams within an hour of the procedure, and this will be discontinued within 24 hours.  She will be admitted to outpatient in a bed with a  possible discharge in the morning, if she is medically stable.    Garth Constantino MD        D: 2022   T: 2022   MT: [id number]    Name:     DOC SOSA  MRN:      [id number]        Account:        [id number]   :      1940           Procedure Date: 2022     Document: [id number]    cc:  [facility]

## 2022-04-04 DIAGNOSIS — I10 BENIGN ESSENTIAL HYPERTENSION: ICD-10-CM

## 2022-04-04 DIAGNOSIS — E87.6 HYPOKALEMIA: ICD-10-CM

## 2022-04-06 RX ORDER — POTASSIUM CHLORIDE 1500 MG/1
TABLET, EXTENDED RELEASE ORAL
Qty: 90 TABLET | Refills: 3 | OUTPATIENT
Start: 2022-04-06

## 2022-04-06 RX ORDER — CARVEDILOL 12.5 MG/1
TABLET ORAL
Qty: 180 TABLET | Refills: 3 | OUTPATIENT
Start: 2022-04-06

## 2022-04-06 NOTE — TELEPHONE ENCOUNTER
"Medication refused, provider updated prescription on 2/24/22 stating \"### DO NOT FILL NOW.  Please update patient's profile to reflect additional refills.  ####\".     Refused Prescriptions:                       Disp   Refills    carvedilol (COREG) 12.5 MG tablet [Pharmac*180 ta*3        Sig: TAKE 1 TABLET TWICE A DAY WITH MEALS    KLOR-CON 20 MEQ CR tablet [Pharmacy Med Na*90 tab*3        Sig: TAKE 1 TABLET DAILY        "

## 2022-04-06 NOTE — TELEPHONE ENCOUNTER
[facility] returned with denial error. Patient has refills available on file.     Call placed to patient. Advised that [facility] has refills available. Advised that if she would like a refill, she should contact [facility]. Patient agrees to plan.

## 2022-04-07 DIAGNOSIS — I10 BENIGN ESSENTIAL HYPERTENSION: ICD-10-CM

## 2022-04-07 DIAGNOSIS — E87.6 HYPOKALEMIA: ICD-10-CM

## 2022-04-07 RX ORDER — CARVEDILOL 12.5 MG/1
TABLET ORAL
Qty: 180 TABLET | Refills: 3 | OUTPATIENT
Start: 2022-04-07

## 2022-04-07 RX ORDER — POTASSIUM CHLORIDE 1500 MG/1
TABLET, EXTENDED RELEASE ORAL
Qty: 90 TABLET | Refills: 3 | OUTPATIENT
Start: 2022-04-07

## 2022-04-07 NOTE — TELEPHONE ENCOUNTER
2ND request.  Called pharmacy and they did get the refill that was sent on 2/24/22.     Prescription denied.

## 2022-04-11 ENCOUNTER — TELEPHONE (OUTPATIENT)
Dept: INTERNAL MEDICINE | Facility: CLINIC | Age: 82
End: 2022-04-11
Payer: COMMERCIAL

## 2022-04-11 RX ORDER — POTASSIUM CHLORIDE 1500 MG/1
20 TABLET, EXTENDED RELEASE ORAL DAILY
Qty: 5 TABLET | Refills: 0 | Status: SHIPPED | OUTPATIENT
Start: 2022-04-11 | End: 2022-04-26

## 2022-04-11 NOTE — TELEPHONE ENCOUNTER
This patient received a 5 day supply of potassium to get her through until mail order arrives.  She says she received a text from mail order saying her order will not arrive until 4/20.  Is it possible to request a second rx of 5 days to fill just in case mail order does not arrive in time?  Thanks so much    Karly Kern [facility]   on behalf of [facility]

## 2022-04-11 NOTE — TELEPHONE ENCOUNTER
Patient calling  [facility] is denying talking with anyone and denying any refills. Patient is beyond frustrated. Please call again. João name of person who is being talked to. Please advise. Call patient when call is made. Ok to matthew [phone]

## 2022-04-11 NOTE — TELEPHONE ENCOUNTER
Called [facility] again and was transferred to the [facility] and they are going to send out refill.  Patient only has 2 days left of [facility] and requested a short supply to be sent to a local pharmacy.  Writer sent a 5 day supply to local pharmacy.

## 2022-04-14 NOTE — TELEPHONE ENCOUNTER
Please call the patient and find out if she got her potassium yet, otherwise cue up a prescription for 5 days.

## 2022-04-14 NOTE — TELEPHONE ENCOUNTER
Spoke with patient.  States she picked up [facility] prescription on 4/11/22.  Has enough to last until mail order prescription arrives.

## 2022-04-23 ASSESSMENT — ENCOUNTER SYMPTOMS
FEVER: 0
MYALGIAS: 1
NERVOUS/ANXIOUS: 0
PARESTHESIAS: 0
PALPITATIONS: 0
ABDOMINAL PAIN: 0
BREAST MASS: 0
HEARTBURN: 0
DYSURIA: 0
ARTHRALGIAS: 1
SHORTNESS OF BREATH: 0
DIARRHEA: 0
WEAKNESS: 1
NAUSEA: 0
FREQUENCY: 1
CONSTIPATION: 0
COUGH: 0
DIZZINESS: 0
SORE THROAT: 0
EYE PAIN: 0
HEMATOCHEZIA: 0
HEMATURIA: 0
CHILLS: 0
HEADACHES: 0

## 2022-04-23 ASSESSMENT — ACTIVITIES OF DAILY LIVING (ADL): CURRENT_FUNCTION: NO ASSISTANCE NEEDED

## 2022-04-26 ENCOUNTER — OFFICE VISIT (OUTPATIENT)
Dept: INTERNAL MEDICINE | Facility: CLINIC | Age: 82
End: 2022-04-26
Payer: COMMERCIAL

## 2022-04-26 VITALS
SYSTOLIC BLOOD PRESSURE: 158 MMHG | BODY MASS INDEX: 32.57 KG/M2 | RESPIRATION RATE: 16 BRPM | TEMPERATURE: 98.5 F | WEIGHT: 177 LBS | HEART RATE: 82 BPM | HEIGHT: 62 IN | DIASTOLIC BLOOD PRESSURE: 74 MMHG | OXYGEN SATURATION: 97 %

## 2022-04-26 DIAGNOSIS — E87.6 HYPOKALEMIA: ICD-10-CM

## 2022-04-26 DIAGNOSIS — Z00.00 ENCOUNTER FOR ANNUAL WELLNESS EXAM IN MEDICARE PATIENT: Primary | ICD-10-CM

## 2022-04-26 DIAGNOSIS — G47.00 INSOMNIA, UNSPECIFIED TYPE: ICD-10-CM

## 2022-04-26 DIAGNOSIS — I10 BENIGN ESSENTIAL HYPERTENSION: ICD-10-CM

## 2022-04-26 PROBLEM — Z96.651 S/P TOTAL KNEE ARTHROPLASTY, RIGHT: Status: RESOLVED | Noted: 2022-03-08 | Resolved: 2022-04-26

## 2022-04-26 PROBLEM — H26.9 CATARACTS, BILATERAL: Status: RESOLVED | Noted: 2020-03-04 | Resolved: 2022-04-26

## 2022-04-26 PROCEDURE — 99213 OFFICE O/P EST LOW 20 MIN: CPT | Mod: 25 | Performed by: INTERNAL MEDICINE

## 2022-04-26 PROCEDURE — 91305 COVID-19,PF,PFIZER (12+ YRS): CPT | Performed by: INTERNAL MEDICINE

## 2022-04-26 PROCEDURE — 99397 PER PM REEVAL EST PAT 65+ YR: CPT | Mod: 25 | Performed by: INTERNAL MEDICINE

## 2022-04-26 PROCEDURE — 0054A COVID-19,PF,PFIZER (12+ YRS): CPT | Performed by: INTERNAL MEDICINE

## 2022-04-26 RX ORDER — POTASSIUM CHLORIDE 1500 MG/1
20 TABLET, EXTENDED RELEASE ORAL DAILY
Qty: 90 TABLET | Refills: 3
Start: 2022-04-11 | End: 2023-03-22

## 2022-04-26 RX ORDER — TRAZODONE HYDROCHLORIDE 50 MG/1
TABLET, FILM COATED ORAL
Qty: 90 TABLET | Refills: 3 | Status: SHIPPED | OUTPATIENT
Start: 2022-04-26 | End: 2022-04-26

## 2022-04-26 RX ORDER — TRAZODONE HYDROCHLORIDE 50 MG/1
TABLET, FILM COATED ORAL
Qty: 90 TABLET | Refills: 3 | Status: SHIPPED | OUTPATIENT
Start: 2022-04-26 | End: 2022-06-10

## 2022-04-26 ASSESSMENT — ENCOUNTER SYMPTOMS
DIZZINESS: 0
DYSURIA: 0
ABDOMINAL PAIN: 0
HEADACHES: 0
MYALGIAS: 1
HEMATOCHEZIA: 0
FEVER: 0
DIARRHEA: 0
NAUSEA: 0
HEARTBURN: 0
SORE THROAT: 0
NERVOUS/ANXIOUS: 0
WEAKNESS: 1
PARESTHESIAS: 0
COUGH: 0
CONSTIPATION: 0
EYE PAIN: 0
SHORTNESS OF BREATH: 0
BREAST MASS: 0
PALPITATIONS: 0
CHILLS: 0
ARTHRALGIAS: 1
FREQUENCY: 1
HEMATURIA: 0

## 2022-04-26 ASSESSMENT — ACTIVITIES OF DAILY LIVING (ADL): CURRENT_FUNCTION: NO ASSISTANCE NEEDED

## 2022-04-26 NOTE — PATIENT INSTRUCTIONS
Increase the pm dose by 1/2 tablet (18.75 mg) for 10 days or so. If BP still over 150 go up to full tablet (25 mg) at night. Take the pm dose around 8-9 pm.     If you don't tolerate the increase at all or still BP >150 after taking 25 mg for 2 weeks, let me know.

## 2022-04-26 NOTE — PROGRESS NOTES
"SUBJECTIVE:   Malika Velasco is a 81 year old female who presents for Preventive Visit.      Patient has been advised of split billing requirements and indicates understanding: Yes  Are you in the first 12 months of your Medicare coverage?  No    Healthy Habits:     In general, how would you rate your overall health?  Good    Frequency of exercise:  2-3 days/week    Duration of exercise:  30-45 minutes    Do you usually eat at least 4 servings of fruit and vegetables a day, include whole grains    & fiber and avoid regularly eating high fat or \"junk\" foods?  Yes    Ability to successfully perform activities of daily living:  No assistance needed    Home Safety:  No safety concerns identified    Hearing Impairment:  Find that men's voices are easier to understand than woman's and difficulty understanding soft or whispered speech    In the past 6 months, have you been bothered by leaking of urine? Yes    In general, how would you rate your overall mental or emotional health?  Fair      PHQ-2 Total Score: 1    Additional concerns today:  No      Problems:  1.  Hypertension: She reports that she is getting blood pressures 150-170 through the morning, later afternoon it is often down to 130.  2.  Osteopenia: She has been on Fosamax for 5 years now  3.  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     Current Outpatient Medications   Medication Sig Dispense Refill     acetaminophen (TYLENOL) 325 MG tablet Take 2 tablets " (650 mg) by mouth every 4 hours as needed for other (mild pain) 100 tablet 0     alendronate (FOSAMAX) 70 MG tablet Take 1 tablet (70 mg) by mouth every 7 days 12 tablet 1     aspirin (ASA) 325 MG EC tablet Take 1 tablet (325 mg) by mouth daily 45 tablet 0     atorvastatin (LIPITOR) 10 MG tablet Take 1 tablet (10 mg) by mouth daily 90 tablet 3     Calcium Carb-Cholecalciferol (CALCIUM 600/VITAMIN D3) 600-800 MG-UNIT TABS        carvedilol (COREG) 12.5 MG tablet Take 1 tablet (12.5 mg) by mouth 2 times daily (with meals) 180 tablet 3     cetirizine (ZYRTEC) 10 MG tablet Take 1 tablet (10 mg) by mouth daily 30 tablet 3     chlorthalidone (HYGROTON) 25 MG tablet Take 0.5 tablets (12.5 mg) by mouth daily 45 tablet 3     fish oil-omega-3 fatty acids 1000 MG capsule Take 2 g by mouth daily       fluticasone (FLONASE) 50 MCG/ACT nasal spray Spray 2 sprays into both nostrils daily 16 g 0     MELATONIN PO Take 5 mg by mouth At Bedtime       multivitamin, therapeutic with minerals (MULTI-VITAMIN) TABS tablet Take 1 tablet by mouth daily       potassium chloride ER (KLOR-CON M) 20 MEQ CR tablet Take 1 tablet (20 mEq) by mouth in the morning. 5 tablet 0     traZODone (DESYREL) 50 MG tablet Take 1 tablet by mouth at bedtime as needed. 90 tablet 1          Do you feel safe in your environment? Yes    Have you ever done Advance Care Planning? (For example, a Health Directive, POLST, or a discussion with a medical provider or your loved ones about your wishes): Yes, advance care planning is on file.       Fall risk  Fallen 2 or more times in the past year?: No  Any fall with injury in the past year?: No    Cognitive Screening   1) Repeat 3 items (Leader, Season, Table)    2) Clock draw: NORMAL  3) 3 item recall: Recalls 3 objects  Results: NORMAL clock, 1-2 items recalled: COGNITIVE IMPAIRMENT LESS LIKELY    Mini-CogTM Copyright S Debbie. Licensed by the author for use in [facility]; reprinted with permission  (deepika@[facility]). All rights reserved.      Do you have sleep apnea, excessive snoring or daytime drowsiness?: no    Reviewed and updated as needed this visit by clinical staff   Tobacco  Allergies  Meds   Med Hx  Surg Hx  Fam Hx  Soc Hx          Reviewed and updated as needed this visit by Provider                   Social History     Tobacco Use     Smoking status: Former Smoker     Packs/day: 0.50     Years: 3.00     Pack years: 1.50     Types: Cigarettes     Smokeless tobacco: Never Used   Substance Use Topics     Alcohol use: Yes     Comment: rarely, yearly         Alcohol Use 4/23/2022   Prescreen: >3 drinks/day or >7 drinks/week? Not Applicable   Prescreen: >3 drinks/day or >7 drinks/week? -               Current providers sharing in care for this patient include:   Patient Care Team:  Karen Navarro MD as PCP - General (Internal Medicine)  Denia Blum CNP as Nurse Practitioner (Nurse Practitioner)  Mary Constantino RN as Registered Nurse  Neeta, Tal Troy MD as Assigned Heart and Vascular Provider  Karen Navarro MD as Assigned PCP  Goltz, Bennett Ezra, PA-C as Assigned Neuroscience Provider  Denia Blum CNP as Assigned Surgical Provider    The following health maintenance items are reviewed in Epic and correct as of today:  Health Maintenance Due   Topic Date Due     ANNUAL REVIEW OF HM ORDERS  Never done     LUNG CANCER SCREENING  Never done     MAMMO SCREENING  02/04/2022     MEDICARE ANNUAL WELLNESS VISIT  02/09/2022     FALL RISK ASSESSMENT  02/09/2022             Pertinent mammograms are reviewed under the imaging tab.    Review of Systems   Constitutional: Negative for chills and fever.   HENT: Negative for congestion, ear pain, hearing loss and sore throat.    Eyes: Negative for pain and visual disturbance.   Respiratory: Negative for cough and shortness of breath.    Cardiovascular: Positive for peripheral edema. Negative for chest pain and palpitations.   Gastrointestinal:  "Negative for abdominal pain, constipation, diarrhea, heartburn, hematochezia and nausea.   Breasts:  Negative for tenderness, breast mass and discharge.   Genitourinary: Positive for frequency, urgency and vaginal discharge. Negative for dysuria, genital sores, hematuria, pelvic pain and vaginal bleeding.   Musculoskeletal: Positive for arthralgias and myalgias.   Skin: Negative for rash.   Neurological: Positive for weakness. Negative for dizziness, headaches and paresthesias.   Psychiatric/Behavioral: Negative for mood changes. The patient is not nervous/anxious.      Swelling is mostly on the right leg where she just had her knee replacement done about 2 months ago.  It is not really causing pain.  Bladder symptoms are stable.    OBJECTIVE:   BP (!) 158/74   Pulse 82   Temp 98.5  F (36.9  C) (Oral)   Resp 16   Ht 1.581 m (5' 2.25\")   Wt 80.3 kg (177 lb)   LMP  (LMP Unknown)   SpO2 97%   BMI 32.11 kg/m   Estimated body mass index is 32.11 kg/m  as calculated from the following:    Height as of this encounter: 1.581 m (5' 2.25\").    Weight as of this encounter: 80.3 kg (177 lb).  Physical Exam      NECK: Neck supple. No adenopathy. Thyroid symmetric, normal size,, Carotids without bruits.  PULMONARY: clear to auscultation  CARDIAC: regular rate and rhythm and no murmurs, clicks, or gallops  PULSES: 2/2 throughout  BACK: no spinal or CVAT  ABDOMINAL: Soft, nontender.  Normal bowel sounds.  No hepatosplenomegaly or abnormal masses        ASSESSMENT / PLAN:   (Z00.00) Encounter for annual wellness exam in Medicare patient  (primary encounter diagnosis)  Comment: COVID booster done  Plan: Up-to-date    (I10) Benign essential hypertension  Comment: Not optimally controlled, recommend we increase her evening carvedilol, have her take it closer to bedtime and hopefully that we will control blood pressure better through the morning.  First can go up to 18.75 mg, that is tolerated increase to 25 if not at goal.  She " "will send me a message through [employer] with an update before she needs refill or if having bothersome side effects.  She is not able to tolerate the increase we may need to consider changing medication or increasing chlorthalidone  Plan: potassium chloride ER (KLOR-CON M) 20 MEQ CR         tablet            (E87.6) Hypokalemia  Comment: stable  Plan: potassium chloride ER (KLOR-CON M) 20 MEQ CR         tablet            (G47.00) Insomnia, unspecified type  Comment: stable  Plan: traZODone (DESYREL) 50 MG tablet, DISCONTINUED:        traZODone (DESYREL) 50 MG tablet              Patient has been advised of split billing requirements and indicates understanding: Yes    COUNSELING:  Reviewed preventive health counseling, as reflected in patient instructions    Estimated body mass index is 32.01 kg/m  as calculated from the following:    Height as of 3/8/22: 1.575 m (5' 2\").    Weight as of 3/8/22: 79.4 kg (175 lb).    Weight management plan: Discussed healthy diet and exercise guidelines    She reports that she has quit smoking. Her smoking use included cigarettes. She has a 1.50 pack-year smoking history. She has never used smokeless tobacco.      Appropriate preventive services were discussed with this patient, including applicable screening as appropriate for cardiovascular disease, diabetes, osteopenia/osteoporosis, and glaucoma.  As appropriate for age/gender, discussed screening for colorectal cancer, prostate cancer, breast cancer, and cervical cancer. Checklist reviewing preventive services available has been given to the patient.    Reviewed patients plan of care and provided an AVS. The Basic Care Plan (routine screening as documented in Health Maintenance) for Malika meets the Care Plan requirement. This Care Plan has been established and reviewed with the Patient.    Counseling Resources:  ATP IV Guidelines  Pooled Cohorts Equation Calculator  Breast Cancer Risk Calculator  Breast Cancer: Medication to " Reduce Risk  FRAX Risk Assessment  ICSI Preventive Guidelines  Dietary Guidelines for Americans, 2010  [employer]'s MyPlate  ASA Prophylaxis  Lung CA Screening    Karen Navarro MD  [facility]    Identified Health Risks:

## 2022-05-31 ENCOUNTER — E-VISIT (OUTPATIENT)
Dept: INTERNAL MEDICINE | Facility: CLINIC | Age: 82
End: 2022-05-31
Payer: COMMERCIAL

## 2022-05-31 DIAGNOSIS — I10 BENIGN ESSENTIAL HYPERTENSION: ICD-10-CM

## 2022-05-31 PROCEDURE — 99421 OL DIG E/M SVC 5-10 MIN: CPT | Performed by: INTERNAL MEDICINE

## 2022-06-02 RX ORDER — CARVEDILOL 12.5 MG/1
TABLET ORAL
Qty: 270 TABLET | Refills: 3 | Status: SHIPPED | OUTPATIENT
Start: 2022-06-02 | End: 2022-06-10

## 2022-06-09 ENCOUNTER — ANCILLARY PROCEDURE (OUTPATIENT)
Dept: CARDIOLOGY | Facility: CLINIC | Age: 82
End: 2022-06-09
Attending: INTERNAL MEDICINE
Payer: COMMERCIAL

## 2022-06-09 DIAGNOSIS — Z95.0 CARDIAC PACEMAKER IN SITU: ICD-10-CM

## 2022-06-09 PROCEDURE — 93296 REM INTERROG EVL PM/IDS: CPT | Performed by: INTERNAL MEDICINE

## 2022-06-09 PROCEDURE — 93294 REM INTERROG EVL PM/LDLS PM: CPT | Performed by: INTERNAL MEDICINE

## 2022-06-10 DIAGNOSIS — G47.00 INSOMNIA, UNSPECIFIED TYPE: ICD-10-CM

## 2022-06-10 RX ORDER — TRAZODONE HYDROCHLORIDE 50 MG/1
TABLET, FILM COATED ORAL
Qty: 90 TABLET | Refills: 3 | Status: CANCELLED | OUTPATIENT
Start: 2022-06-10

## 2022-06-13 NOTE — TELEPHONE ENCOUNTER
Refill request for trazodone was deleted as it was just refilled on 6/10 by her primary.  Bennett Goltz, PA-C

## 2022-07-01 LAB
MDC_IDC_LEAD_IMPLANT_DT: NORMAL
MDC_IDC_LEAD_IMPLANT_DT: NORMAL
MDC_IDC_LEAD_LOCATION: NORMAL
MDC_IDC_LEAD_LOCATION: NORMAL
MDC_IDC_LEAD_LOCATION_DETAIL_1: NORMAL
MDC_IDC_LEAD_LOCATION_DETAIL_1: NORMAL
MDC_IDC_LEAD_MFG: NORMAL
MDC_IDC_LEAD_MFG: NORMAL
MDC_IDC_LEAD_MODEL: NORMAL
MDC_IDC_LEAD_MODEL: NORMAL
MDC_IDC_LEAD_POLARITY_TYPE: NORMAL
MDC_IDC_LEAD_POLARITY_TYPE: NORMAL
MDC_IDC_LEAD_SERIAL: NORMAL
MDC_IDC_LEAD_SERIAL: NORMAL
MDC_IDC_MSMT_BATTERY_DTM: NORMAL
MDC_IDC_MSMT_BATTERY_IMPEDANCE: 1111 OHM
MDC_IDC_MSMT_BATTERY_REMAINING_LONGEVITY: 63 MO
MDC_IDC_MSMT_BATTERY_STATUS: NORMAL
MDC_IDC_MSMT_BATTERY_VOLTAGE: 2.77 V
MDC_IDC_MSMT_LEADCHNL_RA_IMPEDANCE_VALUE: 646 OHM
MDC_IDC_MSMT_LEADCHNL_RA_PACING_THRESHOLD_AMPLITUDE: 0.38 V
MDC_IDC_MSMT_LEADCHNL_RA_PACING_THRESHOLD_PULSEWIDTH: 0.4 MS
MDC_IDC_MSMT_LEADCHNL_RV_IMPEDANCE_VALUE: 721 OHM
MDC_IDC_MSMT_LEADCHNL_RV_PACING_THRESHOLD_AMPLITUDE: 1.12 V
MDC_IDC_MSMT_LEADCHNL_RV_PACING_THRESHOLD_PULSEWIDTH: 0.4 MS
MDC_IDC_PG_IMPLANT_DTM: NORMAL
MDC_IDC_PG_MFG: NORMAL
MDC_IDC_PG_MODEL: NORMAL
MDC_IDC_PG_SERIAL: NORMAL
MDC_IDC_PG_TYPE: NORMAL
MDC_IDC_SESS_CLINIC_NAME: NORMAL
MDC_IDC_SESS_DTM: NORMAL
MDC_IDC_SESS_TYPE: NORMAL
MDC_IDC_SET_BRADY_AT_MODE_SWITCH_MODE: NORMAL
MDC_IDC_SET_BRADY_AT_MODE_SWITCH_RATE: 175 {BEATS}/MIN
MDC_IDC_SET_BRADY_LOWRATE: 60 {BEATS}/MIN
MDC_IDC_SET_BRADY_MAX_SENSOR_RATE: 130 {BEATS}/MIN
MDC_IDC_SET_BRADY_MAX_TRACKING_RATE: 130 {BEATS}/MIN
MDC_IDC_SET_BRADY_MODE: NORMAL
MDC_IDC_SET_BRADY_PAV_DELAY_LOW: 200 MS
MDC_IDC_SET_BRADY_SAV_DELAY_LOW: 180 MS
MDC_IDC_SET_LEADCHNL_RA_PACING_AMPLITUDE: 1.5 V
MDC_IDC_SET_LEADCHNL_RA_PACING_CAPTURE_MODE: NORMAL
MDC_IDC_SET_LEADCHNL_RA_PACING_POLARITY: NORMAL
MDC_IDC_SET_LEADCHNL_RA_PACING_PULSEWIDTH: 0.4 MS
MDC_IDC_SET_LEADCHNL_RA_SENSING_POLARITY: NORMAL
MDC_IDC_SET_LEADCHNL_RA_SENSING_SENSITIVITY: 1 MV
MDC_IDC_SET_LEADCHNL_RV_PACING_AMPLITUDE: 2.25 V
MDC_IDC_SET_LEADCHNL_RV_PACING_CAPTURE_MODE: NORMAL
MDC_IDC_SET_LEADCHNL_RV_PACING_POLARITY: NORMAL
MDC_IDC_SET_LEADCHNL_RV_PACING_PULSEWIDTH: 0.4 MS
MDC_IDC_SET_LEADCHNL_RV_SENSING_POLARITY: NORMAL
MDC_IDC_SET_LEADCHNL_RV_SENSING_SENSITIVITY: 2.8 MV
MDC_IDC_SET_ZONE_DETECTION_INTERVAL: 333.33 MS
MDC_IDC_SET_ZONE_DETECTION_INTERVAL: 342.86 MS
MDC_IDC_SET_ZONE_TYPE: NORMAL
MDC_IDC_SET_ZONE_TYPE: NORMAL
MDC_IDC_STAT_AT_BURDEN_PERCENT: 0 %
MDC_IDC_STAT_AT_DTM_END: NORMAL
MDC_IDC_STAT_AT_DTM_START: NORMAL
MDC_IDC_STAT_AT_MODE_SW_COUNT: 3
MDC_IDC_STAT_BRADY_AP_VP_PERCENT: 63 %
MDC_IDC_STAT_BRADY_AP_VS_PERCENT: 0 %
MDC_IDC_STAT_BRADY_AS_VP_PERCENT: 37 %
MDC_IDC_STAT_BRADY_AS_VS_PERCENT: 0 %
MDC_IDC_STAT_BRADY_DTM_END: NORMAL
MDC_IDC_STAT_BRADY_DTM_START: NORMAL
MDC_IDC_STAT_EPISODE_RECENT_COUNT: 0
MDC_IDC_STAT_EPISODE_RECENT_COUNT: 1
MDC_IDC_STAT_EPISODE_RECENT_COUNT_DTM_END: NORMAL
MDC_IDC_STAT_EPISODE_RECENT_COUNT_DTM_END: NORMAL
MDC_IDC_STAT_EPISODE_RECENT_COUNT_DTM_START: NORMAL
MDC_IDC_STAT_EPISODE_RECENT_COUNT_DTM_START: NORMAL
MDC_IDC_STAT_EPISODE_TYPE: NORMAL
MDC_IDC_STAT_EPISODE_TYPE: NORMAL

## 2022-08-12 DIAGNOSIS — G47.33 OBSTRUCTIVE SLEEP APNEA (ADULT) (PEDIATRIC): Primary | ICD-10-CM

## 2022-09-15 ENCOUNTER — ANCILLARY PROCEDURE (OUTPATIENT)
Dept: CARDIOLOGY | Facility: CLINIC | Age: 82
End: 2022-09-15
Attending: INTERNAL MEDICINE
Payer: COMMERCIAL

## 2022-09-15 DIAGNOSIS — Z95.0 CARDIAC PACEMAKER IN SITU: ICD-10-CM

## 2022-09-15 PROCEDURE — 93294 REM INTERROG EVL PM/LDLS PM: CPT | Performed by: INTERNAL MEDICINE

## 2022-09-15 PROCEDURE — 93296 REM INTERROG EVL PM/IDS: CPT | Performed by: INTERNAL MEDICINE

## 2022-09-17 LAB
MDC_IDC_LEAD_IMPLANT_DT: NORMAL
MDC_IDC_LEAD_IMPLANT_DT: NORMAL
MDC_IDC_LEAD_LOCATION: NORMAL
MDC_IDC_LEAD_LOCATION: NORMAL
MDC_IDC_LEAD_LOCATION_DETAIL_1: NORMAL
MDC_IDC_LEAD_LOCATION_DETAIL_1: NORMAL
MDC_IDC_LEAD_MFG: NORMAL
MDC_IDC_LEAD_MFG: NORMAL
MDC_IDC_LEAD_MODEL: NORMAL
MDC_IDC_LEAD_MODEL: NORMAL
MDC_IDC_LEAD_POLARITY_TYPE: NORMAL
MDC_IDC_LEAD_POLARITY_TYPE: NORMAL
MDC_IDC_LEAD_SERIAL: NORMAL
MDC_IDC_LEAD_SERIAL: NORMAL
MDC_IDC_MSMT_BATTERY_DTM: NORMAL
MDC_IDC_MSMT_BATTERY_IMPEDANCE: 1221 OHM
MDC_IDC_MSMT_BATTERY_REMAINING_LONGEVITY: 59 MO
MDC_IDC_MSMT_BATTERY_STATUS: NORMAL
MDC_IDC_MSMT_BATTERY_VOLTAGE: 2.77 V
MDC_IDC_MSMT_LEADCHNL_RA_IMPEDANCE_VALUE: 636 OHM
MDC_IDC_MSMT_LEADCHNL_RA_PACING_THRESHOLD_AMPLITUDE: 0.38 V
MDC_IDC_MSMT_LEADCHNL_RA_PACING_THRESHOLD_PULSEWIDTH: 0.4 MS
MDC_IDC_MSMT_LEADCHNL_RV_IMPEDANCE_VALUE: 705 OHM
MDC_IDC_MSMT_LEADCHNL_RV_PACING_THRESHOLD_AMPLITUDE: 1.12 V
MDC_IDC_MSMT_LEADCHNL_RV_PACING_THRESHOLD_PULSEWIDTH: 0.4 MS
MDC_IDC_PG_IMPLANT_DTM: NORMAL
MDC_IDC_PG_MFG: NORMAL
MDC_IDC_PG_MODEL: NORMAL
MDC_IDC_PG_SERIAL: NORMAL
MDC_IDC_PG_TYPE: NORMAL
MDC_IDC_SESS_CLINIC_NAME: NORMAL
MDC_IDC_SESS_DTM: NORMAL
MDC_IDC_SESS_TYPE: NORMAL
MDC_IDC_SET_BRADY_AT_MODE_SWITCH_MODE: NORMAL
MDC_IDC_SET_BRADY_AT_MODE_SWITCH_RATE: 175 {BEATS}/MIN
MDC_IDC_SET_BRADY_LOWRATE: 60 {BEATS}/MIN
MDC_IDC_SET_BRADY_MAX_SENSOR_RATE: 130 {BEATS}/MIN
MDC_IDC_SET_BRADY_MAX_TRACKING_RATE: 130 {BEATS}/MIN
MDC_IDC_SET_BRADY_MODE: NORMAL
MDC_IDC_SET_BRADY_PAV_DELAY_LOW: 200 MS
MDC_IDC_SET_BRADY_SAV_DELAY_LOW: 180 MS
MDC_IDC_SET_LEADCHNL_RA_PACING_AMPLITUDE: 1.5 V
MDC_IDC_SET_LEADCHNL_RA_PACING_CAPTURE_MODE: NORMAL
MDC_IDC_SET_LEADCHNL_RA_PACING_POLARITY: NORMAL
MDC_IDC_SET_LEADCHNL_RA_PACING_PULSEWIDTH: 0.4 MS
MDC_IDC_SET_LEADCHNL_RA_SENSING_POLARITY: NORMAL
MDC_IDC_SET_LEADCHNL_RA_SENSING_SENSITIVITY: 1 MV
MDC_IDC_SET_LEADCHNL_RV_PACING_AMPLITUDE: 2.25 V
MDC_IDC_SET_LEADCHNL_RV_PACING_CAPTURE_MODE: NORMAL
MDC_IDC_SET_LEADCHNL_RV_PACING_POLARITY: NORMAL
MDC_IDC_SET_LEADCHNL_RV_PACING_PULSEWIDTH: 0.4 MS
MDC_IDC_SET_LEADCHNL_RV_SENSING_POLARITY: NORMAL
MDC_IDC_SET_LEADCHNL_RV_SENSING_SENSITIVITY: 2.8 MV
MDC_IDC_SET_ZONE_DETECTION_INTERVAL: 333.33 MS
MDC_IDC_SET_ZONE_DETECTION_INTERVAL: 342.86 MS
MDC_IDC_SET_ZONE_TYPE: NORMAL
MDC_IDC_SET_ZONE_TYPE: NORMAL
MDC_IDC_STAT_AT_BURDEN_PERCENT: 0 %
MDC_IDC_STAT_AT_DTM_END: NORMAL
MDC_IDC_STAT_AT_DTM_START: NORMAL
MDC_IDC_STAT_AT_MODE_SW_COUNT: 4
MDC_IDC_STAT_BRADY_AP_VP_PERCENT: 68 %
MDC_IDC_STAT_BRADY_AP_VS_PERCENT: 0 %
MDC_IDC_STAT_BRADY_AS_VP_PERCENT: 32 %
MDC_IDC_STAT_BRADY_AS_VS_PERCENT: 0 %
MDC_IDC_STAT_BRADY_DTM_END: NORMAL
MDC_IDC_STAT_BRADY_DTM_START: NORMAL
MDC_IDC_STAT_EPISODE_RECENT_COUNT: 0
MDC_IDC_STAT_EPISODE_RECENT_COUNT: 1
MDC_IDC_STAT_EPISODE_RECENT_COUNT_DTM_END: NORMAL
MDC_IDC_STAT_EPISODE_RECENT_COUNT_DTM_END: NORMAL
MDC_IDC_STAT_EPISODE_RECENT_COUNT_DTM_START: NORMAL
MDC_IDC_STAT_EPISODE_RECENT_COUNT_DTM_START: NORMAL
MDC_IDC_STAT_EPISODE_TYPE: NORMAL
MDC_IDC_STAT_EPISODE_TYPE: NORMAL

## 2022-09-21 DIAGNOSIS — R06.09 DYSPNEA ON EXERTION: ICD-10-CM

## 2022-09-21 DIAGNOSIS — I10 BENIGN ESSENTIAL HYPERTENSION: ICD-10-CM

## 2022-09-21 RX ORDER — CHLORTHALIDONE 25 MG/1
12.5 TABLET ORAL DAILY
Qty: 45 TABLET | Refills: 0 | Status: SHIPPED | OUTPATIENT
Start: 2022-09-21 | End: 2022-11-22

## 2022-11-07 ENCOUNTER — VIRTUAL VISIT (OUTPATIENT)
Dept: SLEEP MEDICINE | Facility: CLINIC | Age: 82
End: 2022-11-07
Payer: COMMERCIAL

## 2022-11-07 VITALS — BODY MASS INDEX: 31.65 KG/M2 | WEIGHT: 172 LBS | HEIGHT: 62 IN

## 2022-11-07 DIAGNOSIS — G47.33 OSA (OBSTRUCTIVE SLEEP APNEA): Primary | ICD-10-CM

## 2022-11-07 DIAGNOSIS — F51.04 CHRONIC INSOMNIA: ICD-10-CM

## 2022-11-07 PROCEDURE — 99213 OFFICE O/P EST LOW 20 MIN: CPT | Mod: 95 | Performed by: PHYSICIAN ASSISTANT

## 2022-11-07 ASSESSMENT — SLEEP AND FATIGUE QUESTIONNAIRES
HOW LIKELY ARE YOU TO NOD OFF OR FALL ASLEEP WHILE SITTING AND TALKING TO SOMEONE: WOULD NEVER DOZE
HOW LIKELY ARE YOU TO NOD OFF OR FALL ASLEEP WHILE LYING DOWN TO REST IN THE AFTERNOON WHEN CIRCUMSTANCES PERMIT: MODERATE CHANCE OF DOZING
HOW LIKELY ARE YOU TO NOD OFF OR FALL ASLEEP WHEN YOU ARE A PASSENGER IN A CAR FOR AN HOUR WITHOUT A BREAK: SLIGHT CHANCE OF DOZING
HOW LIKELY ARE YOU TO NOD OFF OR FALL ASLEEP WHILE WATCHING TV: SLIGHT CHANCE OF DOZING
HOW LIKELY ARE YOU TO NOD OFF OR FALL ASLEEP WHILE SITTING AND READING: SLIGHT CHANCE OF DOZING
HOW LIKELY ARE YOU TO NOD OFF OR FALL ASLEEP WHILE SITTING QUIETLY AFTER LUNCH WITHOUT ALCOHOL: SLIGHT CHANCE OF DOZING
HOW LIKELY ARE YOU TO NOD OFF OR FALL ASLEEP IN A CAR, WHILE STOPPED FOR A FEW MINUTES IN TRAFFIC: SLIGHT CHANCE OF DOZING
HOW LIKELY ARE YOU TO NOD OFF OR FALL ASLEEP WHILE SITTING INACTIVE IN A PUBLIC PLACE: SLIGHT CHANCE OF DOZING

## 2022-11-07 ASSESSMENT — PAIN SCALES - GENERAL: PAINLEVEL: NO PAIN (0)

## 2022-11-07 NOTE — PROGRESS NOTES
CPAP Follow-Up Visit:    Date on this visit: 11/7/2022    Malika Velasco has a follow-up of her treatment of TRAVIS and insomnia. She was initially seen at the [facility] for management of previously diagnosed TRAVIS. She has not been sleeping well for several months.  Her medical history is significant for HTN, aortic valve sclerosis, pacemaker for AV block, hyperlipidemia, OA.       Malika had a PSG at [facility] on 1/20/2005. Her AHI was 18/hr, RDI 34/hr, O2 ankit 83%. Her weight was 139. She did not have any significant PLMs.      She does not have questions currently. In the Spring, she was having some trouble sleeping due to having a knee surgery. She feels she is sleeping quite well most nights now.      PAP machine: Respironics Dreamstation 2. Pressure settings: 10-16 cm.    The compliance data shows that the patient used the CPAP for 30/30 nights, 100% of nights for >4 hours.  The 90th% pressure is 11.8 cm.  The average time in large leak is 0.  The average nightly usage is 7:32.  The average AHI is 4/hr. Most of the events that she has are while the ramp is active. She is comfortable with the current settings.        Interface:  Mask: Simplus mask, just got a new cushion today.  Chin strap: No  Leak: Yes, a little, but just changed cushion today.  Using Humidifier: Yes, may occasionally run out in the winter  Condensation in hose or mask: No     Difficulties with therapy:    [-] Snoring with CPAP: not that she is aware of  [-] Difficulty tolerating the pressure:  [-] Epistaxis/dry nose:   [-] Nasal congestion:  [-] Dry mouth:  [-] Mouth breathing:   [-] Pain/skin breakdown:      Improvements noted with CPAP:   [+] waking up more refreshed  [+] sleeping better with less arousals  [+] improved energy level during the day    Weight change since sleep study: 172 lbs    Bedtime: 11 PM.  Wake time: 6:30 AM. Falls asleep in 5-10 minutes with 50 mg trazodone. Wakes 0-2 times per night for  5-10 minutes. Reason for waking: restroom  Naps: rarely- inadvertent dozing.       Past medical/surgical history, family history, social history, medications and allergies were reviewed.      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  Added by Vicky Antony MSW Advance Care Planning Liaison       TRAVIS (obstructive sleep apnea)      Priority: Medium     Moderate on CPAP       Aortic valve sclerosis      Priority: Medium     Pacemaker 03/09/2017     Priority: Medium     For complete heart block    Overview:   -placed 11/21/1197 due to complete heart block with syncope  -generator change 5/21/2007 for KAREN  -KAREN reached 2/14/2015 - generator change 3/31/2015       Hyperlipidemia LDL goal <130 03/09/2017     Priority: Medium     Benign essential hypertension 03/09/2017     Priority: Medium     Osteopenia 03/09/2017     Priority: Medium     Back pain      Priority: Medium     Allergic rhinitis due to pollen 12/09/2015     Priority: Medium        Impression/Plan:    (G47.33) TRAVIS (obstructive sleep apnea)  (primary encounter diagnosis)  Comment: Malika is doing well with CPAP, her use is very consistent and she benefits from use. She has no concerns today.  Plan: Comprehensive DME        Continue  auto CPAP 10-16 cm. A prescription was written for new supplies. We reviewed recommendations for cleaning and replacing supplies.       (F51.04) Chronic insomnia  Comment: on trazodone 50 mg. Sleeping well, but worries she is not getting enough REM. Asks about getting a FitBit to track her sleep.  Plan: I suggested if she feels she is sleeping well and is well-rested in the morning, she probably does not need a sleep tracker. They can be unreliable sometimes, and make people worried about their sleep, which can worsen sleep. May try reducing trazodone to 25 mg to see if you notice increase in dreaming.        She will follow up with me in about 1 year(s).     28 minutes were spent on the date of the encounter doing chart review, history and exam, documentation and further activities as noted above.     Bennett Goltz, PA-C    CC: No ref. provider found

## 2022-11-07 NOTE — PROGRESS NOTES
Malika is a 82 year old who is being evaluated via a billable video visit.      How would you like to obtain your AVS? [email or web address]  If the video visit is dropped, the invitation should be resent by: Text to cell phone: [phone]  Will anyone else be joining your video visit? Leann Conn      Video-Visit Details    Video Start Time: 1:47 PM    Type of service:  Video Visit    Video End Time:2:10 PM    Originating Location (pt. Location): Home        Distant Location (provider location):  Off-site    Platform used for Video Visit: Marybel

## 2022-11-22 ENCOUNTER — OFFICE VISIT (OUTPATIENT)
Dept: CARDIOLOGY | Facility: CLINIC | Age: 82
End: 2022-11-22
Payer: COMMERCIAL

## 2022-11-22 VITALS
WEIGHT: 176 LBS | HEIGHT: 63 IN | OXYGEN SATURATION: 98 % | BODY MASS INDEX: 31.18 KG/M2 | DIASTOLIC BLOOD PRESSURE: 94 MMHG | HEART RATE: 75 BPM | SYSTOLIC BLOOD PRESSURE: 175 MMHG

## 2022-11-22 DIAGNOSIS — Z95.0 S/P PLACEMENT OF CARDIAC PACEMAKER: ICD-10-CM

## 2022-11-22 DIAGNOSIS — I44.2 ATRIOVENTRICULAR BLOCK, COMPLETE (H): Primary | ICD-10-CM

## 2022-11-22 DIAGNOSIS — I10 BENIGN ESSENTIAL HYPERTENSION: ICD-10-CM

## 2022-11-22 PROCEDURE — 99214 OFFICE O/P EST MOD 30 MIN: CPT | Performed by: INTERNAL MEDICINE

## 2022-11-22 RX ORDER — CHLORTHALIDONE 25 MG/1
12.5 TABLET ORAL DAILY
Qty: 45 TABLET | Refills: 3 | Status: SHIPPED | OUTPATIENT
Start: 2022-11-22 | End: 2023-06-22

## 2022-11-22 RX ORDER — LISINOPRIL 20 MG/1
20 TABLET ORAL DAILY
Qty: 90 TABLET | Refills: 11 | Status: SHIPPED | OUTPATIENT
Start: 2022-11-22 | End: 2023-06-22

## 2022-11-22 NOTE — LETTER
11/22/2022    Karen Navarro MD  [address] [facility] [address]  [facility] [facility] [address]    RE: Malika Velasco       Dear Colleague,     I had the pleasure of seeing Malika Velasco in the [facility].  HISTORY OF PRESENT ILLNESS:  BP is up  Has not seen pmd recently trying to walk 2 to 3 miles per day. Usually  when she checks     Orders this Visit:  No orders of the defined types were placed in this encounter.    No orders of the defined types were placed in this encounter.    There are no discontinued medications.    Encounter Diagnoses   Name Primary?     Benign essential hypertension      Dyspnea on exertion        CURRENT MEDICATIONS:  Current Outpatient Medications   Medication Sig Dispense Refill     acetaminophen (TYLENOL) 325 MG tablet Take 2 tablets (650 mg) by mouth every 4 hours as needed for other (mild pain) 100 tablet 0     aspirin (ASA) 81 MG EC tablet Take 1 tablet (81 mg) by mouth daily       atorvastatin (LIPITOR) 10 MG tablet Take 1 tablet (10 mg) by mouth daily 90 tablet 3     Calcium Carb-Cholecalciferol 600-800 MG-UNIT TABS        carvedilol (COREG) 12.5 MG tablet 12.5 mg in am and 25 mg in pm 270 tablet 3     cetirizine (ZYRTEC) 10 MG tablet Take 1 tablet (10 mg) by mouth daily 30 tablet 3     chlorthalidone (HYGROTON) 25 MG tablet Take 0.5 tablets (12.5 mg) by mouth daily 45 tablet 0     fish oil-omega-3 fatty acids 1000 MG capsule Take 2 g by mouth daily       fluticasone (FLONASE) 50 MCG/ACT nasal spray Spray 2 sprays into both nostrils daily 16 g 0     MELATONIN PO Take 5 mg by mouth At Bedtime       multivitamin w/minerals (THERA-VIT-M) tablet Take 1 tablet by mouth daily       potassium chloride ER (KLOR-CON M) 20 MEQ CR tablet Take 1 tablet (20 mEq) by mouth daily 90 tablet 3     traZODone (DESYREL) 50 MG tablet Take 1 tablet by mouth at bedtime as needed. (Patient taking differently: Take 1/4 tablet by mouth at bedtime as needed.) 90 tablet 3       ALLERGIES    "  Allergies   Allergen Reactions     Zantac [Ranitidine]      Headache       PAST MEDICAL, SURGICAL, FAMILY, SOCIAL HISTORY:  History was reviewed and updated as needed, see medical record.    Review of Systems:  A 12-point review of systems was completed, see medical record for detailed review of systems information.    Physical Exam:  Vitals: BP (!) 175/94 (BP Location: Right arm)   Pulse 75   Ht 1.588 m (5' 2.5\")   Wt 79.8 kg (176 lb)   LMP  (LMP Unknown)   SpO2 98%   BMI 31.68 kg/m      Constitutional:           Skin:           Head:           Eyes:           ENT:           Neck:           Chest:           Cardiac:                    Abdomen:           Vascular:                                        Extremities and Back:           Neurological:           ASSESSMENT: BP elevated       RECOMMENDATIONS:   Add lisinopril 20 daily  Check bp at home  Follow-up with [facility] 2 weeks      Recent Lab Results:  LIPID RESULTS:  Lab Results   Component Value Date    CHOL 150 02/24/2022    CHOL 125 02/09/2021    HDL 64 02/24/2022    HDL 48 (L) 02/09/2021    LDL 52 02/24/2022    LDL 44 02/09/2021    TRIG 172 (H) 02/24/2022    TRIG 166 (H) 02/09/2021       LIVER ENZYME RESULTS:  Lab Results   Component Value Date    AST 31 03/01/2017    ALT 5 04/10/2018       CBC RESULTS:  Lab Results   Component Value Date    WBC 5.6 02/24/2022    WBC 10.8 01/14/2020    RBC 5.04 02/24/2022    RBC 5.17 01/14/2020    HGB 13.0 03/09/2022    HGB 13.9 01/14/2020    HCT 45.0 02/24/2022    HCT 41.4 01/14/2020    MCV 89 02/24/2022    MCV 80 01/14/2020    MCH 30.8 02/24/2022    MCH 26.9 01/14/2020    MCHC 34.4 02/24/2022    MCHC 33.6 01/14/2020    RDW 12.9 02/24/2022    RDW 14.7 01/14/2020     02/24/2022     01/14/2020       BMP RESULTS:  Lab Results   Component Value Date     (L) 03/09/2022     02/09/2021    POTASSIUM 3.9 03/09/2022    POTASSIUM 4.2 02/09/2021    CHLORIDE 100 03/09/2022    CHLORIDE 101 02/09/2021    CO2 23 " 03/09/2022    CO2 27 02/09/2021    ANIONGAP 8 03/09/2022    ANIONGAP 5 02/09/2021    GLC 95 03/09/2022    GLC 95 03/09/2022     (H) 02/09/2021    BUN 23 03/09/2022    BUN 13 02/09/2021    CR 0.62 03/09/2022    CR 0.66 02/09/2021    GFRESTIMATED 90 03/09/2022    GFRESTIMATED 85 02/09/2021    GFRESTBLACK >90 02/09/2021    SAVANNAH 8.6 03/09/2022    SAVANNAH 9.7 02/09/2021        A1C RESULTS:  No results found for: A1C    INR RESULTS:  No results found for: INR    We greatly appreciate the opportunity to be involved in the care of your patient, Malika Velasco.    Sincerely,  Tal Santacruz MD      CC  Tal Santacruz MD  [address]  [place] [address]                                                                       Service Date: 11/22/2022    HISTORY OF PRESENT ILLNESS:  Malika Velasco, an 82-year-old woman with third-degree AV block (status post permanent pacemaker implantation), coronary artery disease (previous CT coronary angiogram showing nonobstructive plaque in LAD in 2009), obstructive sleep apnea, hypertension, and dyslipidemia was seen today at your request for followup.    Since I last saw the patient, she remains asymptomatic and specifically denies chest pain, dyspnea, syncope or palpitation.  Her pacemaker was recently interrogated and is functioning normally.  She has an in-office visit scheduled in December.    Her blood pressure today was elevated in the range of 170/95 initially, then 168/90.  She checks her blood pressures at home and tells me that usually they are well controlled.    The patient has been participating in a video exercise program where she exercises about 2-3 miles of walking daily.    PAST MEDICAL HISTORY:    1.  Third-degree AV block -- status post pacemaker implantation in 1997.  Most recent generator replacement 03/03/2015.  Device interrogated 09/15/2022 and functioning within normal limits.  In-person followup in December.  2.  Hypertension --  currently on carvedilol and hydrochlorothiazide.  3.  Coronary artery disease -- diagnosed with a CT scan in 2009.  On statin therapy.  LDL therapeutic.  4.  Dyslipidemia.    PHYSICAL EXAMINATION:    GENERAL:  Exam today demonstrates a very pleasant, cooperative, and intelligent 82-year-old woman.  VITAL SIGNS:  Her blood pressure was initially 175/94, on repeat is 160/90, heart rate is 75, height is 1.6 meters her weight is 79.8 kg, BMI is 32.  RESPIRATORY:  Her lungs are clear to percussion and auscultation.  CARDIOVASCULAR:  Shows a normal S1 with a normal S2.  There is no S3.  There is no murmur, rub or click.    MEDICATIONS:    1.  Aspirin 81 mg daily.  2.  Atorvastatin 10 mg daily.  3.  Carvedilol 12.5 mg b.i.d.  4.  Chlorthalidone 12.5 mg daily.  5.  Multivitamins.  6.  Potassium 20 mEq daily.  7.  Trazodone 50 mg at bedtime.    LABORATORY STUDIES:  Her most recent LDL cholesterol was 52.  Her most recent creatinine is 0.62 in 03/2022.    ASSESSMENT:  Ms. Velasco's pacemaker is functioning normally.  Her LDL cholesterol levels are optimal.  Her blood pressure is above ACC guidelines of 130/80.  I would recommend the addition of an ACE inhibitor.    RECOMMENDATIONS:    1.  We reviewed the benefits of a Mediterranean-style diet, weight loss and a regular exercise program.  2.  Add lisinopril 20 mg daily.  3.  Followup visit in about 2 weeks with an [facility] to recheck blood pressure.  We will check a BMP at that time.  4.  An in-person visit to follow the pacemaker is planned in December.  5.  I have asked the patient to contact her primary care doctor, who can follow her blood pressure long-term once we make some additional adjustments.  6.  Follow up with me in about 1 year.    We appreciate the opportunity to be involved in the care of your patient, Malika Velasco.    Tal Santacruz MD    cc:  Karen Navarro MD  [facility]  [address], [address]  [place] [address]    Tal Troy  MD Neeta        D: 2022   T: 2022   MT: shaun    Name:     DOC SOSA  MRN:      [id number]        Account:      [id number]   :      1940           Service Date: 2022       Document: [id number]      Thank you for allowing me to participate in the care of your patient.      Sincerely,     Tal Santacruz MD     [facility]  cc:   Tal Santacruz MD  [address] [place] [id number]

## 2022-11-22 NOTE — PROGRESS NOTES
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    Since I last saw the patient, she remains asymptomatic and specifically denies chest pain, dyspnea, syncope or palpitation.  Her pacemaker was recently interrogated and is functioning normally.  She has an in-office Device [facility] visit scheduled in December 2022.    Her blood pressure today was elevated in the range of 170/95 initially, then 168/90 when I measured it.  She checks her blood pressures at home and tells me that usually they are well controlled.    The patient has been participating in a video exercise program where she exercises about 2-3 miles of walking daily.    PAST MEDICAL HISTORY:    1.  Third-degree AV block -- status post pacemaker implantation in 1997.  Most recent generator replacement 03/03/2015.  Device interrogated 09/15/2022 and functioning within normal limits.  In-person followup in December.  2.  Hypertension -- currently on carvedilol and hydrochlorothiazide.  3.  Coronary artery disease -- diagnosed with a CT scan in 2009.  On statin therapy.  LDL therapeutic.  4.  Dyslipidemia.    PHYSICAL EXAMINATION:    GENERAL:  Exam today demonstrates a very pleasant, cooperative, and intelligent 82-year-old woman.  VITAL SIGNS:  Her blood pressure was initially 175/94, on repeat is 160/90, heart rate is 75, height is 1.6 meters her weight is 79.8 kg, BMI is 32.  RESPIRATORY:  Her lungs are clear to percussion and auscultation.  CARDIOVASCULAR:  Shows a normal S1 with a normal S2.  There is no S3.  There is no murmur, rub or click.    MEDICATIONS:    1.  Aspirin 81 mg daily.  2.  Atorvastatin 10 mg daily.  3.  Carvedilol  12.5 mg b.i.d.  4.  Chlorthalidone 12.5 mg daily.  5.  Multivitamins.  6.  Potassium 20 mEq daily.  7.  Trazodone 50 mg at bedtime.    LABORATORY STUDIES:  Her most recent LDL cholesterol was 52.  Her most recent creatinine is 0.62 in 2022.    ASSESSMENT:  Ms. Velasco's pacemaker is functioning normally.  Her LDL cholesterol levels are optimal.  Her blood pressure is above ACC recommended guidelines of 130/80.  I would recommend the addition of an ACE inhibitor.    RECOMMENDATIONS:    1.  We reviewed the benefits of a Mediterranean-style diet, weight loss and a regular exercise program.  2.  Add lisinopril 20 mg daily.  3.  Followup visit in about 2 weeks with an DINH to recheck blood pressure.  We will check a BMP at that time.  4.  An in-person visit to follow the pacemaker is planned in 2022.  5.  I have asked the patient to contact her primary care doctor, who to follow her blood pressure long-term once we make some additional adjustments.  6.  Follow up with me in about 1 year.    We appreciate the opportunity to be involved in the care of your patient, Doc Velasco.    Tal Santacruz MD    cc:  Karen Navarro MD  [facility]  [address], [address]  [place] [address]    Tal Santacruz MD        D: 2022   T: 2022   MT: shaun    Name:     DOC VELASCO  MRN:      [id number]        Account:      [id number]   :      1940           Service Date: 2022       Document: [id number]

## 2022-11-22 NOTE — PROGRESS NOTES
HISTORY OF PRESENT ILLNESS:  BP is up  Has not seen pmd recently trying to walk 2 to 3 miles per day. Usually  when she checks     Orders this Visit:  No orders of the defined types were placed in this encounter.    No orders of the defined types were placed in this encounter.    There are no discontinued medications.    Encounter Diagnoses   Name Primary?     Benign essential hypertension      Dyspnea on exertion        CURRENT MEDICATIONS:  Current Outpatient Medications   Medication Sig Dispense Refill     acetaminophen (TYLENOL) 325 MG tablet Take 2 tablets (650 mg) by mouth every 4 hours as needed for other (mild pain) 100 tablet 0     aspirin (ASA) 81 MG EC tablet Take 1 tablet (81 mg) by mouth daily       atorvastatin (LIPITOR) 10 MG tablet Take 1 tablet (10 mg) by mouth daily 90 tablet 3     Calcium Carb-Cholecalciferol 600-800 MG-UNIT TABS        carvedilol (COREG) 12.5 MG tablet 12.5 mg in am and 25 mg in pm 270 tablet 3     cetirizine (ZYRTEC) 10 MG tablet Take 1 tablet (10 mg) by mouth daily 30 tablet 3     chlorthalidone (HYGROTON) 25 MG tablet Take 0.5 tablets (12.5 mg) by mouth daily 45 tablet 0     fish oil-omega-3 fatty acids 1000 MG capsule Take 2 g by mouth daily       fluticasone (FLONASE) 50 MCG/ACT nasal spray Spray 2 sprays into both nostrils daily 16 g 0     MELATONIN PO Take 5 mg by mouth At Bedtime       multivitamin w/minerals (THERA-VIT-M) tablet Take 1 tablet by mouth daily       potassium chloride ER (KLOR-CON M) 20 MEQ CR tablet Take 1 tablet (20 mEq) by mouth daily 90 tablet 3     traZODone (DESYREL) 50 MG tablet Take 1 tablet by mouth at bedtime as needed. (Patient taking differently: Take 1/4 tablet by mouth at bedtime as needed.) 90 tablet 3       ALLERGIES     Allergies   Allergen Reactions     Zantac [Ranitidine]      Headache       PAST MEDICAL, SURGICAL, FAMILY, SOCIAL HISTORY:  History was reviewed and updated as needed, see medical record.    Review of Systems:  A 12-point  "review of systems was completed, see medical record for detailed review of systems information.    Physical Exam:  Vitals: BP (!) 175/94 (BP Location: Right arm)   Pulse 75   Ht 1.588 m (5' 2.5\")   Wt 79.8 kg (176 lb)   LMP  (LMP Unknown)   SpO2 98%   BMI 31.68 kg/m      Constitutional:           Skin:           Head:           Eyes:           ENT:           Neck:           Chest:           Cardiac:                    Abdomen:           Vascular:                                        Extremities and Back:           Neurological:           ASSESSMENT: BP elevated       RECOMMENDATIONS:   Add lisinopril 20 daily  Check bp at home  Follow-up with DINH 2 weeks      Recent Lab Results:  LIPID RESULTS:  Lab Results   Component Value Date    CHOL 150 02/24/2022    CHOL 125 02/09/2021    HDL 64 02/24/2022    HDL 48 (L) 02/09/2021    LDL 52 02/24/2022    LDL 44 02/09/2021    TRIG 172 (H) 02/24/2022    TRIG 166 (H) 02/09/2021       LIVER ENZYME RESULTS:  Lab Results   Component Value Date    AST 31 03/01/2017    ALT 5 04/10/2018       CBC RESULTS:  Lab Results   Component Value Date    WBC 5.6 02/24/2022    WBC 10.8 01/14/2020    RBC 5.04 02/24/2022    RBC 5.17 01/14/2020    HGB 13.0 03/09/2022    HGB 13.9 01/14/2020    HCT 45.0 02/24/2022    HCT 41.4 01/14/2020    MCV 89 02/24/2022    MCV 80 01/14/2020    MCH 30.8 02/24/2022    MCH 26.9 01/14/2020    MCHC 34.4 02/24/2022    MCHC 33.6 01/14/2020    RDW 12.9 02/24/2022    RDW 14.7 01/14/2020     02/24/2022     01/14/2020       BMP RESULTS:  Lab Results   Component Value Date     (L) 03/09/2022     02/09/2021    POTASSIUM 3.9 03/09/2022    POTASSIUM 4.2 02/09/2021    CHLORIDE 100 03/09/2022    CHLORIDE 101 02/09/2021    CO2 23 03/09/2022    CO2 27 02/09/2021    ANIONGAP 8 03/09/2022    ANIONGAP 5 02/09/2021    GLC 95 03/09/2022    GLC 95 03/09/2022     (H) 02/09/2021    BUN 23 03/09/2022    BUN 13 02/09/2021    CR 0.62 03/09/2022    CR " 0.66 02/09/2021    GFRESTIMATED 90 03/09/2022    GFRESTIMATED 85 02/09/2021    GFRESTBLACK >90 02/09/2021    SAVANNAH 8.6 03/09/2022    SAVANNAH 9.7 02/09/2021        A1C RESULTS:  No results found for: A1C    INR RESULTS:  No results found for: INR    We greatly appreciate the opportunity to be involved in the care of your patient, Malika Velasco.    Sincerely,  Tal Santacruz MD        Tal Santacruz MD  [address]  [place] [id number]

## 2022-11-23 ENCOUNTER — IMMUNIZATION (OUTPATIENT)
Dept: FAMILY MEDICINE | Facility: CLINIC | Age: 82
End: 2022-11-23
Payer: COMMERCIAL

## 2022-11-23 DIAGNOSIS — Z23 HIGH PRIORITY FOR 2019-NCOV VACCINE: Primary | ICD-10-CM

## 2022-11-23 PROCEDURE — 91312 COVID-19 VACCINE BIVALENT BOOSTER 12+ (PFIZER): CPT

## 2022-11-23 PROCEDURE — 0124A COVID-19 VACCINE BIVALENT BOOSTER 12+ (PFIZER): CPT

## 2022-12-14 ENCOUNTER — ANCILLARY PROCEDURE (OUTPATIENT)
Dept: CARDIOLOGY | Facility: CLINIC | Age: 82
End: 2022-12-14
Attending: INTERNAL MEDICINE
Payer: COMMERCIAL

## 2022-12-14 DIAGNOSIS — Z95.0 CARDIAC PACEMAKER IN SITU: ICD-10-CM

## 2022-12-14 LAB
MDC_IDC_LEAD_IMPLANT_DT: NORMAL
MDC_IDC_LEAD_IMPLANT_DT: NORMAL
MDC_IDC_LEAD_LOCATION: NORMAL
MDC_IDC_LEAD_LOCATION: NORMAL
MDC_IDC_LEAD_LOCATION_DETAIL_1: NORMAL
MDC_IDC_LEAD_LOCATION_DETAIL_1: NORMAL
MDC_IDC_LEAD_MFG: NORMAL
MDC_IDC_LEAD_MFG: NORMAL
MDC_IDC_LEAD_MODEL: NORMAL
MDC_IDC_LEAD_MODEL: NORMAL
MDC_IDC_LEAD_POLARITY_TYPE: NORMAL
MDC_IDC_LEAD_POLARITY_TYPE: NORMAL
MDC_IDC_LEAD_SERIAL: NORMAL
MDC_IDC_LEAD_SERIAL: NORMAL
MDC_IDC_MSMT_BATTERY_DTM: NORMAL
MDC_IDC_MSMT_BATTERY_IMPEDANCE: 1248 OHM
MDC_IDC_MSMT_BATTERY_REMAINING_LONGEVITY: 50 MO
MDC_IDC_MSMT_BATTERY_STATUS: NORMAL
MDC_IDC_MSMT_BATTERY_VOLTAGE: 2.77 V
MDC_IDC_MSMT_LEADCHNL_RA_IMPEDANCE_VALUE: 636 OHM
MDC_IDC_MSMT_LEADCHNL_RA_PACING_THRESHOLD_AMPLITUDE: 1.75 V
MDC_IDC_MSMT_LEADCHNL_RA_PACING_THRESHOLD_PULSEWIDTH: 0.4 MS
MDC_IDC_MSMT_LEADCHNL_RA_SENSING_INTR_AMPL: 4 MV
MDC_IDC_MSMT_LEADCHNL_RV_IMPEDANCE_VALUE: 726 OHM
MDC_IDC_MSMT_LEADCHNL_RV_PACING_THRESHOLD_AMPLITUDE: 1.12 V
MDC_IDC_MSMT_LEADCHNL_RV_PACING_THRESHOLD_PULSEWIDTH: 0.4 MS
MDC_IDC_PG_IMPLANT_DTM: NORMAL
MDC_IDC_PG_MFG: NORMAL
MDC_IDC_PG_MODEL: NORMAL
MDC_IDC_PG_SERIAL: NORMAL
MDC_IDC_PG_TYPE: NORMAL
MDC_IDC_SESS_CLINIC_NAME: NORMAL
MDC_IDC_SESS_DTM: NORMAL
MDC_IDC_SESS_TYPE: NORMAL
MDC_IDC_SET_BRADY_AT_MODE_SWITCH_MODE: NORMAL
MDC_IDC_SET_BRADY_AT_MODE_SWITCH_RATE: 175 {BEATS}/MIN
MDC_IDC_SET_BRADY_LOWRATE: 60 {BEATS}/MIN
MDC_IDC_SET_BRADY_MAX_SENSOR_RATE: 130 {BEATS}/MIN
MDC_IDC_SET_BRADY_MAX_TRACKING_RATE: 130 {BEATS}/MIN
MDC_IDC_SET_BRADY_MODE: NORMAL
MDC_IDC_SET_BRADY_PAV_DELAY_LOW: 200 MS
MDC_IDC_SET_BRADY_SAV_DELAY_LOW: 180 MS
MDC_IDC_SET_LEADCHNL_RA_PACING_AMPLITUDE: 2.75 V
MDC_IDC_SET_LEADCHNL_RA_PACING_CAPTURE_MODE: NORMAL
MDC_IDC_SET_LEADCHNL_RA_PACING_POLARITY: NORMAL
MDC_IDC_SET_LEADCHNL_RA_PACING_PULSEWIDTH: 0.4 MS
MDC_IDC_SET_LEADCHNL_RA_SENSING_POLARITY: NORMAL
MDC_IDC_SET_LEADCHNL_RA_SENSING_SENSITIVITY: 1 MV
MDC_IDC_SET_LEADCHNL_RV_PACING_AMPLITUDE: 2.25 V
MDC_IDC_SET_LEADCHNL_RV_PACING_CAPTURE_MODE: NORMAL
MDC_IDC_SET_LEADCHNL_RV_PACING_POLARITY: NORMAL
MDC_IDC_SET_LEADCHNL_RV_PACING_PULSEWIDTH: 0.4 MS
MDC_IDC_SET_LEADCHNL_RV_SENSING_POLARITY: NORMAL
MDC_IDC_SET_LEADCHNL_RV_SENSING_SENSITIVITY: 2.8 MV
MDC_IDC_SET_ZONE_DETECTION_INTERVAL: 333.33 MS
MDC_IDC_SET_ZONE_DETECTION_INTERVAL: 342.86 MS
MDC_IDC_SET_ZONE_TYPE: NORMAL
MDC_IDC_SET_ZONE_TYPE: NORMAL
MDC_IDC_STAT_AT_BURDEN_PERCENT: 0 %
MDC_IDC_STAT_AT_DTM_END: NORMAL
MDC_IDC_STAT_AT_DTM_START: NORMAL
MDC_IDC_STAT_AT_MODE_SW_COUNT: 5
MDC_IDC_STAT_BRADY_AP_VP_PERCENT: 71 %
MDC_IDC_STAT_BRADY_AP_VS_PERCENT: 0 %
MDC_IDC_STAT_BRADY_AS_VP_PERCENT: 29 %
MDC_IDC_STAT_BRADY_AS_VS_PERCENT: 0 %
MDC_IDC_STAT_BRADY_DTM_END: NORMAL
MDC_IDC_STAT_BRADY_DTM_START: NORMAL
MDC_IDC_STAT_EPISODE_RECENT_COUNT: 0
MDC_IDC_STAT_EPISODE_RECENT_COUNT: 1
MDC_IDC_STAT_EPISODE_RECENT_COUNT_DTM_END: NORMAL
MDC_IDC_STAT_EPISODE_RECENT_COUNT_DTM_END: NORMAL
MDC_IDC_STAT_EPISODE_RECENT_COUNT_DTM_START: NORMAL
MDC_IDC_STAT_EPISODE_RECENT_COUNT_DTM_START: NORMAL
MDC_IDC_STAT_EPISODE_TYPE: NORMAL
MDC_IDC_STAT_EPISODE_TYPE: NORMAL

## 2022-12-14 PROCEDURE — 93280 PM DEVICE PROGR EVAL DUAL: CPT | Performed by: INTERNAL MEDICINE

## 2022-12-27 DIAGNOSIS — Z95.0 S/P PLACEMENT OF CARDIAC PACEMAKER: ICD-10-CM

## 2022-12-27 DIAGNOSIS — I10 BENIGN ESSENTIAL HYPERTENSION: Primary | ICD-10-CM

## 2022-12-27 DIAGNOSIS — R06.09 DYSPNEA ON EXERTION: ICD-10-CM

## 2023-01-01 NOTE — ED PROVIDER NOTES
History     Chief Complaint:  Abdominal Pain      HPI   Malika Velasco is a 76 year old female who presents with abdominal pain. The patient reports 3.5 hours ago she awoke to mid abdominal pain with increased belching. She took peptobismal shortly afterwards without resolution of symptoms. The patient was unable to tolerate the severity of her symptoms which prompts her visit. She denies similar symptoms in the past. The patient denies vomiting, fever, dysuria, hematuria, difficulty with urination, or any associated symptoms. The patient was otherwise feeling well prior to going to bed last night and has had no recent difficulty with eating.    Allergies:  The patient has no known allergies to medications.      Medications:    Atenolol  Chlorthalidone  Simvastatin    Past Medical History:    Hypertension  High cholesterol    Past Surgical History:    Tubal ligation  Pacemaker placement    Family History:    The patient has no pertinent family history.     Social History:  .    Presents with daughter.    Review of Systems   Constitutional: Negative for fever.   Gastrointestinal: Positive for abdominal pain. Negative for vomiting.   Genitourinary: Negative for difficulty urinating, dysuria and hematuria.   All other systems reviewed and are negative.    Physical Exam     Patient Vitals for the past 24 hrs:   BP Temp Temp src Pulse Heart Rate Resp SpO2 Height Weight   03/01/17 0913 - - - 82 - 16 - - -   03/01/17 0900 126/81 - - - - - 97 % - -   03/01/17 0847 123/68 - - - 61 18 96 % - -   03/01/17 0845 123/68 - - - - - 95 % - -   03/01/17 0830 124/69 - - - - - 96 % - -   03/01/17 0815 127/73 - - - - - 98 % - -   03/01/17 0745 130/70 - - - - - 98 % - -   03/01/17 0715 137/79 - - - - - 95 % - -   03/01/17 0711 - - - - - - 97 % - -   03/01/17 0700 139/69 - - - - - - - -   03/01/17 0645 142/83 - - - - - - - -   03/01/17 0643 143/81 - - - - - 99 % - -   03/01/17 0631 - 97.6  F (36.4  C) Oral - - - - - -  Roel Hare is a 5 week old female who is accompanied by:{-:[id number]}    Well Child 1 Month    Girl Sandra Murry is a 5 week old female presenting for a  visit.    Parental Concerns:     Maternal History: 21 y/o , negative serologies, Rubella NI, with hx of DM and kidney stones  GBS: unknown   Abx: ampicillin x 5  Mom's blood type:    Birth History: Born 23 at 1718 at 33 4/7 wga  Complications:  pre-E with SF, A1GDM    Delivery Room Course: Infant was brought to warmer and was dried and stimulated.  Bulb suctioning of mouth and nares performed.  HR > 100 bpm and infant was vigorous with good respiratory effort. Some pauses in breathing at 2 minutes of life. Started on CPAP with good response.      Initial Hospital Course: Infant brought to NICU on CPAP, in stable condition.      Fluids, electrolytes and nutrition: Started on enteral feeds and advanced to goal as tolerated.  Took time to transition from NG to PO.  On full PO ad tabatha feeds by time of discharge. Having feeding related events. Saw Speech who changed nipple and significant important in events. Eating good volumes and gaining weight upon discharge. Safe feeding education given to family.         Respiratory: Brought to NICU on CPAP.  Required escalation to NIPPV due to increased respiratory effort and events. Subsequently able to transition to HFNC and to room air by 23. Breathing comfortably on room air.         Apnea / Bradycardia:  Received a loading dose of caffeine and started on maintenance caffeine. Apnea and bradycardia events improved with maturity.  Was free of significant A/B events for the 5 days prior to discharge.        Cardiovascular: No significant CV issues while in NICU.      ID: Possible concern for NEC during admission due to episode of possible bilious emesis. KUB obtained with no sign of pneumatosis and only revealed distended bowels. Emesis likely secondary to PVS. Continued to monitor with no  "  03/01/17 0628 (!) 129/110 - Oral - 81 18 100 % 1.6 m (5' 3\") 68 kg (150 lb)        Physical Exam   Constitutional: She appears well-developed and well-nourished.   HENT:   Right Ear: External ear normal.   Left Ear: External ear normal.   Mouth/Throat: Oropharynx is clear and moist. No oropharyngeal exudate.   TM's clear bilaterally   Eyes: Conjunctivae are normal. Pupils are equal, round, and reactive to light. No scleral icterus.   Neck: Normal range of motion. Neck supple.   Cardiovascular: Normal rate, regular rhythm, normal heart sounds and intact distal pulses.  Exam reveals no gallop and no friction rub.    No murmur heard.  Pulmonary/Chest: Effort normal and breath sounds normal. No respiratory distress. She has no wheezes. She has no rales.   Abdominal: Soft. Bowel sounds are normal. She exhibits no distension and no mass. There is tenderness (diffuse right sided and periumbilical). There is no rebound and no guarding.   No CVAT   Musculoskeletal: Normal range of motion. She exhibits no edema.   Neurological: She is alert.   Skin: Skin is warm and dry. No rash noted.   Psychiatric: She has a normal mood and affect.       Emergency Department Course   Imaging:  Radiographic findings were communicated with the patient who voiced understanding of the findings.    CT Abdomen Pelvis w Contrast:  Unremarkable CT of the abdomen and pelvis.   Results per radiology.     Laboratory:  CBC: WNL (WBC 4.0, HGB 14.8, )   CMP: WNL (Creat 0.53)   Lactic acid: 1.0  Lipase: 150    UA with Micro: WNL    Interventions:  0.9% sodium chloride infusion 1000 mL  Morphine 4 mg IV, x2  Zofran 4 mg IV    Emergency Department Course:  Nursing notes and vitals reviewed.  I performed an exam of the patient as documented above.     The above imaging study(s) were ordered with results noted above.     Blood drawn and urine collected. This was sent to the lab for further testing, results above.     0755: rechecked patient, pain " further episodes. Abdomen remained soft with daily stools. Patient was seen by surgery who recommended UGI if patient were to have further episodes of bilious emesis. No further issues or concerns prior to discharge. Feeding and growing well.        Hyperbilirubinemia:Bilirubin levels were trended until downtrending.  Received phototherapy from -. Peak bilirubin : 10.9mg/dL; Last bilirubin : 8.5mg/dL      Neuro: No significant neurologic issues while in NICU.    Baby's blood type:  Apgars: 7/8  24h bili level: see above  Pre and post ductal O2: 100%/100%  Hep B given/date: yes, 9/15/23  ABR: passed bilaterally   Screen:           Sent  results unsatisfactory            Repeat sent  results in range            Repeat sent  results in process  Passed car seat test    Length: 45 cm  Head circumference: 29 cm  BW (DOL 1): 2100 grams   DC weight: 2445 grams   Today's weight:     Feeding:   Wets:  Stools:   Sleep:   Tummy Time:   Vitamin D:  LAHW:  Smokers/Pets:  Smoke/CO detector:   Family History:    PE:      Additional concerns today: {NONE:069028::\"None \"}    Review of Systems    {History Review (Optional):88481::\"Patient's medications, allergies, past medical, surgical, social and family histories were reviewed and updated as appropriate.\"}    Objective   There were no vitals taken for this visit.  BMI Percentile: No height and weight on file for this encounter.  Growth parameters are noted and {are:09951::\"are\"} appropriate for age.  Physical Exam    Assessment   {Assess/PlanSmartLinks:60583}    Screening tests:   State  metabolic screen: {P/F/NA:81679135::\"Pass\"}  Hearing screen (OAE, ABR): {P/F/NA:06766009::\"Pass\"}  Congenital heart screen: {P/F/NA:38910609::\"Pass\"}  Developmental milestones were reviewed and were: {FINDINGS:504852}    Immunizations given today: {Y/N:049290::\"No\"}    Follow-up at next well child visit, or sooner as needed.   free at this time.    Findings and plan explained to the Patient. Patient discharged home with instructions regarding supportive care, medications, and reasons to return. The importance of close follow-up was reviewed.     Impression & Plan    Medical Decision Making:  Malika Velasco is a 76 year old female who presents with mid abdominal pain, sudden onset. Her evaluation does shoe mid abdominal pain. Labs are normal including lactate and CT was negative as well. She is feeling much better. She is no longer nauseated and wants to go home. I told her she can follow up with her doctor in two days if symptoms are better. Her lab work up so far is negative. I discussed reasons to return including worsening abdominal pain, intractable nausea/vomiting, bloody diarrhea, or fever, or other concerns. Sent home with Zofran and oxycodone to use as needed. Start liquid diet today and advance to bland diet tomorrow.    Diagnosis:    ICD-10-CM    1. Abdominal pain, generalized R10.84 UA with Microscopic       Disposition:  Discharged.    Discharge Medications:  Discharge Medication List as of 3/1/2017  9:08 AM      START taking these medications    Details   ondansetron (ZOFRAN ODT) 4 MG ODT tab Take 1 tablet (4 mg) by mouth every 8 hours as needed for nausea, Disp-10 tablet, R-0, Local Print      oxyCODONE (ROXICODONE) 5 MG IR tablet Take 1 tablet (5 mg) by mouth every 6 hours as needed for pain, Disp-20 tablet, R-0, Local Print               Lawrence DAN am serving as a scribe at 9:31 AM on 3/1/2017 to document services personally performed by Dr. Stanton, based on my observations and the provider's statements to me    [facility] EMERGENCY DEPARTMENT       Ralph Stanton MD  03/01/17 1420

## 2023-01-01 NOTE — TELEPHONE ENCOUNTER
Chief Complaint : Follow up - 6 months    Hx/Sx: Urinary frequency, urgency    Records/Orders: Available    Pt Contacted: N/a    At Rooming: Koffi Sotomayor EMT       Mom wishes to have both referrals. Placed per PCP okay and notified Mom of this.

## 2023-01-31 ENCOUNTER — OFFICE VISIT (OUTPATIENT)
Dept: CARDIOLOGY | Facility: CLINIC | Age: 83
End: 2023-01-31
Attending: INTERNAL MEDICINE
Payer: COMMERCIAL

## 2023-01-31 ENCOUNTER — LAB (OUTPATIENT)
Dept: LAB | Facility: CLINIC | Age: 83
End: 2023-01-31
Payer: COMMERCIAL

## 2023-01-31 VITALS
HEIGHT: 63 IN | OXYGEN SATURATION: 97 % | WEIGHT: 178.3 LBS | SYSTOLIC BLOOD PRESSURE: 128 MMHG | BODY MASS INDEX: 31.59 KG/M2 | DIASTOLIC BLOOD PRESSURE: 88 MMHG | HEART RATE: 75 BPM

## 2023-01-31 DIAGNOSIS — I44.2 ATRIOVENTRICULAR BLOCK, COMPLETE (H): ICD-10-CM

## 2023-01-31 DIAGNOSIS — Z95.0 S/P PLACEMENT OF CARDIAC PACEMAKER: ICD-10-CM

## 2023-01-31 DIAGNOSIS — I10 BENIGN ESSENTIAL HYPERTENSION: ICD-10-CM

## 2023-01-31 DIAGNOSIS — R06.09 DYSPNEA ON EXERTION: ICD-10-CM

## 2023-01-31 LAB
ANION GAP SERPL CALCULATED.3IONS-SCNC: 13 MMOL/L (ref 7–15)
BUN SERPL-MCNC: 15.4 MG/DL (ref 8–23)
CALCIUM SERPL-MCNC: 9.7 MG/DL (ref 8.8–10.2)
CHLORIDE SERPL-SCNC: 99 MMOL/L (ref 98–107)
CREAT SERPL-MCNC: 0.66 MG/DL (ref 0.51–0.95)
DEPRECATED HCO3 PLAS-SCNC: 26 MMOL/L (ref 22–29)
GFR SERPL CREATININE-BSD FRML MDRD: 88 ML/MIN/1.73M2
GLUCOSE SERPL-MCNC: 92 MG/DL (ref 70–99)
POTASSIUM SERPL-SCNC: 4.1 MMOL/L (ref 3.4–5.3)
SODIUM SERPL-SCNC: 138 MMOL/L (ref 136–145)

## 2023-01-31 PROCEDURE — 36415 COLL VENOUS BLD VENIPUNCTURE: CPT | Performed by: INTERNAL MEDICINE

## 2023-01-31 PROCEDURE — 99214 OFFICE O/P EST MOD 30 MIN: CPT | Performed by: PHYSICIAN ASSISTANT

## 2023-01-31 PROCEDURE — 80048 BASIC METABOLIC PNL TOTAL CA: CPT | Performed by: INTERNAL MEDICINE

## 2023-01-31 NOTE — LETTER
1/31/2023    Karen Navarro MD  [address]  [facility] [address]    RE: Malika Velasco       Dear Colleague,     I had the pleasure of seeing Malika Velasco in the [facility].    Cardiology Clinic Progress Note    Malika Velasco MRN# [id number]   YOB: 1940 Age: 82 year old     Primary cardiologist: Dr. Santacruz         Assessment and Plan     In summary, Malika Velasco presents today for follow-up on uncontrolled hypertension, after the addition of lisinopril 20 mg daily to her regimen. Today, her BP is under much better control. She had a short run of NSVT on recent device interrogation, which she's had in the past. Asymptomatic. EF on 2018 TTE was normal.     Plan:  - I have asked her to update a TTE at her convenience some time this year.   - Continue current medication regimen.  - Continue routine device checks.    Follow-up:  - With Dr. Santacruz in 2 years.    Roselyn Reaves PA-C  [facility]         History of Presenting Illness     Mlaika Velasco is a pleasant 82 year old patient with a pertinent history of the following -   1.  Third-degree AV block status post pacemaker placement in 1997.  2.  Hypertension  3.  Nonobstructive CAD, per CT in 2009.  4.  Dyslipidemia    In brief, this patient saw Dr. Santacruz for a routine follow-up visit in November 2022, and reported no cardiac symptoms at that time. However her clinic blood pressure was quite elevated.  Lisinopril 20 mg daily was added to her regimen.      Today, I am meeting Malika who is very pleasant. BP is under significantly better control in clinic, also running consistently in the 120's/70's at home. She feels great overall. Patient denies chest pain, shortness of breath, PND, orthopnea, edema, claudication, palpitations, near syncope or syncope. She walks several miles daily and uses the recumbent bike without problem. BMP is within normal limits. She had a routine  "device interrogation mid December.  This reportedly showed an increase in her atrial pacing burden however on my review of her prior check it is stable. There was 1 run of nonsustained VT (6 beats) at 180 bpm. She has had this noted on 2021 check as well. Her EF is normal per 2018 TTE.         Review of Systems     12-pt ROS is negative except for as noted in the HPI.          Physical Exam     Vitals: /88 (BP Location: Right arm, Patient Position: Sitting, Cuff Size: Adult Regular)   Pulse 75   Ht 1.588 m (5' 2.5\")   Wt 80.9 kg (178 lb 4.8 oz)   LMP  (LMP Unknown)   SpO2 97%   BMI 32.09 kg/m    Wt Readings from Last 10 Encounters:   01/31/23 80.9 kg (178 lb 4.8 oz)   11/22/22 79.8 kg (176 lb)   11/07/22 78 kg (172 lb)   04/26/22 80.3 kg (177 lb)   03/08/22 79.4 kg (175 lb)   02/24/22 79.8 kg (175 lb 14.4 oz)   09/28/21 80.6 kg (177 lb 12.8 oz)   04/07/21 79.4 kg (175 lb)   02/09/21 79.7 kg (175 lb 12.8 oz)   07/01/20 81 kg (178 lb 8 oz)       Constitutional:  Patient is pleasant, alert, cooperative, and in NAD.  HEENT:  NCAT. PERRLA. EOM's intact.   Neck:  CVP appears normal. No carotid bruits.   Pulmonary: Normal respiratory effort. CTAB.   Cardiac: RRR, normal S1/S2, no S3/S4, no murmur or rub.   Abdomen:  Non-tender abdomen, no hepatosplenomegaly appreciated.   Vascular: Pulses in the upper and lower extremities are 2+ and equal bilaterally.  Extremities: No edema, erythema, cyanosis or tenderness appreciated.  Skin:  No rashes or lesions appreciated.   Neurological:  No gross motor or sensory deficits.   Psych: Appropriate affect.          Data   Labs reviewed:  Recent Labs   Lab Test 02/24/22  1047 02/09/21  0844 02/04/20  1023   LDL 52 44 42   HDL 64 48* 42*   NHDL 86 77 72   CHOL 150 125 114   TRIG 172* 166* 151*       Lab Results   Component Value Date    WBC 5.6 02/24/2022    WBC 10.8 01/14/2020    RBC 5.04 02/24/2022    RBC 5.17 01/14/2020    HGB 13.0 03/09/2022    HGB 13.9 01/14/2020    HCT " 45.0 02/24/2022    HCT 41.4 01/14/2020    MCV 89 02/24/2022    MCV 80 01/14/2020    MCH 30.8 02/24/2022    MCH 26.9 01/14/2020    MCHC 34.4 02/24/2022    MCHC 33.6 01/14/2020    RDW 12.9 02/24/2022    RDW 14.7 01/14/2020     02/24/2022     01/14/2020       Lab Results   Component Value Date     01/31/2023     02/09/2021    POTASSIUM 4.1 01/31/2023    POTASSIUM 3.9 03/09/2022    POTASSIUM 4.2 02/09/2021    CHLORIDE 99 01/31/2023    CHLORIDE 100 03/09/2022    CHLORIDE 101 02/09/2021    CO2 26 01/31/2023    CO2 23 03/09/2022    CO2 27 02/09/2021    ANIONGAP 13 01/31/2023    ANIONGAP 8 03/09/2022    ANIONGAP 5 02/09/2021    GLC 92 01/31/2023    GLC 95 03/09/2022    GLC 95 03/09/2022     (H) 02/09/2021    BUN 15.4 01/31/2023    BUN 23 03/09/2022    BUN 13 02/09/2021    CR 0.66 01/31/2023    CR 0.66 02/09/2021    GFRESTIMATED 88 01/31/2023    GFRESTIMATED 85 02/09/2021    GFRESTBLACK >90 02/09/2021    SAVANNAH 9.7 01/31/2023    SAVANNAH 9.7 02/09/2021      Lab Results   Component Value Date    AST 31 03/01/2017    ALT 5 04/10/2018       No results found for: A1C    No results found for: INR         Problem List     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            Medications     Current Outpatient Medications   Medication Sig Dispense Refill     acetaminophen (TYLENOL) 325 MG tablet Take 2 tablets (650 mg) by mouth every 4 hours as needed for other (mild pain) 100 tablet 0     aspirin (ASA) 81 MG EC tablet Take 1 tablet (81 mg) by mouth daily       atorvastatin (LIPITOR) 10  MG tablet Take 1 tablet (10 mg) by mouth daily 90 tablet 3     Calcium Carb-Cholecalciferol 600-800 MG-UNIT TABS        carvedilol (COREG) 12.5 MG tablet 12.5 mg in am and 25 mg in pm 270 tablet 3     cetirizine (ZYRTEC) 10 MG tablet Take 1 tablet (10 mg) by mouth daily 30 tablet 3     chlorthalidone (HYGROTON) 25 MG tablet Take 0.5 tablets (12.5 mg) by mouth daily 45 tablet 3     fish oil-omega-3 fatty acids 1000 MG capsule Take 2 g by mouth daily       fluticasone (FLONASE) 50 MCG/ACT nasal spray Spray 2 sprays into both nostrils daily 16 g 0     lisinopril (ZESTRIL) 20 MG tablet Take 1 tablet (20 mg) by mouth daily 90 tablet 11     MELATONIN PO Take 5 mg by mouth At Bedtime       multivitamin w/minerals (THERA-VIT-M) tablet Take 1 tablet by mouth daily       potassium chloride ER (KLOR-CON M) 20 MEQ CR tablet Take 1 tablet (20 mEq) by mouth daily 90 tablet 3     traZODone (DESYREL) 50 MG tablet Take 1 tablet by mouth at bedtime as needed. (Patient taking differently: Take 1/4 tablet by mouth at bedtime as needed.) 90 tablet 3            Past Medical History     Past Medical History:   Diagnosis Date     Allergic rhinitis      Anxiety      Aortic valve sclerosis      Back pain      GERD (gastroesophageal reflux disease)      Heart block AV complete (H) 2008    pacemaker     Hyperlipidemia      Hypertension      Major depression      TRAVIS (obstructive sleep apnea)     moderate     Osteoarthritis      Pacemaker     Medtronic     PONV (postoperative nausea and vomiting)      Squamous cell carcinoma, face      Past Surgical History:   Procedure Laterality Date     ARTHROPLASTY KNEE Right 3/8/2022    Procedure: Right total knee arthroplasty;  Surgeon: Garth Constantino MD;  Location: [facility] OR     ARTHROSCOPY KNEE Left      AS TOTAL KNEE ARTHROPLASTY Left 05/2015     CATARACT IOL, RT/LT Bilateral 2013     IMPLANT PACEMAKER  1997     TONSILLECTOMY       TUBAL LIGATION       Family History   Problem Relation Age of Onset      Heart Disease Mother      Dementia Mother      Heart Disease Father      Sleep Apnea Sister      Lung Cancer Sister      Social History     Socioeconomic History     Marital status:      Spouse name: Not on file     Number of children: Not on file     Years of education: Not on file     Highest education level: Not on file   Occupational History     Not on file   Tobacco Use     Smoking status: Former     Packs/day: 0.50     Years: 3.00     Pack years: 1.50     Types: Cigarettes     Smokeless tobacco: Never   Vaping Use     Vaping Use: Never used   Substance and Sexual Activity     Alcohol use: Yes     Comment: rarely, yearly     Drug use: No     Sexual activity: Yes   Other Topics Concern     Parent/sibling w/ CABG, MI or angioplasty before 65F 55M? Not Asked   Social History Narrative     Not on file     Social Determinants of Health     Financial Resource Strain: Not on file   Food Insecurity: Not on file   Transportation Needs: Not on file   Physical Activity: Not on file   Stress: Not on file   Social Connections: Not on file   Intimate Partner Violence: Not on file   Housing Stability: Not on file            Allergies   Zantac [ranitidine]    Today's clinic visit entailed:  Review of the result(s) of each unique test - BMP, device interrogation, echocardiogram  Ordering of each unique test  I spent a total of 30 minutes on the day of the visit.   Time spent doing chart review, history and exam, documentation and further activities per the note  Provider  Link to MDM Help Grid     The level of medical decision making during this visit was of moderate complexity.    Thank you for allowing me to participate in the care of your patient.      Sincerely,     Roselyn Reaves PA-C     [facility]  cc:   Tal Santacruz MD  [address]  [place] [id number]

## 2023-01-31 NOTE — PATIENT INSTRUCTIONS
Today's Plan:   - BP is under much better control.   - Echocardiogram at your convenience.   - See Dr. Santacruz in two years.      If you have questions or concerns please call my nurse team at [phone].   Scheduling phone number: [phone]  For after hours urgent concerns call [phone] option 2.   Reminder: Please bring in all current medications, over the counter supplements and vitamin bottles to your next appointment.    It was a pleasure seeing you today!     Rsoelyn Reaves PA-C

## 2023-01-31 NOTE — PROGRESS NOTES
Cardiology Clinic Progress Note    Malika Velasco MRN# [id number]   YOB: 1940 Age: 82 year old     Primary cardiologist: Dr. Santacruz         Assessment and Plan     In summary, Malika Velasco presents today for follow-up on uncontrolled hypertension, after the addition of lisinopril 20 mg daily to her regimen. Today, her BP is under much better control. She had a short run of NSVT on recent device interrogation, which she's had in the past. Asymptomatic. EF on 2018 TTE was normal.     Plan:  - I have asked her to update a TTE at her convenience some time this year.   - Continue current medication regimen.  - Continue routine device checks.    Follow-up:  - With Dr. Santacruz in 2 years.    [facility]         History of Presenting Illness     Malika Velasco is a pleasant 82 year old patient with a pertinent history of the following -   1.  Third-degree AV block status post pacemaker placement in 1997.  2.  Hypertension  3.  Nonobstructive CAD, per CT in 2009.  4.  Dyslipidemia    In brief, this patient saw Dr. Santacruz for a routine follow-up visit in November 2022, and reported no cardiac symptoms at that time. However her clinic blood pressure was quite elevated.  Lisinopril 20 mg daily was added to her regimen.      Today, I am meeting Malika who is very pleasant. BP is under significantly better control in clinic, also running consistently in the 120's/70's at home. She feels great overall. Patient denies chest pain, shortness of breath, PND, orthopnea, edema, claudication, palpitations, near syncope or syncope. She walks several miles daily and uses the recumbent bike without problem. BMP is within normal limits. She had a routine device interrogation mid December.  This reportedly showed an increase in her atrial pacing burden however on my review of her prior check it is stable. There was 1 run of nonsustained VT (6 beats) at 180 bpm. She  "has had this noted on 2021 check as well. Her EF is normal per 2018 TTE.         Review of Systems     12-pt ROS is negative except for as noted in the HPI.          Physical Exam     Vitals: /88 (BP Location: Right arm, Patient Position: Sitting, Cuff Size: Adult Regular)   Pulse 75   Ht 1.588 m (5' 2.5\")   Wt 80.9 kg (178 lb 4.8 oz)   LMP  (LMP Unknown)   SpO2 97%   BMI 32.09 kg/m    Wt Readings from Last 10 Encounters:   01/31/23 80.9 kg (178 lb 4.8 oz)   11/22/22 79.8 kg (176 lb)   11/07/22 78 kg (172 lb)   04/26/22 80.3 kg (177 lb)   03/08/22 79.4 kg (175 lb)   02/24/22 79.8 kg (175 lb 14.4 oz)   09/28/21 80.6 kg (177 lb 12.8 oz)   04/07/21 79.4 kg (175 lb)   02/09/21 79.7 kg (175 lb 12.8 oz)   07/01/20 81 kg (178 lb 8 oz)       Constitutional:  Patient is pleasant, alert, cooperative, and in NAD.  HEENT:  NCAT. PERRLA. EOM's intact.   Neck:  CVP appears normal. No carotid bruits.   Pulmonary: Normal respiratory effort. CTAB.   Cardiac: RRR, normal S1/S2, no S3/S4, no murmur or rub.   Abdomen:  Non-tender abdomen, no hepatosplenomegaly appreciated.   Vascular: Pulses in the upper and lower extremities are 2+ and equal bilaterally.  Extremities: No edema, erythema, cyanosis or tenderness appreciated.  Skin:  No rashes or lesions appreciated.   Neurological:  No gross motor or sensory deficits.   Psych: Appropriate affect.          Data   Labs reviewed:  Recent Labs   Lab Test 02/24/22  1047 02/09/21  0844 02/04/20  1023   LDL 52 44 42   HDL 64 48* 42*   NHDL 86 77 72   CHOL 150 125 114   TRIG 172* 166* 151*       Lab Results   Component Value Date    WBC 5.6 02/24/2022    WBC 10.8 01/14/2020    RBC 5.04 02/24/2022    RBC 5.17 01/14/2020    HGB 13.0 03/09/2022    HGB 13.9 01/14/2020    HCT 45.0 02/24/2022    HCT 41.4 01/14/2020    MCV 89 02/24/2022    MCV 80 01/14/2020    MCH 30.8 02/24/2022    MCH 26.9 01/14/2020    MCHC 34.4 02/24/2022    MCHC 33.6 01/14/2020    RDW 12.9 02/24/2022    RDW 14.7 " 01/14/2020     02/24/2022     01/14/2020       Lab Results   Component Value Date     01/31/2023     02/09/2021    POTASSIUM 4.1 01/31/2023    POTASSIUM 3.9 03/09/2022    POTASSIUM 4.2 02/09/2021    CHLORIDE 99 01/31/2023    CHLORIDE 100 03/09/2022    CHLORIDE 101 02/09/2021    CO2 26 01/31/2023    CO2 23 03/09/2022    CO2 27 02/09/2021    ANIONGAP 13 01/31/2023    ANIONGAP 8 03/09/2022    ANIONGAP 5 02/09/2021    GLC 92 01/31/2023    GLC 95 03/09/2022    GLC 95 03/09/2022     (H) 02/09/2021    BUN 15.4 01/31/2023    BUN 23 03/09/2022    BUN 13 02/09/2021    CR 0.66 01/31/2023    CR 0.66 02/09/2021    GFRESTIMATED 88 01/31/2023    GFRESTIMATED 85 02/09/2021    GFRESTBLACK >90 02/09/2021    SAVANNAH 9.7 01/31/2023    SAVANNAH 9.7 02/09/2021      Lab Results   Component Value Date    AST 31 03/01/2017    ALT 5 04/10/2018       No results found for: A1C    No results found for: INR         Problem List     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            Medications     Current Outpatient Medications   Medication Sig Dispense Refill     acetaminophen (TYLENOL) 325 MG tablet Take 2 tablets (650 mg) by mouth every 4 hours as needed for other (mild pain) 100 tablet 0     aspirin (ASA) 81 MG EC tablet Take 1 tablet (81 mg) by mouth daily       atorvastatin (LIPITOR) 10 MG tablet Take 1 tablet (10 mg) by mouth daily 90 tablet 3     Calcium Carb-Cholecalciferol 600-800 MG-UNIT TABS        carvedilol (COREG) 12.5 MG tablet 12.5 mg in am and 25 mg in pm 270 tablet 3      cetirizine (ZYRTEC) 10 MG tablet Take 1 tablet (10 mg) by mouth daily 30 tablet 3     chlorthalidone (HYGROTON) 25 MG tablet Take 0.5 tablets (12.5 mg) by mouth daily 45 tablet 3     fish oil-omega-3 fatty acids 1000 MG capsule Take 2 g by mouth daily       fluticasone (FLONASE) 50 MCG/ACT nasal spray Spray 2 sprays into both nostrils daily 16 g 0     lisinopril (ZESTRIL) 20 MG tablet Take 1 tablet (20 mg) by mouth daily 90 tablet 11     MELATONIN PO Take 5 mg by mouth At Bedtime       multivitamin w/minerals (THERA-VIT-M) tablet Take 1 tablet by mouth daily       potassium chloride ER (KLOR-CON M) 20 MEQ CR tablet Take 1 tablet (20 mEq) by mouth daily 90 tablet 3     traZODone (DESYREL) 50 MG tablet Take 1 tablet by mouth at bedtime as needed. (Patient taking differently: Take 1/4 tablet by mouth at bedtime as needed.) 90 tablet 3            Past Medical History     Past Medical History:   Diagnosis Date     Allergic rhinitis      Anxiety      Aortic valve sclerosis      Back pain      GERD (gastroesophageal reflux disease)      Heart block AV complete (H) 2008    pacemaker     Hyperlipidemia      Hypertension      Major depression      TRAVIS (obstructive sleep apnea)     moderate     Osteoarthritis      Pacemaker     Medtronic     PONV (postoperative nausea and vomiting)      Squamous cell carcinoma, face      Past Surgical History:   Procedure Laterality Date     ARTHROPLASTY KNEE Right 3/8/2022    Procedure: Right total knee arthroplasty;  Surgeon: Garth Constantino MD;  Location: [facility] OR     ARTHROSCOPY KNEE Left      AS TOTAL KNEE ARTHROPLASTY Left 05/2015     CATARACT IOL, RT/LT Bilateral 2013     IMPLANT PACEMAKER  1997     TONSILLECTOMY       TUBAL LIGATION       Family History   Problem Relation Age of Onset     Heart Disease Mother      Dementia Mother      Heart Disease Father      Sleep Apnea Sister      Lung Cancer Sister      Social History     Socioeconomic History     Marital status:      Spouse  name: Not on file     Number of children: Not on file     Years of education: Not on file     Highest education level: Not on file   Occupational History     Not on file   Tobacco Use     Smoking status: Former     Packs/day: 0.50     Years: 3.00     Pack years: 1.50     Types: Cigarettes     Smokeless tobacco: Never   Vaping Use     Vaping Use: Never used   Substance and Sexual Activity     Alcohol use: Yes     Comment: rarely, yearly     Drug use: No     Sexual activity: Yes   Other Topics Concern     Parent/sibling w/ CABG, MI or angioplasty before 65F 55M? Not Asked   Social History Narrative     Not on file     Social Determinants of Health     Financial Resource Strain: Not on file   Food Insecurity: Not on file   Transportation Needs: Not on file   Physical Activity: Not on file   Stress: Not on file   Social Connections: Not on file   Intimate Partner Violence: Not on file   Housing Stability: Not on file            Allergies   Zantac [ranitidine]    Today's clinic visit entailed:  Review of the result(s) of each unique test - BMP, device interrogation, echocardiogram  Ordering of each unique test  I spent a total of 30 minutes on the day of the visit.   Time spent doing chart review, history and exam, documentation and further activities per the note  Provider  Link to MDM Help Grid     The level of medical decision making during this visit was of moderate complexity.

## 2023-02-20 ENCOUNTER — HOSPITAL ENCOUNTER (OUTPATIENT)
Dept: CARDIOLOGY | Facility: CLINIC | Age: 83
Discharge: HOME | End: 2023-02-20
Attending: PHYSICIAN ASSISTANT | Admitting: PHYSICIAN ASSISTANT
Payer: COMMERCIAL

## 2023-02-20 DIAGNOSIS — I10 BENIGN ESSENTIAL HYPERTENSION: ICD-10-CM

## 2023-02-20 LAB — LVEF ECHO: NORMAL

## 2023-02-20 PROCEDURE — 93306 TTE W/DOPPLER COMPLETE: CPT

## 2023-02-20 PROCEDURE — 93306 TTE W/DOPPLER COMPLETE: CPT | Mod: 26 | Performed by: INTERNAL MEDICINE

## 2023-02-27 ENCOUNTER — APPOINTMENT (OUTPATIENT)
Dept: GENERAL RADIOLOGY | Facility: CLINIC | Age: 83
End: 2023-02-27
Attending: PHYSICIAN ASSISTANT
Payer: COMMERCIAL

## 2023-02-27 ENCOUNTER — APPOINTMENT (OUTPATIENT)
Dept: CT IMAGING | Facility: CLINIC | Age: 83
End: 2023-02-27
Attending: PHYSICIAN ASSISTANT
Payer: COMMERCIAL

## 2023-02-27 ENCOUNTER — HOSPITAL ENCOUNTER (EMERGENCY)
Facility: CLINIC | Age: 83
Discharge: HOME | End: 2023-02-27
Attending: PHYSICIAN ASSISTANT | Admitting: PHYSICIAN ASSISTANT
Payer: COMMERCIAL

## 2023-02-27 VITALS
BODY MASS INDEX: 32.5 KG/M2 | HEART RATE: 66 BPM | WEIGHT: 180.56 LBS | DIASTOLIC BLOOD PRESSURE: 84 MMHG | RESPIRATION RATE: 18 BRPM | TEMPERATURE: 97.8 F | SYSTOLIC BLOOD PRESSURE: 178 MMHG | OXYGEN SATURATION: 100 %
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
## 2023-02-27 DIAGNOSIS — S30.0XXA CONTUSION OF COCCYX, INITIAL ENCOUNTER: ICD-10-CM

## 2023-02-27 DIAGNOSIS — S09.90XA CLOSED HEAD INJURY, INITIAL ENCOUNTER: ICD-10-CM

## 2023-02-27 PROCEDURE — 72220 X-RAY EXAM SACRUM TAILBONE: CPT

## 2023-02-27 PROCEDURE — 70450 CT HEAD/BRAIN W/O DYE: CPT

## 2023-02-27 PROCEDURE — 72125 CT NECK SPINE W/O DYE: CPT

## 2023-02-27 PROCEDURE — 250N000013 HC RX MED GY IP 250 OP 250 PS 637: Performed by: PHYSICIAN ASSISTANT

## 2023-02-27 PROCEDURE — 99285 EMERGENCY DEPT VISIT HI MDM: CPT | Mod: 25

## 2023-02-27 RX ORDER — ACETAMINOPHEN 325 MG/1
650 TABLET ORAL ONCE
Status: COMPLETED | OUTPATIENT
Start: 2023-02-27 | End: 2023-02-27

## 2023-02-27 RX ADMIN — ACETAMINOPHEN 650 MG: 325 TABLET ORAL at 17:39

## 2023-02-27 ASSESSMENT — ENCOUNTER SYMPTOMS
NAUSEA: 0
NUMBNESS: 1
VOMITING: 0
NECK PAIN: 1
MYALGIAS: 1
HEADACHES: 0

## 2023-02-27 ASSESSMENT — ACTIVITIES OF DAILY LIVING (ADL)
ADLS_ACUITY_SCORE: 35
ADLS_ACUITY_SCORE: 33
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
## 2023-02-28 NOTE — DISCHARGE INSTRUCTIONS
Your imaging is reassuring today. Continue supportive cares at home including rest, ice to tail bone, and Tylenol for pain. Follow-up with your primary care provider if not improving within three days. For any new or worsening symptoms, return to Emergency Department.

## 2023-03-03 ENCOUNTER — NURSE TRIAGE (OUTPATIENT)
Dept: INTERNAL MEDICINE | Facility: CLINIC | Age: 83
End: 2023-03-03
Payer: COMMERCIAL

## 2023-03-03 DIAGNOSIS — U07.1 INFECTION DUE TO 2019 NOVEL CORONAVIRUS: Primary | ICD-10-CM

## 2023-03-03 NOTE — TELEPHONE ENCOUNTER
Nurse Triage SBAR    Is this a 2nd Level Triage? NO    Situation: Patient called due to positive Covid test.     Background: Symptoms started yesterday    Assessment: Symptoms started yesterday. Eyes are watery, cough with clear phlegm, runny nose and slight elevation in temp up to 99.5. Positive Covid test today. Denies chest pain, shortness of breath or confusion. She has had an intermittent headache since Monday, but she also fell and hit her head Monday. She was evaluated in ER for fall.     Protocol Recommended Disposition:   Discuss With PCP And Callback By Nurse Within 1 Hour    Recommendation: Home care advice given for Covid treatment along with symptom requiring ER visit. Paxlovid standing order initiated for patient.    Does the patient meet one of the following criteria for ADS visit consideration? 16+ years old, with an [facility] PCP     TIP  Providers, please consider if this condition is appropriate for management at one of our Acute and Diagnostic Services sites.     If patient is a good candidate, please use dotphrase <dot>triageresponse and select Refer to ADS to document.    RN COVID TREATMENT VISIT  03/03/23    The patient has been triaged and does not require a higher level of care.    Malika FAM Velasco  82 year old  Current weight? 175 lbs    Has the patient been seen by a primary care provider at an [facility] or [facility] within the past two years? Yes.   Have you been in close proximity to/do you have a known exposure to a person with a confirmed case of influenza? No.     General treatment eligibility:  Date of positive COVID test (PCR or at home)?  3/3/23    Are you or have you been hospitalized for this COVID-19 infection? No.   Have you received monoclonal antibodies or antiviral treatment for COVID-19 since this positive test? No.   Do you have any of the following conditions that place you at risk of being very sick from COVID-19?   - Age 50 years or  older  Yes, patient has at least one high risk condition as noted above.     Current COVID symptoms:   - cough  - headache  - congestion or runny nose  - diarrhea  Yes. Patient has at least one symptom as selected.     How many days since symptoms started? 5 days or less. Established patient, 12 years or older weighing at least 88.2 lbs, who has symptoms that started in the past 5 days, has not been hospitalized nor received treatment already, and is at risk for being very sick from COVID-19.     Treatment eligibility by RN:    Are you currently pregnant or nursing? No    Do you have a clinically significant hypersensitivity to nirmatrelvir or ritonavir, or toxic epidermal necrolysis (TEN) or Camarena-Dougie Syndrome? No    Do you have a history of hepatitis, any hepatic impairment on the Problem List (such as Child-Becker Class C, cirrhosis, fatty liver disease, alcoholic liver disease), or was the last liver lab (hepatic panel, ALT, AST, ALK Phos, bilirubin) elevated in the past 6 months? No    Do you have any history of severe renal impairment (eGFR < 30mL/min)? No    Is patient eligible to continue?   Yes, patient meets all eligibility requirements for the RN COVID treatment (as denoted by all no responses above).     Current Outpatient Medications   Medication Sig Dispense Refill     nirmatrelvir and ritonavir (PAXLOVID) therapy pack Take 3 tablets by mouth 2 times daily for 5 days 30 tablet 0     acetaminophen (TYLENOL) 325 MG tablet Take 2 tablets (650 mg) by mouth every 4 hours as needed for other (mild pain) 100 tablet 0     aspirin (ASA) 81 MG EC tablet Take 1 tablet (81 mg) by mouth daily       atorvastatin (LIPITOR) 10 MG tablet Take 1 tablet (10 mg) by mouth daily 90 tablet 3     Calcium Carb-Cholecalciferol 600-800 MG-UNIT TABS        carvedilol (COREG) 12.5 MG tablet 12.5 mg in am and 25 mg in pm 270 tablet 3     cetirizine (ZYRTEC) 10 MG tablet Take 1 tablet (10 mg) by mouth daily 30 tablet 3      chlorthalidone (HYGROTON) 25 MG tablet Take 0.5 tablets (12.5 mg) by mouth daily 45 tablet 3     fish oil-omega-3 fatty acids 1000 MG capsule Take 2 g by mouth daily       fluticasone (FLONASE) 50 MCG/ACT nasal spray Spray 2 sprays into both nostrils daily 16 g 0     lisinopril (ZESTRIL) 20 MG tablet Take 1 tablet (20 mg) by mouth daily 90 tablet 11     MELATONIN PO Take 5 mg by mouth At Bedtime       multivitamin w/minerals (THERA-VIT-M) tablet Take 1 tablet by mouth daily       potassium chloride ER (KLOR-CON M) 20 MEQ CR tablet Take 1 tablet (20 mEq) by mouth daily 90 tablet 3     traZODone (DESYREL) 50 MG tablet Take 1 tablet by mouth at bedtime as needed. (Patient taking differently: Take 1/4 tablet by mouth at bedtime as needed.) 90 tablet 3       Medications from List 1 of the standing order (on medications that exclude the use of Paxlovid) that patient is taking: NONE. Is patient taking [place]'s Wort? No  Is patient taking [place]'s Wort or any meds from List 1? No.   Medications from List 2 of the standing order (on meds that provider needs to adjust) that patient is taking: NONE. Is patient on any of the meds from List 2? No.   Medications from List 3 of standing order (on meds that a RN needs to adjust) that patient is taking: atorvastatin (Lipitor): Instructed patient to stop atorvastatin while taking Paxlovid and restart atorvastatin 1 day after the completion of Paxlovid.   trazodone (Desyrel): Instructed patient to stop taking trazodone while taking Paxlovid and restart trazodone 3 days after the completion of Paxlovid.  Is patient on any meds from List 3? Yes. Patient is on meds from list 3. No meds require a provider visit and at least one med required RN to adjust.     Paxlovid has an approximate 90% reduction in hospitalization. Paxlovid can possibly cause altered sense of taste, diarrhea (loose, watery stools), high blood pressure, muscle aches.     Would patient like a Paxlovid prescription?    Yes.   Lab Results   Component Value Date    GFRESTIMATED 88 01/31/2023       Was last eGFR reduced? No, eGFR 60 or greater/ No Result on record. Patient can receive the normal renal function dose. Paxlovid Rx sent to [facility]   [facility] [phone]    [address]  [place] [address]    Hours:  Mon-Fri: 8:00a - 6:00p  Sat-Sun: 8:00a - 4:00p    Drive-thru services available.    Temporary change to home medications: Yes, holding Atorvastatin and Trazodone     All medication adjustments (holds, etc) were discussed with the patient and patient was asked to repeat back (teachback) their med adjustment.  Did patient understand med adjustment? Yes, patient repeated back and understood correctly.      Reviewed the following instructions with the patient:    Paxlovid (nimatrelvir and ritonavir)    How it works  Two medicines (nirmatrelvir and ritonavir) are taken together. They stop the virus from growing. Less amount of virus is easier for your body to fight.    How to take    Medicine comes in a daily container with both medicine tablets. Take by mouth twice daily (once in the morning, once at night) for 5 days.    The number of tablets to take varies by patient.    Don't chew or break capsules. Swallow whole.    When to take  Take as soon as possible after positive COVID-19 test result, and within 5 days of your first symptoms.    Possible side effects  Can cause altered sense of taste, diarrhea (loose, watery stools), high blood pressure, muscle aches.    Mary Alcazar RN      Reason for Disposition    [1] HIGH RISK for severe COVID complications (e.g., weak immune system, age > 64 years, obesity with BMI of 30 or higher, pregnant, chronic lung disease or other chronic medical condition) AND [2] COVID symptoms (e.g., cough, fever)  (Exceptions: Already seen by PCP and no new or worsening symptoms.)    Additional Information    Negative: SEVERE difficulty breathing (e.g.,  struggling for each breath, speaks in single words)    Negative: Difficult to awaken or acting confused (e.g., disoriented, slurred speech)    Negative: Bluish (or gray) lips or face now    Negative: Shock suspected (e.g., cold/pale/clammy skin, too weak to stand, low BP, rapid pulse)    Negative: Sounds like a life-threatening emergency to the triager    Negative: [1] Diagnosed or suspected COVID-19 AND [2] symptoms lasting 3 or more weeks    Negative: [1] COVID-19 exposure AND [2] no symptoms    Negative: COVID-19 vaccine reaction suspected (e.g., fever, headache, muscle aches) occurring 1 to 3 days after getting vaccine    Negative: COVID-19 vaccine, questions about    Negative: [1] Lives with someone known to have influenza (flu test positive) AND [2] flu-like symptoms (e.g., cough, runny nose, sore throat, SOB; with or without fever)    Negative: [1] Adult with possible COVID-19 symptoms AND [2] triager concerned about severity of symptoms or other causes    Negative: COVID-19 and breastfeeding, questions about    Negative: SEVERE or constant chest pain or pressure  (Exception: Mild central chest pain, present only when coughing.)    Negative: MODERATE difficulty breathing (e.g., speaks in phrases, SOB even at rest, pulse 100-120)    Negative: Headache and stiff neck (can't touch chin to chest)    Negative: Oxygen level (e.g., pulse oximetry) 90 percent or lower    Negative: Chest pain or pressure  (Exception: MILD central chest pain, present only when coughing)    Negative: Patient sounds very sick or weak to the triager    Negative: MILD difficulty breathing (e.g., minimal/no SOB at rest, SOB with walking, pulse <100)    Negative: Fever > 103 F (39.4 C)    Negative: [1] Fever > 101 F (38.3 C) AND [2] over 60 years of age    Negative: [1] Fever > 100.0 F (37.8 C) AND [2] bedridden (e.g., nursing home patient, CVA, chronic illness, recovering from surgery)    Protocols used: CORONAVIRUS (COVID-19) DIAGNOSED OR  [id number]-T-UP

## 2023-03-06 ENCOUNTER — TELEPHONE (OUTPATIENT)
Dept: INTERNAL MEDICINE | Facility: CLINIC | Age: 83
End: 2023-03-06
Payer: COMMERCIAL

## 2023-03-06 DIAGNOSIS — E78.5 HYPERLIPIDEMIA LDL GOAL <130: ICD-10-CM

## 2023-03-06 NOTE — TELEPHONE ENCOUNTER
Pt calling requesting information regarding masking and being in public post COVID. RN gave information from the CDC website for current guidelines. No further questions.     Meryl STEWARD RN

## 2023-03-07 RX ORDER — ATORVASTATIN CALCIUM 10 MG/1
TABLET, FILM COATED ORAL
Qty: 90 TABLET | Refills: 0 | Status: SHIPPED | OUTPATIENT
Start: 2023-03-07 | End: 2023-06-22

## 2023-03-07 NOTE — TELEPHONE ENCOUNTER
Medication is being filled for 1 time refill only due to:  Patient needs labs - lipids. Patient needs to be seen because due for annual exam in April.  [employer] message sent.

## 2023-03-10 ENCOUNTER — NURSE TRIAGE (OUTPATIENT)
Dept: NURSING | Facility: CLINIC | Age: 83
End: 2023-03-10
Payer: COMMERCIAL

## 2023-03-10 NOTE — TELEPHONE ENCOUNTER
Nurse Triage SBAR    Is this a 2nd Level Triage? NO    Situation: COVID done with Paxlovid treatment    Background: Many comorbidities    Assessment: Finished treatment on 2/8 started to feel better, then Thursday was coughing up stuff and still continues to cough and now today has fever of 99.0-100.6   NO SOB or chest pains.     Protocol Recommended Disposition:   See PCP Within 24 Hours, See More Appropriate Guideline  Writer explained to patient she should be evaluated by physician in Urgent Care.  No to wait to be seen until next week cj to new onset of fever and coughing up,     Katie Fontenot RN on 3/10/2023 at 4:51 PM                Reason for Disposition    [1] Fever AND [2] new-onset or worsening    Fever present > 3 days (72 hours)    Additional Information    Negative: SEVERE difficulty breathing (e.g., struggling for each breath, speaks in single words)    Negative: [1] SEVERE weakness (e.g., can't stand or can barely walk) AND [2] new-onset or WORSE    Negative: Difficult to awaken or acting confused (e.g., disoriented, slurred speech)    Negative: Bluish (or gray) lips or face now    Negative: Sounds like a life-threatening emergency to the triager    Negative: [1] Typical COVID-19 symptoms AND [2] lasting less than 3 weeks    Negative: [1] Chest pain, pressure, or tightness AND [2] new-onset or worsening    Negative: Shock suspected (e.g., cold/pale/clammy skin, too weak to stand, low BP, rapid pulse)    Negative: Difficult to awaken or acting confused (e.g., disoriented, slurred speech)    Negative: [1] Difficulty breathing AND [2] bluish lips, tongue or face    Negative: New-onset rash with multiple purple (or blood-colored) spots or dots    Negative: Sounds like a life-threatening emergency to the triager    Negative: Fever in a cancer patient who is currently (or recently) receiving chemotherapy or radiation therapy, or cancer patient who has metastatic or end-stage cancer and is receiving  palliative care    Negative: Pregnant    Negative: Postpartum (from 0 to 6 weeks after delivery)    Negative: Fever onset within 24 hours of receiving vaccine    Negative: [1] Fever AND [2] within 14 days of COVID-19 Exposure    Negative: Other symptom is present, see that guideline (e.g., symptoms of cough, runny nose, sore throat, earache, abdominal pain, diarrhea, vomiting)    Negative: [1] Headache AND [2] stiff neck (can't touch chin to chest)    Negative: Difficulty breathing    Negative: IV Drug Use (IVDU)    Negative: [1] Drinking very little AND [2] dehydration suspected (e.g., no urine > 12 hours, very dry mouth, very lightheaded)    Negative: Widespread rash and cause unknown    Negative: Patient sounds very sick or weak to the triager  (Exception: mild weakness and hasn't taken fever medicine)    Negative: [1] Fever > 101 F (38.3 C) AND [2] age > 60 years    Negative: Fever > 104 F (40 C)    Negative: [1] Fever > 100.0 F (37.8 C) AND [2] bedridden (e.g., nursing home patient, CVA, chronic illness, recovering from surgery)    Negative: [1] Fever > 100.0 F (37.8 C) AND [2] indwelling urinary catheter (e.g., Marquez, Coude)    Negative: [1] Fever > 100.0 F (37.8 C) AND [2] has port (portacath), central line, or PICC line    Negative: [1] Fever > 100.0 F (37.8 C) AND [2] diabetes mellitus or weak immune system (e.g., HIV positive, cancer chemo, splenectomy, organ transplant, chronic steroids)    Negative: [1] Fever > 100.0 F (37.8 C) AND [2] surgery in the last month    Negative: Transplant patient (e.g., kidney, liver, lung, heart)    Negative: Severe chills (i.e., feeling extremely cold WITH shaking chills)    Negative: [1] Fever > 100.0 F (37.8 C) AND [2] foreign travel to a developing country in the last month    Protocols used: CORONAVIRUS (COVID-19) PERSISTING SYMPTOMS FOLLOW-UP CALL-A-AH, FEVER-A-AH

## 2023-03-11 ENCOUNTER — OFFICE VISIT (OUTPATIENT)
Dept: URGENT CARE | Facility: URGENT CARE | Age: 83
End: 2023-03-11
Payer: COMMERCIAL

## 2023-03-11 ENCOUNTER — ANCILLARY PROCEDURE (OUTPATIENT)
Dept: GENERAL RADIOLOGY | Facility: CLINIC | Age: 83
End: 2023-03-11
Attending: PHYSICIAN ASSISTANT
Payer: COMMERCIAL

## 2023-03-11 VITALS
SYSTOLIC BLOOD PRESSURE: 168 MMHG | HEART RATE: 85 BPM | TEMPERATURE: 98.1 F | OXYGEN SATURATION: 95 % | RESPIRATION RATE: 16 BRPM | DIASTOLIC BLOOD PRESSURE: 93 MMHG

## 2023-03-11 DIAGNOSIS — J40 BRONCHITIS WITH ACUTE WHEEZING: Primary | ICD-10-CM

## 2023-03-11 DIAGNOSIS — U07.1 INFECTION DUE TO 2019 NOVEL CORONAVIRUS: ICD-10-CM

## 2023-03-11 DIAGNOSIS — I10 BENIGN ESSENTIAL HYPERTENSION: ICD-10-CM

## 2023-03-11 DIAGNOSIS — J40 BRONCHITIS WITH ACUTE WHEEZING: ICD-10-CM

## 2023-03-11 PROCEDURE — 99213 OFFICE O/P EST LOW 20 MIN: CPT | Mod: CS | Performed by: PHYSICIAN ASSISTANT

## 2023-03-11 PROCEDURE — 71046 X-RAY EXAM CHEST 2 VIEWS: CPT | Mod: TC | Performed by: RADIOLOGY

## 2023-03-11 RX ORDER — PREDNISONE 20 MG/1
40 TABLET ORAL DAILY
Qty: 10 TABLET | Refills: 0 | Status: SHIPPED | OUTPATIENT
Start: 2023-03-11 | End: 2023-03-16

## 2023-03-11 RX ORDER — ALBUTEROL SULFATE 90 UG/1
2 AEROSOL, METERED RESPIRATORY (INHALATION) EVERY 4 HOURS PRN
Qty: 18 G | Refills: 0 | Status: SHIPPED | OUTPATIENT
Start: 2023-03-11 | End: 2023-06-22

## 2023-03-11 ASSESSMENT — ENCOUNTER SYMPTOMS
SHORTNESS OF BREATH: 1
WHEEZING: 1
FEVER: 1
RHINORRHEA: 1
COUGH: 1
ABDOMINAL PAIN: 1
SORE THROAT: 0

## 2023-03-11 NOTE — PROGRESS NOTES
Assessment & Plan:        ICD-10-CM    1. Bronchitis with acute wheezing  J40 XR Chest 2 Views     albuterol (PROAIR HFA/PROVENTIL HFA/VENTOLIN HFA) 108 (90 Base) MCG/ACT inhaler     predniSONE (DELTASONE) 20 MG tablet      2. Infection due to 2019 novel coronavirus  U07.1 albuterol (PROAIR HFA/PROVENTIL HFA/VENTOLIN HFA) 108 (90 Base) MCG/ACT inhaler     predniSONE (DELTASONE) 20 MG tablet      3. Benign essential hypertension  I10             Plan/Clinical Decision Making:    Patient with Covid since 3/3, improved with Palovid and then having some rebound symptoms with mild fever and SOB, wheezing. Patient has acute wheezing on exam.   I independently visualized the xray:  Negative for infiltrate.     Will treat with albuterol inhaler and prednisone.     When feeling better needs recheck of BP. Does not appear under ideal control.     Return if symptoms worsen or fail to improve, for in 2-3 days.     At the end of the encounter, I discussed results, diagnosis, medications. Discussed red flags for immediate return to clinic/ER, as well as indications for follow up if no improvement. Patient understood and agreed to plan. Patient was stable for discharge.      Merna Lima PA-C on 3/11/2023 at 9:19 AM          Subjective:     HPI:    Malika is a 82 year old female who presents to clinic today for the following health issues:  Chief Complaint   Patient presents with     Shortness of Breath     Shortness of breath, low grade fever, cough productive, very weak, tested positive covid 3/3 she took the medication and was feeling better on Tuesday and Wednesday, but Thursday and yesterday started feeling really bad     HPI    Patient tested positive for Covid on 3/3, took Paxlovid which did help. Finished med Wednesday morning. Thursday and Friday started to start feeling bad. Today having a harder time breathing. Denies hx of lung problems. Coughing up phlegm.   Temp today 99.5, yesterday 100.6.     Patient has hx of  HTN, did take BP medications today.     BP Readings from Last 6 Encounters:   03/11/23 (!) 168/93   02/27/23 (!) 178/84   01/31/23 128/88   11/22/22 (!) 175/94   04/26/22 (!) 158/74   03/09/22 (!) 164/64     History obtained from the patient.    Review of Systems   Constitutional: Positive for fever.   HENT: Positive for congestion and rhinorrhea. Negative for sore throat.    Respiratory: Positive for cough, shortness of breath and wheezing (mild).    Gastrointestinal: Positive for abdominal pain (mild upset stomach).         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        Past Medical History:   Diagnosis Date     Allergic rhinitis      Anxiety      Aortic valve sclerosis      Back pain      GERD (gastroesophageal reflux disease)      Heart block AV complete (H) 2008    pacemaker     Hyperlipidemia      Hypertension      Major depression      TRAVIS (obstructive sleep apnea)     moderate     Osteoarthritis      Pacemaker     Medtronic     PONV (postoperative nausea and vomiting)      Squamous cell carcinoma, face        Social History     Tobacco Use     Smoking status: Former     Packs/day: 0.50     Years: 3.00     Pack years: 1.50     Types: Cigarettes     Smokeless tobacco: Never   Substance Use Topics     Alcohol use: Yes     Comment: rarely, yearly             Objective:     Vitals:    03/11/23 0914 03/11/23 0916   BP: (!) 183/85 (!) 168/93   Pulse: 85    Resp: 16    Temp: 98.1  F (36.7  C)    SpO2: 95%          Physical Exam   EXAM:   Pleasant, alert, appropriate appearance. NAD.  Head  Exam: Normocephalic, atraumatic.  Eye Exam:   non icteric/injection.    Ear Exam: TMs grey without bulging. Normal canals.  Normal pinna.  Nose Exam: Normal external nose.    OroPharynx Exam:  Moist mucous membranes. No erythema, pharynx without exudate or hypertrophy.  Neck/Thyroid Exam:  No LAD.  No nodules or enlargement.  Chest/Respiratory Exam: bilateral wheezing  Cardiovascular Exam: RRR. No murmur or rubs.      Results:  Results for orders placed or performed in visit on 03/11/23   XR Chest 2 Views     Status: None    Narrative    EXAM: XR CHEST 2 VIEWS  LOCATION: [facility]  DATE/TIME: 03/11/2023, 9:38 AM    INDICATION: Bronchitis with acute wheezing.  COMPARISON: None.      Impression    IMPRESSION: No acute infiltrates. Cardiac device noted with leads that project over the cardiac silhouette. No effusions.

## 2023-03-16 ENCOUNTER — ANCILLARY PROCEDURE (OUTPATIENT)
Dept: CARDIOLOGY | Facility: CLINIC | Age: 83
End: 2023-03-16
Attending: INTERNAL MEDICINE
Payer: COMMERCIAL

## 2023-03-16 DIAGNOSIS — Z95.0 CARDIAC PACEMAKER IN SITU: ICD-10-CM

## 2023-03-16 PROCEDURE — 93296 REM INTERROG EVL PM/IDS: CPT | Performed by: INTERNAL MEDICINE

## 2023-03-16 PROCEDURE — 93294 REM INTERROG EVL PM/LDLS PM: CPT | Performed by: INTERNAL MEDICINE

## 2023-03-17 ENCOUNTER — NURSE TRIAGE (OUTPATIENT)
Dept: NURSING | Facility: CLINIC | Age: 83
End: 2023-03-17
Payer: COMMERCIAL

## 2023-03-17 LAB
MDC_IDC_LEAD_IMPLANT_DT: NORMAL
MDC_IDC_LEAD_IMPLANT_DT: NORMAL
MDC_IDC_LEAD_LOCATION: NORMAL
MDC_IDC_LEAD_LOCATION: NORMAL
MDC_IDC_LEAD_LOCATION_DETAIL_1: NORMAL
MDC_IDC_LEAD_LOCATION_DETAIL_1: NORMAL
MDC_IDC_LEAD_MFG: NORMAL
MDC_IDC_LEAD_MFG: NORMAL
MDC_IDC_LEAD_MODEL: NORMAL
MDC_IDC_LEAD_MODEL: NORMAL
MDC_IDC_LEAD_POLARITY_TYPE: NORMAL
MDC_IDC_LEAD_POLARITY_TYPE: NORMAL
MDC_IDC_LEAD_SERIAL: NORMAL
MDC_IDC_LEAD_SERIAL: NORMAL
MDC_IDC_MSMT_BATTERY_DTM: NORMAL
MDC_IDC_MSMT_BATTERY_IMPEDANCE: 1443 OHM
MDC_IDC_MSMT_BATTERY_REMAINING_LONGEVITY: 45 MO
MDC_IDC_MSMT_BATTERY_STATUS: NORMAL
MDC_IDC_MSMT_BATTERY_VOLTAGE: 2.76 V
MDC_IDC_MSMT_LEADCHNL_RA_IMPEDANCE_VALUE: 647 OHM
MDC_IDC_MSMT_LEADCHNL_RA_PACING_THRESHOLD_AMPLITUDE: 0.38 V
MDC_IDC_MSMT_LEADCHNL_RA_PACING_THRESHOLD_PULSEWIDTH: 0.4 MS
MDC_IDC_MSMT_LEADCHNL_RV_IMPEDANCE_VALUE: 654 OHM
MDC_IDC_MSMT_LEADCHNL_RV_PACING_THRESHOLD_AMPLITUDE: 0.88 V
MDC_IDC_MSMT_LEADCHNL_RV_PACING_THRESHOLD_PULSEWIDTH: 0.4 MS
MDC_IDC_PG_IMPLANT_DTM: NORMAL
MDC_IDC_PG_MFG: NORMAL
MDC_IDC_PG_MODEL: NORMAL
MDC_IDC_PG_SERIAL: NORMAL
MDC_IDC_PG_TYPE: NORMAL
MDC_IDC_SESS_CLINIC_NAME: NORMAL
MDC_IDC_SESS_DTM: NORMAL
MDC_IDC_SESS_TYPE: NORMAL
MDC_IDC_SET_BRADY_AT_MODE_SWITCH_MODE: NORMAL
MDC_IDC_SET_BRADY_AT_MODE_SWITCH_RATE: 175 {BEATS}/MIN
MDC_IDC_SET_BRADY_LOWRATE: 60 {BEATS}/MIN
MDC_IDC_SET_BRADY_MAX_SENSOR_RATE: 130 {BEATS}/MIN
MDC_IDC_SET_BRADY_MAX_TRACKING_RATE: 130 {BEATS}/MIN
MDC_IDC_SET_BRADY_MODE: NORMAL
MDC_IDC_SET_BRADY_PAV_DELAY_LOW: 200 MS
MDC_IDC_SET_BRADY_SAV_DELAY_LOW: 180 MS
MDC_IDC_SET_LEADCHNL_RA_PACING_AMPLITUDE: 2.75 V
MDC_IDC_SET_LEADCHNL_RA_PACING_CAPTURE_MODE: NORMAL
MDC_IDC_SET_LEADCHNL_RA_PACING_POLARITY: NORMAL
MDC_IDC_SET_LEADCHNL_RA_PACING_PULSEWIDTH: 0.4 MS
MDC_IDC_SET_LEADCHNL_RA_SENSING_POLARITY: NORMAL
MDC_IDC_SET_LEADCHNL_RA_SENSING_SENSITIVITY: 1 MV
MDC_IDC_SET_LEADCHNL_RV_PACING_AMPLITUDE: 2 V
MDC_IDC_SET_LEADCHNL_RV_PACING_CAPTURE_MODE: NORMAL
MDC_IDC_SET_LEADCHNL_RV_PACING_POLARITY: NORMAL
MDC_IDC_SET_LEADCHNL_RV_PACING_PULSEWIDTH: 0.4 MS
MDC_IDC_SET_LEADCHNL_RV_SENSING_POLARITY: NORMAL
MDC_IDC_SET_LEADCHNL_RV_SENSING_SENSITIVITY: 2.8 MV
MDC_IDC_SET_ZONE_DETECTION_INTERVAL: 333.33 MS
MDC_IDC_SET_ZONE_DETECTION_INTERVAL: 342.86 MS
MDC_IDC_SET_ZONE_TYPE: NORMAL
MDC_IDC_SET_ZONE_TYPE: NORMAL
MDC_IDC_STAT_AT_BURDEN_PERCENT: 0 %
MDC_IDC_STAT_AT_DTM_END: NORMAL
MDC_IDC_STAT_AT_DTM_START: NORMAL
MDC_IDC_STAT_AT_MODE_SW_COUNT: 3
MDC_IDC_STAT_BRADY_AP_VP_PERCENT: 80 %
MDC_IDC_STAT_BRADY_AP_VS_PERCENT: 0 %
MDC_IDC_STAT_BRADY_AS_VP_PERCENT: 20 %
MDC_IDC_STAT_BRADY_AS_VS_PERCENT: 0 %
MDC_IDC_STAT_BRADY_DTM_END: NORMAL
MDC_IDC_STAT_BRADY_DTM_START: NORMAL
MDC_IDC_STAT_EPISODE_RECENT_COUNT: 0
MDC_IDC_STAT_EPISODE_RECENT_COUNT: 0
MDC_IDC_STAT_EPISODE_RECENT_COUNT_DTM_END: NORMAL
MDC_IDC_STAT_EPISODE_RECENT_COUNT_DTM_END: NORMAL
MDC_IDC_STAT_EPISODE_RECENT_COUNT_DTM_START: NORMAL
MDC_IDC_STAT_EPISODE_RECENT_COUNT_DTM_START: NORMAL
MDC_IDC_STAT_EPISODE_TYPE: NORMAL
MDC_IDC_STAT_EPISODE_TYPE: NORMAL

## 2023-03-17 NOTE — TELEPHONE ENCOUNTER
Having diarrhea that started almost a week and a half    Is having about 3 mushy soft watery stools    No blood in the stools    No fever    No pain    Still drinking fluids fine    No one else with diarrhea - patient is just getting over covid    No antibiotics in the past 2 months    Per protocol patient should be seen in the next 3 days    Home cares given per protocol    Oksana Hudson RN  [place] Nurse Advisor  1:39 PM  3/17/2023        Reason for Disposition    MILD diarrhea (e.g., 1-3 or more stools than normal in past 24 hours) diarrhea without known cause and present > 7 days    Additional Information    Negative: Shock suspected (e.g., cold/pale/clammy skin, too weak to stand, low BP, rapid pulse)    Negative: Difficult to awaken or acting confused (e.g., disoriented, slurred speech)    Negative: Sounds like a life-threatening emergency to the triager    Negative: SEVERE abdominal pain (e.g., excruciating) and present > 1 hour    Negative: SEVERE abdominal pain and age > 60 years    Negative: Bloody, black, or tarry bowel movements (Exception: chronic-unchanged black-grey bowel movements and is taking iron pills or Pepto-Bismol)    Negative: SEVERE diarrhea (e.g., 7 or more times / day more than normal) and age > 60 years    Negative: Constant abdominal pain lasting > 2 hours    Negative: Drinking very little and has signs of dehydration (e.g., no urine > 12 hours, very dry mouth, very lightheaded)    Negative: Patient sounds very sick or weak to the triager    Negative: SEVERE diarrhea (e.g., 7 or more times / day more than normal) and present > 24 hours (1 day)    Negative: MODERATE diarrhea (e.g., 4-6 times / day more than normal) and present > 48 hours (2 days)    Negative: MODERATE diarrhea (e.g., 4-6 times / day more than normal) and age > 70 years    Negative: Abdominal pain (Exception: Pain clears completely with each passage of diarrhea stool.)    Negative: Fever > 101 F (38.3 C)    Negative:  Blood in the stool    Negative: Mucus or pus in stool has been present > 2 days and diarrhea is more than mild    Negative: Weak immune system (e.g., HIV positive, cancer chemo, splenectomy, organ transplant, chronic steroids)    Negative: Travel to a foreign country in past month    Negative: Recent antibiotic therapy (i.e., within last 2 months) and diarrhea present > 3 days since antibiotic was stopped    Negative: Recent hospitalization and diarrhea present > 3 days    Negative: Tube feedings (e.g., nasogastric, g-tube, j-tube)    Protocols used: DIARRHEA-A-OH

## 2023-03-20 DIAGNOSIS — I10 BENIGN ESSENTIAL HYPERTENSION: ICD-10-CM

## 2023-03-20 DIAGNOSIS — E87.6 HYPOKALEMIA: ICD-10-CM

## 2023-03-22 RX ORDER — POTASSIUM CHLORIDE 1500 MG/1
TABLET, EXTENDED RELEASE ORAL
Qty: 90 TABLET | Refills: 0 | Status: SHIPPED | OUTPATIENT
Start: 2023-03-22 | End: 2023-06-22

## 2023-03-22 NOTE — TELEPHONE ENCOUNTER
Pending Prescriptions:                       Disp   Refills    KLOR-CON 20 MEQ CR tablet [Pharmacy Med N*90 tab*0            Sig: TAKE 1 TABLET DAILY    Medication is being filled for 1 time refill only due to:  patient has an upcoming appointment     Next 5 appointments (look out 90 days)    Mar 27, 2023 10:45 AM  (Arrive by 10:30 AM)  Screening Mammogram with [facility]  [facility] ) [address] [place] [id number] [phone]

## 2023-03-27 ENCOUNTER — ANCILLARY PROCEDURE (OUTPATIENT)
Dept: MAMMOGRAPHY | Facility: CLINIC | Age: 83
End: 2023-03-27
Attending: INTERNAL MEDICINE
Payer: COMMERCIAL

## 2023-03-27 ENCOUNTER — ANCILLARY ORDERS (OUTPATIENT)
Dept: MAMMOGRAPHY | Facility: CLINIC | Age: 83
End: 2023-03-27

## 2023-03-27 DIAGNOSIS — Z12.31 VISIT FOR SCREENING MAMMOGRAM: ICD-10-CM

## 2023-03-27 PROCEDURE — 77067 SCR MAMMO BI INCL CAD: CPT | Mod: TC | Performed by: RADIOLOGY

## 2023-03-27 PROCEDURE — 77063 BREAST TOMOSYNTHESIS BI: CPT | Mod: TC | Performed by: RADIOLOGY

## 2023-06-02 ENCOUNTER — HEALTH MAINTENANCE LETTER (OUTPATIENT)
Age: 83
End: 2023-06-02

## 2023-06-04 DIAGNOSIS — I10 BENIGN ESSENTIAL HYPERTENSION: ICD-10-CM

## 2023-06-07 RX ORDER — CARVEDILOL 12.5 MG/1
TABLET ORAL
Qty: 270 TABLET | Refills: 3 | Status: SHIPPED | OUTPATIENT
Start: 2023-06-07 | End: 2023-10-02

## 2023-06-07 NOTE — TELEPHONE ENCOUNTER
Routing refill request to provider for review/approval because:  Blood pressure out of protocol range    Next 5 appointments (look out 90 days)      Jun 22, 2023  9:30 AM  (Arrive by 9:10 AM)  Annual Wellness Visit with Karen Navarro MD  [facility] ([facility] ) [address]  [address]  [facility] [address]  [phone]

## 2023-06-19 ASSESSMENT — ENCOUNTER SYMPTOMS
NAUSEA: 0
EYE PAIN: 0
WEAKNESS: 0
MYALGIAS: 0
FEVER: 0
HEADACHES: 0
HEMATOCHEZIA: 0
DIARRHEA: 0
COUGH: 0
DIZZINESS: 0
HEARTBURN: 0
CONSTIPATION: 0
HEMATURIA: 0
PARESTHESIAS: 0
ARTHRALGIAS: 0
JOINT SWELLING: 0
PALPITATIONS: 0
FREQUENCY: 0
ABDOMINAL PAIN: 0
DYSURIA: 0
CHILLS: 0
SHORTNESS OF BREATH: 0
SORE THROAT: 0
NERVOUS/ANXIOUS: 0
BREAST MASS: 0

## 2023-06-19 ASSESSMENT — ACTIVITIES OF DAILY LIVING (ADL): CURRENT_FUNCTION: NO ASSISTANCE NEEDED

## 2023-06-22 ENCOUNTER — OFFICE VISIT (OUTPATIENT)
Dept: INTERNAL MEDICINE | Facility: CLINIC | Age: 83
End: 2023-06-22
Payer: COMMERCIAL

## 2023-06-22 ENCOUNTER — ANCILLARY PROCEDURE (OUTPATIENT)
Dept: CARDIOLOGY | Facility: CLINIC | Age: 83
End: 2023-06-22
Attending: INTERNAL MEDICINE
Payer: COMMERCIAL

## 2023-06-22 VITALS
DIASTOLIC BLOOD PRESSURE: 74 MMHG | HEIGHT: 63 IN | RESPIRATION RATE: 13 BRPM | BODY MASS INDEX: 30.76 KG/M2 | TEMPERATURE: 97.5 F | HEART RATE: 88 BPM | WEIGHT: 173.6 LBS | OXYGEN SATURATION: 96 % | SYSTOLIC BLOOD PRESSURE: 132 MMHG

## 2023-06-22 DIAGNOSIS — Z00.00 ENCOUNTER FOR ANNUAL WELLNESS EXAM IN MEDICARE PATIENT: Primary | ICD-10-CM

## 2023-06-22 DIAGNOSIS — E78.5 HYPERLIPIDEMIA LDL GOAL <130: ICD-10-CM

## 2023-06-22 DIAGNOSIS — R73.01 ELEVATED FASTING BLOOD SUGAR: ICD-10-CM

## 2023-06-22 DIAGNOSIS — E87.6 HYPOKALEMIA: ICD-10-CM

## 2023-06-22 DIAGNOSIS — I10 BENIGN ESSENTIAL HYPERTENSION: ICD-10-CM

## 2023-06-22 DIAGNOSIS — Z95.0 CARDIAC PACEMAKER IN SITU: ICD-10-CM

## 2023-06-22 LAB
ANION GAP SERPL CALCULATED.3IONS-SCNC: 11 MMOL/L (ref 7–15)
BUN SERPL-MCNC: 15.7 MG/DL (ref 8–23)
CALCIUM SERPL-MCNC: 9.8 MG/DL (ref 8.8–10.2)
CHLORIDE SERPL-SCNC: 98 MMOL/L (ref 98–107)
CHOLEST SERPL-MCNC: 140 MG/DL
CREAT SERPL-MCNC: 0.68 MG/DL (ref 0.51–0.95)
CREAT UR-MCNC: 44.8 MG/DL
DEPRECATED HCO3 PLAS-SCNC: 25 MMOL/L (ref 22–29)
GFR SERPL CREATININE-BSD FRML MDRD: 88 ML/MIN/1.73M2
GLUCOSE SERPL-MCNC: 113 MG/DL (ref 70–99)
HDLC SERPL-MCNC: 48 MG/DL
LDLC SERPL CALC-MCNC: 45 MG/DL
MICROALBUMIN UR-MCNC: <12 MG/L
MICROALBUMIN/CREAT UR: NORMAL MG/G{CREAT}
NONHDLC SERPL-MCNC: 92 MG/DL
POTASSIUM SERPL-SCNC: 4.3 MMOL/L (ref 3.4–5.3)
SODIUM SERPL-SCNC: 134 MMOL/L (ref 136–145)
TRIGL SERPL-MCNC: 236 MG/DL

## 2023-06-22 PROCEDURE — 93294 REM INTERROG EVL PM/LDLS PM: CPT | Performed by: INTERNAL MEDICINE

## 2023-06-22 PROCEDURE — 80061 LIPID PANEL: CPT | Performed by: INTERNAL MEDICINE

## 2023-06-22 PROCEDURE — 91312 COVID-19 BIVALENT 12+ (PFIZER): CPT | Performed by: INTERNAL MEDICINE

## 2023-06-22 PROCEDURE — 0124A COVID-19 BIVALENT 12+ (PFIZER): CPT | Performed by: INTERNAL MEDICINE

## 2023-06-22 PROCEDURE — G0439 PPPS, SUBSEQ VISIT: HCPCS | Performed by: INTERNAL MEDICINE

## 2023-06-22 PROCEDURE — 36415 COLL VENOUS BLD VENIPUNCTURE: CPT | Performed by: INTERNAL MEDICINE

## 2023-06-22 PROCEDURE — 82570 ASSAY OF URINE CREATININE: CPT | Performed by: INTERNAL MEDICINE

## 2023-06-22 PROCEDURE — 82043 UR ALBUMIN QUANTITATIVE: CPT | Performed by: INTERNAL MEDICINE

## 2023-06-22 PROCEDURE — 80048 BASIC METABOLIC PNL TOTAL CA: CPT | Performed by: INTERNAL MEDICINE

## 2023-06-22 PROCEDURE — 93296 REM INTERROG EVL PM/IDS: CPT | Performed by: INTERNAL MEDICINE

## 2023-06-22 PROCEDURE — 99214 OFFICE O/P EST MOD 30 MIN: CPT | Mod: 25 | Performed by: INTERNAL MEDICINE

## 2023-06-22 RX ORDER — LISINOPRIL 20 MG/1
20 TABLET ORAL DAILY
Qty: 90 TABLET | Refills: 3 | Status: SHIPPED | OUTPATIENT
Start: 2023-06-22 | End: 2024-01-12

## 2023-06-22 RX ORDER — CHLORTHALIDONE 25 MG/1
12.5 TABLET ORAL DAILY
Qty: 45 TABLET | Refills: 3 | Status: SHIPPED | OUTPATIENT
Start: 2023-06-22 | End: 2023-10-02

## 2023-06-22 RX ORDER — POTASSIUM CHLORIDE 1500 MG/1
20 TABLET, EXTENDED RELEASE ORAL DAILY
Qty: 90 TABLET | Refills: 3 | Status: SHIPPED | OUTPATIENT
Start: 2023-06-22 | End: 2024-01-12

## 2023-06-22 RX ORDER — ATORVASTATIN CALCIUM 10 MG/1
10 TABLET, FILM COATED ORAL DAILY
Qty: 90 TABLET | Refills: 3 | Status: SHIPPED | OUTPATIENT
Start: 2023-06-22 | End: 2023-10-02

## 2023-06-22 ASSESSMENT — ENCOUNTER SYMPTOMS
NAUSEA: 0
PARESTHESIAS: 0
DIARRHEA: 0
BREAST MASS: 0
FEVER: 0
ABDOMINAL PAIN: 0
DYSURIA: 0
CHILLS: 0
JOINT SWELLING: 0
SORE THROAT: 0
CONSTIPATION: 0
HEMATURIA: 0
HEARTBURN: 0
SHORTNESS OF BREATH: 0
HEMATOCHEZIA: 0
MYALGIAS: 0
WEAKNESS: 0
HEADACHES: 0
DIZZINESS: 0
ARTHRALGIAS: 0
PALPITATIONS: 0
NERVOUS/ANXIOUS: 0
FREQUENCY: 0
COUGH: 0
EYE PAIN: 0

## 2023-06-22 ASSESSMENT — ACTIVITIES OF DAILY LIVING (ADL): CURRENT_FUNCTION: NO ASSISTANCE NEEDED

## 2023-06-22 NOTE — PROGRESS NOTES
"SUBJECTIVE:   Malika is a 82 year old who presents for Preventive Visit.       No data to display              Are you in the first 12 months of your Medicare coverage?  No    Healthy Habits:     In general, how would you rate your overall health?  Excellent    Frequency of exercise:  2-3 days/week    Duration of exercise:  30-45 minutes    Do you usually eat at least 4 servings of fruit and vegetables a day, include whole grains    & fiber and avoid regularly eating high fat or \"junk\" foods?  Yes    Taking medications regularly:  Yes    Medication side effects:  Not applicable    Ability to successfully perform activities of daily living:  No assistance needed    Home Safety:  No safety concerns identified    Hearing Impairment:  No hearing concerns    In the past 6 months, have you been bothered by leaking of urine?  No    In general, how would you rate your overall mental or emotional health?  Excellent      PHQ-2 Total Score: 0    Problems:   1. HTN: blood pressure is well controlled, 115/69 recently at home.   2. 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     Current Outpatient Medications   Medication Sig Dispense Refill     aspirin (ASA) 81 MG EC tablet Take 1 tablet (81 mg) by mouth daily       atorvastatin (LIPITOR) 10 MG tablet Take 1 tablet (10 mg) by mouth daily 90 tablet 3     Calcium Carb-Cholecalciferol 600-800 MG-UNIT TABS        carvedilol (COREG) 12.5 MG tablet TAKE 1 TABLET IN THE MORNING AND 2 TABLETS (25 MG) IN THE EVENING 270 " tablet 3     cetirizine (ZYRTEC) 10 MG tablet Take 1 tablet (10 mg) by mouth daily 30 tablet 3     chlorthalidone (HYGROTON) 25 MG tablet Take 0.5 tablets (12.5 mg) by mouth daily 45 tablet 3     fish oil-omega-3 fatty acids 1000 MG capsule Take 2 g by mouth daily       fluticasone (FLONASE) 50 MCG/ACT nasal spray Spray 2 sprays into both nostrils daily 16 g 0     lisinopril (ZESTRIL) 20 MG tablet Take 1 tablet (20 mg) by mouth daily 90 tablet 3     MELATONIN PO Take 5 mg by mouth At Bedtime       multivitamin w/minerals (THERA-VIT-M) tablet Take 1 tablet by mouth daily       potassium chloride ER (KLOR-CON) 20 MEQ CR tablet Take 1 tablet (20 mEq) by mouth daily 90 tablet 3        Have you ever done Advance Care Planning? (For example, a Health Directive, POLST, or a discussion with a medical provider or your loved ones about your wishes): Yes, advance care planning is on file.       Fall risk  Fallen 2 or more times in the past year?: No  Any fall with injury in the past year?: No    Cognitive Screening   1) Repeat 3 items (Leader, Season, Table)    2) Clock draw: NORMAL  3) 3 item recall: Recalls 3 objects  Results: 3 items recalled: COGNITIVE IMPAIRMENT LESS LIKELY    Mini-CogTM Copyright S Debbie. Licensed by the author for use in [facility]; reprinted with permission ([email or web address]@.[email or web address]). All rights reserved.      Do you have sleep apnea, excessive snoring or daytime drowsiness?: no    Reviewed and updated as needed this visit by clinical staff                  Reviewed and updated as needed this visit by Provider                 Social History     Tobacco Use     Smoking status: Former     Packs/day: 0.50     Years: 3.00     Pack years: 1.50     Types: Cigarettes     Smokeless tobacco: Never   Substance Use Topics     Alcohol use: Yes     Comment: rarely, yearly             6/19/2023     1:21 PM   Alcohol Use   Prescreen: >3 drinks/day or >7 drinks/week? Not Applicable          No data to display               Do you have a current opioid prescription? No  Do you use any other controlled substances or medications that are not prescribed by a provider? None              Current providers sharing in care for this patient include:   Patient Care Team:  Karen Navarro MD as PCP - General (Internal Medicine)  Denia Blum CNP as Nurse Practitioner (Nurse Practitioner)  aMry Constantino RN as Registered Nurse  Karen Navarro MD as Assigned PCP  Goltz, Bennett Ezra, PA-C as Assigned Neuroscience Provider  Roselyn Reaves PA-C as Assigned Heart and Vascular Provider    The following health maintenance items are reviewed in Epic and correct as of today:  Health Maintenance   Topic Date Due     COVID-19 Vaccine (6 - Pfizer series) 03/23/2023     MEDICARE ANNUAL WELLNESS VISIT  04/26/2023     ANNUAL REVIEW OF HM ORDERS  04/26/2023     DEXA  04/25/2024     FALL RISK ASSESSMENT  06/22/2024     MAMMO SCREENING  03/27/2025     LIPID  02/24/2027     ADVANCE CARE PLANNING  04/26/2027     DTAP/TDAP/TD IMMUNIZATION (2 - Td or Tdap) 06/02/2027     PHQ-2 (once per calendar year)  Completed     INFLUENZA VACCINE  Completed     Pneumococcal Vaccine: 65+ Years  Completed     ZOSTER IMMUNIZATION  Completed     IPV IMMUNIZATION  Aged Out     MENINGITIS IMMUNIZATION  Aged Out             Pertinent mammograms are reviewed under the imaging tab.    Review of Systems   Constitutional: Negative for chills and fever.   HENT: Negative for congestion, ear pain, hearing loss and sore throat.    Eyes: Negative for pain and visual disturbance.   Respiratory: Negative for cough and shortness of breath.    Cardiovascular: Negative for chest pain, palpitations and peripheral edema.   Gastrointestinal: Negative for abdominal pain, constipation, diarrhea, heartburn, hematochezia and nausea.   Breasts:  Negative for tenderness, breast mass and discharge.   Genitourinary: Negative for dysuria, frequency, genital sores, hematuria, pelvic pain,  "urgency, vaginal bleeding and vaginal discharge.   Musculoskeletal: Negative for arthralgias, joint swelling and myalgias.   Skin: Negative for rash.   Neurological: Negative for dizziness, weakness, headaches and paresthesias.   Psychiatric/Behavioral: Negative for mood changes. The patient is not nervous/anxious.          OBJECTIVE:   /74   Pulse 88   Temp 97.5  F (36.4  C) (Tympanic)   Resp 13   Ht 1.588 m (5' 2.5\")   Wt 78.7 kg (173 lb 9.6 oz)   LMP  (LMP Unknown)   SpO2 96%   BMI 31.25 kg/m   Estimated body mass index is 32.5 kg/m  as calculated from the following:    Height as of 1/31/23: 1.588 m (5' 2.5\").    Weight as of 2/27/23: 81.9 kg (180 lb 8.9 oz).  Physical Exam    Lungs: clear  CV: normal S1, S2 without murmur, S3 or S4.   Abdomen: Bowel sounds normal, soft, nontender. No hepatosplenomegaly. No masses.   No edema  Pulses full, no carotid bruits        ASSESSMENT / PLAN:   (Z00.00) Encounter for annual wellness exam in Medicare patient  (primary encounter diagnosis)  Comment: up to date   Plan:     (I10) Benign essential hypertension  Comment: well controlled, continu meds   Plan: lisinopril (ZESTRIL) 20 MG tablet, potassium         chloride ER (KLOR-CON) 20 MEQ CR tablet,         chlorthalidone (HYGROTON) 25 MG tablet, Basic         metabolic panel  (Ca, Cl, CO2, Creat, Gluc, K,         Na, BUN), Albumin Random Urine Quantitative         with Creat Ratio          (R73.01) Elevated fasting blood sugar  Comment: recheck lab   Plan:     (E78.5) Hyperlipidemia LDL goal <130  Comment: stable on med, stable diet   Plan: atorvastatin (LIPITOR) 10 MG tablet, Lipid         panel reflex to direct LDL Fasting            (E87.6) Hypokalemia  Comment:   Plan: potassium chloride ER (KLOR-CON) 20 MEQ CR         tablet, Basic metabolic panel  (Ca, Cl, CO2,         Creat, Gluc, K, Na, BUN)              Patient has been advised of split billing requirements and indicates understanding: " "Yes      COUNSELING:  Reviewed preventive health counseling, as reflected in patient instructions      BMI:   Estimated body mass index is 32.5 kg/m  as calculated from the following:    Height as of 1/31/23: 1.588 m (5' 2.5\").    Weight as of 2/27/23: 81.9 kg (180 lb 8.9 oz).   Weight management plan: Discussed healthy diet and exercise guidelines      She reports that she has quit smoking. Her smoking use included cigarettes. She has a 1.50 pack-year smoking history. She has never used smokeless tobacco.      Appropriate preventive services were discussed with this patient, including applicable screening as appropriate for cardiovascular disease, diabetes, osteopenia/osteoporosis, and glaucoma.  As appropriate for age/gender, discussed screening for colorectal cancer, prostate cancer, breast cancer, and cervical cancer. Checklist reviewing preventive services available has been given to the patient.    Reviewed patients plan of care and provided an AVS. The Basic Care Plan (routine screening as documented in Health Maintenance) for Malika meets the Care Plan requirement. This Care Plan has been established and reviewed with the Patient.      Karen Navarro MD  [facility]    Identified Health Risks:    I have reviewed Opioid Use Disorder and Substance Use Disorder risk factors and made any needed referrals.     "

## 2023-06-26 ENCOUNTER — MYC MEDICAL ADVICE (OUTPATIENT)
Dept: INTERNAL MEDICINE | Facility: CLINIC | Age: 83
End: 2023-06-26
Payer: COMMERCIAL

## 2023-06-28 LAB
MDC_IDC_LEAD_IMPLANT_DT: NORMAL
MDC_IDC_LEAD_IMPLANT_DT: NORMAL
MDC_IDC_LEAD_LOCATION: NORMAL
MDC_IDC_LEAD_LOCATION: NORMAL
MDC_IDC_LEAD_LOCATION_DETAIL_1: NORMAL
MDC_IDC_LEAD_LOCATION_DETAIL_1: NORMAL
MDC_IDC_LEAD_MFG: NORMAL
MDC_IDC_LEAD_MFG: NORMAL
MDC_IDC_LEAD_MODEL: NORMAL
MDC_IDC_LEAD_MODEL: NORMAL
MDC_IDC_LEAD_POLARITY_TYPE: NORMAL
MDC_IDC_LEAD_POLARITY_TYPE: NORMAL
MDC_IDC_LEAD_SERIAL: NORMAL
MDC_IDC_LEAD_SERIAL: NORMAL
MDC_IDC_MSMT_BATTERY_DTM: NORMAL
MDC_IDC_MSMT_BATTERY_IMPEDANCE: 1529 OHM
MDC_IDC_MSMT_BATTERY_REMAINING_LONGEVITY: 49 MO
MDC_IDC_MSMT_BATTERY_STATUS: NORMAL
MDC_IDC_MSMT_BATTERY_VOLTAGE: 2.76 V
MDC_IDC_MSMT_LEADCHNL_RA_IMPEDANCE_VALUE: 606 OHM
MDC_IDC_MSMT_LEADCHNL_RA_PACING_THRESHOLD_AMPLITUDE: 0.88 V
MDC_IDC_MSMT_LEADCHNL_RA_PACING_THRESHOLD_PULSEWIDTH: 0.4 MS
MDC_IDC_MSMT_LEADCHNL_RV_IMPEDANCE_VALUE: 720 OHM
MDC_IDC_MSMT_LEADCHNL_RV_PACING_THRESHOLD_AMPLITUDE: 0.75 V
MDC_IDC_MSMT_LEADCHNL_RV_PACING_THRESHOLD_PULSEWIDTH: 0.4 MS
MDC_IDC_PG_IMPLANT_DTM: NORMAL
MDC_IDC_PG_MFG: NORMAL
MDC_IDC_PG_MODEL: NORMAL
MDC_IDC_PG_SERIAL: NORMAL
MDC_IDC_PG_TYPE: NORMAL
MDC_IDC_SESS_CLINIC_NAME: NORMAL
MDC_IDC_SESS_DTM: NORMAL
MDC_IDC_SESS_TYPE: NORMAL
MDC_IDC_SET_BRADY_AT_MODE_SWITCH_MODE: NORMAL
MDC_IDC_SET_BRADY_AT_MODE_SWITCH_RATE: 175 {BEATS}/MIN
MDC_IDC_SET_BRADY_LOWRATE: 60 {BEATS}/MIN
MDC_IDC_SET_BRADY_MAX_SENSOR_RATE: 130 {BEATS}/MIN
MDC_IDC_SET_BRADY_MAX_TRACKING_RATE: 130 {BEATS}/MIN
MDC_IDC_SET_BRADY_MODE: NORMAL
MDC_IDC_SET_BRADY_PAV_DELAY_LOW: 200 MS
MDC_IDC_SET_BRADY_SAV_DELAY_LOW: 180 MS
MDC_IDC_SET_LEADCHNL_RA_PACING_AMPLITUDE: 1.88
MDC_IDC_SET_LEADCHNL_RA_PACING_CAPTURE_MODE: NORMAL
MDC_IDC_SET_LEADCHNL_RA_PACING_POLARITY: NORMAL
MDC_IDC_SET_LEADCHNL_RA_PACING_PULSEWIDTH: 0.4 MS
MDC_IDC_SET_LEADCHNL_RA_SENSING_POLARITY: NORMAL
MDC_IDC_SET_LEADCHNL_RA_SENSING_SENSITIVITY: 1 MV
MDC_IDC_SET_LEADCHNL_RV_PACING_AMPLITUDE: 2 V
MDC_IDC_SET_LEADCHNL_RV_PACING_CAPTURE_MODE: NORMAL
MDC_IDC_SET_LEADCHNL_RV_PACING_POLARITY: NORMAL
MDC_IDC_SET_LEADCHNL_RV_PACING_PULSEWIDTH: 0.4 MS
MDC_IDC_SET_LEADCHNL_RV_SENSING_POLARITY: NORMAL
MDC_IDC_SET_LEADCHNL_RV_SENSING_SENSITIVITY: 2.8 MV
MDC_IDC_SET_ZONE_DETECTION_INTERVAL: 333.33 MS
MDC_IDC_SET_ZONE_DETECTION_INTERVAL: 342.86 MS
MDC_IDC_SET_ZONE_TYPE: NORMAL
MDC_IDC_SET_ZONE_TYPE: NORMAL
MDC_IDC_STAT_AT_BURDEN_PERCENT: 0 %
MDC_IDC_STAT_AT_DTM_END: NORMAL
MDC_IDC_STAT_AT_DTM_START: NORMAL
MDC_IDC_STAT_AT_MODE_SW_COUNT: 4
MDC_IDC_STAT_BRADY_AP_VP_PERCENT: 82 %
MDC_IDC_STAT_BRADY_AP_VS_PERCENT: 0 %
MDC_IDC_STAT_BRADY_AS_VP_PERCENT: 18 %
MDC_IDC_STAT_BRADY_AS_VS_PERCENT: 0 %
MDC_IDC_STAT_BRADY_DTM_END: NORMAL
MDC_IDC_STAT_BRADY_DTM_START: NORMAL
MDC_IDC_STAT_EPISODE_RECENT_COUNT: 0
MDC_IDC_STAT_EPISODE_RECENT_COUNT: 0
MDC_IDC_STAT_EPISODE_RECENT_COUNT_DTM_END: NORMAL
MDC_IDC_STAT_EPISODE_RECENT_COUNT_DTM_END: NORMAL
MDC_IDC_STAT_EPISODE_RECENT_COUNT_DTM_START: NORMAL
MDC_IDC_STAT_EPISODE_RECENT_COUNT_DTM_START: NORMAL
MDC_IDC_STAT_EPISODE_TYPE: NORMAL
MDC_IDC_STAT_EPISODE_TYPE: NORMAL

## 2023-08-03 ENCOUNTER — TELEPHONE (OUTPATIENT)
Dept: INTERNAL MEDICINE | Facility: CLINIC | Age: 83
End: 2023-08-03
Payer: COMMERCIAL

## 2023-08-03 NOTE — TELEPHONE ENCOUNTER
This is a form regarding over-the-counter medications and supplements.  I need to know the exact name of the eyedrops, sinus medicine, pain reliever and allergy medicine that she is taking including the doses and the frequency.  Also please indicate if a doctor has recommended she take these things and if so what condition is she treating with them.

## 2023-08-03 NOTE — TELEPHONE ENCOUNTER
[facility] [place] * OTC medecation verification request received via fax     Forms in [facility] mail box for review and signature.

## 2023-08-08 NOTE — TELEPHONE ENCOUNTER
Patient calls back to report taking:    - Soothe XP eye drops - 115mg -- patient takes two drops per day in each eye  - Pataday eye drops - 2.5ml - patient takes one drop per day in each eye  These drops have been prescribed by Dr. Castaneda at [facility].    - Flonase nasal spray 15.8mg - patient takes one spray once per day in each nostril  - Zyrtec 10mg patient takes once per day    - Tylenol Extra Strength  500mg -- Patient takes two tablets twice per day  - Volteran 100g cream - patient applies as needed.    - Fish Oil 1200mg - patient takes one tablet twice per day  - Calcium with Vit D. 600mg with 500 units - takes one tablet twice per day  - Equate womens multivitamin patient takes one tablet daily    Thank you,  Case Cadet, Triage RN [facility]  8:53 AM 8/8/2023

## 2023-08-17 ENCOUNTER — OFFICE VISIT (OUTPATIENT)
Dept: URGENT CARE | Facility: URGENT CARE | Age: 83
End: 2023-08-17
Payer: COMMERCIAL

## 2023-08-17 ENCOUNTER — HOSPITAL ENCOUNTER (OUTPATIENT)
Dept: CT IMAGING | Facility: CLINIC | Age: 83
Discharge: HOME | End: 2023-08-17
Attending: FAMILY MEDICINE | Admitting: FAMILY MEDICINE
Payer: COMMERCIAL

## 2023-08-17 ENCOUNTER — OFFICE VISIT (OUTPATIENT)
Dept: PEDIATRICS | Facility: CLINIC | Age: 83
End: 2023-08-17
Payer: COMMERCIAL

## 2023-08-17 VITALS
TEMPERATURE: 97.7 F | RESPIRATION RATE: 14 BRPM | WEIGHT: 173 LBS | OXYGEN SATURATION: 99 % | DIASTOLIC BLOOD PRESSURE: 82 MMHG | BODY MASS INDEX: 31.14 KG/M2 | SYSTOLIC BLOOD PRESSURE: 142 MMHG | HEART RATE: 83 BPM

## 2023-08-17 VITALS
OXYGEN SATURATION: 97 % | HEART RATE: 72 BPM | TEMPERATURE: 98 F | SYSTOLIC BLOOD PRESSURE: 140 MMHG | DIASTOLIC BLOOD PRESSURE: 82 MMHG

## 2023-08-17 DIAGNOSIS — R10.32 LLQ ABDOMINAL PAIN: ICD-10-CM

## 2023-08-17 DIAGNOSIS — K57.32 DIVERTICULITIS OF COLON: Primary | ICD-10-CM

## 2023-08-17 DIAGNOSIS — R10.32 LLQ ABDOMINAL PAIN: Primary | ICD-10-CM

## 2023-08-17 LAB
ALBUMIN SERPL BCG-MCNC: 4 G/DL (ref 3.5–5.2)
ALBUMIN UR-MCNC: NEGATIVE MG/DL
ALP SERPL-CCNC: 97 U/L (ref 35–104)
ALT SERPL W P-5'-P-CCNC: ABNORMAL U/L
ANION GAP SERPL CALCULATED.3IONS-SCNC: 11 MMOL/L (ref 7–15)
APPEARANCE UR: CLEAR
AST SERPL W P-5'-P-CCNC: 41 U/L (ref 0–45)
BASOPHILS # BLD AUTO: 0.1 10E3/UL (ref 0–0.2)
BASOPHILS NFR BLD AUTO: 1 %
BILIRUB SERPL-MCNC: 1.2 MG/DL
BILIRUB UR QL STRIP: NEGATIVE
BUN SERPL-MCNC: 11 MG/DL (ref 8–23)
CALCIUM SERPL-MCNC: 9.5 MG/DL (ref 8.8–10.2)
CHLORIDE SERPL-SCNC: 96 MMOL/L (ref 98–107)
COLOR UR AUTO: YELLOW
CREAT SERPL-MCNC: 0.55 MG/DL (ref 0.51–0.95)
CRP SERPL-MCNC: 20.49 MG/L
DEPRECATED HCO3 PLAS-SCNC: 25 MMOL/L (ref 22–29)
EOSINOPHIL # BLD AUTO: 0.1 10E3/UL (ref 0–0.7)
EOSINOPHIL NFR BLD AUTO: 2 %
ERYTHROCYTE [DISTWIDTH] IN BLOOD BY AUTOMATED COUNT: 12.1 % (ref 10–15)
GFR SERPL CREATININE-BSD FRML MDRD: >90 ML/MIN/1.73M2
GLUCOSE SERPL-MCNC: 100 MG/DL (ref 70–99)
GLUCOSE UR STRIP-MCNC: NEGATIVE MG/DL
HCT VFR BLD AUTO: 44.1 % (ref 35–47)
HGB BLD-MCNC: 15.4 G/DL (ref 11.7–15.7)
HGB UR QL STRIP: NEGATIVE
IMM GRANULOCYTES # BLD: 0 10E3/UL
IMM GRANULOCYTES NFR BLD: 0 %
KETONES UR STRIP-MCNC: NEGATIVE MG/DL
LEUKOCYTE ESTERASE UR QL STRIP: NEGATIVE
LIPASE SERPL-CCNC: 32 U/L (ref 13–60)
LYMPHOCYTES # BLD AUTO: 1.8 10E3/UL (ref 0.8–5.3)
LYMPHOCYTES NFR BLD AUTO: 30 %
MCH RBC QN AUTO: 31.1 PG (ref 26.5–33)
MCHC RBC AUTO-ENTMCNC: 34.9 G/DL (ref 31.5–36.5)
MCV RBC AUTO: 89 FL (ref 78–100)
MONOCYTES # BLD AUTO: 0.5 10E3/UL (ref 0–1.3)
MONOCYTES NFR BLD AUTO: 7 %
NEUTROPHILS # BLD AUTO: 3.6 10E3/UL (ref 1.6–8.3)
NEUTROPHILS NFR BLD AUTO: 60 %
NITRATE UR QL: NEGATIVE
NRBC # BLD AUTO: 0 10E3/UL
NRBC BLD AUTO-RTO: 0 /100
PH UR STRIP: 6.5 [PH] (ref 5–7)
PLATELET # BLD AUTO: 201 10E3/UL (ref 150–450)
POTASSIUM SERPL-SCNC: 4.9 MMOL/L (ref 3.4–5.3)
PROT SERPL-MCNC: 7.2 G/DL (ref 6.4–8.3)
RBC # BLD AUTO: 4.95 10E6/UL (ref 3.8–5.2)
SODIUM SERPL-SCNC: 132 MMOL/L (ref 136–145)
SP GR UR STRIP: 1.01 (ref 1–1.03)
UROBILINOGEN UR STRIP-ACNC: 0.2 E.U./DL
WBC # BLD AUTO: 6.1 10E3/UL (ref 4–11)

## 2023-08-17 PROCEDURE — 86140 C-REACTIVE PROTEIN: CPT | Performed by: FAMILY MEDICINE

## 2023-08-17 PROCEDURE — 82247 BILIRUBIN TOTAL: CPT | Performed by: FAMILY MEDICINE

## 2023-08-17 PROCEDURE — 36415 COLL VENOUS BLD VENIPUNCTURE: CPT | Performed by: FAMILY MEDICINE

## 2023-08-17 PROCEDURE — 82040 ASSAY OF SERUM ALBUMIN: CPT | Performed by: FAMILY MEDICINE

## 2023-08-17 PROCEDURE — 85025 COMPLETE CBC W/AUTO DIFF WBC: CPT | Performed by: FAMILY MEDICINE

## 2023-08-17 PROCEDURE — 80048 BASIC METABOLIC PNL TOTAL CA: CPT | Performed by: FAMILY MEDICINE

## 2023-08-17 PROCEDURE — 84450 TRANSFERASE (AST) (SGOT): CPT | Performed by: FAMILY MEDICINE

## 2023-08-17 PROCEDURE — 74177 CT ABD & PELVIS W/CONTRAST: CPT

## 2023-08-17 PROCEDURE — 99207 REFERRAL TO ACUTE AND DIAGNOSTIC SERVICES: CPT | Performed by: NURSE PRACTITIONER

## 2023-08-17 PROCEDURE — 83690 ASSAY OF LIPASE: CPT | Performed by: FAMILY MEDICINE

## 2023-08-17 PROCEDURE — 250N000011 HC RX IP 250 OP 636: Performed by: FAMILY MEDICINE

## 2023-08-17 PROCEDURE — 84075 ASSAY ALKALINE PHOSPHATASE: CPT | Performed by: FAMILY MEDICINE

## 2023-08-17 PROCEDURE — 81003 URINALYSIS AUTO W/O SCOPE: CPT | Performed by: NURSE PRACTITIONER

## 2023-08-17 PROCEDURE — 84155 ASSAY OF PROTEIN SERUM: CPT | Performed by: FAMILY MEDICINE

## 2023-08-17 PROCEDURE — 99214 OFFICE O/P EST MOD 30 MIN: CPT | Performed by: FAMILY MEDICINE

## 2023-08-17 PROCEDURE — 250N000009 HC RX 250: Performed by: FAMILY MEDICINE

## 2023-08-17 RX ORDER — IOPAMIDOL 755 MG/ML
500 INJECTION, SOLUTION INTRAVASCULAR ONCE
Status: COMPLETED | OUTPATIENT
Start: 2023-08-17 | End: 2023-08-17

## 2023-08-17 RX ADMIN — IOPAMIDOL 86 ML: 755 INJECTION, SOLUTION INTRAVENOUS at 13:21

## 2023-08-17 RX ADMIN — SODIUM CHLORIDE 100 ML: 9 INJECTION, SOLUTION INTRAVENOUS at 13:21

## 2023-08-17 NOTE — PROGRESS NOTES
Chief Complaint   Patient presents with    Urgent Care     Present for LLQ abdominal pain that started yesterday.   (reports hx of diverticulitis).          ICD-10-CM    1. LLQ abdominal pain  R10.32 UA Macroscopic with reflex to Microscopic and Culture     Referral to Acute and Diagnostic Services (Day of diagnostic / First order acute)      Referring patient to [facility].  Spoke with Dr. Donaldo Rogers at the [facility] and he will be happy to see the patient.  Primary concern is to rule out diverticulitis or other cause of left lower quadrant pain.  She will need advanced imaging and laboratory work.      Medical Decision Making    A differential diagnosis for the abdominal pain includes but is not limited to: Gastritis, gastroenteritis, enteritis, colitis, irritable bowel disease, inflammatory bowel disease, appendicitis, viral infection, diverticulitis, ileus, bowel obstruction, volvulus, ileus, intussusception, gas pains, indigestion, gastroesophageal reflux, aortic pathology, urinary tract infection, pyelonephritis, cystitis, gastric ulcer, duodenal ulcer, viscus perforation, mesenteric ischemia, Crohn's disease, abdominal aortic dissection, ulcerative colitis, renal stone, biliary colic, cholecystitis, pancreatitis, abdominal hernia, internal hernia.     Results for orders placed or performed in visit on 08/17/23 (from the past 24 hour(s))   UA Macroscopic with reflex to Microscopic and Culture    Specimen: Urine, Clean Catch   Result Value Ref Range    Color Urine Yellow Colorless, Straw, Light Yellow, Yellow    Appearance Urine Clear Clear    Glucose Urine Negative Negative mg/dL    Bilirubin Urine Negative Negative    Ketones Urine Negative Negative mg/dL    Specific Gravity Urine 1.010 1.003 - 1.035    Blood Urine Negative Negative    pH Urine 6.5 5.0 - 7.0    Protein Albumin Urine Negative Negative mg/dL    Urobilinogen Urine 0.2 0.2, 1.0 E.U./dL    Nitrite Urine Negative Negative    Leukocyte  Esterase Urine Negative Negative    Narrative    Microscopic not indicated       Subjective     Malika Velasco is an 82 year old female who presents to clinic today for complaints of left lower quadrant pain that started last week and was intermittent for a couple of days then went away and now has recurred with worse pain than previously.  She reports having history of diverticuli.    ROS: 10 point ROS neg other than the symptoms noted above in the HPI.       Objective    BP (!) 142/82   Pulse 83   Temp 97.7  F (36.5  C) (Tympanic)   Resp 14   Wt 78.5 kg (173 lb)   LMP  (LMP Unknown)   SpO2 99%   BMI 31.14 kg/m    Nurses notes and VS have been reviewed.    Physical Exam         GENERAL APPEARANCE: healthy appearing, alert     RESP: lungs clear to auscultation - no rales, rhonchi or wheezes     CV: regular rates and rhythm, no murmurs, rubs, or gallop     ABDOMEN: Normal bowel sounds, left lower quadrant very tender     MS: extremities normal- no gross deformities noted; normal muscle tone.     SKIN: no suspicious lesions or rashes     PSYCH: normal thought process; no significant mood disturbance      LUISA Ross, CNP  [facility] Provider    The use of Dragon/Impakt Protective dictation services may have been used to construct the content in this note; any grammatical or spelling errors are non-intentional. Please contact the author of this note directly if you are in need of any clarification.

## 2023-08-17 NOTE — PROGRESS NOTES
Assessment & Plan     LLQ abdominal pain  - Referral to Acute and Diagnostic Services (Day of diagnostic / First order acute)  - CT Abdomen Pelvis w Contrast  - CRP inflammation  - CBC with platelets differential  - Lipase  - Comprehensive metabolic panel  - sodium chloride (PF) 0.9% PF flush 3 mL  - CRP inflammation  - CBC with platelets differential  - Lipase  - Comprehensive metabolic panel    Patient presenting with a second episode diverticulitis in her lifetime this is a mild course we discussed episodes of family due to viral illnesses and can resolve without antibiotics at this time she is in favor of doing an oral course of antibiotics in addition to doing the recommended dietary adjustments during this acute flare.  But signs of abscess on exam.  Blood work consistent with CT findings.  No signs of sepsis at this time.    Patient does not appear dehydrated    Donaldo Rogers MD   [facility]    Bri Daly is a 82 year old female who presents to clinic today for the following health issues:  Chief Complaint   Patient presents with    Abdominal Pain     HPI    Moderate abdominal pain lower quadrant of the left side moderate and comes and goes.  No vomiting or diarrhea or fever.  She does recall an episode of diverticulitis possibly 15 years ago.  She denies any other history of surgeries.  No exposures to illnesses.  She denies any urinary difficulties.      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  Added by Vicky Antony MSW Advance Care Planning Liaison      TRAVIS (obstructive sleep apnea)      Priority: Medium     Moderate on CPAP      Aortic valve sclerosis      Priority: Medium    Pacemaker 03/09/2017     Priority: Medium     For complete heart block    Overview:   -placed 11/21/1197 due to complete heart block with syncope  -generator change 5/21/2007 for KAREN  -KAREN reached 2/14/2015 - generator change 3/31/2015      Hyperlipidemia LDL goal <130 03/09/2017     Priority: Medium    Benign essential hypertension 03/09/2017     Priority: Medium    Osteopenia 03/09/2017     Priority: Medium    Back pain      Priority: Medium    Allergic rhinitis due to pollen 12/09/2015     Priority: Medium       Current Outpatient Medications   Medication    aspirin (ASA) 81 MG EC tablet    atorvastatin (LIPITOR) 10 MG tablet    Calcium Carb-Cholecalciferol 600-800 MG-UNIT TABS    carvedilol (COREG) 12.5 MG tablet    cetirizine (ZYRTEC) 10 MG tablet    chlorthalidone (HYGROTON) 25 MG tablet    fish oil-omega-3 fatty acids 1000 MG capsule    fluticasone (FLONASE) 50 MCG/ACT nasal spray    lisinopril (ZESTRIL) 20 MG tablet    MELATONIN PO    multivitamin w/minerals (THERA-VIT-M) tablet    potassium chloride ER (KLOR-CON) 20 MEQ CR tablet     Current Facility-Administered Medications   Medication    sodium chloride (PF) 0.9% PF flush 3 mL     Facility-Administered Medications Ordered in Other Visits   Medication    sodium chloride 0.9 % bag 500 mL for CT scan flush use           Objective    BP (!) 140/82 (BP Location: Left arm, Patient Position: Sitting)   Pulse 72   Temp 98  F (36.7  C)   LMP  (LMP Unknown)   SpO2 97%   Physical Exam       Abd: no guarding, LLQ as area of reported mild pain  GEN: no  yellowing of eyes/skin, no distress, normal mentation    Results for orders placed or performed during the hospital encounter of 08/17/23   CT Abdomen Pelvis w Contrast     Status: None (Preliminary result)    Narrative    CT ABDOMEN AND PELVIS WITH CONTRAST 8/17/2023 1:25 PM    CLINICAL HISTORY: Left lower quadrant abdominal pain, history of  diverticulitis 15 years ago.    TECHNIQUE: CT scan of the abdomen and pelvis was performed following  injection of IV contrast. Multiplanar reformats were obtained. Dose  reduction techniques were used.  CONTRAST: 86 mL Isovue-370    COMPARISON: PET/CT 3/1/2017.    FINDINGS:   LOWER CHEST: Basilar atelectasis.    HEPATOBILIARY: Normal.    PANCREAS: Normal.    SPLEEN: Normal.    ADRENAL GLANDS: Normal.    KIDNEYS/BLADDER: Normal.    BOWEL: Acute diverticulitis at the sigmoid colon along the left lower  quadrant, series 3 image 139. No abscess at this time. There is  adjacent small fluid layering into the pelvis.    PELVIC ORGANS: Small pelvic fluid. Nodule along the anterior left  uterus may be a fibroid that is unchanged measuring 3.3 cm, series 3  image 156.    ADDITIONAL FINDINGS: None.    MUSCULOSKELETAL: Degenerative changes of the spine.      Impression    IMPRESSION: Acute sigmoid diverticulitis at the left lower quadrant.  No abscess at this time.   Results for orders placed or performed in visit on 08/17/23   CRP inflammation     Status: Abnormal   Result Value Ref Range    CRP Inflammation 20.49 (H) <5.00 mg/L   Lipase     Status: Normal   Result Value Ref Range    Lipase 32 13 - 60 U/L   Comprehensive metabolic panel     Status: Abnormal   Result Value Ref Range    Sodium 132 (L) 136 - 145 mmol/L    Potassium 4.9 3.4 - 5.3 mmol/L    Chloride 96 (L) 98 - 107 mmol/L    Carbon Dioxide (CO2) 25 22 - 29 mmol/L    Anion Gap 11 7 - 15 mmol/L    Urea Nitrogen 11.0 8.0 - 23.0 mg/dL    Creatinine 0.55 0.51 - 0.95 mg/dL    Calcium 9.5 8.8 - 10.2 mg/dL    Glucose 100 (H) 70 - 99  mg/dL    Alkaline Phosphatase 97 35 - 104 U/L    AST 41 0 - 45 U/L    ALT      Protein Total 7.2 6.4 - 8.3 g/dL    Albumin 4.0 3.5 - 5.2 g/dL    Bilirubin Total 1.2 <=1.2 mg/dL    GFR Estimate >90 >60 mL/min/1.73m2   CBC with platelets and differential     Status: None   Result Value Ref Range    WBC Count 6.1 4.0 - 11.0 10e3/uL    RBC Count 4.95 3.80 - 5.20 10e6/uL    Hemoglobin 15.4 11.7 - 15.7 g/dL    Hematocrit 44.1 35.0 - 47.0 %    MCV 89 78 - 100 fL    MCH 31.1 26.5 - 33.0 pg    MCHC 34.9 31.5 - 36.5 g/dL    RDW 12.1 10.0 - 15.0 %    Platelet Count 201 150 - 450 10e3/uL    % Neutrophils 60 %    % Lymphocytes 30 %    % Monocytes 7 %    % Eosinophils 2 %    % Basophils 1 %    % Immature Granulocytes 0 %    NRBCs per 100 WBC 0 <1 /100    Absolute Neutrophils 3.6 1.6 - 8.3 10e3/uL    Absolute Lymphocytes 1.8 0.8 - 5.3 10e3/uL    Absolute Monocytes 0.5 0.0 - 1.3 10e3/uL    Absolute Eosinophils 0.1 0.0 - 0.7 10e3/uL    Absolute Basophils 0.1 0.0 - 0.2 10e3/uL    Absolute Immature Granulocytes 0.0 <=0.4 10e3/uL    Absolute NRBCs 0.0 10e3/uL   CBC with platelets differential     Status: None    Narrative    The following orders were created for panel order CBC with platelets differential.  Procedure                               Abnormality         Status                     ---------                               -----------         ------                     CBC with platelets and d...[[id number]]                      Final result                 Please view results for these tests on the individual orders.   Results for orders placed or performed in visit on 08/17/23   UA Macroscopic with reflex to Microscopic and Culture     Status: Normal    Specimen: Urine, Clean Catch   Result Value Ref Range    Color Urine Yellow Colorless, Straw, Light Yellow, Yellow    Appearance Urine Clear Clear    Glucose Urine Negative Negative mg/dL    Bilirubin Urine Negative Negative    Ketones Urine Negative Negative mg/dL    Specific  Gravity Urine 1.010 1.003 - 1.035    Blood Urine Negative Negative    pH Urine 6.5 5.0 - 7.0    Protein Albumin Urine Negative Negative mg/dL    Urobilinogen Urine 0.2 0.2, 1.0 E.U./dL    Nitrite Urine Negative Negative    Leukocyte Esterase Urine Negative Negative    Narrative    Microscopic not indicated                     The use of Dragon/Proxeonation services may have been used to construct the content in this note; any grammatical or spelling errors are non-intentional. Please contact the author of this note directly if you are in need of any clarification.

## 2023-08-17 NOTE — PATIENT INSTRUCTIONS
Augmentin antibiotic twice a day for 5-7 days ( stop at day 5 if symptoms have resolved)       If symptoms worsen (pain, new fever, vomiting) please seek medical help right away

## 2023-08-29 ENCOUNTER — TELEPHONE (OUTPATIENT)
Dept: INTERNAL MEDICINE | Facility: CLINIC | Age: 83
End: 2023-08-29
Payer: COMMERCIAL

## 2023-08-29 NOTE — TELEPHONE ENCOUNTER
Kermit Horner [facility] verification of meds received via fax    Forms in [facility] mail box for review and signature.

## 2023-08-31 NOTE — TELEPHONE ENCOUNTER
This was done previously, see the note dated 8/3/2023.  Please try to find the other form and resend it.

## 2023-09-13 ENCOUNTER — IMMUNIZATION (OUTPATIENT)
Dept: INTERNAL MEDICINE | Facility: CLINIC | Age: 83
End: 2023-09-13
Payer: COMMERCIAL

## 2023-09-13 DIAGNOSIS — Z23 NEED FOR PROPHYLACTIC VACCINATION AND INOCULATION AGAINST INFLUENZA: Primary | ICD-10-CM

## 2023-09-13 PROCEDURE — 90662 IIV NO PRSV INCREASED AG IM: CPT

## 2023-09-13 PROCEDURE — 99207 PR NO CHARGE NURSE ONLY: CPT

## 2023-09-13 PROCEDURE — G0008 ADMIN INFLUENZA VIRUS VAC: HCPCS

## 2023-09-28 ENCOUNTER — ANCILLARY PROCEDURE (OUTPATIENT)
Dept: CARDIOLOGY | Facility: CLINIC | Age: 83
End: 2023-09-28
Attending: INTERNAL MEDICINE
Payer: COMMERCIAL

## 2023-09-28 DIAGNOSIS — Z95.0 CARDIAC PACEMAKER IN SITU: ICD-10-CM

## 2023-09-28 PROCEDURE — 93296 REM INTERROG EVL PM/IDS: CPT | Performed by: INTERNAL MEDICINE

## 2023-09-28 PROCEDURE — 93294 REM INTERROG EVL PM/LDLS PM: CPT | Performed by: INTERNAL MEDICINE

## 2023-10-02 ENCOUNTER — TELEPHONE (OUTPATIENT)
Dept: INTERNAL MEDICINE | Facility: CLINIC | Age: 83
End: 2023-10-02
Payer: COMMERCIAL

## 2023-10-02 DIAGNOSIS — I10 BENIGN ESSENTIAL HYPERTENSION: ICD-10-CM

## 2023-10-02 DIAGNOSIS — E78.5 HYPERLIPIDEMIA LDL GOAL <130: ICD-10-CM

## 2023-10-02 RX ORDER — ATORVASTATIN CALCIUM 10 MG/1
10 TABLET, FILM COATED ORAL DAILY
Qty: 10 TABLET | Refills: 0 | Status: SHIPPED | OUTPATIENT
Start: 2023-10-02 | End: 2024-01-12

## 2023-10-02 RX ORDER — CHLORTHALIDONE 25 MG/1
12.5 TABLET ORAL DAILY
Qty: 5 TABLET | Refills: 0 | Status: SHIPPED | OUTPATIENT
Start: 2023-10-02 | End: 2024-01-12

## 2023-10-02 RX ORDER — CARVEDILOL 12.5 MG/1
TABLET ORAL
Qty: 30 TABLET | Refills: 0 | Status: SHIPPED | OUTPATIENT
Start: 2023-10-02 | End: 2024-01-12

## 2023-10-02 NOTE — TELEPHONE ENCOUNTER
Attempted to contact pt. Phone is quick busy signal.     Refilled 10 days worth to local pharmacy

## 2023-10-02 NOTE — TELEPHONE ENCOUNTER
Patient calling with medication question. She did not order her refills of atorvastatin, carvedilol, and chlorthalidone from [facility] in time and they will not arrive until after she has run out of the above medications. She has enough of each medication through 10/3/23, but will need a few days worth of each until her prescriptions arrive in the mail around 10/9/23. Is it possible to send a temporary order through to the [facility] pharmacy for her?

## 2023-10-03 LAB
MDC_IDC_LEAD_IMPLANT_DT: NORMAL
MDC_IDC_LEAD_IMPLANT_DT: NORMAL
MDC_IDC_LEAD_LOCATION: NORMAL
MDC_IDC_LEAD_LOCATION: NORMAL
MDC_IDC_LEAD_LOCATION_DETAIL_1: NORMAL
MDC_IDC_LEAD_LOCATION_DETAIL_1: NORMAL
MDC_IDC_LEAD_MFG: NORMAL
MDC_IDC_LEAD_MFG: NORMAL
MDC_IDC_LEAD_MODEL: NORMAL
MDC_IDC_LEAD_MODEL: NORMAL
MDC_IDC_LEAD_POLARITY_TYPE: NORMAL
MDC_IDC_LEAD_POLARITY_TYPE: NORMAL
MDC_IDC_LEAD_SERIAL: NORMAL
MDC_IDC_LEAD_SERIAL: NORMAL
MDC_IDC_MSMT_BATTERY_DTM: NORMAL
MDC_IDC_MSMT_BATTERY_IMPEDANCE: 1704 OHM
MDC_IDC_MSMT_BATTERY_REMAINING_LONGEVITY: 45 MO
MDC_IDC_MSMT_BATTERY_STATUS: NORMAL
MDC_IDC_MSMT_BATTERY_VOLTAGE: 2.76 V
MDC_IDC_MSMT_LEADCHNL_RA_IMPEDANCE_VALUE: 677 OHM
MDC_IDC_MSMT_LEADCHNL_RA_PACING_THRESHOLD_AMPLITUDE: 0.38 V
MDC_IDC_MSMT_LEADCHNL_RA_PACING_THRESHOLD_PULSEWIDTH: 0.4 MS
MDC_IDC_MSMT_LEADCHNL_RV_IMPEDANCE_VALUE: 726 OHM
MDC_IDC_MSMT_LEADCHNL_RV_PACING_THRESHOLD_AMPLITUDE: 1.12 V
MDC_IDC_MSMT_LEADCHNL_RV_PACING_THRESHOLD_PULSEWIDTH: 0.4 MS
MDC_IDC_PG_IMPLANT_DTM: NORMAL
MDC_IDC_PG_MFG: NORMAL
MDC_IDC_PG_MODEL: NORMAL
MDC_IDC_PG_SERIAL: NORMAL
MDC_IDC_PG_TYPE: NORMAL
MDC_IDC_SESS_CLINIC_NAME: NORMAL
MDC_IDC_SESS_DTM: NORMAL
MDC_IDC_SESS_TYPE: NORMAL
MDC_IDC_SET_BRADY_AT_MODE_SWITCH_MODE: NORMAL
MDC_IDC_SET_BRADY_AT_MODE_SWITCH_RATE: 175 {BEATS}/MIN
MDC_IDC_SET_BRADY_LOWRATE: 60 {BEATS}/MIN
MDC_IDC_SET_BRADY_MAX_SENSOR_RATE: 130 {BEATS}/MIN
MDC_IDC_SET_BRADY_MAX_TRACKING_RATE: 130 {BEATS}/MIN
MDC_IDC_SET_BRADY_MODE: NORMAL
MDC_IDC_SET_BRADY_PAV_DELAY_LOW: 200 MS
MDC_IDC_SET_BRADY_SAV_DELAY_LOW: 180 MS
MDC_IDC_SET_LEADCHNL_RA_PACING_AMPLITUDE: 1.5 V
MDC_IDC_SET_LEADCHNL_RA_PACING_CAPTURE_MODE: NORMAL
MDC_IDC_SET_LEADCHNL_RA_PACING_POLARITY: NORMAL
MDC_IDC_SET_LEADCHNL_RA_PACING_PULSEWIDTH: 0.4 MS
MDC_IDC_SET_LEADCHNL_RA_SENSING_POLARITY: NORMAL
MDC_IDC_SET_LEADCHNL_RA_SENSING_SENSITIVITY: 1 MV
MDC_IDC_SET_LEADCHNL_RV_PACING_AMPLITUDE: 2.25 V
MDC_IDC_SET_LEADCHNL_RV_PACING_CAPTURE_MODE: NORMAL
MDC_IDC_SET_LEADCHNL_RV_PACING_POLARITY: NORMAL
MDC_IDC_SET_LEADCHNL_RV_PACING_PULSEWIDTH: 0.4 MS
MDC_IDC_SET_LEADCHNL_RV_SENSING_POLARITY: NORMAL
MDC_IDC_SET_LEADCHNL_RV_SENSING_SENSITIVITY: 2.8 MV
MDC_IDC_SET_ZONE_DETECTION_INTERVAL: 333.33 MS
MDC_IDC_SET_ZONE_DETECTION_INTERVAL: 342.86 MS
MDC_IDC_SET_ZONE_TYPE: NORMAL
MDC_IDC_SET_ZONE_TYPE: NORMAL
MDC_IDC_STAT_AT_BURDEN_PERCENT: 0 %
MDC_IDC_STAT_AT_DTM_END: NORMAL
MDC_IDC_STAT_AT_DTM_START: NORMAL
MDC_IDC_STAT_AT_MODE_SW_COUNT: 4
MDC_IDC_STAT_BRADY_AP_VP_PERCENT: 86 %
MDC_IDC_STAT_BRADY_AP_VS_PERCENT: 0 %
MDC_IDC_STAT_BRADY_AS_VP_PERCENT: 14 %
MDC_IDC_STAT_BRADY_AS_VS_PERCENT: 0 %
MDC_IDC_STAT_BRADY_DTM_END: NORMAL
MDC_IDC_STAT_BRADY_DTM_START: NORMAL
MDC_IDC_STAT_EPISODE_RECENT_COUNT: 0
MDC_IDC_STAT_EPISODE_RECENT_COUNT: 0
MDC_IDC_STAT_EPISODE_RECENT_COUNT_DTM_END: NORMAL
MDC_IDC_STAT_EPISODE_RECENT_COUNT_DTM_END: NORMAL
MDC_IDC_STAT_EPISODE_RECENT_COUNT_DTM_START: NORMAL
MDC_IDC_STAT_EPISODE_RECENT_COUNT_DTM_START: NORMAL
MDC_IDC_STAT_EPISODE_TYPE: NORMAL
MDC_IDC_STAT_EPISODE_TYPE: NORMAL

## 2023-10-06 ENCOUNTER — OFFICE VISIT (OUTPATIENT)
Dept: URGENT CARE | Facility: URGENT CARE | Age: 83
End: 2023-10-06
Payer: COMMERCIAL

## 2023-10-06 VITALS
TEMPERATURE: 96.8 F | DIASTOLIC BLOOD PRESSURE: 82 MMHG | RESPIRATION RATE: 16 BRPM | OXYGEN SATURATION: 97 % | BODY MASS INDEX: 31.14 KG/M2 | HEART RATE: 88 BPM | SYSTOLIC BLOOD PRESSURE: 140 MMHG | WEIGHT: 173 LBS

## 2023-10-06 DIAGNOSIS — J01.90 ACUTE SINUSITIS WITH SYMPTOMS > 10 DAYS: Primary | ICD-10-CM

## 2023-10-06 PROCEDURE — 99213 OFFICE O/P EST LOW 20 MIN: CPT | Performed by: PHYSICIAN ASSISTANT

## 2023-10-06 RX ORDER — AMOXICILLIN 875 MG
875 TABLET ORAL 2 TIMES DAILY
Qty: 20 TABLET | Refills: 0 | Status: SHIPPED | OUTPATIENT
Start: 2023-10-06 | End: 2023-10-16

## 2023-10-06 NOTE — PROGRESS NOTES
SUBJECTIVE:  Malika Velasco is a 82 year old female who comes with concerns for sinus infection.  Patient has had over 1 week history of URI related symptoms including sinus pain and pressure, mucus cough, runny nose.  No high fevers.  Denies any significant shortness of breath or chest pain.  No significant sore throat ear pain GI symptoms or rashes.  Has used over-the-counter med for symptomatic relief.  Did home COVID test which was negative.  Otherwise at baseline health.    Past Medical History:   Diagnosis Date    Allergic rhinitis     Anxiety     Aortic valve sclerosis     Back pain     Depressive disorder 2012    started meds stress    GERD (gastroesophageal reflux disease)     Heart block AV complete (H) 2008    pacemaker    Hyperlipidemia     Hypertension     Major depression     TRAVIS (obstructive sleep apnea)     moderate    Osteoarthritis     Pacemaker     Medtronic    PONV (postoperative nausea and vomiting)     Squamous cell carcinoma, face      Current Outpatient Medications   Medication    aspirin (ASA) 81 MG EC tablet    atorvastatin (LIPITOR) 10 MG tablet    Calcium Carb-Cholecalciferol 600-800 MG-UNIT TABS    carvedilol (COREG) 12.5 MG tablet    cetirizine (ZYRTEC) 10 MG tablet    chlorthalidone (HYGROTON) 25 MG tablet    fish oil-omega-3 fatty acids 1000 MG capsule    fluticasone (FLONASE) 50 MCG/ACT nasal spray    lisinopril (ZESTRIL) 20 MG tablet    MELATONIN PO    multivitamin w/minerals (THERA-VIT-M) tablet    potassium chloride ER (KLOR-CON) 20 MEQ CR tablet     Current Facility-Administered Medications   Medication    sodium chloride (PF) 0.9% PF flush 3 mL     Social History     Socioeconomic History    Marital status:      Spouse name: Not on file    Number of children: Not on file    Years of education: Not on file    Highest education level: Not on file   Occupational History    Not on file   Tobacco Use    Smoking status: Former     Packs/day: 0.50     Years: 3.00     Pack  years: 1.50     Types: Cigarettes     Start date: 1965     Quit date: 1968     Years since quittin.5    Smokeless tobacco: Never    Tobacco comments:     I quit so long ago i don't remember any problems   Vaping Use    Vaping Use: Never used   Substance and Sexual Activity    Alcohol use: Yes     Comment: rarely, yearly    Drug use: Never    Sexual activity: Yes   Other Topics Concern    Parent/sibling w/ CABG, MI or angioplasty before 65F 55M? No   Social History Narrative    Not on file     Social Determinants of Health     Financial Resource Strain: Not on file   Food Insecurity: Not on file   Transportation Needs: Not on file   Physical Activity: Not on file   Stress: Not on file   Social Connections: Not on file   Interpersonal Safety: Not on file   Housing Stability: Not on file     ROS  negative other then stated above  Exam:  GENERAL APPEARANCE: healthy, alert and no distress  EYES: EOMI,  PERRL  HENT: TMs and canals clear bilaterally.  Oral mucosa moist with no significant erythema noted.  She does have some postnasal drainage and nasal congestion noted.  Pressure in the sinus region.  NECK: no adenopathy, no asymmetry, masses, or scars and thyroid normal to palpation  RESP: lungs clear to auscultation - no rales, rhonchi or wheezes  CV: regular rates and rhythm, normal S1 S2, no S3 or S4 and no murmur, click or rub -  SKIN: no suspicious lesions or rashes    assessment/plan:  (J01.90) Acute sinusitis with symptoms > 10 days  (primary encounter diagnosis)  Comment:   Plan: amoxicillin (AMOXIL) 875 MG tablet        Patient with 1 week history of URI related symptoms now with symptoms consistent with sinusitis.  Amoxicillin as directed.  Advise continue with over-the-counter med for symptomatic relief.  Follow-up with primary symptoms worsen or new symptoms develop

## 2023-10-20 ENCOUNTER — IMMUNIZATION (OUTPATIENT)
Dept: INTERNAL MEDICINE | Facility: CLINIC | Age: 83
End: 2023-10-20
Payer: COMMERCIAL

## 2023-10-20 DIAGNOSIS — Z23 NEED FOR VACCINATION: Primary | ICD-10-CM

## 2023-10-20 DIAGNOSIS — Z23 HIGH PRIORITY FOR 2019-NCOV VACCINE: ICD-10-CM

## 2023-10-20 PROCEDURE — 99207 PR NO CHARGE NURSE ONLY: CPT

## 2023-10-20 PROCEDURE — 90480 ADMN SARSCOV2 VAC 1/ONLY CMP: CPT

## 2023-10-20 PROCEDURE — 91320 SARSCV2 VAC 30MCG TRS-SUC IM: CPT

## 2023-12-17 DIAGNOSIS — E78.5 HYPERLIPIDEMIA LDL GOAL <130: ICD-10-CM

## 2023-12-18 RX ORDER — ATORVASTATIN CALCIUM 10 MG/1
TABLET, FILM COATED ORAL
Qty: 90 TABLET | Refills: 3 | OUTPATIENT
Start: 2023-12-18

## 2024-01-10 NOTE — COMMUNITY RESOURCES LIST (ENGLISH)
01/10/2024   [facility]  N/A  For questions about this resource list or additional care needs, please contact your primary care clinic or care manager.  Phone: [phone]   Email: N/A   Address: [address] [place] [address]   Hours: N/A        Hotlines and Helplines       Hotline - Housing crisis  1  [facility] (Main Office) Distance: 7.31 miles      Phone/Virtual   [address] [place] [address]  Language: English  Hours: Mon - Sun Open 24 Hours   Phone: (428) 540-1548 Email: info@[employer] Website: http://[employer]     2  [facility] Distance: 14.23 miles      Phone/Virtual   [address] [place] [address]  Language: English  Hours: Mon - Sun Open 24 Hours   Phone: (664) 592-1255 Email: info@[employer] Website: http://www.[employer].org          Housing       Coordinated Entry access point  3  [facility] [employer] [facility] [place] ([facility] - [facility] Services Distance: 14.22 miles      In-Person   [address] [place] [address]  Language: English  Hours: Mon - Fri 9:00 AM - 5:00 PM  Fees: Free   Phone: (419) 602-8906 Email: housing@[facility].org Website: http://www.[facility].org/housing     4  [facility] Distance: 15.82 miles      In-Person, Phone/Virtual   [address] [place] [address]  Language: English  Hours: Mon - Fri 8:30 AM - 4:30 PM  Fees: Free   Phone: (840) 484-6496 Email: info@[facility].org Website: https://www.[facility].org/locations/[facility]-clinic/     Drop-in center or day shelter  5  [facility] Distance: 14.62 miles      In-Person   [address] [place] [address]  Language: English  Hours: Mon - Fri 12:00 PM - 3:00 PM  Fees: Free   Phone: (474) 133-3758 Email: [employer] Website: http://[employer].org/     6  [facility] Distance: 14.76 miles       In-Person   [address] [place] [address]  Language: English, New Zealander, Trinidadian  Hours: Mon - Sat 7:00 AM - 3:00 PM  Fees: Free, Self Pay   Phone: (222) 709-8204 Email: info@[employer] Website: https://www.[employer]/locations/opportunity-center/     Housing search assistance  7  [facility] - Rental Homes for Future Homebuyers Program Distance: 5.21 miles      Phone/Virtual   [address] [place] [address]  Language: English  Hours: Mon - Fri 8:00 AM - 4:30 PM  Fees: Free   Phone: (655) 651-2406 Email: hra@[facility] Website: https://www.[facility]/hra/[place]-housing-and-lycxzcjkaamzg-lvsshzzsg-xud     8  [facility] - Online Housing Search Assistance Distance: 12.64 miles      Phone/Virtual   [address] [place] [address]  Language: English  Hours: Mon - Sun Open 24 Hours  Fees: Free   Phone: (900) 941-1656 Email: findhoueyad@[employer] Website: https://[employer]/     Shelter for families  9  [facility] Distance: 8.65 miles      In-Person   [address] [place] [address]  Language: English  Hours: Mon - Sun Open 24 Hours  Fees: Free, Sliding Fee   Phone: (145) 245-8183 Ext.1 Email: info@[facility].[employer] Website: http://www.[facility].org     Shelter for individuals  10  [employer] [facility] [employer] Distance: 7.81 miles      In-Person   [address] [place] [address]  Language: English, Trinidadian  Hours: Mon - Fri 8:00 AM - 4:30 PM  Fees: Free   Phone: (792) 339-1858 Email: info@[facility].org Website: http://www.[facility].org     11  [employer] [facility] [place] [facility] [facility] [place] Distance: 12.41 miles      In-Person   [address] [place] [address]  Language: English, Trinidadian  Hours: Mon - Fri 8:00 AM - 4:30 PM  Fees: Free   Phone: (002)  546-6055 Email: info@[employer].org Website: https://www.capagency.org/          Important Numbers & Websites       Emergency Services   911  [facility]   311  Poison Control   (602) 100-7332  Suicide Prevention Lifeline   (742) 960-6018 (TALK)  Child Abuse Hotline   (673) 362-1714 (4-A-Child)  Sexual Assault Hotline   (669) 743-6532 (HOPE)  National Runaway Safeline   (365) 281-2497 (RUNAWAY)  All-Options Talkline   (246) 410-7992  Substance Abuse Referral   (801) 194-4056 (HELP)

## 2024-01-12 ENCOUNTER — OFFICE VISIT (OUTPATIENT)
Dept: INTERNAL MEDICINE | Facility: CLINIC | Age: 84
End: 2024-01-12
Payer: COMMERCIAL

## 2024-01-12 VITALS
SYSTOLIC BLOOD PRESSURE: 123 MMHG | TEMPERATURE: 96.4 F | OXYGEN SATURATION: 97 % | BODY MASS INDEX: 30.12 KG/M2 | RESPIRATION RATE: 18 BRPM | HEIGHT: 63 IN | HEART RATE: 92 BPM | WEIGHT: 170 LBS | DIASTOLIC BLOOD PRESSURE: 70 MMHG

## 2024-01-12 DIAGNOSIS — E78.5 HYPERLIPIDEMIA LDL GOAL <130: ICD-10-CM

## 2024-01-12 DIAGNOSIS — I44.2 THIRD DEGREE ATRIOVENTRICULAR BLOCK (H): ICD-10-CM

## 2024-01-12 DIAGNOSIS — R73.9 BLOOD SUGAR INCREASED: ICD-10-CM

## 2024-01-12 DIAGNOSIS — I10 HTN, GOAL BELOW 140/90: Primary | ICD-10-CM

## 2024-01-12 DIAGNOSIS — E87.6 HYPOKALEMIA: ICD-10-CM

## 2024-01-12 DIAGNOSIS — Z00.00 ROUTINE GENERAL MEDICAL EXAMINATION AT A HEALTH CARE FACILITY: ICD-10-CM

## 2024-01-12 PROCEDURE — 99214 OFFICE O/P EST MOD 30 MIN: CPT | Performed by: INTERNAL MEDICINE

## 2024-01-12 RX ORDER — LISINOPRIL 20 MG/1
20 TABLET ORAL DAILY
Qty: 90 TABLET | Refills: 1 | Status: SHIPPED | OUTPATIENT
Start: 2024-01-12 | End: 2024-07-30

## 2024-01-12 RX ORDER — ATORVASTATIN CALCIUM 10 MG/1
10 TABLET, FILM COATED ORAL DAILY
Qty: 90 TABLET | Refills: 1 | Status: SHIPPED | OUTPATIENT
Start: 2024-01-12 | End: 2024-07-02

## 2024-01-12 RX ORDER — CARVEDILOL 12.5 MG/1
TABLET ORAL
Qty: 270 TABLET | Refills: 1 | Status: SHIPPED | OUTPATIENT
Start: 2024-01-12 | End: 2024-06-24

## 2024-01-12 RX ORDER — POTASSIUM CHLORIDE 1500 MG/1
20 TABLET, EXTENDED RELEASE ORAL DAILY
Qty: 90 TABLET | Refills: 1 | Status: SHIPPED | OUTPATIENT
Start: 2024-01-12 | End: 2024-08-29

## 2024-01-12 RX ORDER — CHLORTHALIDONE 25 MG/1
12.5 TABLET ORAL DAILY
Qty: 45 TABLET | Refills: 1 | Status: SHIPPED | OUTPATIENT
Start: 2024-01-12 | End: 2024-06-24

## 2024-01-12 ASSESSMENT — PAIN SCALES - GENERAL: PAINLEVEL: NO PAIN (0)

## 2024-01-12 NOTE — PATIENT INSTRUCTIONS
Plan:  Continue same meds, same doses for now   2. Schedule ANNUAL EXAM after June 23, 2024  3. Please make a lab appointment for fasting labs  few days before   -- Make sure you drink plenty of water the day of the blood test.

## 2024-01-12 NOTE — PROGRESS NOTES
Dr Freeman's note      Patient's instructions / PLAN:                                                        Plan:  Continue same meds, same doses for now   2. Schedule ANNUAL EXAM after June 23, 2024  3. Please make a lab appointment for fasting labs  few days before   -- Make sure you drink plenty of water the day of the blood test.        ASSESSMENT & PLAN:                                                      (I10) HTN, goal below 140/90  (primary encounter diagnosis)  Comment: Controlled    Plan: carvedilol (COREG) 12.5 MG tablet,         chlorthalidone (HYGROTON) 25 MG tablet,         lisinopril (ZESTRIL) 20 MG tablet, potassium         chloride ER (KLOR-CON) 20 MEQ CR tablet            (E78.5) Hyperlipidemia LDL goal <130  Comment: Controlled    Plan: atorvastatin (LIPITOR) 10 MG tablet            (I44.2) Third-degree AV block status post pacemaker placement in 1997.  Comment:   Plan:     (E87.6) Hypokalemia  Comment:   Plan: potassium chloride ER (KLOR-CON) 20 MEQ CR         tablet               Chief complaint:                                                      Follow up chronic medical problems       SUBJECTIVE:                                                    History of present illness:    No acute c/o     Subjective   Malika is a 83 year old, presenting for the following health issues:  Establish Care and Recheck Medication      1/12/2024     7:09 AM   Additional Questions   Roomed by Katherine Phan       History of Present Illness       Hyperlipidemia:  She presents for follow up of hyperlipidemia.   She is taking medication to lower cholesterol. She is not having myalgia or other side effects to statin medications.    Hypertension: She presents for follow up of hypertension.  She does not check blood pressure  regularly outside of the clinic. Outside blood pressures have been over 140/90. She follows a low salt diet.     She eats 2-3 servings of fruits and vegetables daily.She consumes 1 sweetened  "beverage(s) daily.She exercises with enough effort to increase her heart rate 20 to 29 minutes per day.    She is taking medications regularly.         Review of Systems:                                                      ROS: negative for fever, chills, cough, wheezes, chest pain, shortness of breath, vomiting, abdominal pain, leg swelling   OBJECTIVE:             Physical exam:  Blood pressure 123/70, pulse 92, temperature (!) 96.4  F (35.8  C), temperature source Tympanic, resp. rate 18, height 1.588 m (5' 2.5\"), weight 77.1 kg (170 lb), SpO2 97%, not currently breastfeeding.   NAD, appears comfortable  Skin: no rashes   Neck: supple, no JVD,  No thyroidmegaly. Lymph nodes nonpalpable cervical and supraclavicular.  Chest: clear to auscultation bilaterally, good respiratory effort  Heart: S1 S2, RRR, no mgr appreciated  Abdomen: soft, not tender, no hepatosplenomegaly or masses appreciated, no abdominal bruit, present bowel sounds  Extremities: no edema,   Neurologic: A, Ox3, no focal signs appreciated    PMHx: reviewed  Past Medical History:   Diagnosis Date    Allergic rhinitis     Anxiety     Aortic valve sclerosis     Back pain     Depressive disorder 2012    started meds stress    GERD (gastroesophageal reflux disease)     Heart block AV complete (H) 2008    pacemaker    Hyperlipidemia     Hypertension     Major depression     TRAVIS (obstructive sleep apnea)     moderate    Osteoarthritis     Pacemaker     Medtronic    PONV (postoperative nausea and vomiting)     Squamous cell carcinoma, face       PSHx: reviewed  Past Surgical History:   Procedure Laterality Date    ARTHROPLASTY KNEE Right 03/08/2022    Procedure: Right total knee arthroplasty;  Surgeon: Garth Constantino MD;  Location: [facility] OR    ARTHROSCOPY KNEE Left     AS TOTAL KNEE ARTHROPLASTY Left 05/2015    CATARACT IOL, RT/LT Bilateral 2013    COLONOSCOPY  12/2012    normal    IMPLANT PACEMAKER  1997    TONSILLECTOMY      TUBAL LIGATION          Meds: " reviewed  Current Outpatient Medications   Medication Sig Dispense Refill    aspirin (ASA) 81 MG EC tablet Take 1 tablet (81 mg) by mouth daily      atorvastatin (LIPITOR) 10 MG tablet Take 1 tablet (10 mg) by mouth daily 10 tablet 0    Calcium Carb-Cholecalciferol 600-800 MG-UNIT TABS       carvedilol (COREG) 12.5 MG tablet TAKE 1 TABLET IN THE MORNING AND 2 TABLETS (25 MG) IN THE EVENING 30 tablet 0    cetirizine (ZYRTEC) 10 MG tablet Take 1 tablet (10 mg) by mouth daily 30 tablet 3    chlorthalidone (HYGROTON) 25 MG tablet Take 0.5 tablets (12.5 mg) by mouth daily 5 tablet 0    fish oil-omega-3 fatty acids 1000 MG capsule Take 2 g by mouth daily      fluticasone (FLONASE) 50 MCG/ACT nasal spray Spray 2 sprays into both nostrils daily 16 g 0    lisinopril (ZESTRIL) 20 MG tablet Take 1 tablet (20 mg) by mouth daily 90 tablet 3    MELATONIN PO Take 5 mg by mouth At Bedtime      multivitamin w/minerals (THERA-VIT-M) tablet Take 1 tablet by mouth daily      potassium chloride ER (KLOR-CON) 20 MEQ CR tablet Take 1 tablet (20 mEq) by mouth daily 90 tablet 3       Soc Hx: reviewed  Fam Hx: reviewed      Chart documentation was completed, in part, with Marley [employer]oon voice-recognition software. Even though reviewed, some grammatical, spelling, and word errors may remain.      Kendra Freeman MD  Internal Medicine

## 2024-01-22 ENCOUNTER — TELEPHONE (OUTPATIENT)
Dept: CARDIOLOGY | Facility: CLINIC | Age: 84
End: 2024-01-22
Payer: COMMERCIAL

## 2024-01-22 NOTE — TELEPHONE ENCOUNTER
Patient called stating that she has been experiencing an increase in fatigue and dizziness and wondering if it could have anything to do with her PPM. She will be sending a manual transmission for review.  LORENA MARTÍNEZ

## 2024-01-22 NOTE — TELEPHONE ENCOUNTER
Transmission received, results as below:    Medtronic Adapta (D) Manual Transmission PPM Device Check  AP: 86.1%    : 100%    Mode: DDDR     Presenting Rhythm: AP- 70s w/ PACs    Heart Rate: some variability from 60-90s    Sensing: ROEL    Pacing Threshold: WNL    Impedance: WNL    Battery Status: estimated 3.5 (1-5.5) years remaining until RRT    Atrial Arrhythmia: none    Ventricular Arrhythmia: none    Stable device check. No episodes logged. Nothing to explain her fatigue or dizziness. She is aware to follow up with her primary regarding her symptoms.  LORENA MARTÍNEZ

## 2024-02-02 ASSESSMENT — SLEEP AND FATIGUE QUESTIONNAIRES
HOW LIKELY ARE YOU TO NOD OFF OR FALL ASLEEP WHEN YOU ARE A PASSENGER IN A CAR FOR AN HOUR WITHOUT A BREAK: MODERATE CHANCE OF DOZING
HOW LIKELY ARE YOU TO NOD OFF OR FALL ASLEEP WHILE WATCHING TV: MODERATE CHANCE OF DOZING
HOW LIKELY ARE YOU TO NOD OFF OR FALL ASLEEP WHILE SITTING QUIETLY AFTER LUNCH WITHOUT ALCOHOL: SLIGHT CHANCE OF DOZING
HOW LIKELY ARE YOU TO NOD OFF OR FALL ASLEEP WHILE SITTING INACTIVE IN A PUBLIC PLACE: SLIGHT CHANCE OF DOZING
HOW LIKELY ARE YOU TO NOD OFF OR FALL ASLEEP IN A CAR, WHILE STOPPED FOR A FEW MINUTES IN TRAFFIC: SLIGHT CHANCE OF DOZING
HOW LIKELY ARE YOU TO NOD OFF OR FALL ASLEEP WHILE SITTING AND READING: SLIGHT CHANCE OF DOZING
HOW LIKELY ARE YOU TO NOD OFF OR FALL ASLEEP WHILE SITTING AND TALKING TO SOMEONE: SLIGHT CHANCE OF DOZING
HOW LIKELY ARE YOU TO NOD OFF OR FALL ASLEEP WHILE LYING DOWN TO REST IN THE AFTERNOON WHEN CIRCUMSTANCES PERMIT: MODERATE CHANCE OF DOZING

## 2024-02-08 ENCOUNTER — OFFICE VISIT (OUTPATIENT)
Dept: SLEEP MEDICINE | Facility: CLINIC | Age: 84
End: 2024-02-08
Payer: COMMERCIAL

## 2024-02-08 VITALS
DIASTOLIC BLOOD PRESSURE: 79 MMHG | SYSTOLIC BLOOD PRESSURE: 133 MMHG | WEIGHT: 173.5 LBS | BODY MASS INDEX: 31.23 KG/M2 | HEART RATE: 82 BPM | OXYGEN SATURATION: 94 %

## 2024-02-08 DIAGNOSIS — G47.33 OSA (OBSTRUCTIVE SLEEP APNEA): Primary | ICD-10-CM

## 2024-02-08 PROCEDURE — 99213 OFFICE O/P EST LOW 20 MIN: CPT | Performed by: PHYSICIAN ASSISTANT

## 2024-02-08 NOTE — NURSING NOTE
"Chief Complaint   Patient presents with    Sleep Problem     CPAP follow up       Initial /79   Pulse 82   Wt 78.7 kg (173 lb 8 oz)   LMP  (LMP Unknown)   SpO2 94%   BMI 31.23 kg/m   Estimated body mass index is 31.23 kg/m  as calculated from the following:    Height as of 1/12/24: 1.588 m (5' 2.5\").    Weight as of this encounter: 78.7 kg (173 lb 8 oz).    Medication Reconciliation: complete    DME: [facility]    ESS 11    MARISA 5    Cristian Whitfield CMA (AAMA)  "

## 2024-02-08 NOTE — PROGRESS NOTES
CPAP Follow-Up Visit:    Date on this visit: 2/8/2024    Malika Velasco has a  follow-up of her treatment of TRAVIS and insomnia. She was initially seen at the [facility] for management of previously diagnosed TRAVIS. She has not been sleeping well for several months.  Her medical history is significant for HTN, aortic valve sclerosis, pacemaker for AV block, hyperlipidemia, OA.       Malika had a PSG at [facility] on 1/20/2005. Her AHI was 18/hr, RDI 34/hr, O2 ankit 83%. Her weight was 139. She did not have any significant PLMs.        PAP machine: Respironics Dreamstation 2. Pressure settings: 10-16 cm.    Auto-PAP 10.0 - 16.0 cmH2O 30 day usage data:    83% of days with > 4 hours of use. 0/30 days with no use.   Average use 433 minutes per day.   Average leak 27.74 LPM.  Average % of night in large leak 0%.    CPAP 90% pressure 11cm.   AHI 4.27 events per hour.      She has no concerns other than occasionally having a prolonged awakening. They are random and not too often.         No specialty comments available.    Do you use a CPAP Machine at home: Yes  Overall, on a scale of 0-10 how would you rate your CPAP (0 poor, 10 great): 10    What type of mask do you use: Full Face Mask Simplus  Is your mask comfortable: Yes  If not, why:    How often do you replace supplies: on schedule    Is your mask leaking: No  If yes, where do you feel it:    How many night per week does the mask leak (0-7):      Do you notice snoring with mask on: No not observed  Do you notice gasping arousals with mask on: No  Are you having significant oral or nasal dryness: Yes, occasional congestion and will use nasacort. Also has room humidifier  Are you using the humidifier: yes  Does the water chamber run out before the night is over:no  Do you get condensation in the mask or hose:no  Is the pressure setting comfortable: Yes  If not, why:      Typical bedtime: around 11:00 p.m.  Sleep latency on PAP therapy: 10  minutes or less  Typical wake time: 6:30a.m.  Wakes 0-2 times per night for 5 minutes. Reason for waking: restroom  How many hours on average per night are you using PAP therapy: 7-8  How many hours are you sleeping per night:    Do you feel well rested in the morning: Yes    Naps: no.         Weight change since sleep study: 173 lbs      Past medical/surgical history, family history, social history, medications and allergies were reviewed.      Problem List:  Patient Active Problem List    Diagnosis Date Noted    Third-degree AV block status post pacemaker placement in 1997. 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  Added by Vicky Antony MSW Advance Care Planning Liaison      TRAVIS (obstructive sleep apnea)      Priority: Medium     Moderate on CPAP      Aortic valve sclerosis      Priority: Medium    Pacemaker 03/09/2017     Priority: Medium     For complete heart block    Overview:   -placed 11/21/1197 due to complete heart block with syncope  -generator change 5/21/2007 for KAREN  -KAREN reached 2/14/2015 - generator change 3/31/2015      Hyperlipidemia LDL goal <130 03/09/2017     Priority: Medium    HTN, goal below 140/90 03/09/2017     Priority: Medium    Osteopenia 03/09/2017     Priority: Medium    Back pain      Priority:  Medium    Allergic rhinitis due to pollen 12/09/2015     Priority: Medium        Impression/Plan:    (G47.33) TRAVIS (obstructive sleep apnea)  (primary encounter diagnosis)  Comment: Malika is using her CPAP regularly and benefits from use. She has no major concerns today. She has infrequent prolonged awakenings.  Plan: Comprehensive DME        Continue auto CPAP 10-16 cm. A prescription was written for new supplies. We reviewed recommendations for cleaning and replacing supplies. We talked about CPAP sterilizing devices. She would be due for a replacement machine at the end of the year if needed. We talked about making sure to keep a consistent sleep schedule to avoid perpetuating insomnia. She does a good job of that in general.       She will follow up with me in about 2 year(s).     17 minutes were spent on the date of the encounter doing chart review, history and exam, documentation and further activities as noted above.     Bennett Goltz, PA-C    CC: No ref. provider found

## 2024-02-21 ENCOUNTER — OFFICE VISIT (OUTPATIENT)
Dept: CARDIOLOGY | Facility: CLINIC | Age: 84
End: 2024-02-21
Payer: COMMERCIAL

## 2024-02-21 ENCOUNTER — ANCILLARY PROCEDURE (OUTPATIENT)
Dept: CARDIOLOGY | Facility: CLINIC | Age: 84
End: 2024-02-21
Payer: COMMERCIAL

## 2024-02-21 VITALS
HEIGHT: 63 IN | WEIGHT: 171.3 LBS | HEART RATE: 82 BPM | BODY MASS INDEX: 30.35 KG/M2 | OXYGEN SATURATION: 96 % | DIASTOLIC BLOOD PRESSURE: 80 MMHG | SYSTOLIC BLOOD PRESSURE: 126 MMHG

## 2024-02-21 DIAGNOSIS — Z95.0 CARDIAC PACEMAKER IN SITU: Primary | ICD-10-CM

## 2024-02-21 DIAGNOSIS — I44.2 ATRIOVENTRICULAR BLOCK, COMPLETE (H): ICD-10-CM

## 2024-02-21 DIAGNOSIS — I10 BENIGN ESSENTIAL HYPERTENSION: ICD-10-CM

## 2024-02-21 DIAGNOSIS — Z95.0 CARDIAC PACEMAKER IN SITU: ICD-10-CM

## 2024-02-21 DIAGNOSIS — Z95.0 S/P PLACEMENT OF CARDIAC PACEMAKER: ICD-10-CM

## 2024-02-21 PROCEDURE — 93280 PM DEVICE PROGR EVAL DUAL: CPT | Performed by: INTERNAL MEDICINE

## 2024-02-21 PROCEDURE — 99214 OFFICE O/P EST MOD 30 MIN: CPT | Performed by: INTERNAL MEDICINE

## 2024-02-21 NOTE — LETTER
2/21/2024    Kendra Durand MD  [address] [facility] [address]    RE: Malika Velasco       Dear Colleague,     I had the pleasure of seeing Malika Velasco in the [facility].  HISTORY OF PRESENT ILLNESS:  Malika Velasco a 83 year old woman with third-degree AV block (status post permanent pacemaker implantation), coronary artery disease (previous CT coronary angiogram showing nonobstructive plaque in LAD in 2009), obstructive sleep apnea, hypertension, and dyslipidemia was seen today at your request for followup       PAST MEDICAL HISTORY:    1.  Third-degree AV block -- status post pacemaker implantation in 1997.  Most recent generator replacement 03/03/2015.  Device interrogated 09/15/2022 and functioning within normal limits.  In-person followup in December.  2.  Hypertension -- currently on carvedilol and hydrochlorothiazide.  3.  Coronary artery disease -- diagnosed with a CT scan in 2009.  On statin therapy.  LDL therapeutic.  4.  Dyslipidemia.  5. Obstructive Sleep Apnea       Orders this Visit:  No orders of the defined types were placed in this encounter.    No orders of the defined types were placed in this encounter.    There are no discontinued medications.    No diagnosis found.    CURRENT MEDICATIONS:  Current Outpatient Medications   Medication Sig Dispense Refill    aspirin (ASA) 81 MG EC tablet Take 1 tablet (81 mg) by mouth daily      atorvastatin (LIPITOR) 10 MG tablet Take 1 tablet (10 mg) by mouth daily 90 tablet 1    Calcium Carb-Cholecalciferol 600-800 MG-UNIT TABS       carvedilol (COREG) 12.5 MG tablet TAKE 1 TABLET IN THE MORNING AND 2 TABLETS (25 MG) IN THE EVENING 270 tablet 1    cetirizine (ZYRTEC) 10 MG tablet Take 1 tablet (10 mg) by mouth daily 30 tablet 3    chlorthalidone (HYGROTON) 25 MG tablet Take 0.5 tablets (12.5 mg) by mouth daily 45 tablet 1    fish oil-omega-3 fatty acids 1000 MG capsule Take 2 g by mouth daily       "lisinopril (ZESTRIL) 20 MG tablet Take 1 tablet (20 mg) by mouth daily 90 tablet 1    MELATONIN PO Take 5 mg by mouth At Bedtime      multivitamin w/minerals (THERA-VIT-M) tablet Take 1 tablet by mouth daily      potassium chloride ER (KLOR-CON) 20 MEQ CR tablet Take 1 tablet (20 mEq) by mouth daily 90 tablet 1    fluticasone (FLONASE) 50 MCG/ACT nasal spray Spray 2 sprays into both nostrils daily (Patient not taking: Reported on 2/21/2024) 16 g 0       ALLERGIES     Allergies   Allergen Reactions    Zantac [Ranitidine]      Headache       PAST MEDICAL, SURGICAL, FAMILY, SOCIAL HISTORY:  History was reviewed and updated as needed, see medical record.        Review of Systems:  A 12-point review of systems was completed, see medical record for detailed review of systems information.    Physical Exam:  Vitals: /80 (BP Location: Right arm, Patient Position: Sitting, Cuff Size: Adult Regular)   Pulse 82   Ht 1.588 m (5' 2.5\")   Wt 77.7 kg (171 lb 4.8 oz)   LMP  (LMP Unknown)   SpO2 96%   BMI 30.83 kg/m      Constitutional: No apparent distress    HEENT: pupils equal round reactive to light, thyroid normal size, JVP is normal    Chest: Clear to percussion and auscultation    Cardiac: Normal S1, normal S2 no S3, no murmur or click          ASSESSMENT: Malika Velasco is a 83 year old female with          We reviewed the benefits of a regular exercise program, a Mediterranean-style weight loss diet, and contacting us for any changes in between now and the time of her next visit.     RECOMMENDATIONS:   1) continue present medications  2) continue regular  exercise program  3) Mediterranean  style weight loss   4)  Follow up in one year        Recent Lab Results:  LIPID RESULTS:  Lab Results   Component Value Date    CHOL 140 06/22/2023    CHOL 125 02/09/2021    HDL 48 (L) 06/22/2023    HDL 48 (L) 02/09/2021    LDL 45 06/22/2023    LDL 44 02/09/2021    TRIG 236 (H) 06/22/2023    TRIG 166 (H) 02/09/2021 " "      LIVER ENZYME RESULTS:  Lab Results   Component Value Date    AST 41 08/17/2023    AST 31 03/01/2017    ALT  08/17/2023      Comment:      Unsatisfactory specimen - hemolyzed        Reference intervals for this test were updated on 6/12/2023 to more accurately reflect our healthy population. There may be differences in the flagging of prior results with similar values performed with this method. Interpretation of those prior results can be made in the context of the updated reference intervals.      ALT 5 04/10/2018       CBC RESULTS:  Lab Results   Component Value Date    WBC 6.1 08/17/2023    WBC 10.8 01/14/2020    RBC 4.95 08/17/2023    RBC 5.17 01/14/2020    HGB 15.4 08/17/2023    HGB 13.9 01/14/2020    HCT 44.1 08/17/2023    HCT 41.4 01/14/2020    MCV 89 08/17/2023    MCV 80 01/14/2020    MCH 31.1 08/17/2023    MCH 26.9 01/14/2020    MCHC 34.9 08/17/2023    MCHC 33.6 01/14/2020    RDW 12.1 08/17/2023    RDW 14.7 01/14/2020     08/17/2023     01/14/2020       BMP RESULTS:  Lab Results   Component Value Date     (L) 08/17/2023     02/09/2021    POTASSIUM 4.9 08/17/2023    POTASSIUM 3.9 03/09/2022    POTASSIUM 4.2 02/09/2021    CHLORIDE 96 (L) 08/17/2023    CHLORIDE 100 03/09/2022    CHLORIDE 101 02/09/2021    CO2 25 08/17/2023    CO2 23 03/09/2022    CO2 27 02/09/2021    ANIONGAP 11 08/17/2023    ANIONGAP 8 03/09/2022    ANIONGAP 5 02/09/2021     (H) 08/17/2023    GLC 95 03/09/2022    GLC 95 03/09/2022     (H) 02/09/2021    BUN 11.0 08/17/2023    BUN 23 03/09/2022    BUN 13 02/09/2021    CR 0.55 08/17/2023    CR 0.66 02/09/2021    GFRESTIMATED >90 08/17/2023    GFRESTIMATED 85 02/09/2021    GFRESTBLACK >90 02/09/2021    SAVANNAH 9.5 08/17/2023    SAVANNAH 9.7 02/09/2021        A1C RESULTS:  No results found for: \"A1C\"    INR RESULTS:  No results found for: \"INR\"    We greatly appreciate the opportunity to be involved in the care of your patient, Malika OTTO" Rod.    Sincerely,  Tal Santacruz MD      CC  Tal Santacruz MD  [address]  [place] [id number]

## 2024-02-21 NOTE — PROGRESS NOTES
HISTORY OF PRESENT ILLNESS:  Malika Velasco a 83 year old woman with third-degree AV block (status post permanent pacemaker implantation), coronary artery disease (previous CT coronary angiogram showing nonobstructive plaque in LAD in 2009), obstructive sleep apnea, hypertension, and dyslipidemia was seen today at your request for followup     During our last visit in November 2022, we had noted her blood pressure was not well-controlled, and begin lisinopril.  The patient responded very well to lisinopril and her blood pressures are now very well-controlled.  She follows a Mediterranean-style low carbohydrate diet and remains active.  She is able to tolerate a CPAP device which has helped her obstructive sleep apnea.  Device interrogation shows that her pacemaker continues to function normally and she is followed on a regular basis in our device clinic.      PAST MEDICAL HISTORY:    1.  Third-degree AV block -- status post pacemaker implantation in 1997.  Most recent generator replacement 03/03/2015.  Device interrogated 09/15/2022 and functioning within normal limits.  In-person followup in December.  2.  Hypertension -- currently on carvedilol and hydrochlorothiazide.  3.  Coronary artery disease -- diagnosed with a CT scan in 2009.  On statin therapy.  LDL therapeutic.  4.  Dyslipidemia.  5. Obstructive Sleep Apnea       Orders this Visit:  No orders of the defined types were placed in this encounter.    No orders of the defined types were placed in this encounter.    There are no discontinued medications.    No diagnosis found.    CURRENT MEDICATIONS:  Current Outpatient Medications   Medication Sig Dispense Refill    aspirin (ASA) 81 MG EC tablet Take 1 tablet (81 mg) by mouth daily      atorvastatin (LIPITOR) 10 MG tablet Take 1 tablet (10 mg) by mouth daily 90 tablet 1    Calcium Carb-Cholecalciferol 600-800 MG-UNIT TABS       carvedilol (COREG) 12.5 MG tablet TAKE 1 TABLET IN THE MORNING AND 2 TABLETS  "(25 MG) IN THE EVENING 270 tablet 1    cetirizine (ZYRTEC) 10 MG tablet Take 1 tablet (10 mg) by mouth daily 30 tablet 3    chlorthalidone (HYGROTON) 25 MG tablet Take 0.5 tablets (12.5 mg) by mouth daily 45 tablet 1    fish oil-omega-3 fatty acids 1000 MG capsule Take 2 g by mouth daily      lisinopril (ZESTRIL) 20 MG tablet Take 1 tablet (20 mg) by mouth daily 90 tablet 1    MELATONIN PO Take 5 mg by mouth At Bedtime      multivitamin w/minerals (THERA-VIT-M) tablet Take 1 tablet by mouth daily      potassium chloride ER (KLOR-CON) 20 MEQ CR tablet Take 1 tablet (20 mEq) by mouth daily 90 tablet 1    fluticasone (FLONASE) 50 MCG/ACT nasal spray Spray 2 sprays into both nostrils daily (Patient not taking: Reported on 2/21/2024) 16 g 0       ALLERGIES     Allergies   Allergen Reactions    Zantac [Ranitidine]      Headache       PAST MEDICAL, SURGICAL, FAMILY, SOCIAL HISTORY:  History was reviewed and updated as needed, see medical record.        Review of Systems:  A 12-point review of systems was completed, see medical record for detailed review of systems information.    Physical Exam:  Vitals: /80 (BP Location: Right arm, Patient Position: Sitting, Cuff Size: Adult Regular)   Pulse 82   Ht 1.588 m (5' 2.5\")   Wt 77.7 kg (171 lb 4.8 oz)   LMP  (LMP Unknown)   SpO2 96%   BMI 30.83 kg/m      Constitutional: No apparent distress    HEENT: pupils equal round reactive to light, thyroid normal size, JVP is normal    Chest: Clear to percussion and auscultation    Cardiac: Normal S1, normal S2 no S3, soft 1/6 systolic  murmur unchanged  Legs no edema          ASSESSMENT: The patient is asymptomatic with optimal blood pressure and LDL cholesterol levels on guideline directed medical therapy for coronary artery disease and hypertension.  Her pacemaker device functions normally and she is receiving close follow-up in clinic.  I encouraged her to continue a regular exercise program and a Mediterranean-style diet.  " Will plan to follow-up in 1 year.    RECOMMENDATIONS:   1) continue present medications  2) continue regular  exercise program  3) Mediterranean  style weight loss   4)  Follow up in one year        Recent Lab Results:  LIPID RESULTS:  Lab Results   Component Value Date    CHOL 140 06/22/2023    CHOL 125 02/09/2021    HDL 48 (L) 06/22/2023    HDL 48 (L) 02/09/2021    LDL 45 06/22/2023    LDL 44 02/09/2021    TRIG 236 (H) 06/22/2023    TRIG 166 (H) 02/09/2021       LIVER ENZYME RESULTS:  Lab Results   Component Value Date    AST 41 08/17/2023    AST 31 03/01/2017    ALT  08/17/2023      Comment:      Unsatisfactory specimen - hemolyzed        Reference intervals for this test were updated on 6/12/2023 to more accurately reflect our healthy population. There may be differences in the flagging of prior results with similar values performed with this method. Interpretation of those prior results can be made in the context of the updated reference intervals.      ALT 5 04/10/2018       CBC RESULTS:  Lab Results   Component Value Date    WBC 6.1 08/17/2023    WBC 10.8 01/14/2020    RBC 4.95 08/17/2023    RBC 5.17 01/14/2020    HGB 15.4 08/17/2023    HGB 13.9 01/14/2020    HCT 44.1 08/17/2023    HCT 41.4 01/14/2020    MCV 89 08/17/2023    MCV 80 01/14/2020    MCH 31.1 08/17/2023    MCH 26.9 01/14/2020    MCHC 34.9 08/17/2023    MCHC 33.6 01/14/2020    RDW 12.1 08/17/2023    RDW 14.7 01/14/2020     08/17/2023     01/14/2020       BMP RESULTS:  Lab Results   Component Value Date     (L) 08/17/2023     02/09/2021    POTASSIUM 4.9 08/17/2023    POTASSIUM 3.9 03/09/2022    POTASSIUM 4.2 02/09/2021    CHLORIDE 96 (L) 08/17/2023    CHLORIDE 100 03/09/2022    CHLORIDE 101 02/09/2021    CO2 25 08/17/2023    CO2 23 03/09/2022    CO2 27 02/09/2021    ANIONGAP 11 08/17/2023    ANIONGAP 8 03/09/2022    ANIONGAP 5 02/09/2021     (H) 08/17/2023    GLC 95 03/09/2022    GLC 95 03/09/2022     (H)  "02/09/2021    BUN 11.0 08/17/2023    BUN 23 03/09/2022    BUN 13 02/09/2021    CR 0.55 08/17/2023    CR 0.66 02/09/2021    GFRESTIMATED >90 08/17/2023    GFRESTIMATED 85 02/09/2021    GFRESTBLACK >90 02/09/2021    SAVANNAH 9.5 08/17/2023    SAVANNAH 9.7 02/09/2021        A1C RESULTS:  No results found for: \"A1C\"    INR RESULTS:  No results found for: \"INR\"    We greatly appreciate the opportunity to be involved in the care of your patient, Malika Velasco.    Sincerely,  Tal Santacruz MD        Tal Santacruz MD  [address]  [place] [id number]                                                                     "

## 2024-02-26 LAB
MDC_IDC_LEAD_CONNECTION_STATUS: NORMAL
MDC_IDC_LEAD_CONNECTION_STATUS: NORMAL
MDC_IDC_LEAD_IMPLANT_DT: NORMAL
MDC_IDC_LEAD_IMPLANT_DT: NORMAL
MDC_IDC_LEAD_LOCATION: NORMAL
MDC_IDC_LEAD_LOCATION: NORMAL
MDC_IDC_LEAD_LOCATION_DETAIL_1: NORMAL
MDC_IDC_LEAD_LOCATION_DETAIL_1: NORMAL
MDC_IDC_LEAD_MFG: NORMAL
MDC_IDC_LEAD_MFG: NORMAL
MDC_IDC_LEAD_MODEL: NORMAL
MDC_IDC_LEAD_MODEL: NORMAL
MDC_IDC_LEAD_POLARITY_TYPE: NORMAL
MDC_IDC_LEAD_POLARITY_TYPE: NORMAL
MDC_IDC_LEAD_SERIAL: NORMAL
MDC_IDC_LEAD_SERIAL: NORMAL
MDC_IDC_MSMT_BATTERY_DTM: NORMAL
MDC_IDC_MSMT_BATTERY_IMPEDANCE: 1911 OHM
MDC_IDC_MSMT_BATTERY_REMAINING_LONGEVITY: 41 MO
MDC_IDC_MSMT_BATTERY_STATUS: NORMAL
MDC_IDC_MSMT_BATTERY_VOLTAGE: 2.75 V
MDC_IDC_MSMT_LEADCHNL_RA_IMPEDANCE_VALUE: 623 OHM
MDC_IDC_MSMT_LEADCHNL_RA_PACING_THRESHOLD_AMPLITUDE: 0.38 V
MDC_IDC_MSMT_LEADCHNL_RA_PACING_THRESHOLD_AMPLITUDE: 0.5 V
MDC_IDC_MSMT_LEADCHNL_RA_PACING_THRESHOLD_PULSEWIDTH: 0.4 MS
MDC_IDC_MSMT_LEADCHNL_RA_PACING_THRESHOLD_PULSEWIDTH: 0.4 MS
MDC_IDC_MSMT_LEADCHNL_RA_SENSING_INTR_AMPL: 2.8 MV
MDC_IDC_MSMT_LEADCHNL_RV_IMPEDANCE_VALUE: 660 OHM
MDC_IDC_MSMT_LEADCHNL_RV_PACING_THRESHOLD_AMPLITUDE: 0.75 V
MDC_IDC_MSMT_LEADCHNL_RV_PACING_THRESHOLD_AMPLITUDE: 1 V
MDC_IDC_MSMT_LEADCHNL_RV_PACING_THRESHOLD_PULSEWIDTH: 0.4 MS
MDC_IDC_MSMT_LEADCHNL_RV_PACING_THRESHOLD_PULSEWIDTH: 0.4 MS
MDC_IDC_PG_IMPLANT_DTM: NORMAL
MDC_IDC_PG_MFG: NORMAL
MDC_IDC_PG_MODEL: NORMAL
MDC_IDC_PG_SERIAL: NORMAL
MDC_IDC_PG_TYPE: NORMAL
MDC_IDC_SESS_CLINIC_NAME: NORMAL
MDC_IDC_SESS_DTM: NORMAL
MDC_IDC_SESS_TYPE: NORMAL
MDC_IDC_SET_BRADY_AT_MODE_SWITCH_MODE: NORMAL
MDC_IDC_SET_BRADY_AT_MODE_SWITCH_RATE: 175 {BEATS}/MIN
MDC_IDC_SET_BRADY_LOWRATE: 60 {BEATS}/MIN
MDC_IDC_SET_BRADY_MAX_SENSOR_RATE: 130 {BEATS}/MIN
MDC_IDC_SET_BRADY_MAX_TRACKING_RATE: 130 {BEATS}/MIN
MDC_IDC_SET_BRADY_MODE: NORMAL
MDC_IDC_SET_BRADY_PAV_DELAY_LOW: 200 MS
MDC_IDC_SET_BRADY_SAV_DELAY_LOW: 180 MS
MDC_IDC_SET_LEADCHNL_RA_PACING_AMPLITUDE: 1.5 V
MDC_IDC_SET_LEADCHNL_RA_PACING_CAPTURE_MODE: NORMAL
MDC_IDC_SET_LEADCHNL_RA_PACING_POLARITY: NORMAL
MDC_IDC_SET_LEADCHNL_RA_PACING_PULSEWIDTH: 0.4 MS
MDC_IDC_SET_LEADCHNL_RA_SENSING_POLARITY: NORMAL
MDC_IDC_SET_LEADCHNL_RA_SENSING_SENSITIVITY: 1 MV
MDC_IDC_SET_LEADCHNL_RV_PACING_AMPLITUDE: 2 V
MDC_IDC_SET_LEADCHNL_RV_PACING_CAPTURE_MODE: NORMAL
MDC_IDC_SET_LEADCHNL_RV_PACING_POLARITY: NORMAL
MDC_IDC_SET_LEADCHNL_RV_PACING_PULSEWIDTH: 0.4 MS
MDC_IDC_SET_LEADCHNL_RV_SENSING_POLARITY: NORMAL
MDC_IDC_SET_LEADCHNL_RV_SENSING_SENSITIVITY: 2.8 MV
MDC_IDC_SET_ZONE_DETECTION_INTERVAL: 333.33 MS
MDC_IDC_SET_ZONE_DETECTION_INTERVAL: 342.86 MS
MDC_IDC_SET_ZONE_STATUS: NORMAL
MDC_IDC_SET_ZONE_STATUS: NORMAL
MDC_IDC_SET_ZONE_TYPE: NORMAL
MDC_IDC_SET_ZONE_TYPE: NORMAL
MDC_IDC_SET_ZONE_VENDOR_TYPE: NORMAL
MDC_IDC_SET_ZONE_VENDOR_TYPE: NORMAL
MDC_IDC_STAT_AT_BURDEN_PERCENT: 0 %
MDC_IDC_STAT_AT_DTM_END: NORMAL
MDC_IDC_STAT_AT_DTM_START: NORMAL
MDC_IDC_STAT_AT_MODE_SW_COUNT: 7
MDC_IDC_STAT_BRADY_AP_VP_PERCENT: 86 %
MDC_IDC_STAT_BRADY_AP_VS_PERCENT: 0 %
MDC_IDC_STAT_BRADY_AS_VP_PERCENT: 14 %
MDC_IDC_STAT_BRADY_AS_VS_PERCENT: 0 %
MDC_IDC_STAT_BRADY_DTM_END: NORMAL
MDC_IDC_STAT_BRADY_DTM_START: NORMAL
MDC_IDC_STAT_EPISODE_RECENT_COUNT: 0
MDC_IDC_STAT_EPISODE_RECENT_COUNT: 0
MDC_IDC_STAT_EPISODE_RECENT_COUNT_DTM_END: NORMAL
MDC_IDC_STAT_EPISODE_RECENT_COUNT_DTM_END: NORMAL
MDC_IDC_STAT_EPISODE_RECENT_COUNT_DTM_START: NORMAL
MDC_IDC_STAT_EPISODE_RECENT_COUNT_DTM_START: NORMAL
MDC_IDC_STAT_EPISODE_TYPE: NORMAL
MDC_IDC_STAT_EPISODE_TYPE: NORMAL

## 2024-06-05 ENCOUNTER — ANCILLARY PROCEDURE (OUTPATIENT)
Dept: CARDIOLOGY | Facility: CLINIC | Age: 84
End: 2024-06-05
Attending: INTERNAL MEDICINE
Payer: COMMERCIAL

## 2024-06-05 DIAGNOSIS — Z95.0 CARDIAC PACEMAKER IN SITU: ICD-10-CM

## 2024-06-05 DIAGNOSIS — Z95.0 CARDIAC PACEMAKER IN SITU: Primary | ICD-10-CM

## 2024-06-05 DIAGNOSIS — I44.2 ATRIOVENTRICULAR BLOCK, COMPLETE (H): ICD-10-CM

## 2024-06-05 LAB
MDC_IDC_LEAD_CONNECTION_STATUS: NORMAL
MDC_IDC_LEAD_CONNECTION_STATUS: NORMAL
MDC_IDC_LEAD_IMPLANT_DT: NORMAL
MDC_IDC_LEAD_IMPLANT_DT: NORMAL
MDC_IDC_LEAD_LOCATION: NORMAL
MDC_IDC_LEAD_LOCATION: NORMAL
MDC_IDC_LEAD_LOCATION_DETAIL_1: NORMAL
MDC_IDC_LEAD_LOCATION_DETAIL_1: NORMAL
MDC_IDC_LEAD_MFG: NORMAL
MDC_IDC_LEAD_MFG: NORMAL
MDC_IDC_LEAD_MODEL: NORMAL
MDC_IDC_LEAD_MODEL: NORMAL
MDC_IDC_LEAD_POLARITY_TYPE: NORMAL
MDC_IDC_LEAD_POLARITY_TYPE: NORMAL
MDC_IDC_LEAD_SERIAL: NORMAL
MDC_IDC_LEAD_SERIAL: NORMAL
MDC_IDC_MSMT_BATTERY_DTM: NORMAL
MDC_IDC_MSMT_BATTERY_IMPEDANCE: 1984 OHM
MDC_IDC_MSMT_BATTERY_REMAINING_LONGEVITY: 39 MO
MDC_IDC_MSMT_BATTERY_STATUS: NORMAL
MDC_IDC_MSMT_BATTERY_VOLTAGE: 2.75 V
MDC_IDC_MSMT_LEADCHNL_RA_IMPEDANCE_VALUE: 685 OHM
MDC_IDC_MSMT_LEADCHNL_RA_PACING_THRESHOLD_AMPLITUDE: 0.38 V
MDC_IDC_MSMT_LEADCHNL_RA_PACING_THRESHOLD_PULSEWIDTH: 0.4 MS
MDC_IDC_MSMT_LEADCHNL_RV_IMPEDANCE_VALUE: 668 OHM
MDC_IDC_MSMT_LEADCHNL_RV_PACING_THRESHOLD_AMPLITUDE: 1.12 V
MDC_IDC_MSMT_LEADCHNL_RV_PACING_THRESHOLD_PULSEWIDTH: 0.4 MS
MDC_IDC_PG_IMPLANT_DTM: NORMAL
MDC_IDC_PG_MFG: NORMAL
MDC_IDC_PG_MODEL: NORMAL
MDC_IDC_PG_SERIAL: NORMAL
MDC_IDC_PG_TYPE: NORMAL
MDC_IDC_SESS_CLINIC_NAME: NORMAL
MDC_IDC_SESS_DTM: NORMAL
MDC_IDC_SESS_TYPE: NORMAL
MDC_IDC_SET_BRADY_AT_MODE_SWITCH_MODE: NORMAL
MDC_IDC_SET_BRADY_AT_MODE_SWITCH_RATE: 175 {BEATS}/MIN
MDC_IDC_SET_BRADY_LOWRATE: 60 {BEATS}/MIN
MDC_IDC_SET_BRADY_MAX_SENSOR_RATE: 130 {BEATS}/MIN
MDC_IDC_SET_BRADY_MAX_TRACKING_RATE: 130 {BEATS}/MIN
MDC_IDC_SET_BRADY_MODE: NORMAL
MDC_IDC_SET_BRADY_PAV_DELAY_LOW: 200 MS
MDC_IDC_SET_BRADY_SAV_DELAY_LOW: 180 MS
MDC_IDC_SET_LEADCHNL_RA_PACING_AMPLITUDE: 1.5 V
MDC_IDC_SET_LEADCHNL_RA_PACING_CAPTURE_MODE: NORMAL
MDC_IDC_SET_LEADCHNL_RA_PACING_POLARITY: NORMAL
MDC_IDC_SET_LEADCHNL_RA_PACING_PULSEWIDTH: 0.4 MS
MDC_IDC_SET_LEADCHNL_RA_SENSING_POLARITY: NORMAL
MDC_IDC_SET_LEADCHNL_RA_SENSING_SENSITIVITY: 1 MV
MDC_IDC_SET_LEADCHNL_RV_PACING_AMPLITUDE: 2.25 V
MDC_IDC_SET_LEADCHNL_RV_PACING_CAPTURE_MODE: NORMAL
MDC_IDC_SET_LEADCHNL_RV_PACING_POLARITY: NORMAL
MDC_IDC_SET_LEADCHNL_RV_PACING_PULSEWIDTH: 0.4 MS
MDC_IDC_SET_LEADCHNL_RV_SENSING_POLARITY: NORMAL
MDC_IDC_SET_LEADCHNL_RV_SENSING_SENSITIVITY: 2.8 MV
MDC_IDC_SET_ZONE_DETECTION_INTERVAL: 333.33 MS
MDC_IDC_SET_ZONE_DETECTION_INTERVAL: 342.86 MS
MDC_IDC_SET_ZONE_STATUS: NORMAL
MDC_IDC_SET_ZONE_STATUS: NORMAL
MDC_IDC_SET_ZONE_TYPE: NORMAL
MDC_IDC_SET_ZONE_TYPE: NORMAL
MDC_IDC_SET_ZONE_VENDOR_TYPE: NORMAL
MDC_IDC_SET_ZONE_VENDOR_TYPE: NORMAL
MDC_IDC_STAT_AT_BURDEN_PERCENT: 0 %
MDC_IDC_STAT_AT_DTM_END: NORMAL
MDC_IDC_STAT_AT_DTM_START: NORMAL
MDC_IDC_STAT_AT_MODE_SW_COUNT: 1
MDC_IDC_STAT_BRADY_AP_VP_PERCENT: 82 %
MDC_IDC_STAT_BRADY_AP_VS_PERCENT: 0 %
MDC_IDC_STAT_BRADY_AS_VP_PERCENT: 18 %
MDC_IDC_STAT_BRADY_AS_VS_PERCENT: 0 %
MDC_IDC_STAT_BRADY_DTM_END: NORMAL
MDC_IDC_STAT_BRADY_DTM_START: NORMAL
MDC_IDC_STAT_EPISODE_RECENT_COUNT: 0
MDC_IDC_STAT_EPISODE_RECENT_COUNT: 1
MDC_IDC_STAT_EPISODE_RECENT_COUNT_DTM_END: NORMAL
MDC_IDC_STAT_EPISODE_RECENT_COUNT_DTM_END: NORMAL
MDC_IDC_STAT_EPISODE_RECENT_COUNT_DTM_START: NORMAL
MDC_IDC_STAT_EPISODE_RECENT_COUNT_DTM_START: NORMAL
MDC_IDC_STAT_EPISODE_TYPE: NORMAL
MDC_IDC_STAT_EPISODE_TYPE: NORMAL

## 2024-06-05 PROCEDURE — 93294 REM INTERROG EVL PM/LDLS PM: CPT | Performed by: INTERNAL MEDICINE

## 2024-06-05 PROCEDURE — 93296 REM INTERROG EVL PM/IDS: CPT | Performed by: INTERNAL MEDICINE

## 2024-06-24 DIAGNOSIS — I10 HTN, GOAL BELOW 140/90: ICD-10-CM

## 2024-06-24 RX ORDER — CARVEDILOL 12.5 MG/1
TABLET ORAL
Qty: 270 TABLET | Refills: 1 | Status: SHIPPED | OUTPATIENT
Start: 2024-06-24 | End: 2024-08-29

## 2024-06-24 RX ORDER — CHLORTHALIDONE 25 MG/1
12.5 TABLET ORAL DAILY
Qty: 45 TABLET | Refills: 1 | Status: SHIPPED | OUTPATIENT
Start: 2024-06-24 | End: 2024-08-29

## 2024-07-02 DIAGNOSIS — E78.5 HYPERLIPIDEMIA LDL GOAL <130: ICD-10-CM

## 2024-07-02 RX ORDER — ATORVASTATIN CALCIUM 10 MG/1
10 TABLET, FILM COATED ORAL DAILY
Qty: 90 TABLET | Refills: 0 | Status: SHIPPED | OUTPATIENT
Start: 2024-07-02 | End: 2024-08-29

## 2024-07-03 ENCOUNTER — TELEPHONE (OUTPATIENT)
Dept: INTERNAL MEDICINE | Facility: CLINIC | Age: 84
End: 2024-07-03
Payer: COMMERCIAL

## 2024-07-03 NOTE — TELEPHONE ENCOUNTER
Patient Quality Outreach    Patient is due for the following:   Physical Annual Wellness Visit    Next Steps:   Patient has upcoming appointment, these items will be addressed at that time.    Type of outreach:    Chart review performed, no outreach needed.    Next Steps:  Reach out within 90 days via Phone.    Max number of attempts reached: No. Will try again in 90 days if patient still on fail list.    Questions for provider review:    None           Katherine Phan

## 2024-07-30 DIAGNOSIS — I10 HTN, GOAL BELOW 140/90: ICD-10-CM

## 2024-07-30 RX ORDER — LISINOPRIL 20 MG/1
20 TABLET ORAL DAILY
Qty: 90 TABLET | Refills: 0 | Status: SHIPPED | OUTPATIENT
Start: 2024-07-30 | End: 2024-08-29

## 2024-08-26 ENCOUNTER — LAB (OUTPATIENT)
Dept: LAB | Facility: CLINIC | Age: 84
End: 2024-08-26
Payer: COMMERCIAL

## 2024-08-26 DIAGNOSIS — R73.9 BLOOD SUGAR INCREASED: ICD-10-CM

## 2024-08-26 DIAGNOSIS — E78.5 HYPERLIPIDEMIA LDL GOAL <130: ICD-10-CM

## 2024-08-26 DIAGNOSIS — I44.2 THIRD DEGREE ATRIOVENTRICULAR BLOCK (H): ICD-10-CM

## 2024-08-26 DIAGNOSIS — E87.6 HYPOKALEMIA: ICD-10-CM

## 2024-08-26 DIAGNOSIS — I10 HTN, GOAL BELOW 140/90: ICD-10-CM

## 2024-08-26 DIAGNOSIS — Z00.00 ROUTINE GENERAL MEDICAL EXAMINATION AT A HEALTH CARE FACILITY: ICD-10-CM

## 2024-08-26 LAB
ALBUMIN SERPL BCG-MCNC: 4.1 G/DL (ref 3.5–5.2)
ALP SERPL-CCNC: 73 U/L (ref 40–150)
ALT SERPL W P-5'-P-CCNC: 10 U/L (ref 0–50)
ANION GAP SERPL CALCULATED.3IONS-SCNC: 12 MMOL/L (ref 7–15)
AST SERPL W P-5'-P-CCNC: 20 U/L (ref 0–45)
BILIRUB SERPL-MCNC: 1 MG/DL
BUN SERPL-MCNC: 16.8 MG/DL (ref 8–23)
CALCIUM SERPL-MCNC: 9.3 MG/DL (ref 8.8–10.4)
CHLORIDE SERPL-SCNC: 100 MMOL/L (ref 98–107)
CHOLEST SERPL-MCNC: 133 MG/DL
CREAT SERPL-MCNC: 0.59 MG/DL (ref 0.51–0.95)
CREAT UR-MCNC: 41.8 MG/DL
EGFRCR SERPLBLD CKD-EPI 2021: 90 ML/MIN/1.73M2
ERYTHROCYTE [DISTWIDTH] IN BLOOD BY AUTOMATED COUNT: 12.4 % (ref 10–15)
FASTING STATUS PATIENT QL REPORTED: YES
FASTING STATUS PATIENT QL REPORTED: YES
GLUCOSE SERPL-MCNC: 107 MG/DL (ref 70–99)
HBA1C MFR BLD: 5.2 % (ref 0–5.6)
HCO3 SERPL-SCNC: 23 MMOL/L (ref 22–29)
HCT VFR BLD AUTO: 43 % (ref 35–47)
HDLC SERPL-MCNC: 50 MG/DL
HGB BLD-MCNC: 15.6 G/DL (ref 11.7–15.7)
LDLC SERPL CALC-MCNC: 56 MG/DL
MCH RBC QN AUTO: 31.5 PG (ref 26.5–33)
MCHC RBC AUTO-ENTMCNC: 36.3 G/DL (ref 31.5–36.5)
MCV RBC AUTO: 87 FL (ref 78–100)
MICROALBUMIN UR-MCNC: 12.4 MG/L
MICROALBUMIN/CREAT UR: 29.67 MG/G CR (ref 0–25)
NONHDLC SERPL-MCNC: 83 MG/DL
PLATELET # BLD AUTO: 198 10E3/UL (ref 150–450)
POTASSIUM SERPL-SCNC: 3.9 MMOL/L (ref 3.4–5.3)
PROT SERPL-MCNC: 6.7 G/DL (ref 6.4–8.3)
RBC # BLD AUTO: 4.95 10E6/UL (ref 3.8–5.2)
SODIUM SERPL-SCNC: 135 MMOL/L (ref 135–145)
TRIGL SERPL-MCNC: 133 MG/DL
TSH SERPL DL<=0.005 MIU/L-ACNC: 2.02 UIU/ML (ref 0.3–4.2)
WBC # BLD AUTO: 4.3 10E3/UL (ref 4–11)

## 2024-08-26 PROCEDURE — 82043 UR ALBUMIN QUANTITATIVE: CPT | Performed by: INTERNAL MEDICINE

## 2024-08-26 PROCEDURE — 83036 HEMOGLOBIN GLYCOSYLATED A1C: CPT | Performed by: INTERNAL MEDICINE

## 2024-08-26 PROCEDURE — 84443 ASSAY THYROID STIM HORMONE: CPT | Performed by: INTERNAL MEDICINE

## 2024-08-26 PROCEDURE — 82570 ASSAY OF URINE CREATININE: CPT | Performed by: INTERNAL MEDICINE

## 2024-08-26 PROCEDURE — 80061 LIPID PANEL: CPT | Performed by: INTERNAL MEDICINE

## 2024-08-26 PROCEDURE — 85027 COMPLETE CBC AUTOMATED: CPT | Performed by: INTERNAL MEDICINE

## 2024-08-26 PROCEDURE — 36415 COLL VENOUS BLD VENIPUNCTURE: CPT | Performed by: INTERNAL MEDICINE

## 2024-08-26 PROCEDURE — 80053 COMPREHEN METABOLIC PANEL: CPT | Performed by: INTERNAL MEDICINE

## 2024-08-29 ENCOUNTER — OFFICE VISIT (OUTPATIENT)
Dept: INTERNAL MEDICINE | Facility: CLINIC | Age: 84
End: 2024-08-29
Payer: COMMERCIAL

## 2024-08-29 ENCOUNTER — HOSPITAL ENCOUNTER (OUTPATIENT)
Dept: MAMMOGRAPHY | Facility: CLINIC | Age: 84
Discharge: HOME | End: 2024-08-29
Attending: INTERNAL MEDICINE | Admitting: INTERNAL MEDICINE
Payer: COMMERCIAL

## 2024-08-29 VITALS
HEART RATE: 66 BPM | TEMPERATURE: 98.1 F | RESPIRATION RATE: 14 BRPM | BODY MASS INDEX: 31.28 KG/M2 | OXYGEN SATURATION: 98 % | HEIGHT: 62 IN | SYSTOLIC BLOOD PRESSURE: 120 MMHG | WEIGHT: 170 LBS | DIASTOLIC BLOOD PRESSURE: 84 MMHG

## 2024-08-29 DIAGNOSIS — Z12.31 ENCOUNTER FOR SCREENING MAMMOGRAM FOR BREAST CANCER: ICD-10-CM

## 2024-08-29 DIAGNOSIS — I10 HTN, GOAL BELOW 140/90: ICD-10-CM

## 2024-08-29 DIAGNOSIS — M79.675 PAIN OF TOE OF LEFT FOOT: ICD-10-CM

## 2024-08-29 DIAGNOSIS — E87.6 HYPOKALEMIA: ICD-10-CM

## 2024-08-29 DIAGNOSIS — E78.5 HYPERLIPIDEMIA LDL GOAL <130: ICD-10-CM

## 2024-08-29 DIAGNOSIS — Z00.00 ROUTINE GENERAL MEDICAL EXAMINATION AT A HEALTH CARE FACILITY: Primary | ICD-10-CM

## 2024-08-29 DIAGNOSIS — Z78.0 ASYMPTOMATIC POSTMENOPAUSAL STATUS: ICD-10-CM

## 2024-08-29 PROCEDURE — 99214 OFFICE O/P EST MOD 30 MIN: CPT | Mod: 25 | Performed by: INTERNAL MEDICINE

## 2024-08-29 PROCEDURE — G0439 PPPS, SUBSEQ VISIT: HCPCS | Performed by: INTERNAL MEDICINE

## 2024-08-29 PROCEDURE — 77063 BREAST TOMOSYNTHESIS BI: CPT

## 2024-08-29 RX ORDER — POTASSIUM CHLORIDE 1500 MG/1
20 TABLET, EXTENDED RELEASE ORAL DAILY
Qty: 90 TABLET | Refills: 4 | Status: SHIPPED | OUTPATIENT
Start: 2024-08-29

## 2024-08-29 RX ORDER — CHLORTHALIDONE 25 MG/1
12.5 TABLET ORAL DAILY
Qty: 45 TABLET | Refills: 4 | Status: SHIPPED | OUTPATIENT
Start: 2024-08-29

## 2024-08-29 RX ORDER — TRIAMCINOLONE ACETONIDE 55 UG/1
2 SPRAY, METERED NASAL DAILY
COMMUNITY

## 2024-08-29 RX ORDER — CARVEDILOL 12.5 MG/1
TABLET ORAL
Qty: 270 TABLET | Refills: 4 | Status: SHIPPED | OUTPATIENT
Start: 2024-08-29

## 2024-08-29 RX ORDER — ATORVASTATIN CALCIUM 10 MG/1
10 TABLET, FILM COATED ORAL DAILY
Qty: 90 TABLET | Refills: 4 | Status: SHIPPED | OUTPATIENT
Start: 2024-08-29

## 2024-08-29 RX ORDER — LISINOPRIL 20 MG/1
20 TABLET ORAL DAILY
Qty: 90 TABLET | Refills: 4 | Status: SHIPPED | OUTPATIENT
Start: 2024-08-29

## 2024-08-29 NOTE — PROGRESS NOTES
Dr Freeman's note    Patient's instructions / PLAN:                                                        Plan:  Mammogram ( please call [phone] to schedule it)   2. Continue same meds, same doses for now   3. Bone Density Scan ( DEXA), to be done at [facility] -- call [phone] to schedule it   4. The following vaccines are recommended for you. Please check with your insurance about coverage.  Some insurances cover better if you have these vaccines at the pharmacy:  -- RSV  -- Flu,  Covid  5. Podiatry referral  6. Next ANNUAL EXAM -- Sept 2025          ASSESSMENT & PLAN:                                                      (Z00.00) Routine general medical examination at a health care facility  (primary encounter diagnosis)  Comment:   Plan:     (E78.5) Hyperlipidemia LDL goal <130  Comment: Controlled, no side effects from the medication  Plan: atorvastatin (LIPITOR) 10 MG tablet            (I10) HTN, goal below 140/90  Comment: Controlled  Plan: carvedilol (COREG) 12.5 MG tablet,         chlorthalidone (HYGROTON) 25 MG tablet,         lisinopril (ZESTRIL) 20 MG tablet, potassium         chloride glory ER (KLOR-CON M20) 20 MEQ CR         tablet            (Z12.31) Encounter for screening mammogram for breast cancer  Comment:   Plan: MA Screening Bilateral w/ Johnny            (Z78.0) Asymptomatic postmenopausal status  Comment:   Plan: DX Bone Density            (M79.645) Pain of toe of left foot  Comment:   Plan: Orthopedic  Referral            (E87.6) Hypokalemia  Comment:   Plan: potassium chloride glory ER (KLOR-CON M20) 20         MEQ CR tablet               Chief Complaint:                                                        Annual exam  Follow up chronic medical problems      SUBJECTIVE:                                                    History of present illness     We reviewed the chronic medical problems as above.   I reviewed the recent tests results in [employer]       Labs April Aug  26 -- Discussed    -- changes in diet--> chol and A1c much better     Left toe pain --     ROS:                                                      ROS: negative for fever, chills, cough, wheezes, chest pain, shortness of breath, vomiting, abdominal pain, leg swelling     PMHx: - reviewed  Past Medical History:   Diagnosis Date    Allergic rhinitis     Anxiety     Aortic valve sclerosis     Back pain     Depressive disorder     started meds stress    GERD (gastroesophageal reflux disease)     Heart block AV complete (H)     pacemaker    Hyperlipidemia     Hypertension     Major depression     TRAVIS (obstructive sleep apnea)     moderate    Osteoarthritis     Pacemaker     Medtronic    PONV (postoperative nausea and vomiting)     Squamous cell carcinoma, face        PSHx: reviewed  Past Surgical History:   Procedure Laterality Date    ARTHROPLASTY KNEE Right 2022    Procedure: Right total knee arthroplasty;  Surgeon: Garth Constantino MD;  Location: [facility] OR    ARTHROSCOPY KNEE Left     AS TOTAL KNEE ARTHROPLASTY Left 2015    CATARACT IOL, RT/LT Bilateral     COLONOSCOPY  2012    normal    IMPLANT PACEMAKER      TONSILLECTOMY      TUBAL LIGATION          Soc Hx: No daily alcohol, no smoking  Social History     Socioeconomic History    Marital status:      Spouse name: Not on file    Number of children: Not on file    Years of education: Not on file    Highest education level: Not on file   Occupational History    Not on file   Tobacco Use    Smoking status: Former     Current packs/day: 0.00     Average packs/day: 0.5 packs/day for 3.2 years (1.6 ttl pk-yrs)     Types: Cigarettes     Start date: 1965     Quit date: 1968     Years since quittin.4     Passive exposure: Past    Smokeless tobacco: Never    Tobacco comments:     I quit so long ago i don't remember any problems   Vaping Use    Vaping status: Never Used   Substance and Sexual Activity    Alcohol use: Not Currently      Comment: rarely, yearly    Drug use: Never    Sexual activity: Not Currently     Birth control/protection: None   Other Topics Concern    Parent/sibling w/ CABG, MI or angioplasty before 65F 55M? No   Social History Narrative    Not on file     Social Determinants of Health     Financial Resource Strain: Low Risk  (8/25/2024)    Financial Resource Strain     Within the past 12 months, have you or your family members you live with been unable to get utilities (heat, electricity) when it was really needed?: No   Food Insecurity: Low Risk  (8/25/2024)    Food Insecurity     Within the past 12 months, did you worry that your food would run out before you got money to buy more?: No     Within the past 12 months, did the food you bought just not last and you didn t have money to get more?: No   Transportation Needs: Low Risk  (8/25/2024)    Transportation Needs     Within the past 12 months, has lack of transportation kept you from medical appointments, getting your medicines, non-medical meetings or appointments, work, or from getting things that you need?: No   Physical Activity: Not on file   Stress: No Stress Concern Present (8/25/2024)    [employer] - Occupational Stress Questionnaire     Feeling of Stress : Only a little   Social Connections: Not on file   Interpersonal Safety: Low Risk  (8/29/2024)    Interpersonal Safety     Do you feel physically and emotionally safe where you currently live?: Yes     Within the past 12 months, have you been hit, slapped, kicked or otherwise physically hurt by someone?: No     Within the past 12 months, have you been humiliated or emotionally abused in other ways by your partner or ex-partner?: No   Housing Stability: Low Risk  (8/25/2024)    Housing Stability     Do you have housing? : Yes     Are you worried about losing your housing?: No        Fam Hx: reviewed  Family History   Problem Relation Age of Onset    Heart Disease Mother      Dementia Mother     Hypertension Mother     Osteoporosis Mother     Heart Disease Father     Sleep Apnea Sister     Lung Cancer Sister     Cerebrovascular Disease Maternal Grandfather     Hypertension Other     Anesthesia Reaction Other     Hyperlipidemia Other     Hypertension Brother     Other Cancer Brother         lung    Hypertension Sister     Other Cancer Sister         lung    Cerebrovascular Disease Cousin     Breast Cancer Cousin     Breast Cancer Cousin     Breast Cancer Cousin     Breast Cancer Sister     Thyroid Disease Sister     Prostate Cancer Other          Screening: reviewed      All: reviewed    Meds: reviewed  Current Outpatient Medications   Medication Sig Dispense Refill    aspirin (ASA) 81 MG EC tablet Take 1 tablet (81 mg) by mouth daily      atorvastatin (LIPITOR) 10 MG tablet TAKE 1 TABLET (10 MG) BY MOUTH DAILY 90 tablet 0    Calcium Carb-Cholecalciferol 600-800 MG-UNIT TABS       carvedilol (COREG) 12.5 MG tablet TAKE 1 TABLET IN THE MORNING AND 2 TABLETS (25 MG) IN THE EVENING 270 tablet 1    cetirizine (ZYRTEC) 10 MG tablet Take 1 tablet (10 mg) by mouth daily 30 tablet 3    chlorthalidone (HYGROTON) 25 MG tablet TAKE ONE-HALF TABLET BY MOUTH ONCE DAILY 45 tablet 1    fish oil-omega-3 fatty acids 1000 MG capsule Take 2 g by mouth daily      lisinopril (ZESTRIL) 20 MG tablet TAKE 1 TABLET (20 MG) BY MOUTH DAILY 90 tablet 0    MELATONIN PO Take 5 mg by mouth At Bedtime      multivitamin w/minerals (THERA-VIT-M) tablet Take 1 tablet by mouth daily      potassium chloride ER (KLOR-CON) 20 MEQ CR tablet Take 1 tablet (20 mEq) by mouth daily 90 tablet 1    triamcinolone (NASACORT) 55 MCG/ACT nasal aerosol Spray 2 sprays into both nostrils daily. prn      fluticasone (FLONASE) 50 MCG/ACT nasal spray Spray 2 sprays into both nostrils daily 16 g 0       OBJECTIVE:                                                    Physical Exam :  Blood pressure 120/84, pulse 66, temperature 98.1  F (36.7  C),  "temperature source Oral, resp. rate 14, height 1.575 m (5' 2\"), weight 77.1 kg (170 lb), SpO2 98%, not currently breastfeeding.     NAD, appears comfortable  Skin clear, no rashes  Neck: supple, no JVD,  no thyroidmegaly  Lymph nodes non palpable in the cervical, supraclavicular axillaries,   Chest: clear to auscultation with good respiratory effort  Cardiac: S1S2, RRR, no mgr appreciated  Abdomen: soft, not tender, not distended, audible bowel sound, no hepatosplenomegaly, no palpable masses, no abdominal bruits  Extremities: no cyanosis, clubbing or edema.   Neuro: A, Ox3, no focal signs.  Breast exam in supine and erect position: they are symmetrical, no skin changes, no tenderness or nodes on palpation. Nipples are erect, no skin lesions, no discharge on pressure.    Pelvic exam: deferred, s/p menopause, no symptoms, no hx of abnormal pap        Patient has been advised of split billing requirements and indicates understanding: Yes.  At the check in, at the      Kendra Freeman MD  Internal Medicine          ######################################    Preventive Care Visit  [facility]  Kendra Durand MD, Internal Medicine  Aug 29, 2024          Bri Daly is a 83 year old, presenting for the following:  Medicare visit      8/29/2024     9:11 AM   Additional Questions   Roomed by Myra ARELLANO         Health Care Directive  Patient has a Health Care Directive on file  Advance care planning document is on file and is current.    HPI              8/25/2024   General Health   How would you rate your overall physical health? Good   Feel stress (tense, anxious, or unable to sleep) Only a little      (!) STRESS CONCERN      8/25/2024   Nutrition   Diet: Regular (no restrictions)            6/19/2023   Exercise   Frequency of exercise: 2-3 days/week            8/25/2024   Social Factors   Worry food won't last until get money to buy more No   Food not last or not have " enough money for food? No   Do you have housing? (Housing is defined as stable permanent housing and does not include staying ouside in a car, in a tent, in an abandoned building, in an overnight shelter, or couch-surfing.) Yes   Are you worried about losing your housing? No   Lack of transportation? No   Unable to get utilities (heat,electricity)? No            8/25/2024   Fall Risk   Fallen 2 or more times in the past year? No    No   Trouble with walking or balance? No    No       Multiple values from one day are sorted in reverse-chronological order          8/25/2024   Activities of Daily Living- Home Safety   Needs help with the following daily activites None of the above   Safety concerns in the home None of the above            8/25/2024   Dental   Dentist two times every year? Yes            8/25/2024   Hearing Screening   Hearing concerns? (!) IT'S HARDER TO UNDERSTAND WOMEN'S VOICES THAN MEN'S VOICES.    (!) IT'S HARD TO FOLLOW A CONVERSATION IN A NOISY RESTAURANT OR CROWDED ROOM.    (!) TROUBLE UNDESTANDING A SPEAKER IN A PUBLIC MEETING OR [facility] SERVICE.    (!) TROUBLE UNDERSTANDING SPEECH ON THE TELEPHONE       Multiple values from one day are sorted in reverse-chronological order         8/25/2024   Driving Risk Screening   Patient/family members have concerns about driving No            8/25/2024   General Alertness/Fatigue Screening   Have you been more tired than usual lately? (!) YES            8/25/2024   Urinary Incontinence Screening   Bothered by leaking urine in past 6 months No            8/25/2024   TB Screening   Were you born outside of the [place]? No            Today's PHQ-2 Score:       8/29/2024     8:56 AM   PHQ-2 ( 1999 Pfizer)   Q1: Little interest or pleasure in doing things 0   Q2: Feeling down, depressed or hopeless 1   PHQ-2 Score 1   Q1: Little interest or pleasure in doing things Not at all   Q2: Feeling down, depressed or hopeless Several days   PHQ-2 Score 1            2024   Substance Use   Alcohol more than 3/day or more than 7/wk No   Do you have a current opioid prescription? No   How severe/bad is pain from 1 to 10? 0/10 (No Pain)   Do you use any other substances recreationally? No        Social History     Tobacco Use    Smoking status: Former     Current packs/day: 0.00     Average packs/day: 0.5 packs/day for 3.2 years (1.6 ttl pk-yrs)     Types: Cigarettes     Start date: 1965     Quit date: 1968     Years since quittin.4     Passive exposure: Past    Smokeless tobacco: Never    Tobacco comments:     I quit so long ago i don't remember any problems   Vaping Use    Vaping status: Never Used   Substance Use Topics    Alcohol use: Not Currently     Comment: rarely, yearly    Drug use: Never           3/27/2023   LAST FHS-7 RESULTS   1st degree relative breast or ovarian cancer Yes   Any relative bilateral breast cancer No   Any male have breast cancer No   Any ONE woman have BOTH breast AND ovarian cancer Yes   Any woman with breast cancer before 50yrs Yes   2 or more relatives with breast AND/OR ovarian cancer Yes   2 or more relatives with breast AND/OR bowel cancer Yes                         Reviewed and updated as needed this visit by Provider                      Current providers sharing in care for this patient include:  Patient Care Team:  Kendra Durand MD as PCP - General (Internal Medicine)  Denia Blum CNP as Nurse Practitioner (Nurse Practitioner)  Mary Constantino, TANYA as Registered Nurse  Goltz, Bennett Ezra, PA-C as Assigned Neuroscience Provider  Tal Santacruz MD as MD (Cardiovascular Disease)  Kendra Durand MD as Assigned PCP  Tal Santacruz MD as Assigned Heart and Vascular Provider    The following health maintenance items are reviewed in Epic and correct as of today:  Health Maintenance   Topic Date Due    RSV VACCINE (Pregnancy & 60+) (1 - 1-dose 60+ series) Never done     "COVID-19 Vaccine (8 - 2023-24 season) 02/20/2024    DEXA  04/25/2024    MEDICARE ANNUAL WELLNESS VISIT  06/22/2024    ANNUAL REVIEW OF HM ORDERS  06/22/2024    INFLUENZA VACCINE (1) 09/01/2024    MAMMO SCREENING  03/27/2025    LIPID  08/26/2025    FALL RISK ASSESSMENT  08/29/2025    DTAP/TDAP/TD IMMUNIZATION (2 - Td or Tdap) 06/02/2027    ADVANCE CARE PLANNING  07/09/2028    PHQ-2 (once per calendar year)  Completed    Pneumococcal Vaccine: 65+ Years  Completed    ZOSTER IMMUNIZATION  Completed    HPV IMMUNIZATION  Aged Out    MENINGITIS IMMUNIZATION  Aged Out    RSV MONOCLONAL ANTIBODY  Aged Out            Objective    Exam  LMP  (LMP Unknown)    Estimated body mass index is 30.83 kg/m  as calculated from the following:    Height as of 2/21/24: 1.588 m (5' 2.5\").    Weight as of 2/21/24: 77.7 kg (171 lb 4.8 oz).    Physical Exam           No data to display              Exam  The patient scored 5 out of 5 with Mini-Mentalexam             Signed Electronically by: Kendra Durand MD    "

## 2024-08-29 NOTE — NURSING NOTE
"Chief Complaint   Patient presents with    Medicare Visit     Non fasting labs were done     initial /84   Pulse 66   Temp 98.1  F (36.7  C) (Oral)   Resp 14   Ht 1.575 m (5' 2\")   Wt 77.1 kg (170 lb)   LMP  (LMP Unknown)   SpO2 98%   BMI 31.09 kg/m   Estimated body mass index is 31.09 kg/m  as calculated from the following:    Height as of this encounter: 1.575 m (5' 2\").    Weight as of this encounter: 77.1 kg (170 lb)..  bp completed using cuff size large  FRANCIS HAYDEN LPN  "

## 2024-08-29 NOTE — PATIENT INSTRUCTIONS
Plan:  Mammogram ( please call [phone] to schedule it)   2. Continue same meds, same doses for now   3. Bone Density Scan ( DEXA), to be done at [facility] -- call [phone] to schedule it   4. The following vaccines are recommended for you. Please check with your insurance about coverage.  Some insurances cover better if you have these vaccines at the pharmacy:  -- RSV  -- Flu,  Covid  5. Podiatry referral  6. Next ANNUAL EXAM -- Sept 2025

## 2024-09-06 NOTE — PROGRESS NOTES
ASSESSMENT & PLAN    Malika was seen today for pain.    Diagnoses and all orders for this visit:    Pain of toe of left foot  -     Orthopedic  Referral  -     XR Toe LT G/E 2 vw; Future      This issue is chronic and Unchanged. Malika presents our clinic today to discuss her chronic left great toe pain.  Her history, exam findings, and imaging findings are consistent with her radiographically advanced, severe osteoarthritis of the first MTP joint.  We discussed these findings and the treatment options including anti-inflammatory medicines, topical medications, orthotic use, and surgical intervention.  We discussed that given her arthritis is still advanced, she would likely not get much benefit from a corticosteroid injection but that the procedure itself would be painful given the small joint space.  We discussed that should this be very bothersome, she could consider discussion with a foot and ankle surgeon to discuss a joint fusion, however at this time the patient's symptoms are overall well-controlled with conservative treatment.  We determined the following plan:  - Patient will continue to use Tylenol and ice as needed for pain  - She will trial Voltaren gel, 3-4 times a day to the area  - She will continue to use orthotics and shoes with a wide toe box  - She can follow-up in our clinic as needed        Jonathon Phillips [facility] [place]    -----  Chief Complaint   Patient presents with    Left Foot - Pain       SUBJECTIVE  Malika Vleasco is a/an 83 year old female who is seen in consultation at the request of  Kendra Durand M.D. for evaluation of great toe pain of the left foot.     The patient is seen by themselves.    Onset: 5+ years(s) ago. Reports insidious onset without acute precipitating event.  Location of Pain: left large toe  Worsened by: walking  Better with: ice, Tylenol  Treatments tried: ice, Tylenol, and home  exercises  Associated symptoms: swelling and numbness    Orthopedic/Surgical history: NO  Social History/Occupation: Retired; walks      REVIEW OF SYSTEMS:  Review of systems negative unless mentioned in HPI     OBJECTIVE:  /86   LMP  (LMP Unknown)    General: healthy, alert and in no distress  Skin: no suspicious lesions or rash.  CV: distal perfusion intact   Resp: normal respiratory effort without conversational dyspnea   Psych: normal mood and affect  Gait: NORMAL  Neuro: Normal light sensory exam of LL extremity      Left Foot and Ankle exam  Gait: Normal  Alignment: Normal  Inspection: [x] Normal  [] Swelling present  [] Ecchymosis present []Other   Tenderness: TTP 1st MTP joint, 1st IP joint. No other bony tenderness of forefoot, midfoot, hindfoot noted. No tenderness over either malleolus.   Range of motion (ankle):   Plantarflexion:Full Dorsiflexion: Full  Inversion: Full  Eversion: Full  Strength:   Plantarflexion: 5/5 Dorsiflexion: 5/5  Internal rotation: 5/5 External rotation 5/5  Neurologic: Normal sensation   Vascular: Normal pulses   Special tests:   Sanchez: Negative  Syndesmotic squeeze test: Negative  Anterior drawer: Negative  Dorsiflexion/eversion: No pain   Talar tilt: Negative    Calcaneal squeeze: Negative    Other tests: None  Contralateral foot and ankle grossly normal in terms of appearance, range of motion, and strength      RADIOLOGY:  Final results and radiologist's interpretation, available in the [facility] health record.  Images were reviewed with the patient in the office today.  My personal interpretation of the performed imaging: Advanced joint space narrowing and osteophytosis with large bony callus formation at the first MTP joint of the foot.  Mild degenerative changes at the first and second IP joints.  No acute fractures or other acute bony abnormalities.

## 2024-09-09 ENCOUNTER — OFFICE VISIT (OUTPATIENT)
Dept: ORTHOPEDICS | Facility: CLINIC | Age: 84
End: 2024-09-09
Payer: COMMERCIAL

## 2024-09-09 ENCOUNTER — ANCILLARY PROCEDURE (OUTPATIENT)
Dept: GENERAL RADIOLOGY | Facility: CLINIC | Age: 84
End: 2024-09-09
Attending: STUDENT IN AN ORGANIZED HEALTH CARE EDUCATION/TRAINING PROGRAM
Payer: COMMERCIAL

## 2024-09-09 VITALS — SYSTOLIC BLOOD PRESSURE: 132 MMHG | DIASTOLIC BLOOD PRESSURE: 86 MMHG

## 2024-09-09 DIAGNOSIS — M19.079 ARTHRITIS OF FIRST METATARSOPHALANGEAL (MTP) JOINT: Primary | ICD-10-CM

## 2024-09-09 DIAGNOSIS — M79.675 PAIN OF TOE OF LEFT FOOT: ICD-10-CM

## 2024-09-09 PROCEDURE — 99203 OFFICE O/P NEW LOW 30 MIN: CPT | Performed by: STUDENT IN AN ORGANIZED HEALTH CARE EDUCATION/TRAINING PROGRAM

## 2024-09-09 PROCEDURE — 73660 X-RAY EXAM OF TOE(S): CPT | Mod: TC | Performed by: RADIOLOGY

## 2024-09-09 NOTE — LETTER
9/9/2024      Malika Velasco  [id number]  [place] [id number]      Dear Colleague,    Thank you for referring your patient, Malika Velasco, to the [facility]. Please see a copy of my visit note below.    ASSESSMENT & PLAN    Malika was seen today for pain.    Diagnoses and all orders for this visit:    Pain of toe of left foot  -     Orthopedic  Referral  -     XR Toe LT G/E 2 vw; Future      This issue is chronic and Unchanged. Malika presents our clinic today to discuss her chronic left great toe pain.  Her history, exam findings, and imaging findings are consistent with her radiographically advanced, severe osteoarthritis of the first MTP joint.  We discussed these findings and the treatment options including anti-inflammatory medicines, topical medications, orthotic use, and surgical intervention.  We discussed that given her arthritis is still advanced, she would likely not get much benefit from a corticosteroid injection but that the procedure itself would be painful given the small joint space.  We discussed that should this be very bothersome, she could consider discussion with a foot and ankle surgeon to discuss a joint fusion, however at this time the patient's symptoms are overall well-controlled with conservative treatment.  We determined the following plan:  - Patient will continue to use Tylenol and ice as needed for pain  - She will trial Voltaren gel, 3-4 times a day to the area  - She will continue to use orthotics and shoes with a wide toe box  - She can follow-up in our clinic as needed        Jonathon Phillips,   [employer]    -----  Chief Complaint   Patient presents with     Left Foot - Pain       SUBJECTIVE  Malika Velasco is a/an 83 year old female who is seen in consultation at the request of  Kendra Durand M.D. for evaluation of great toe pain of the left  foot.     The patient is seen by themselves.    Onset: 5+ years(s) ago. Reports insidious onset without acute precipitating event.  Location of Pain: left large toe  Worsened by: walking  Better with: ice, Tylenol  Treatments tried: ice, Tylenol, and home exercises  Associated symptoms: swelling and numbness    Orthopedic/Surgical history: NO  Social History/Occupation: Retired; walks      REVIEW OF SYSTEMS:  Review of systems negative unless mentioned in HPI     OBJECTIVE:  /86   LMP  (LMP Unknown)    General: healthy, alert and in no distress  Skin: no suspicious lesions or rash.  CV: distal perfusion intact   Resp: normal respiratory effort without conversational dyspnea   Psych: normal mood and affect  Gait: NORMAL  Neuro: Normal light sensory exam of LL extremity      Left Foot and Ankle exam  Gait: Normal  Alignment: Normal  Inspection: [x] Normal  [] Swelling present  [] Ecchymosis present []Other   Tenderness: TTP 1st MTP joint, 1st IP joint. No other bony tenderness of forefoot, midfoot, hindfoot noted. No tenderness over either malleolus.   Range of motion (ankle):   Plantarflexion:Full Dorsiflexion: Full  Inversion: Full  Eversion: Full  Strength:   Plantarflexion: 5/5 Dorsiflexion: 5/5  Internal rotation: 5/5 External rotation 5/5  Neurologic: Normal sensation   Vascular: Normal pulses   Special tests:   Sanchez: Negative  Syndesmotic squeeze test: Negative  Anterior drawer: Negative  Dorsiflexion/eversion: No pain   Talar tilt: Negative    Calcaneal squeeze: Negative    Other tests: None  Contralateral foot and ankle grossly normal in terms of appearance, range of motion, and strength      RADIOLOGY:  Final results and radiologist's interpretation, available in the [facility] health record.  Images were reviewed with the patient in the office today.  My personal interpretation of the performed imaging: Advanced joint space narrowing and osteophytosis with large bony callus formation at the first MTP  joint of the foot.  Mild degenerative changes at the first and second IP joints.  No acute fractures or other acute bony abnormalities.             Again, thank you for allowing me to participate in the care of your patient.        Sincerely,        Jonathon Phillips, DO

## 2024-09-19 ENCOUNTER — ANCILLARY PROCEDURE (OUTPATIENT)
Dept: CARDIOLOGY | Facility: CLINIC | Age: 84
End: 2024-09-19
Attending: INTERNAL MEDICINE
Payer: COMMERCIAL

## 2024-09-19 DIAGNOSIS — Z95.0 CARDIAC PACEMAKER IN SITU: ICD-10-CM

## 2024-09-19 DIAGNOSIS — Z95.0 CARDIAC PACEMAKER IN SITU: Primary | ICD-10-CM

## 2024-09-19 DIAGNOSIS — I44.2 ATRIOVENTRICULAR BLOCK, COMPLETE (H): ICD-10-CM

## 2024-09-19 LAB
MDC_IDC_LEAD_CONNECTION_STATUS: NORMAL
MDC_IDC_LEAD_CONNECTION_STATUS: NORMAL
MDC_IDC_LEAD_IMPLANT_DT: NORMAL
MDC_IDC_LEAD_IMPLANT_DT: NORMAL
MDC_IDC_LEAD_LOCATION: NORMAL
MDC_IDC_LEAD_LOCATION: NORMAL
MDC_IDC_LEAD_LOCATION_DETAIL_1: NORMAL
MDC_IDC_LEAD_LOCATION_DETAIL_1: NORMAL
MDC_IDC_LEAD_MFG: NORMAL
MDC_IDC_LEAD_MFG: NORMAL
MDC_IDC_LEAD_MODEL: NORMAL
MDC_IDC_LEAD_MODEL: NORMAL
MDC_IDC_LEAD_POLARITY_TYPE: NORMAL
MDC_IDC_LEAD_POLARITY_TYPE: NORMAL
MDC_IDC_LEAD_SERIAL: NORMAL
MDC_IDC_LEAD_SERIAL: NORMAL
MDC_IDC_MSMT_BATTERY_DTM: NORMAL
MDC_IDC_MSMT_BATTERY_IMPEDANCE: 1945 OHM
MDC_IDC_MSMT_BATTERY_REMAINING_LONGEVITY: 41 MO
MDC_IDC_MSMT_BATTERY_STATUS: NORMAL
MDC_IDC_MSMT_BATTERY_VOLTAGE: 2.75 V
MDC_IDC_MSMT_LEADCHNL_RA_IMPEDANCE_VALUE: 669 OHM
MDC_IDC_MSMT_LEADCHNL_RA_PACING_THRESHOLD_AMPLITUDE: 0.38 V
MDC_IDC_MSMT_LEADCHNL_RA_PACING_THRESHOLD_PULSEWIDTH: 0.4 MS
MDC_IDC_MSMT_LEADCHNL_RV_IMPEDANCE_VALUE: 677 OHM
MDC_IDC_MSMT_LEADCHNL_RV_PACING_THRESHOLD_AMPLITUDE: 0.88 V
MDC_IDC_MSMT_LEADCHNL_RV_PACING_THRESHOLD_PULSEWIDTH: 0.4 MS
MDC_IDC_PG_IMPLANT_DTM: NORMAL
MDC_IDC_PG_MFG: NORMAL
MDC_IDC_PG_MODEL: NORMAL
MDC_IDC_PG_SERIAL: NORMAL
MDC_IDC_PG_TYPE: NORMAL
MDC_IDC_SESS_CLINIC_NAME: NORMAL
MDC_IDC_SESS_DTM: NORMAL
MDC_IDC_SESS_TYPE: NORMAL
MDC_IDC_SET_BRADY_AT_MODE_SWITCH_MODE: NORMAL
MDC_IDC_SET_BRADY_AT_MODE_SWITCH_RATE: 175 {BEATS}/MIN
MDC_IDC_SET_BRADY_LOWRATE: 60 {BEATS}/MIN
MDC_IDC_SET_BRADY_MAX_SENSOR_RATE: 130 {BEATS}/MIN
MDC_IDC_SET_BRADY_MAX_TRACKING_RATE: 130 {BEATS}/MIN
MDC_IDC_SET_BRADY_MODE: NORMAL
MDC_IDC_SET_BRADY_PAV_DELAY_LOW: 200 MS
MDC_IDC_SET_BRADY_SAV_DELAY_LOW: 180 MS
MDC_IDC_SET_LEADCHNL_RA_PACING_AMPLITUDE: 1.5 V
MDC_IDC_SET_LEADCHNL_RA_PACING_CAPTURE_MODE: NORMAL
MDC_IDC_SET_LEADCHNL_RA_PACING_POLARITY: NORMAL
MDC_IDC_SET_LEADCHNL_RA_PACING_PULSEWIDTH: 0.4 MS
MDC_IDC_SET_LEADCHNL_RA_SENSING_POLARITY: NORMAL
MDC_IDC_SET_LEADCHNL_RA_SENSING_SENSITIVITY: 1 MV
MDC_IDC_SET_LEADCHNL_RV_PACING_AMPLITUDE: 2 V
MDC_IDC_SET_LEADCHNL_RV_PACING_CAPTURE_MODE: NORMAL
MDC_IDC_SET_LEADCHNL_RV_PACING_POLARITY: NORMAL
MDC_IDC_SET_LEADCHNL_RV_PACING_PULSEWIDTH: 0.4 MS
MDC_IDC_SET_LEADCHNL_RV_SENSING_POLARITY: NORMAL
MDC_IDC_SET_LEADCHNL_RV_SENSING_SENSITIVITY: 2.8 MV
MDC_IDC_SET_ZONE_DETECTION_INTERVAL: 333.33 MS
MDC_IDC_SET_ZONE_DETECTION_INTERVAL: 342.86 MS
MDC_IDC_SET_ZONE_STATUS: NORMAL
MDC_IDC_SET_ZONE_STATUS: NORMAL
MDC_IDC_SET_ZONE_TYPE: NORMAL
MDC_IDC_SET_ZONE_TYPE: NORMAL
MDC_IDC_SET_ZONE_VENDOR_TYPE: NORMAL
MDC_IDC_SET_ZONE_VENDOR_TYPE: NORMAL
MDC_IDC_STAT_AT_BURDEN_PERCENT: 0 %
MDC_IDC_STAT_AT_DTM_END: NORMAL
MDC_IDC_STAT_AT_DTM_START: NORMAL
MDC_IDC_STAT_AT_MODE_SW_COUNT: 3
MDC_IDC_STAT_BRADY_AP_VP_PERCENT: 87 %
MDC_IDC_STAT_BRADY_AP_VS_PERCENT: 0 %
MDC_IDC_STAT_BRADY_AS_VP_PERCENT: 13 %
MDC_IDC_STAT_BRADY_AS_VS_PERCENT: 0 %
MDC_IDC_STAT_BRADY_DTM_END: NORMAL
MDC_IDC_STAT_BRADY_DTM_START: NORMAL
MDC_IDC_STAT_EPISODE_RECENT_COUNT: 0
MDC_IDC_STAT_EPISODE_RECENT_COUNT: 1
MDC_IDC_STAT_EPISODE_RECENT_COUNT_DTM_END: NORMAL
MDC_IDC_STAT_EPISODE_RECENT_COUNT_DTM_END: NORMAL
MDC_IDC_STAT_EPISODE_RECENT_COUNT_DTM_START: NORMAL
MDC_IDC_STAT_EPISODE_RECENT_COUNT_DTM_START: NORMAL
MDC_IDC_STAT_EPISODE_TYPE: NORMAL
MDC_IDC_STAT_EPISODE_TYPE: NORMAL

## 2024-09-19 PROCEDURE — 93294 REM INTERROG EVL PM/LDLS PM: CPT | Performed by: INTERNAL MEDICINE

## 2024-09-19 PROCEDURE — 93296 REM INTERROG EVL PM/IDS: CPT | Performed by: INTERNAL MEDICINE

## 2024-10-22 DIAGNOSIS — I10 HTN, GOAL BELOW 140/90: ICD-10-CM

## 2024-10-22 RX ORDER — LISINOPRIL 20 MG/1
TABLET ORAL
Qty: 90 TABLET | Refills: 0 | OUTPATIENT
Start: 2024-10-22

## 2025-01-03 ENCOUNTER — ANCILLARY PROCEDURE (OUTPATIENT)
Dept: CARDIOLOGY | Facility: CLINIC | Age: 85
End: 2025-01-03
Attending: INTERNAL MEDICINE
Payer: COMMERCIAL

## 2025-01-03 DIAGNOSIS — Z95.0 CARDIAC PACEMAKER IN SITU: ICD-10-CM

## 2025-01-03 DIAGNOSIS — I44.2 ATRIOVENTRICULAR BLOCK, COMPLETE (H): ICD-10-CM

## 2025-01-03 LAB
MDC_IDC_LEAD_CONNECTION_STATUS: NORMAL
MDC_IDC_LEAD_CONNECTION_STATUS: NORMAL
MDC_IDC_LEAD_IMPLANT_DT: NORMAL
MDC_IDC_LEAD_IMPLANT_DT: NORMAL
MDC_IDC_LEAD_LOCATION: NORMAL
MDC_IDC_LEAD_LOCATION: NORMAL
MDC_IDC_LEAD_LOCATION_DETAIL_1: NORMAL
MDC_IDC_LEAD_LOCATION_DETAIL_1: NORMAL
MDC_IDC_LEAD_MFG: NORMAL
MDC_IDC_LEAD_MFG: NORMAL
MDC_IDC_LEAD_MODEL: NORMAL
MDC_IDC_LEAD_MODEL: NORMAL
MDC_IDC_LEAD_POLARITY_TYPE: NORMAL
MDC_IDC_LEAD_POLARITY_TYPE: NORMAL
MDC_IDC_LEAD_SERIAL: NORMAL
MDC_IDC_LEAD_SERIAL: NORMAL
MDC_IDC_MSMT_BATTERY_DTM: NORMAL
MDC_IDC_MSMT_BATTERY_IMPEDANCE: 1989 OHM
MDC_IDC_MSMT_BATTERY_REMAINING_LONGEVITY: 40 MO
MDC_IDC_MSMT_BATTERY_STATUS: NORMAL
MDC_IDC_MSMT_BATTERY_VOLTAGE: 2.74 V
MDC_IDC_MSMT_LEADCHNL_RA_IMPEDANCE_VALUE: 682 OHM
MDC_IDC_MSMT_LEADCHNL_RA_PACING_THRESHOLD_AMPLITUDE: 0.38 V
MDC_IDC_MSMT_LEADCHNL_RA_PACING_THRESHOLD_PULSEWIDTH: 0.4 MS
MDC_IDC_MSMT_LEADCHNL_RV_IMPEDANCE_VALUE: 670 OHM
MDC_IDC_MSMT_LEADCHNL_RV_PACING_THRESHOLD_AMPLITUDE: 1 V
MDC_IDC_MSMT_LEADCHNL_RV_PACING_THRESHOLD_PULSEWIDTH: 0.4 MS
MDC_IDC_PG_IMPLANT_DTM: NORMAL
MDC_IDC_PG_MFG: NORMAL
MDC_IDC_PG_MODEL: NORMAL
MDC_IDC_PG_SERIAL: NORMAL
MDC_IDC_PG_TYPE: NORMAL
MDC_IDC_SESS_CLINIC_NAME: NORMAL
MDC_IDC_SESS_DTM: NORMAL
MDC_IDC_SESS_TYPE: NORMAL
MDC_IDC_SET_BRADY_AT_MODE_SWITCH_MODE: NORMAL
MDC_IDC_SET_BRADY_AT_MODE_SWITCH_RATE: 175 {BEATS}/MIN
MDC_IDC_SET_BRADY_LOWRATE: 60 {BEATS}/MIN
MDC_IDC_SET_BRADY_MAX_SENSOR_RATE: 130 {BEATS}/MIN
MDC_IDC_SET_BRADY_MAX_TRACKING_RATE: 130 {BEATS}/MIN
MDC_IDC_SET_BRADY_MODE: NORMAL
MDC_IDC_SET_BRADY_PAV_DELAY_LOW: 200 MS
MDC_IDC_SET_BRADY_SAV_DELAY_LOW: 180 MS
MDC_IDC_SET_LEADCHNL_RA_PACING_AMPLITUDE: 1.5 V
MDC_IDC_SET_LEADCHNL_RA_PACING_CAPTURE_MODE: NORMAL
MDC_IDC_SET_LEADCHNL_RA_PACING_POLARITY: NORMAL
MDC_IDC_SET_LEADCHNL_RA_PACING_PULSEWIDTH: 0.4 MS
MDC_IDC_SET_LEADCHNL_RA_SENSING_POLARITY: NORMAL
MDC_IDC_SET_LEADCHNL_RA_SENSING_SENSITIVITY: 1 MV
MDC_IDC_SET_LEADCHNL_RV_PACING_AMPLITUDE: 2 V
MDC_IDC_SET_LEADCHNL_RV_PACING_CAPTURE_MODE: NORMAL
MDC_IDC_SET_LEADCHNL_RV_PACING_POLARITY: NORMAL
MDC_IDC_SET_LEADCHNL_RV_PACING_PULSEWIDTH: 0.4 MS
MDC_IDC_SET_LEADCHNL_RV_SENSING_POLARITY: NORMAL
MDC_IDC_SET_LEADCHNL_RV_SENSING_SENSITIVITY: 2.8 MV
MDC_IDC_SET_ZONE_DETECTION_INTERVAL: 333.33 MS
MDC_IDC_SET_ZONE_DETECTION_INTERVAL: 342.86 MS
MDC_IDC_SET_ZONE_STATUS: NORMAL
MDC_IDC_SET_ZONE_STATUS: NORMAL
MDC_IDC_SET_ZONE_TYPE: NORMAL
MDC_IDC_SET_ZONE_TYPE: NORMAL
MDC_IDC_SET_ZONE_VENDOR_TYPE: NORMAL
MDC_IDC_SET_ZONE_VENDOR_TYPE: NORMAL
MDC_IDC_STAT_AT_BURDEN_PERCENT: 0 %
MDC_IDC_STAT_AT_DTM_END: NORMAL
MDC_IDC_STAT_AT_DTM_START: NORMAL
MDC_IDC_STAT_AT_MODE_SW_COUNT: 5
MDC_IDC_STAT_BRADY_AP_VP_PERCENT: 85 %
MDC_IDC_STAT_BRADY_AP_VS_PERCENT: 0 %
MDC_IDC_STAT_BRADY_AS_VP_PERCENT: 15 %
MDC_IDC_STAT_BRADY_AS_VS_PERCENT: 0 %
MDC_IDC_STAT_BRADY_DTM_END: NORMAL
MDC_IDC_STAT_BRADY_DTM_START: NORMAL
MDC_IDC_STAT_EPISODE_RECENT_COUNT: 0
MDC_IDC_STAT_EPISODE_RECENT_COUNT: 1
MDC_IDC_STAT_EPISODE_RECENT_COUNT_DTM_END: NORMAL
MDC_IDC_STAT_EPISODE_RECENT_COUNT_DTM_END: NORMAL
MDC_IDC_STAT_EPISODE_RECENT_COUNT_DTM_START: NORMAL
MDC_IDC_STAT_EPISODE_RECENT_COUNT_DTM_START: NORMAL
MDC_IDC_STAT_EPISODE_TYPE: NORMAL
MDC_IDC_STAT_EPISODE_TYPE: NORMAL

## 2025-01-03 PROCEDURE — 93296 REM INTERROG EVL PM/IDS: CPT | Performed by: INTERNAL MEDICINE

## 2025-01-03 PROCEDURE — 93294 REM INTERROG EVL PM/LDLS PM: CPT | Performed by: INTERNAL MEDICINE

## 2025-04-02 DIAGNOSIS — G47.33 OSA (OBSTRUCTIVE SLEEP APNEA): Primary | ICD-10-CM

## 2025-04-02 NOTE — PROGRESS NOTES
CPAP DME order signed. Due for follow-up next year  Respironics  Auto-PAP 10.0 - 16.0 cmH2O 30 day usage data:    93% of days with > 4 hours of use. 0/30 days with no use.   Average use 399 minutes per day.   Average leak 24.1 LPM.  Average % of night in large leak 0%.    CPAP 90% pressure 11.5cm.   AHI 3.87 events per hour.   Bennett Goltz, PA-C

## 2025-04-21 ENCOUNTER — ANCILLARY PROCEDURE (OUTPATIENT)
Dept: BONE DENSITY | Facility: CLINIC | Age: 85
End: 2025-04-21
Attending: INTERNAL MEDICINE
Payer: COMMERCIAL

## 2025-04-21 DIAGNOSIS — Z78.0 ASYMPTOMATIC POSTMENOPAUSAL STATUS: ICD-10-CM

## 2025-04-21 PROCEDURE — 77080 DXA BONE DENSITY AXIAL: CPT | Mod: TC | Performed by: PHYSICIAN ASSISTANT

## 2025-04-23 ENCOUNTER — ANCILLARY PROCEDURE (OUTPATIENT)
Dept: CARDIOLOGY | Facility: CLINIC | Age: 85
End: 2025-04-23
Attending: INTERNAL MEDICINE
Payer: COMMERCIAL

## 2025-04-23 DIAGNOSIS — I44.2 ATRIOVENTRICULAR BLOCK, COMPLETE (H): ICD-10-CM

## 2025-04-23 DIAGNOSIS — Z95.0 CARDIAC PACEMAKER IN SITU: ICD-10-CM

## 2025-04-23 PROCEDURE — 93280 PM DEVICE PROGR EVAL DUAL: CPT | Performed by: INTERNAL MEDICINE

## 2025-04-24 LAB
MDC_IDC_LEAD_CONNECTION_STATUS: NORMAL
MDC_IDC_LEAD_CONNECTION_STATUS: NORMAL
MDC_IDC_LEAD_IMPLANT_DT: NORMAL
MDC_IDC_LEAD_IMPLANT_DT: NORMAL
MDC_IDC_LEAD_LOCATION: NORMAL
MDC_IDC_LEAD_LOCATION: NORMAL
MDC_IDC_LEAD_LOCATION_DETAIL_1: NORMAL
MDC_IDC_LEAD_LOCATION_DETAIL_1: NORMAL
MDC_IDC_LEAD_MFG: NORMAL
MDC_IDC_LEAD_MFG: NORMAL
MDC_IDC_LEAD_MODEL: NORMAL
MDC_IDC_LEAD_MODEL: NORMAL
MDC_IDC_LEAD_POLARITY_TYPE: NORMAL
MDC_IDC_LEAD_POLARITY_TYPE: NORMAL
MDC_IDC_LEAD_SERIAL: NORMAL
MDC_IDC_LEAD_SERIAL: NORMAL
MDC_IDC_MSMT_BATTERY_DTM: NORMAL
MDC_IDC_MSMT_BATTERY_IMPEDANCE: 1976 OHM
MDC_IDC_MSMT_BATTERY_REMAINING_LONGEVITY: 40 MO
MDC_IDC_MSMT_BATTERY_STATUS: NORMAL
MDC_IDC_MSMT_BATTERY_VOLTAGE: 2.74 V
MDC_IDC_MSMT_LEADCHNL_RA_IMPEDANCE_VALUE: 687 OHM
MDC_IDC_MSMT_LEADCHNL_RA_PACING_THRESHOLD_AMPLITUDE: 0.38 V
MDC_IDC_MSMT_LEADCHNL_RA_PACING_THRESHOLD_AMPLITUDE: 0.5 V
MDC_IDC_MSMT_LEADCHNL_RA_PACING_THRESHOLD_PULSEWIDTH: 0.4 MS
MDC_IDC_MSMT_LEADCHNL_RA_PACING_THRESHOLD_PULSEWIDTH: 0.4 MS
MDC_IDC_MSMT_LEADCHNL_RA_SENSING_INTR_AMPL: 1.4 MV
MDC_IDC_MSMT_LEADCHNL_RV_IMPEDANCE_VALUE: 648 OHM
MDC_IDC_MSMT_LEADCHNL_RV_PACING_THRESHOLD_AMPLITUDE: 1 V
MDC_IDC_MSMT_LEADCHNL_RV_PACING_THRESHOLD_AMPLITUDE: 1 V
MDC_IDC_MSMT_LEADCHNL_RV_PACING_THRESHOLD_PULSEWIDTH: 0.4 MS
MDC_IDC_MSMT_LEADCHNL_RV_PACING_THRESHOLD_PULSEWIDTH: 0.4 MS
MDC_IDC_PG_IMPLANT_DTM: NORMAL
MDC_IDC_PG_MFG: NORMAL
MDC_IDC_PG_MODEL: NORMAL
MDC_IDC_PG_SERIAL: NORMAL
MDC_IDC_PG_TYPE: NORMAL
MDC_IDC_SESS_CLINIC_NAME: NORMAL
MDC_IDC_SESS_DTM: NORMAL
MDC_IDC_SESS_TYPE: NORMAL
MDC_IDC_SET_BRADY_AT_MODE_SWITCH_MODE: NORMAL
MDC_IDC_SET_BRADY_AT_MODE_SWITCH_RATE: 175 {BEATS}/MIN
MDC_IDC_SET_BRADY_LOWRATE: 60 {BEATS}/MIN
MDC_IDC_SET_BRADY_MAX_SENSOR_RATE: 130 {BEATS}/MIN
MDC_IDC_SET_BRADY_MAX_TRACKING_RATE: 130 {BEATS}/MIN
MDC_IDC_SET_BRADY_MODE: NORMAL
MDC_IDC_SET_BRADY_PAV_DELAY_LOW: 200 MS
MDC_IDC_SET_BRADY_SAV_DELAY_LOW: 180 MS
MDC_IDC_SET_LEADCHNL_RA_PACING_AMPLITUDE: 1.5 V
MDC_IDC_SET_LEADCHNL_RA_PACING_CAPTURE_MODE: NORMAL
MDC_IDC_SET_LEADCHNL_RA_PACING_POLARITY: NORMAL
MDC_IDC_SET_LEADCHNL_RA_PACING_PULSEWIDTH: 0.4 MS
MDC_IDC_SET_LEADCHNL_RA_SENSING_POLARITY: NORMAL
MDC_IDC_SET_LEADCHNL_RA_SENSING_SENSITIVITY: 0.7 MV
MDC_IDC_SET_LEADCHNL_RV_PACING_AMPLITUDE: 2 V
MDC_IDC_SET_LEADCHNL_RV_PACING_CAPTURE_MODE: NORMAL
MDC_IDC_SET_LEADCHNL_RV_PACING_POLARITY: NORMAL
MDC_IDC_SET_LEADCHNL_RV_PACING_PULSEWIDTH: 0.4 MS
MDC_IDC_SET_LEADCHNL_RV_SENSING_POLARITY: NORMAL
MDC_IDC_SET_LEADCHNL_RV_SENSING_SENSITIVITY: 2.8 MV
MDC_IDC_SET_ZONE_DETECTION_INTERVAL: 333.33 MS
MDC_IDC_SET_ZONE_DETECTION_INTERVAL: 342.86 MS
MDC_IDC_SET_ZONE_STATUS: NORMAL
MDC_IDC_SET_ZONE_STATUS: NORMAL
MDC_IDC_SET_ZONE_TYPE: NORMAL
MDC_IDC_SET_ZONE_TYPE: NORMAL
MDC_IDC_SET_ZONE_VENDOR_TYPE: NORMAL
MDC_IDC_SET_ZONE_VENDOR_TYPE: NORMAL
MDC_IDC_STAT_AT_BURDEN_PERCENT: 0 %
MDC_IDC_STAT_AT_DTM_END: NORMAL
MDC_IDC_STAT_AT_DTM_START: NORMAL
MDC_IDC_STAT_AT_MODE_SW_COUNT: 8
MDC_IDC_STAT_BRADY_AP_VP_PERCENT: 85 %
MDC_IDC_STAT_BRADY_AP_VS_PERCENT: 0 %
MDC_IDC_STAT_BRADY_AS_VP_PERCENT: 15 %
MDC_IDC_STAT_BRADY_AS_VS_PERCENT: 0 %
MDC_IDC_STAT_BRADY_DTM_END: NORMAL
MDC_IDC_STAT_BRADY_DTM_START: NORMAL
MDC_IDC_STAT_EPISODE_RECENT_COUNT: 0
MDC_IDC_STAT_EPISODE_RECENT_COUNT: 1
MDC_IDC_STAT_EPISODE_RECENT_COUNT_DTM_END: NORMAL
MDC_IDC_STAT_EPISODE_RECENT_COUNT_DTM_END: NORMAL
MDC_IDC_STAT_EPISODE_RECENT_COUNT_DTM_START: NORMAL
MDC_IDC_STAT_EPISODE_RECENT_COUNT_DTM_START: NORMAL
MDC_IDC_STAT_EPISODE_TYPE: NORMAL
MDC_IDC_STAT_EPISODE_TYPE: NORMAL

## 2025-05-14 ENCOUNTER — OFFICE VISIT (OUTPATIENT)
Dept: CARDIOLOGY | Facility: CLINIC | Age: 85
End: 2025-05-14
Payer: COMMERCIAL

## 2025-05-14 VITALS
HEIGHT: 62 IN | BODY MASS INDEX: 31.93 KG/M2 | WEIGHT: 173.5 LBS | DIASTOLIC BLOOD PRESSURE: 80 MMHG | HEART RATE: 64 BPM | SYSTOLIC BLOOD PRESSURE: 132 MMHG

## 2025-05-14 DIAGNOSIS — I10 BENIGN ESSENTIAL HYPERTENSION: ICD-10-CM

## 2025-05-14 DIAGNOSIS — Z95.0 PACEMAKER: Primary | ICD-10-CM

## 2025-05-14 NOTE — PATIENT INSTRUCTIONS
It was nice to meet you.    Thank you for your visit with the [facility] today.    Medication changes and/or recommendations discussed today:   Continue current medications  Elevate legs at rest  Continue routine walking  Recheck remote of pacemaker end of July   If any change in symptoms notify the office   Continue lower sodium diet   Labs with your primary annually       Follow up plan:   - 1 year with Dr. Santacruz     If you have questions or concerns in the meantime please call my nurse team at [phone] or send a timeplazza message for non urgent requests.       Scheduling phone number: [phone]    ________________________________    LUISA Orlando, CNP    [facility]

## 2025-05-14 NOTE — PROGRESS NOTES
Cardiology Clinic Follow up     Malika Velasco MRN# [id number]   YOB: 1940 Age: 84 year old     Primary cardiologist: Dr. 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  Hypertension  Nonobstructive CAD, per CT in 2009.  Dyslipidemia  TRAVIS    Malika was seen last year with Dr. Santacruz 2/2024. Doing well. Blood pressure well controlled.     Today, Malika presents for follow up unaccompanied. She is doing well. She notes her feet feel a little more tight while sitting than normal. No swelling in lower legs.  She lives in an independent apartment. On occasion taking the stairs between level 2-3 she may feel a little winded otherwise no limitation. She routinely walks without limitation.            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  -Continue routine device monitoring.  Has about another 3 years before next GEN change. AP 84.7%,  100%.  Note did have 3 brief mode switches but no EGM's available lasting less than 30 seconds, continue to monitor on future device checks.     Hypertension  -Controlled  -Continues on chlorthalidone 12.5 mg daily, carvedilol 12.5 mg in the morning and 25 mg in the evening and lisinopril 20 mg daily  -Normal renal function, will be due for lab work this summer with her primary provider    Nonobstructive CAD, per CT in 2009.  -Denies any chest pain  -Continues on statin     Dyslipidemia  -LDL well-controlled at 56 continue on atorvastatin 10 mg daily    Possible pedal edema  -Some subjective feeling of feet tightness.  Edema appreciated on exam today but will  "continue to monitor  -Encouraged leg elevation, low-sodium diet    TRAVIS        PLAN:     Continue current medications  Encouraged low-sodium diet encouraged low-sodium diet, Mediterranean diet  Encouraged increasing physical [facility], she also enjoys routine walking and taking stairs at her  Routine device monitoring, next remote in July  Follow-up in 1 year with Dr. Neeta Key, DNP, APRN, CNP  [facility]     Orders this Visit:  Orders Placed This Encounter   Procedures    Follow-Up with Cardiology     Orders Placed This Encounter   Medications    Multiple Vitamins-Minerals (PRESERVISION AREDS 2 PO)     Sig: Take by mouth 2 times daily.     Medications Discontinued During This Encounter   Medication Reason    fluticasone (FLONASE) 50 MCG/ACT nasal spray        Today's clinic visit entailed:  Review of external notes as documented elsewhere in note  Review of the result(s) of each unique test - Echocardiogram, device check, BMP, lipids  The level of medical decision making during this visit was of moderate complexity.           Physical Exam:   Vitals: /80 (BP Location: Right arm, Patient Position: Sitting, Cuff Size: Adult Regular)   Pulse 64   Ht 1.575 m (5' 2\")   Wt 78.7 kg (173 lb 8 oz)   LMP  (LMP Unknown)   BMI 31.73 kg/m      Body mass index is 31.73 kg/m .  Wt Readings from Last 3 Encounters:   05/14/25 78.7 kg (173 lb 8 oz)   08/29/24 77.1 kg (170 lb)   02/21/24 77.7 kg (171 lb 4.8 oz)      General :   Alert and oriented, in no acute distress.    Respiratory:   Respirations unlabored. Clear bilaterally with no rales, rhonchi, or wheezes.     Cardiovascular:   Rhythm is regular. S1 and S2 are normal. No significant murmur is present.  Left subclavian pacer site intact   Extremities: Warm and dry, no lower edema   Neurologic: Moves all extremities, non focal    Psych:  Appropriate             Medications:     Current " Outpatient Medications   Medication Sig Dispense Refill    aspirin (ASA) 81 MG EC tablet Take 1 tablet (81 mg) by mouth daily      atorvastatin (LIPITOR) 10 MG tablet Take 1 tablet (10 mg) by mouth daily. 90 tablet 4    Calcium Carb-Cholecalciferol 600-800 MG-UNIT TABS       carvedilol (COREG) 12.5 MG tablet TAKE 1 TABLET IN THE MORNING AND 2 TABLETS (25 MG) IN THE EVENING 270 tablet 4    cetirizine (ZYRTEC) 10 MG tablet Take 1 tablet (10 mg) by mouth daily 30 tablet 3    chlorthalidone (HYGROTON) 25 MG tablet Take 0.5 tablets (12.5 mg) by mouth daily. 45 tablet 4    fish oil-omega-3 fatty acids 1000 MG capsule Take 2 g by mouth daily      lisinopril (ZESTRIL) 20 MG tablet Take 1 tablet (20 mg) by mouth daily. 90 tablet 4    MELATONIN PO Take 5 mg by mouth At Bedtime      Multiple Vitamins-Minerals (PRESERVISION AREDS 2 PO) Take by mouth 2 times daily.      multivitamin w/minerals (THERA-VIT-M) tablet Take 1 tablet by mouth daily      potassium chloride glory ER (KLOR-CON M20) 20 MEQ CR tablet Take 1 tablet (20 mEq) by mouth daily. 90 tablet 4    triamcinolone (NASACORT) 55 MCG/ACT nasal aerosol Spray 2 sprays into both nostrils daily. prn         Family History   Problem Relation Age of Onset    Heart Disease Mother     Dementia Mother     Hypertension Mother     Osteoporosis Mother     Heart Disease Father     Sleep Apnea Sister     Lung Cancer Sister     Cerebrovascular Disease Maternal Grandfather     Hypertension Other     Anesthesia Reaction Other     Hyperlipidemia Other     Hypertension Brother     Other Cancer Brother         lung    Hypertension Sister     Other Cancer Sister         lung    Cerebrovascular Disease Cousin     Breast Cancer Cousin     Breast Cancer Cousin     Breast Cancer Cousin     Breast Cancer Sister     Thyroid Disease Sister     Prostate Cancer Other        Social History     Socioeconomic History    Marital status:      Spouse name: Not on file    Number of children: Not on  file    Years of education: Not on file    Highest education level: Not on file   Occupational History    Not on file   Tobacco Use    Smoking status: Former     Current packs/day: 0.00     Average packs/day: 0.5 packs/day for 3.2 years (1.6 ttl pk-yrs)     Types: Cigarettes     Start date: 1965     Quit date: 1968     Years since quittin.1     Passive exposure: Past    Smokeless tobacco: Never    Tobacco comments:     I quit so long ago i don't remember any problems   Vaping Use    Vaping status: Never Used   Substance and Sexual Activity    Alcohol use: Not Currently     Comment: rarely, yearly    Drug use: Never    Sexual activity: Not Currently     Birth control/protection: None   Other Topics Concern    Parent/sibling w/ CABG, MI or angioplasty before 65F 55M? No   Social History Narrative    Not on file     Social Drivers of Health     Financial Resource Strain: Low Risk  (2024)    Financial Resource Strain     Within the past 12 months, have you or your family members you live with been unable to get utilities (heat, electricity) when it was really needed?: No   Food Insecurity: Low Risk  (2024)    Food Insecurity     Within the past 12 months, did you worry that your food would run out before you got money to buy more?: No     Within the past 12 months, did the food you bought just not last and you didn t have money to get more?: No   Transportation Needs: Low Risk  (2024)    Transportation Needs     Within the past 12 months, has lack of transportation kept you from medical appointments, getting your medicines, non-medical meetings or appointments, work, or from getting things that you need?: No   Physical Activity: Not on file   Stress: No Stress Concern Present (2024)    [employer] [place] [employer] - Occupational Stress Questionnaire     Feeling of Stress : Only a little   Social Connections: Not on file   Interpersonal Safety: Low Risk  (2024)     Interpersonal Safety     Do you feel physically and emotionally safe where you currently live?: Yes     Within the past 12 months, have you been hit, slapped, kicked or otherwise physically hurt by someone?: No     Within the past 12 months, have you been humiliated or emotionally abused in other ways by your partner or ex-partner?: No   Housing Stability: Low Risk  (8/25/2024)    Housing Stability     Do you have housing? : Yes     Are you worried about losing your housing?: No          Past Medical History:     Past Medical History:   Diagnosis Date    Allergic rhinitis     Anxiety     Aortic valve sclerosis     Back pain     Depressive disorder 2012    started meds stress    GERD (gastroesophageal reflux disease)     Heart block AV complete (H) 2008    pacemaker    Hyperlipidemia     Hypertension     Major depression     TRAVIS (obstructive sleep apnea)     moderate    Osteoarthritis     Pacemaker     Medtronic    PONV (postoperative nausea and vomiting)     Squamous cell carcinoma, face             Past Surgical History:     Past Surgical History:   Procedure Laterality Date    ARTHROPLASTY KNEE Right 03/08/2022    Procedure: Right total knee arthroplasty;  Surgeon: Garth Constantino MD;  Location: [facility] OR    ARTHROSCOPY KNEE Left     AS TOTAL KNEE ARTHROPLASTY Left 05/2015    CATARACT IOL, RT/LT Bilateral 2013    COLONOSCOPY  12/2012    normal    IMPLANT PACEMAKER  1997    TONSILLECTOMY      TUBAL LIGATION              Allergies:   Zantac [ranitidine]       Data Reviewed today:   Most Recent Echocardiogram: 2/2023  Summary  Left ventricular systolic function is normal.The visual ejection fraction is  55-60%.  The right ventricular systolic function is normal.  There is trace aortic regurgitation.  The inferior vena cava was normal in size with preserved respiratory  variability.      Device interrogation: In clinic 4/2025  Medtronic Adapta (D) Pacemaker Device Check  Patient seen in clinic for device evaluation and  iterative programming.      Mode: DDDR    Underlying Rhythm: SBrady in the 40-50s with CHB and no junctional escape at VVI 30      Pacing/Histogram Data Since: 2/21/24   AP: 84.7%  : 100%  Heart Rate: Stable with good variability.  HRs per histogram range primarily from .      Sensing: RA: WNL, RV: Unable to obtain due to no intrinsic at VVI 30   Pacing Threshold: Stable   Impedance: Stable   Battery Status: Estimated at 3.5 years remaining (minimum 1 year)  Device Site: Well healed, left sided      Arrhythmia Data Since: 1/3/2025  Atrial Arrhythmia: 3 mode switches logged with no EGMs available (implying episodes lasted <30 seconds each).  Total burden <0.1% since 2/21/24.    Ventricular Arrhythmia: 0     Setting Change: RA sensitivity changed from 1.0mV to 0.7mV, maintaining appropriate safety margin and per clinic protocol.       Care Plan: Remote device check scheduled for 7/29/25.    All laboratory data reviewed:    Recent Labs   Lab Test 08/26/24  0750 06/22/23  1020 02/24/22  1047   LDL 56 45 52   HDL 50 48* 64   NHDL 83 92 86   CHOL 133 140 150   TRIG 133 236* 172*   TSH 2.02  --   --        Lab Results   Component Value Date    WBC 4.3 08/26/2024    WBC 10.8 01/14/2020    RBC 4.95 08/26/2024    RBC 5.17 01/14/2020    HGB 15.6 08/26/2024    HGB 13.9 01/14/2020    HCT 43.0 08/26/2024    HCT 41.4 01/14/2020    MCV 87 08/26/2024    MCV 80 01/14/2020    MCH 31.5 08/26/2024    MCH 26.9 01/14/2020    MCHC 36.3 08/26/2024    MCHC 33.6 01/14/2020    RDW 12.4 08/26/2024    RDW 14.7 01/14/2020     08/26/2024     01/14/2020       Lab Results   Component Value Date     08/26/2024     02/09/2021    POTASSIUM 3.9 08/26/2024    POTASSIUM 3.9 03/09/2022    POTASSIUM 4.2 02/09/2021    CHLORIDE 100 08/26/2024    CHLORIDE 100 03/09/2022    CHLORIDE 101 02/09/2021    CO2 23 08/26/2024    CO2 23 03/09/2022    CO2 27 02/09/2021    ANIONGAP 12 08/26/2024    ANIONGAP 8 03/09/2022    ANIONGAP  5 02/09/2021     (H) 08/26/2024    GLC 95 03/09/2022    GLC 95 03/09/2022     (H) 02/09/2021    BUN 16.8 08/26/2024    BUN 23 03/09/2022    BUN 13 02/09/2021    CR 0.59 08/26/2024    CR 0.66 02/09/2021    GFRESTIMATED 90 08/26/2024    GFRESTIMATED 85 02/09/2021    GFRESTBLACK >90 02/09/2021    SAVANNAH 9.3 08/26/2024    SAVANNAH 9.7 02/09/2021      Lab Results   Component Value Date    AST 20 08/26/2024    AST 31 03/01/2017    ALT 10 08/26/2024    ALT 5 04/10/2018       Lab Results   Component Value Date    A1C 5.2 08/26/2024       The longitudinal plan of care for Malika was addressed during this visit. Due to the added complexity in care, I will continue to support Malika in the subsequent management of this condition(s) and with the ongoing continuity of care of this condition(s).    This note has been dictated using voice recognition software. Any grammatical, typographical, or context distortions are unintentional and inherent to the software.

## 2025-05-14 NOTE — LETTER
5/14/2025    Kendra Durand MD  [address] [facility] [address]    RE: Malika Velasco       Dear Colleague,     I had the pleasure of seeing Malika Velasco in the [facility].        Cardiology Clinic Follow up     Malika Velasco MRN# [id number]   YOB: 1940 Age: 84 year old     Primary cardiologist: Dr. 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  Hypertension  Nonobstructive CAD, per CT in 2009.  Dyslipidemia  TRAVIS    Malika was seen last year with Dr. Santacruz 2/2024. Doing well. Blood pressure well controlled.     Today, Malika presents for follow up unaccompanied. She is doing well. She notes her feet feel a little more tight while sitting than normal. No swelling in lower legs.  She lives in an independent apartment. On occasion taking the stairs between level 2-3 she may feel a little winded otherwise no limitation. She routinely walks without limitation.            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  -Continue routine device monitoring.  Has about another 3 years before next GEN change. AP 84.7%,  100%.  Note did have 3 brief mode switches but no EGM's available lasting less than 30 seconds, continue to monitor on future device checks.     Hypertension  -Controlled  -Continues on chlorthalidone 12.5 mg daily, carvedilol 12.5 mg in the morning and 25 mg in the evening and lisinopril 20 mg daily  -Normal renal function, will be due for lab work this summer with her primary provider    Nonobstructive CAD, per CT in 2009.  -Denies any chest  "pain  -Continues on statin     Dyslipidemia  -LDL well-controlled at 56 continue on atorvastatin 10 mg daily    Possible pedal edema  -Some subjective feeling of feet tightness.  Edema appreciated on exam today but will continue to monitor  -Encouraged leg elevation, low-sodium diet    TRAVIS        PLAN:     Continue current medications  Encouraged low-sodium diet encouraged low-sodium diet, Mediterranean diet  Encouraged increasing physical [facility], she also enjoys routine walking and taking stairs at her  Routine device monitoring, next remote in July  Follow-up in 1 year with Dr. Neeta Key, DNP, APRN, CNP  [facility]     Orders this Visit:  Orders Placed This Encounter   Procedures     Follow-Up with Cardiology     Orders Placed This Encounter   Medications     Multiple Vitamins-Minerals (PRESERVISION AREDS 2 PO)     Sig: Take by mouth 2 times daily.     Medications Discontinued During This Encounter   Medication Reason     fluticasone (FLONASE) 50 MCG/ACT nasal spray        Today's clinic visit entailed:  Review of external notes as documented elsewhere in note  Review of the result(s) of each unique test - Echocardiogram, device check, BMP, lipids  The level of medical decision making during this visit was of moderate complexity.           Physical Exam:   Vitals: /80 (BP Location: Right arm, Patient Position: Sitting, Cuff Size: Adult Regular)   Pulse 64   Ht 1.575 m (5' 2\")   Wt 78.7 kg (173 lb 8 oz)   LMP  (LMP Unknown)   BMI 31.73 kg/m      Body mass index is 31.73 kg/m .  Wt Readings from Last 3 Encounters:   05/14/25 78.7 kg (173 lb 8 oz)   08/29/24 77.1 kg (170 lb)   02/21/24 77.7 kg (171 lb 4.8 oz)      General :   Alert and oriented, in no acute distress.    Respiratory:   Respirations unlabored. Clear bilaterally with no rales, rhonchi, or wheezes.     Cardiovascular:   Rhythm is regular. S1 and S2 are " normal. No significant murmur is present.  Left subclavian pacer site intact   Extremities: Warm and dry, no lower edema   Neurologic: Moves all extremities, non focal    Psych:  Appropriate             Medications:     Current Outpatient Medications   Medication Sig Dispense Refill     aspirin (ASA) 81 MG EC tablet Take 1 tablet (81 mg) by mouth daily       atorvastatin (LIPITOR) 10 MG tablet Take 1 tablet (10 mg) by mouth daily. 90 tablet 4     Calcium Carb-Cholecalciferol 600-800 MG-UNIT TABS        carvedilol (COREG) 12.5 MG tablet TAKE 1 TABLET IN THE MORNING AND 2 TABLETS (25 MG) IN THE EVENING 270 tablet 4     cetirizine (ZYRTEC) 10 MG tablet Take 1 tablet (10 mg) by mouth daily 30 tablet 3     chlorthalidone (HYGROTON) 25 MG tablet Take 0.5 tablets (12.5 mg) by mouth daily. 45 tablet 4     fish oil-omega-3 fatty acids 1000 MG capsule Take 2 g by mouth daily       lisinopril (ZESTRIL) 20 MG tablet Take 1 tablet (20 mg) by mouth daily. 90 tablet 4     MELATONIN PO Take 5 mg by mouth At Bedtime       Multiple Vitamins-Minerals (PRESERVISION AREDS 2 PO) Take by mouth 2 times daily.       multivitamin w/minerals (THERA-VIT-M) tablet Take 1 tablet by mouth daily       potassium chloride glory ER (KLOR-CON M20) 20 MEQ CR tablet Take 1 tablet (20 mEq) by mouth daily. 90 tablet 4     triamcinolone (NASACORT) 55 MCG/ACT nasal aerosol Spray 2 sprays into both nostrils daily. prn         Family History   Problem Relation Age of Onset     Heart Disease Mother      Dementia Mother      Hypertension Mother      Osteoporosis Mother      Heart Disease Father      Sleep Apnea Sister      Lung Cancer Sister      Cerebrovascular Disease Maternal Grandfather      Hypertension Other      Anesthesia Reaction Other      Hyperlipidemia Other      Hypertension Brother      Other Cancer Brother         lung     Hypertension Sister      Other Cancer Sister         lung     Cerebrovascular Disease Cousin      Breast Cancer Cousin       Breast Cancer Cousin      Breast Cancer Cousin      Breast Cancer Sister      Thyroid Disease Sister      Prostate Cancer Other        Social History     Socioeconomic History     Marital status:      Spouse name: Not on file     Number of children: Not on file     Years of education: Not on file     Highest education level: Not on file   Occupational History     Not on file   Tobacco Use     Smoking status: Former     Current packs/day: 0.00     Average packs/day: 0.5 packs/day for 3.2 years (1.6 ttl pk-yrs)     Types: Cigarettes     Start date: 1965     Quit date: 1968     Years since quittin.1     Passive exposure: Past     Smokeless tobacco: Never     Tobacco comments:     I quit so long ago i don't remember any problems   Vaping Use     Vaping status: Never Used   Substance and Sexual Activity     Alcohol use: Not Currently     Comment: rarely, yearly     Drug use: Never     Sexual activity: Not Currently     Birth control/protection: None   Other Topics Concern     Parent/sibling w/ CABG, MI or angioplasty before 65F 55M? No   Social History Narrative     Not on file     Social Drivers of Health     Financial Resource Strain: Low Risk  (2024)    Financial Resource Strain      Within the past 12 months, have you or your family members you live with been unable to get utilities (heat, electricity) when it was really needed?: No   Food Insecurity: Low Risk  (2024)    Food Insecurity      Within the past 12 months, did you worry that your food would run out before you got money to buy more?: No      Within the past 12 months, did the food you bought just not last and you didn t have money to get more?: No   Transportation Needs: Low Risk  (2024)    Transportation Needs      Within the past 12 months, has lack of transportation kept you from medical appointments, getting your medicines, non-medical meetings or appointments, work, or from getting things that you need?: No    Physical Activity: Not on file   Stress: No Stress Concern Present (8/25/2024)    [employer] - Occupational Stress Questionnaire      Feeling of Stress : Only a little   Social Connections: Not on file   Interpersonal Safety: Low Risk  (8/29/2024)    Interpersonal Safety      Do you feel physically and emotionally safe where you currently live?: Yes      Within the past 12 months, have you been hit, slapped, kicked or otherwise physically hurt by someone?: No      Within the past 12 months, have you been humiliated or emotionally abused in other ways by your partner or ex-partner?: No   Housing Stability: Low Risk  (8/25/2024)    Housing Stability      Do you have housing? : Yes      Are you worried about losing your housing?: No          Past Medical History:     Past Medical History:   Diagnosis Date     Allergic rhinitis      Anxiety      Aortic valve sclerosis      Back pain      Depressive disorder 2012    started meds stress     GERD (gastroesophageal reflux disease)      Heart block AV complete (H) 2008    pacemaker     Hyperlipidemia      Hypertension      Major depression      TRAVIS (obstructive sleep apnea)     moderate     Osteoarthritis      Pacemaker     Medtronic     PONV (postoperative nausea and vomiting)      Squamous cell carcinoma, face             Past Surgical History:     Past Surgical History:   Procedure Laterality Date     ARTHROPLASTY KNEE Right 03/08/2022    Procedure: Right total knee arthroplasty;  Surgeon: Garth Constantino MD;  Location: [facility] OR     ARTHROSCOPY KNEE Left      AS TOTAL KNEE ARTHROPLASTY Left 05/2015     CATARACT IOL, RT/LT Bilateral 2013     COLONOSCOPY  12/2012    normal     IMPLANT PACEMAKER  1997     TONSILLECTOMY       TUBAL LIGATION              Allergies:   Zantac [ranitidine]       Data Reviewed today:   Most Recent Echocardiogram: 2/2023  Summary  Left ventricular systolic function is normal.The visual ejection fraction  is  55-60%.  The right ventricular systolic function is normal.  There is trace aortic regurgitation.  The inferior vena cava was normal in size with preserved respiratory  variability.      Device interrogation: In clinic 4/2025  Medtronic Adapta (D) Pacemaker Device Check  Patient seen in clinic for device evaluation and iterative programming.      Mode: DDDR    Underlying Rhythm: SBrady in the 40-50s with CHB and no junctional escape at VVI 30      Pacing/Histogram Data Since: 2/21/24   AP: 84.7%  : 100%  Heart Rate: Stable with good variability.  HRs per histogram range primarily from .      Sensing: RA: WNL, RV: Unable to obtain due to no intrinsic at VVI 30   Pacing Threshold: Stable   Impedance: Stable   Battery Status: Estimated at 3.5 years remaining (minimum 1 year)  Device Site: Well healed, left sided      Arrhythmia Data Since: 1/3/2025  Atrial Arrhythmia: 3 mode switches logged with no EGMs available (implying episodes lasted <30 seconds each).  Total burden <0.1% since 2/21/24.    Ventricular Arrhythmia: 0     Setting Change: RA sensitivity changed from 1.0mV to 0.7mV, maintaining appropriate safety margin and per clinic protocol.       Care Plan: Remote device check scheduled for 7/29/25.    All laboratory data reviewed:    Recent Labs   Lab Test 08/26/24  0750 06/22/23  1020 02/24/22  1047   LDL 56 45 52   HDL 50 48* 64   NHDL 83 92 86   CHOL 133 140 150   TRIG 133 236* 172*   TSH 2.02  --   --        Lab Results   Component Value Date    WBC 4.3 08/26/2024    WBC 10.8 01/14/2020    RBC 4.95 08/26/2024    RBC 5.17 01/14/2020    HGB 15.6 08/26/2024    HGB 13.9 01/14/2020    HCT 43.0 08/26/2024    HCT 41.4 01/14/2020    MCV 87 08/26/2024    MCV 80 01/14/2020    MCH 31.5 08/26/2024    MCH 26.9 01/14/2020    MCHC 36.3 08/26/2024    MCHC 33.6 01/14/2020    RDW 12.4 08/26/2024    RDW 14.7 01/14/2020     08/26/2024     01/14/2020       Lab Results   Component Value Date    NA  135 08/26/2024     02/09/2021    POTASSIUM 3.9 08/26/2024    POTASSIUM 3.9 03/09/2022    POTASSIUM 4.2 02/09/2021    CHLORIDE 100 08/26/2024    CHLORIDE 100 03/09/2022    CHLORIDE 101 02/09/2021    CO2 23 08/26/2024    CO2 23 03/09/2022    CO2 27 02/09/2021    ANIONGAP 12 08/26/2024    ANIONGAP 8 03/09/2022    ANIONGAP 5 02/09/2021     (H) 08/26/2024    GLC 95 03/09/2022    GLC 95 03/09/2022     (H) 02/09/2021    BUN 16.8 08/26/2024    BUN 23 03/09/2022    BUN 13 02/09/2021    CR 0.59 08/26/2024    CR 0.66 02/09/2021    GFRESTIMATED 90 08/26/2024    GFRESTIMATED 85 02/09/2021    GFRESTBLACK >90 02/09/2021    SAVANNAH 9.3 08/26/2024    SAVANNAH 9.7 02/09/2021      Lab Results   Component Value Date    AST 20 08/26/2024    AST 31 03/01/2017    ALT 10 08/26/2024    ALT 5 04/10/2018       Lab Results   Component Value Date    A1C 5.2 08/26/2024       The longitudinal plan of care for Malika was addressed during this visit. Due to the added complexity in care, I will continue to support Malika in the subsequent management of this condition(s) and with the ongoing continuity of care of this condition(s).    This note has been dictated using voice recognition software. Any grammatical, typographical, or context distortions are unintentional and inherent to the software.    Thank you for allowing me to participate in the care of your patient.      Sincerely,     LUISA Major [facility]  cc:   Kendra Durand [place]  [place] [address]

## 2025-06-28 ASSESSMENT — SLEEP AND FATIGUE QUESTIONNAIRES
HOW LIKELY ARE YOU TO NOD OFF OR FALL ASLEEP WHILE SITTING AND TALKING TO SOMEONE: SLIGHT CHANCE OF DOZING
HOW LIKELY ARE YOU TO NOD OFF OR FALL ASLEEP IN A CAR, WHILE STOPPED FOR A FEW MINUTES IN TRAFFIC: WOULD NEVER DOZE
HOW LIKELY ARE YOU TO NOD OFF OR FALL ASLEEP WHILE SITTING AND READING: MODERATE CHANCE OF DOZING
HOW LIKELY ARE YOU TO NOD OFF OR FALL ASLEEP WHILE WATCHING TV: MODERATE CHANCE OF DOZING
HOW LIKELY ARE YOU TO NOD OFF OR FALL ASLEEP WHILE SITTING INACTIVE IN A PUBLIC PLACE: SLIGHT CHANCE OF DOZING
HOW LIKELY ARE YOU TO NOD OFF OR FALL ASLEEP WHILE LYING DOWN TO REST IN THE AFTERNOON WHEN CIRCUMSTANCES PERMIT: SLIGHT CHANCE OF DOZING
HOW LIKELY ARE YOU TO NOD OFF OR FALL ASLEEP WHEN YOU ARE A PASSENGER IN A CAR FOR AN HOUR WITHOUT A BREAK: SLIGHT CHANCE OF DOZING
HOW LIKELY ARE YOU TO NOD OFF OR FALL ASLEEP WHILE SITTING QUIETLY AFTER LUNCH WITHOUT ALCOHOL: MODERATE CHANCE OF DOZING

## 2025-07-02 NOTE — PROGRESS NOTES
CPAP Follow-Up Visit:    Date on this visit: 7/3/2025    Malika Velasco has a follow-up of her treatment of TRAVIS and insomnia. She was initially seen at the [facility] for management of previously diagnosed TRAVIS. She has not been sleeping well for several months.  Her medical history is significant for HTN, aortic valve sclerosis, pacemaker for AV block, hyperlipidemia, OA.       Malika had a PSG at [facility] on 1/20/2005. Her AHI was 18/hr, RDI 34/hr, O2 ankit 83%. Her weight was 139. She did not have any significant PLMs.         PAP machine: [employer] Dreamstation 2. Last one through  insurance was 11/2019.  Auto-PAP 10.0 - 16.0 cmH2O 30 day usage data:    96% of days with > 4 hours of use. 0/30 days with no use.   Average use 406 minutes per day.   Average leak 25.59 LPM.  Average % of night in large leak 0%.    CPAP 90% pressure 11.5cm.   AHI 4.21 events per hour.      She has not issues with the CPAP. She sometimes has to adjust the mask due to sleeping on her side. She falls asleep quickly, but sometimes wakes. If she has something on her mind, she may have difficulty getting back to sleep. That is maybe a couple of times per month.         No specialty comments available.    Do you use a CPAP Machine at home: (Patient-Rptd) Yes  Overall, on a scale of 0-10 how would you rate your CPAP (0 poor, 10 great):      What type of mask do you use: (Patient-Rptd) Full Face Mask  Is your mask comfortable: (Patient-Rptd) Yes  If not, why:    How often do you replace supplies: about every 6 months.     Is your mask leaking: (Patient-Rptd) No  If yes, where do you feel it:    How many night per week does the mask leak (0-7):      Do you notice snoring with mask on: (Patient-Rptd) No  Do you notice gasping arousals with mask on: (Patient-Rptd) No  Are you having significant oral or nasal dryness: (Patient-Rptd) No not often  Are you using the humidifier: yes  Does the water chamber run out  before the night is over:no  Do you get condensation in the mask or hose:no  Is the pressure setting comfortable: (Patient-Rptd) Yes  If not, why:      Typical bedtime: (Patient-Rptd) 11:00  Sleep latency on PAP therapy: (Patient-Rptd) 5 minutes  Typical wake time: (Patient-Rptd) 6:20  Wakes 1 times per night for 10 minutes usually, 2 times per month can be 60 minutes. Reason for waking: restroom  How many hours on average per night are you using PAP therapy: (Patient-Rptd) 6/7  How many hours are you sleeping per night: (Patient-Rptd) 6/7  Do you feel well rested in the morning:      Naps: she can doze without trying, but usually can't fall asleep if she tries to nap         Weight change since sleep study: 178 lbs      Past medical/surgical history, family history, social history, medications and allergies were reviewed.      Problem List:  Patient Active Problem List    Diagnosis Date Noted    Third-degree AV block status post pacemaker placement in 1997. 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  Added by Vicky Antony [facility] Advance Care Planning Liaison      TRAVIS (obstructive sleep apnea)      Priority: Medium     Moderate on CPAP      Aortic valve sclerosis      Priority: Medium    Pacemaker 03/09/2017     Priority: Medium      For complete heart block    Overview:   -placed 11/21/1197 due to complete heart block with syncope  -generator change 5/21/2007 for KAREN  -KAREN reached 2/14/2015 - generator change 3/31/2015      Hyperlipidemia LDL goal <130 03/09/2017     Priority: Medium    HTN, goal below 140/90 03/09/2017     Priority: Medium    Osteopenia 03/09/2017     Priority: Medium    Back pain      Priority: Medium    Allergic rhinitis due to pollen 12/09/2015     Priority: Medium        Impression/Plan:    (G47.33) TRAVIS (obstructive sleep apnea)  (primary encounter diagnosis)  Comment: Malika is using CPAP regularly and benefits from use. She has no concerns about the machine. She sometimes has to adjust the mask as she is a side sleeper, but her download shows excellent mask seal.   Plan: Comprehensive DME        Continue auto CPAP 10-16 cm. A prescription was written for new supplies. We reviewed recommendations for cleaning and replacing supplies. I put in an order for a replacement machine which she will plan to get early next year so her 1 year follow-up is well-timed to review how she is doing on the new machine.        She will follow up with me in about 1 year(s).     24 minutes were spent on the date of the encounter doing chart review, history and exam, documentation and further activities as noted above.     Bennett Goltz, PA-C    CC: No ref. provider found

## 2025-07-03 ENCOUNTER — OFFICE VISIT (OUTPATIENT)
Dept: SLEEP MEDICINE | Facility: CLINIC | Age: 85
End: 2025-07-03
Payer: COMMERCIAL

## 2025-07-03 VITALS
HEART RATE: 83 BPM | OXYGEN SATURATION: 96 % | DIASTOLIC BLOOD PRESSURE: 82 MMHG | HEIGHT: 63 IN | BODY MASS INDEX: 31.57 KG/M2 | WEIGHT: 178.2 LBS | SYSTOLIC BLOOD PRESSURE: 149 MMHG

## 2025-07-03 DIAGNOSIS — G47.33 OSA (OBSTRUCTIVE SLEEP APNEA): Primary | ICD-10-CM

## (undated) DEVICE — SET HANDPIECE INTERPULSE W/COAXIAL FAN SPRAY TIP 0210118000

## (undated) DEVICE — BLADE SAW RECIP STRK LONG 70X12.5X0.9MM 0277-096-278

## (undated) DEVICE — GLOVE PROTEXIS W/NEU-THERA 8.5  2D73TE85

## (undated) DEVICE — SUCTION MANIFOLD NEPTUNE 2 SYS 4 PORT 0702-020-000

## (undated) DEVICE — SOL NACL 0.9% IRRIG 1000ML BOTTLE 2F7124

## (undated) DEVICE — LINEN ORTHO ACL PACK 5447

## (undated) DEVICE — PACK TOTAL KNEE BOXED LATEX FREE PO15TKFCT

## (undated) DEVICE — TOURNIQUET SGL  BLADDER 30"X4" BLUE 5921030135

## (undated) DEVICE — GLOVE PROTEXIS POWDER FREE 8.0 ORTHOPEDIC 2D73ET80

## (undated) DEVICE — Device

## (undated) DEVICE — SOL NACL 0.9% IRRIG 3000ML BAG 2B7477

## (undated) DEVICE — LINEN FULL SHEET 5511

## (undated) DEVICE — IMM KNEE 20"

## (undated) DEVICE — SU VICRYL 1 CT-1 27" J341H

## (undated) DEVICE — SU VICRYL 2-0 CT-1 27" UND J259H

## (undated) DEVICE — BLADE SAW SAGITTAL STRK 13X90X1.27MM HD SYS 6 6113-127-090

## (undated) DEVICE — DRAPE STERI TOWEL LG 1010

## (undated) DEVICE — CAST PADDING 6" STERILE 9046S

## (undated) DEVICE — BONE CEMENT MIXEVAC III HI VAC KIT  0206-015-000

## (undated) DEVICE — GLOVE PROTEXIS BLUE W/NEU-THERA 8.5  2D73EB85

## (undated) DEVICE — PREP CHLORAPREP 26ML TINTED HI-LITE ORANGE 930815

## (undated) DEVICE — BLADE SAW SAGITTAL STRK 25X90X1.27MM HD SYS 6 6125-127-090

## (undated) DEVICE — GLOVE PROTEXIS POWDER FREE 8.5 ORTHOPEDIC 2D73ET85

## (undated) DEVICE — GLOVE PROTEXIS POWDER FREE 7.5 ORTHOPEDIC 2D73ET75

## (undated) DEVICE — BAG CLEAR TRASH 1.3M 39X33" P4040C

## (undated) DEVICE — DRSG AQUACEL AG HYDROFIBER  3.5X10" 422605

## (undated) RX ORDER — CEFAZOLIN SODIUM 1 G/3ML
INJECTION, POWDER, FOR SOLUTION INTRAMUSCULAR; INTRAVENOUS
Status: DISPENSED
Start: 2022-03-08

## (undated) RX ORDER — ACETAMINOPHEN 325 MG/1
TABLET ORAL
Status: DISPENSED
Start: 2022-03-08

## (undated) RX ORDER — FENTANYL CITRATE 50 UG/ML
INJECTION, SOLUTION INTRAMUSCULAR; INTRAVENOUS
Status: DISPENSED
Start: 2022-03-08

## (undated) RX ORDER — NEOSTIGMINE METHYLSULFATE 1 MG/ML
VIAL (ML) INJECTION
Status: DISPENSED
Start: 2022-03-08

## (undated) RX ORDER — TRANEXAMIC ACID 650 MG/1
TABLET ORAL
Status: DISPENSED
Start: 2022-03-08

## (undated) RX ORDER — ACETAMINOPHEN 325 MG/1
TABLET ORAL
Status: DISPENSED
Start: 2023-02-27

## (undated) RX ORDER — OXYCODONE HYDROCHLORIDE 5 MG/1
TABLET ORAL
Status: DISPENSED
Start: 2022-03-08

## (undated) RX ORDER — CELECOXIB 200 MG/1
CAPSULE ORAL
Status: DISPENSED
Start: 2022-03-08

## (undated) RX ORDER — VANCOMYCIN HYDROCHLORIDE 1 G/20ML
INJECTION, POWDER, LYOPHILIZED, FOR SOLUTION INTRAVENOUS
Status: DISPENSED
Start: 2022-03-08

## (undated) RX ORDER — GLYCOPYRROLATE 0.2 MG/ML
INJECTION INTRAMUSCULAR; INTRAVENOUS
Status: DISPENSED
Start: 2022-03-08

## (undated) RX ORDER — PROPOFOL 10 MG/ML
INJECTION, EMULSION INTRAVENOUS
Status: DISPENSED
Start: 2022-03-08